# Patient Record
Sex: FEMALE | Race: WHITE
[De-identification: names, ages, dates, MRNs, and addresses within clinical notes are randomized per-mention and may not be internally consistent; named-entity substitution may affect disease eponyms.]

---

## 2020-12-15 ENCOUNTER — HOSPITAL ENCOUNTER (OUTPATIENT)
Dept: HOSPITAL 47 - RADMAMWWP | Age: 60
Discharge: HOME | End: 2020-12-15
Attending: FAMILY MEDICINE
Payer: MEDICARE

## 2020-12-15 DIAGNOSIS — Z98.82: ICD-10-CM

## 2020-12-15 DIAGNOSIS — N63.20: Primary | ICD-10-CM

## 2020-12-15 PROCEDURE — 77066 DX MAMMO INCL CAD BI: CPT

## 2020-12-29 NOTE — MM
Reason for exam: additional evaluation requested from prior study.

Last mammogram was performed 4 years and 6 months ago.



History:

Patient is postmenopausal.

Silicone gel implants in both breasts, 2000.  Benign excisional biopsy of the left

breast.

Took estrogen for 6 months beginning at age 31.  Took progesterone for 6 months 

beginning at age 31.



Physical Findings:

Nurse did not find any significant physical abnormalities on exam.



MG Diag Mamm Implants VICTORINA w CAD

Bilateral CC, MLO, and ID view(s) were taken.  XCCL view(s) were taken of the left

breast.

Prior study comparison: June 1, 2016, mammogram.  May 21, 2015, mammogram.

The breast tissue is heterogeneously dense. This may lower the sensitivity of 

mammography.  Bilateral breast prothesis.

No significant new findings when compared with previous films.





ASSESSMENT: Benign, BI-RAD 2



RECOMMENDATION:

Routine screening mammogram of both breasts in 1 year.

Manage patient on a clinical basis.

## 2021-04-14 ENCOUNTER — HOSPITAL ENCOUNTER (OUTPATIENT)
Dept: HOSPITAL 47 - ORWHC2ENDO | Age: 61
Discharge: HOME | End: 2021-04-14
Attending: INTERNAL MEDICINE
Payer: MEDICARE

## 2021-04-14 VITALS — RESPIRATION RATE: 16 BRPM | TEMPERATURE: 98.5 F

## 2021-04-14 VITALS — SYSTOLIC BLOOD PRESSURE: 136 MMHG | DIASTOLIC BLOOD PRESSURE: 91 MMHG | HEART RATE: 82 BPM

## 2021-04-14 VITALS — BODY MASS INDEX: 27.4 KG/M2

## 2021-04-14 DIAGNOSIS — K21.9: ICD-10-CM

## 2021-04-14 DIAGNOSIS — K22.70: ICD-10-CM

## 2021-04-14 DIAGNOSIS — Z79.899: ICD-10-CM

## 2021-04-14 DIAGNOSIS — E78.49: ICD-10-CM

## 2021-04-14 DIAGNOSIS — K62.5: ICD-10-CM

## 2021-04-14 DIAGNOSIS — I10: ICD-10-CM

## 2021-04-14 DIAGNOSIS — Z88.5: ICD-10-CM

## 2021-04-14 DIAGNOSIS — K31.9: ICD-10-CM

## 2021-04-14 DIAGNOSIS — K64.8: Primary | ICD-10-CM

## 2021-04-14 DIAGNOSIS — Z98.890: ICD-10-CM

## 2021-04-14 PROCEDURE — 88305 TISSUE EXAM BY PATHOLOGIST: CPT

## 2021-04-14 PROCEDURE — 43239 EGD BIOPSY SINGLE/MULTIPLE: CPT

## 2021-04-14 PROCEDURE — 45380 COLONOSCOPY AND BIOPSY: CPT

## 2021-04-14 NOTE — P.PCN
Date of Procedure: 04/14/21


Procedure(s) Performed: 


Brief history:


Patient is a pleasant 60-year-old white female scheduled for an elective upper 

endoscopy as well as colonoscopy as a part of evaluation of abdominal pain, 

heartburn and intermittent rectal bleeding for the last 3 months duration.  





Procedure performed:


Esophagogastroduodenoscopy with biopsy


Colonoscopy with biopsy





Preoperative diagnosis:


Abdominal pain and heartburn


Intermittent rectal bleeding and change in bowel





Anesthesia: MAC





Procedure:


After informed consent was obtained from the patient  was brought into the 

endoscopy unit and IV  sedation was administered by anesthesia under continuous 

monitoring.  Initially upper endoscopy was done.  The Olympus  video 

endoscope was inserted inserted into the mouth and esophagus intubated without 

any difficulty and was gradually advanced into the stomach and duodenum and 

carefully examined.  The bulb and second part of the duodenum appeared normal.  

The scope was then withdrawn into the stomach adequately insufflated with air 

and upon careful examination  the antrum had mild gastritis and biopsies were 

done from this area.  The body, cardia and fundus appeared normal.  The scope 

was then withdrawn into the esophagus.  The GE junction was located at 40 cm to 

the incisors.  It appeared riregular with no erythema erosions or ulcerations. 

there were 2 small tongues of Nolasco's appearing mucosa extending 3-4 mm 

proximal to the GE junction which was biopsied.    Rest of the esophagus 

appeared normal.  Patient tolerated the procedure well.





At this time the patient continued to remain sedation.  Initial digital rectal 

examination was normal.  Olympus  video colonoscope was then inserted into

the rectum and gradually advanced to the cecum without any difficulty.  Careful 

examination was performed as the scope was gradually being withdrawn.  The prep 

was excellent.  The cecum, ascending colon, transverse colon, descending colon, 

sigmoid colon and rectum appeared normal. random biopsies were done from 

ascending and descending colon to rule out microscopic/  Retroflexion was perfo

rmed in the rectum and no lesions were noted.  Patient tolerated the procedure 

well.





Impression:


1.  Upper endoscopy revealed mild antral gastritis and short segment Nolasco's 

esophagus


2.  Colonoscopy revealed small internal hemorrhoids but no evidence of colitis 

or colorectal neoplasia





Recommendations:


Findings of this examination were discussed with the patient as well as] her 

family.  She was advised to follow with the biopsy results.  She will continue 

with a high-fiber diet and take supplements a regular basis.  She can have a 

repeat screening colonoscopy in 10 years.

## 2021-08-27 ENCOUNTER — HOSPITAL ENCOUNTER (OUTPATIENT)
Dept: HOSPITAL 47 - LABWHC1 | Age: 61
Discharge: HOME | End: 2021-08-27
Attending: NURSE PRACTITIONER
Payer: MEDICARE

## 2021-08-27 DIAGNOSIS — F41.0: Primary | ICD-10-CM

## 2021-08-27 DIAGNOSIS — F10.11: ICD-10-CM

## 2021-08-27 LAB
ALBUMIN SERPL-MCNC: 4.7 G/DL (ref 3.8–4.9)
ALBUMIN/GLOB SERPL: 1.88 G/DL (ref 1.6–3.17)
ALP SERPL-CCNC: 71 U/L (ref 41–126)
ALT SERPL-CCNC: 23 U/L (ref 8–44)
ANION GAP SERPL CALC-SCNC: 7.9 MMOL/L (ref 4–12)
AST SERPL-CCNC: 24 U/L (ref 13–35)
BASOPHILS # BLD AUTO: 0.05 X 10*3/UL (ref 0–0.1)
BASOPHILS NFR BLD AUTO: 0.6 %
BUN SERPL-SCNC: 17 MG/DL (ref 9–27)
BUN/CREAT SERPL: 17 RATIO (ref 12–20)
CALCIUM SPEC-MCNC: 9.9 MG/DL (ref 8.7–10.3)
CHLORIDE SERPL-SCNC: 106 MMOL/L (ref 96–109)
CO2 SERPL-SCNC: 28.1 MMOL/L (ref 21.6–31.8)
EOSINOPHIL # BLD AUTO: 0.15 X 10*3/UL (ref 0.04–0.35)
EOSINOPHIL NFR BLD AUTO: 1.8 %
ERYTHROCYTE [DISTWIDTH] IN BLOOD BY AUTOMATED COUNT: 3.51 X 10*6/UL (ref 4.1–5.2)
ERYTHROCYTE [DISTWIDTH] IN BLOOD: 12 % (ref 11.5–14.5)
GLOBULIN SER CALC-MCNC: 2.5 G/DL (ref 1.6–3.3)
GLUCOSE SERPL-MCNC: 93 MG/DL (ref 70–110)
HBA1C MFR BLD: 5.7 % (ref 4–6)
HBV SURFACE AB SERPL IA-ACNC: 679.9 MIU/ML
HCT VFR BLD AUTO: 35.3 % (ref 37.2–46.3)
HGB BLD-MCNC: 11.5 G/DL (ref 12–15)
LYMPHOCYTES # SPEC AUTO: 3.41 X 10*3/UL (ref 0.9–5)
LYMPHOCYTES NFR SPEC AUTO: 40.1 %
MAGNESIUM SPEC-SCNC: 2.1 MG/DL (ref 1.5–2.4)
MCH RBC QN AUTO: 32.8 PG (ref 27–32)
MCHC RBC AUTO-ENTMCNC: 32.6 G/DL (ref 32–37)
MCV RBC AUTO: 100.6 FL (ref 80–97)
MONOCYTES # BLD AUTO: 0.65 X 10*3/UL (ref 0.2–1)
MONOCYTES NFR BLD AUTO: 7.6 %
NEUTROPHILS # BLD AUTO: 4.23 X 10*3/UL (ref 1.8–7.7)
NEUTROPHILS NFR BLD AUTO: 49.7 %
PLATELET # BLD AUTO: 240 X 10*3/UL (ref 140–440)
POTASSIUM SERPL-SCNC: 4.2 MMOL/L (ref 3.5–5.5)
PROT SERPL-MCNC: 7.2 G/DL (ref 6.2–8.2)
SODIUM SERPL-SCNC: 142 MMOL/L (ref 135–145)
T4 FREE SERPL-MCNC: 1 NG/DL (ref 0.8–1.8)
WBC # BLD AUTO: 8.51 X 10*3/UL (ref 4.5–10)

## 2021-08-27 PROCEDURE — 80164 ASSAY DIPROPYLACETIC ACD TOT: CPT

## 2021-08-27 PROCEDURE — 87340 HEPATITIS B SURFACE AG IA: CPT

## 2021-08-27 PROCEDURE — 83735 ASSAY OF MAGNESIUM: CPT

## 2021-08-27 PROCEDURE — 80053 COMPREHEN METABOLIC PANEL: CPT

## 2021-08-27 PROCEDURE — 83036 HEMOGLOBIN GLYCOSYLATED A1C: CPT

## 2021-08-27 PROCEDURE — 87390 HIV-1 AG IA: CPT

## 2021-08-27 PROCEDURE — 86803 HEPATITIS C AB TEST: CPT

## 2021-08-27 PROCEDURE — 36415 COLL VENOUS BLD VENIPUNCTURE: CPT

## 2021-08-27 PROCEDURE — 86140 C-REACTIVE PROTEIN: CPT

## 2021-08-27 PROCEDURE — 84443 ASSAY THYROID STIM HORMONE: CPT

## 2021-08-27 PROCEDURE — 86706 HEP B SURFACE ANTIBODY: CPT

## 2021-08-27 PROCEDURE — 85025 COMPLETE CBC W/AUTO DIFF WBC: CPT

## 2021-08-27 PROCEDURE — 84439 ASSAY OF FREE THYROXINE: CPT

## 2021-08-27 PROCEDURE — 86780 TREPONEMA PALLIDUM: CPT

## 2022-02-10 ENCOUNTER — HOSPITAL ENCOUNTER (OUTPATIENT)
Dept: HOSPITAL 47 - RADUSWWP | Age: 62
Discharge: HOME | End: 2022-02-10
Attending: FAMILY MEDICINE
Payer: MEDICARE

## 2022-02-10 DIAGNOSIS — E83.52: Primary | ICD-10-CM

## 2022-02-10 DIAGNOSIS — Z79.899: ICD-10-CM

## 2022-02-10 DIAGNOSIS — R10.32: ICD-10-CM

## 2022-02-10 DIAGNOSIS — R25.2: ICD-10-CM

## 2022-02-10 LAB
ALBUMIN SERPL-MCNC: 4.5 G/DL (ref 3.8–4.9)
ALBUMIN/GLOB SERPL: 1.73 G/DL (ref 1.6–3.17)
ALP SERPL-CCNC: 78 U/L (ref 41–126)
ALT SERPL-CCNC: 22 U/L (ref 8–44)
AMYLASE SERPL-CCNC: 59 U/L (ref 23–121)
ANION GAP SERPL CALC-SCNC: 14.4 MMOL/L (ref 10–18)
AST SERPL-CCNC: 22 U/L (ref 13–35)
BASOPHILS # BLD AUTO: 0.04 X 10*3/UL (ref 0–0.1)
BASOPHILS NFR BLD AUTO: 0.5 %
BUN SERPL-SCNC: 12.9 MG/DL (ref 9–27)
BUN/CREAT SERPL: 13.96 RATIO (ref 12–20)
CALCIUM SPEC-MCNC: 9.9 MG/DL (ref 8.7–10.3)
CHLORIDE SERPL-SCNC: 101 MMOL/L (ref 96–109)
CHOLEST SERPL-MCNC: 192 MG/DL (ref 0–200)
CO2 SERPL-SCNC: 24 MMOL/L (ref 20–27.5)
EOSINOPHIL # BLD AUTO: 0.1 X 10*3/UL (ref 0.04–0.35)
EOSINOPHIL NFR BLD AUTO: 1.2 %
ERYTHROCYTE [DISTWIDTH] IN BLOOD BY AUTOMATED COUNT: 3.94 X 10*6/UL (ref 4.1–5.2)
ERYTHROCYTE [DISTWIDTH] IN BLOOD: 13.1 % (ref 11.5–14.5)
GLOBULIN SER CALC-MCNC: 2.6 G/DL (ref 1.6–3.3)
GLUCOSE SERPL-MCNC: 90 MG/DL (ref 70–110)
HCT VFR BLD AUTO: 39.3 % (ref 37.2–46.3)
HDLC SERPL-MCNC: 62.2 MG/DL (ref 40–60)
HGB BLD-MCNC: 12.6 G/DL (ref 12–15)
IMM GRANULOCYTES BLD QL AUTO: 0.1 %
LDLC SERPL CALC-MCNC: 100.2 MG/DL (ref 0–131)
LIPASE SERPL-CCNC: 99 U/L (ref 14–63)
LYMPHOCYTES # SPEC AUTO: 2.5 X 10*3/UL (ref 0.9–5)
LYMPHOCYTES NFR SPEC AUTO: 29.6 %
MAGNESIUM SPEC-SCNC: 2.2 MG/DL (ref 1.5–2.4)
MCH RBC QN AUTO: 32 PG (ref 27–32)
MCHC RBC AUTO-ENTMCNC: 32.1 G/DL (ref 32–37)
MCV RBC AUTO: 99.7 FL (ref 80–97)
MONOCYTES # BLD AUTO: 0.77 X 10*3/UL (ref 0.2–1)
MONOCYTES NFR BLD AUTO: 9.1 %
NEUTROPHILS # BLD AUTO: 5.03 X 10*3/UL (ref 1.8–7.7)
NEUTROPHILS NFR BLD AUTO: 59.5 %
NRBC BLD AUTO-RTO: 0 /100 WBCS (ref 0–0)
PLATELET # BLD AUTO: 239 X 10*3/UL (ref 140–440)
POTASSIUM SERPL-SCNC: 4.8 MMOL/L (ref 3.5–5.5)
PROT SERPL-MCNC: 7.1 G/DL (ref 6.2–8.2)
SODIUM SERPL-SCNC: 139 MMOL/L (ref 135–145)
T4 FREE SERPL-MCNC: 1.4 NG/DL (ref 0.8–1.8)
TRIGL SERPL-MCNC: 148 MG/DL (ref 0–149)
VIT B12 SERPL-MCNC: 1204 PG/ML (ref 200–944)
VLDLC SERPL CALC-MCNC: 29.6 MG/DL (ref 5–40)
WBC # BLD AUTO: 8.45 X 10*3/UL (ref 4.5–10)

## 2022-02-10 PROCEDURE — 83036 HEMOGLOBIN GLYCOSYLATED A1C: CPT

## 2022-02-10 PROCEDURE — 82607 VITAMIN B-12: CPT

## 2022-02-10 PROCEDURE — 84439 ASSAY OF FREE THYROXINE: CPT

## 2022-02-10 PROCEDURE — 85025 COMPLETE CBC W/AUTO DIFF WBC: CPT

## 2022-02-10 PROCEDURE — 83735 ASSAY OF MAGNESIUM: CPT

## 2022-02-10 PROCEDURE — 82150 ASSAY OF AMYLASE: CPT

## 2022-02-10 PROCEDURE — 80053 COMPREHEN METABOLIC PANEL: CPT

## 2022-02-10 PROCEDURE — 76700 US EXAM ABDOM COMPLETE: CPT

## 2022-02-10 PROCEDURE — 84436 ASSAY OF TOTAL THYROXINE: CPT

## 2022-02-10 PROCEDURE — 84443 ASSAY THYROID STIM HORMONE: CPT

## 2022-02-10 PROCEDURE — 80061 LIPID PANEL: CPT

## 2022-02-10 PROCEDURE — 82306 VITAMIN D 25 HYDROXY: CPT

## 2022-02-10 PROCEDURE — 82746 ASSAY OF FOLIC ACID SERUM: CPT

## 2022-02-10 PROCEDURE — 84100 ASSAY OF PHOSPHORUS: CPT

## 2022-02-10 PROCEDURE — 83690 ASSAY OF LIPASE: CPT

## 2022-02-10 NOTE — US
EXAMINATION TYPE: US abdomen complete

 

DATE OF EXAM: 2/10/2022

 

COMPARISON: NONE

 

CLINICAL HISTORY: 61-year-old female  ABD PAIN. LLQ Pain x 3 days

 

TECHNIQUE: Multiple sonographic images of the right upper quadrant are obtained.

 

FINDINGS:

 

EXAM MEASUREMENTS:

 

Liver Length:  15.3 cm   

Gallbladder Wall:  0.2 cm   

CBD:  0.4 cm

Spleen:  9.5 cm   

Right Kidney:  11.1 x 4.9 x 4.7 cm 

Left Kidney:  11.3 x 6.3 x 4.8 cm   

 

 

 

Pancreas:  Tail obscured by overlying bowel gas. Visualized portions show no gross abnormality. The m
ain pancreatic duct visualized normal caliber at 2 mm.

Liver:  No masses seen. Partially obstructed by overlying bowel gas.   

Gallbladder:  No stones seen

**Evidence for sonographic Davalos's sign:  No

CBD:  wnl 

Spleen:  Partially Obscured by overlying bowel gas   

Right Kidney:  No hydronephrosis or masses seen   

Left Kidney:  No hydronephrosis or masses seen   

Upper IVC:  wnl  

Abd Aorta:  Proximal abdominal aorta for line ectatic at 2.5 cm.

 

Sonographer notes: All patient's pain is in LLQ. Area scanned. No masses or fluid seen. Entirely obsc
ured by bowel gas.  

 

 

 

IMPRESSION: 

1. Targeted scanning along the left lower quadrant site of patient's pain. The region is obscured by 
bowel gas shadowing. No discrete abnormality of the more superficial tissues.

2. No gallstones or biliary ductal dilatation. No hydronephrosis.

## 2022-09-21 ENCOUNTER — HOSPITAL ENCOUNTER (OUTPATIENT)
Dept: HOSPITAL 47 - EC | Age: 62
Setting detail: OBSERVATION
LOS: 2 days | Discharge: HOME | End: 2022-09-23
Attending: HOSPITALIST | Admitting: HOSPITALIST
Payer: MEDICARE

## 2022-09-21 DIAGNOSIS — R07.89: Primary | ICD-10-CM

## 2022-09-21 DIAGNOSIS — Z88.5: ICD-10-CM

## 2022-09-21 DIAGNOSIS — M79.651: ICD-10-CM

## 2022-09-21 DIAGNOSIS — R94.31: ICD-10-CM

## 2022-09-21 DIAGNOSIS — F41.0: ICD-10-CM

## 2022-09-21 DIAGNOSIS — Z98.890: ICD-10-CM

## 2022-09-21 DIAGNOSIS — I10: ICD-10-CM

## 2022-09-21 DIAGNOSIS — Z79.899: ICD-10-CM

## 2022-09-21 DIAGNOSIS — N17.9: ICD-10-CM

## 2022-09-21 DIAGNOSIS — R73.03: ICD-10-CM

## 2022-09-21 DIAGNOSIS — Z91.81: ICD-10-CM

## 2022-09-21 DIAGNOSIS — Z98.82: ICD-10-CM

## 2022-09-21 DIAGNOSIS — Z88.4: ICD-10-CM

## 2022-09-21 DIAGNOSIS — E78.5: ICD-10-CM

## 2022-09-21 DIAGNOSIS — Z86.010: ICD-10-CM

## 2022-09-21 DIAGNOSIS — F10.21: ICD-10-CM

## 2022-09-21 DIAGNOSIS — R61: ICD-10-CM

## 2022-09-21 DIAGNOSIS — E86.0: ICD-10-CM

## 2022-09-21 DIAGNOSIS — Z87.891: ICD-10-CM

## 2022-09-21 DIAGNOSIS — Z91.040: ICD-10-CM

## 2022-09-21 DIAGNOSIS — G89.29: ICD-10-CM

## 2022-09-21 DIAGNOSIS — Z80.9: ICD-10-CM

## 2022-09-21 DIAGNOSIS — Z87.19: ICD-10-CM

## 2022-09-21 DIAGNOSIS — Z88.7: ICD-10-CM

## 2022-09-21 DIAGNOSIS — R11.2: ICD-10-CM

## 2022-09-21 DIAGNOSIS — I08.1: ICD-10-CM

## 2022-09-21 DIAGNOSIS — Z82.49: ICD-10-CM

## 2022-09-21 DIAGNOSIS — M19.90: ICD-10-CM

## 2022-09-21 DIAGNOSIS — F32.A: ICD-10-CM

## 2022-09-21 DIAGNOSIS — R19.7: ICD-10-CM

## 2022-09-21 LAB
ALBUMIN SERPL-MCNC: 5.1 G/DL (ref 3.5–5)
ALP SERPL-CCNC: 81 U/L (ref 38–126)
ALT SERPL-CCNC: 29 U/L (ref 4–34)
ANION GAP SERPL CALC-SCNC: 19 MMOL/L
APTT BLD: 21.8 SEC (ref 22–30)
AST SERPL-CCNC: 55 U/L (ref 14–36)
BASOPHILS # BLD AUTO: 0 K/UL (ref 0–0.2)
BASOPHILS NFR BLD AUTO: 1 %
BUN SERPL-SCNC: 34 MG/DL (ref 7–17)
CALCIUM SPEC-MCNC: 9.9 MG/DL (ref 8.4–10.2)
CHLORIDE SERPL-SCNC: 98 MMOL/L (ref 98–107)
CO2 SERPL-SCNC: 20 MMOL/L (ref 22–30)
EOSINOPHIL # BLD AUTO: 0.2 K/UL (ref 0–0.7)
EOSINOPHIL NFR BLD AUTO: 3 %
ERYTHROCYTE [DISTWIDTH] IN BLOOD BY AUTOMATED COUNT: 3.74 M/UL (ref 3.8–5.4)
ERYTHROCYTE [DISTWIDTH] IN BLOOD: 12.3 % (ref 11.5–15.5)
GLUCOSE SERPL-MCNC: 107 MG/DL (ref 74–99)
HCT VFR BLD AUTO: 38.1 % (ref 34–46)
HGB BLD-MCNC: 12.6 GM/DL (ref 11.4–16)
INR PPP: 0.9 (ref ?–1.2)
LYMPHOCYTES # SPEC AUTO: 1.9 K/UL (ref 1–4.8)
LYMPHOCYTES NFR SPEC AUTO: 25 %
MCH RBC QN AUTO: 33.6 PG (ref 25–35)
MCHC RBC AUTO-ENTMCNC: 33 G/DL (ref 31–37)
MCV RBC AUTO: 101.7 FL (ref 80–100)
MONOCYTES # BLD AUTO: 0.4 K/UL (ref 0–1)
MONOCYTES NFR BLD AUTO: 5 %
NEUTROPHILS # BLD AUTO: 4.9 K/UL (ref 1.3–7.7)
NEUTROPHILS NFR BLD AUTO: 66 %
PLATELET # BLD AUTO: 186 K/UL (ref 150–450)
POTASSIUM SERPL-SCNC: 4.2 MMOL/L (ref 3.5–5.1)
PROT SERPL-MCNC: 8 G/DL (ref 6.3–8.2)
PT BLD: 10 SEC (ref 9–12)
SODIUM SERPL-SCNC: 137 MMOL/L (ref 137–145)
WBC # BLD AUTO: 7.5 K/UL (ref 3.8–10.6)

## 2022-09-21 PROCEDURE — 96372 THER/PROPH/DIAG INJ SC/IM: CPT

## 2022-09-21 PROCEDURE — 99285 EMERGENCY DEPT VISIT HI MDM: CPT

## 2022-09-21 PROCEDURE — 93971 EXTREMITY STUDY: CPT

## 2022-09-21 PROCEDURE — 76937 US GUIDE VASCULAR ACCESS: CPT

## 2022-09-21 PROCEDURE — 36415 COLL VENOUS BLD VENIPUNCTURE: CPT

## 2022-09-21 PROCEDURE — 93306 TTE W/DOPPLER COMPLETE: CPT

## 2022-09-21 PROCEDURE — 83880 ASSAY OF NATRIURETIC PEPTIDE: CPT

## 2022-09-21 PROCEDURE — 96374 THER/PROPH/DIAG INJ IV PUSH: CPT

## 2022-09-21 PROCEDURE — 83735 ASSAY OF MAGNESIUM: CPT

## 2022-09-21 PROCEDURE — 80048 BASIC METABOLIC PNL TOTAL CA: CPT

## 2022-09-21 PROCEDURE — 71046 X-RAY EXAM CHEST 2 VIEWS: CPT

## 2022-09-21 PROCEDURE — 85610 PROTHROMBIN TIME: CPT

## 2022-09-21 PROCEDURE — 81003 URINALYSIS AUTO W/O SCOPE: CPT

## 2022-09-21 PROCEDURE — 96361 HYDRATE IV INFUSION ADD-ON: CPT

## 2022-09-21 PROCEDURE — 84484 ASSAY OF TROPONIN QUANT: CPT

## 2022-09-21 PROCEDURE — 36410 VNPNXR 3YR/> PHY/QHP DX/THER: CPT

## 2022-09-21 PROCEDURE — 80053 COMPREHEN METABOLIC PANEL: CPT

## 2022-09-21 PROCEDURE — 93005 ELECTROCARDIOGRAM TRACING: CPT

## 2022-09-21 PROCEDURE — 85025 COMPLETE CBC W/AUTO DIFF WBC: CPT

## 2022-09-21 PROCEDURE — 85730 THROMBOPLASTIN TIME PARTIAL: CPT

## 2022-09-21 PROCEDURE — 83036 HEMOGLOBIN GLYCOSYLATED A1C: CPT

## 2022-09-21 RX ADMIN — HEPARIN SODIUM SCH UNIT: 5000 INJECTION INTRAVENOUS; SUBCUTANEOUS at 21:12

## 2022-09-21 RX ADMIN — CEFAZOLIN SCH MLS/HR: 330 INJECTION, POWDER, FOR SOLUTION INTRAMUSCULAR; INTRAVENOUS at 15:02

## 2022-09-21 RX ADMIN — CEFAZOLIN SCH MLS/HR: 330 INJECTION, POWDER, FOR SOLUTION INTRAMUSCULAR; INTRAVENOUS at 21:12

## 2022-09-21 NOTE — ED
General Adult HPI





- General


Chief complaint: Chest Pain


Stated complaint: CVA type symptoms


Time Seen by Provider: 09/21/22 12:17


Source: patient


Mode of arrival: EMS





- History of Present Illness


Initial comments: 


Dictation was produced using dragon dictation software. please excuse any 

grammatical, word or spelling errors. 











Chief Complaint: 62-year-old female presents to the emergency department for 

several days of chest pain and shortness of breath





History of Present Illness: To 2-year-old female presents to emergency 

department for chest pain shortness of breath.  She states that her symptoms are

worse whenever she exerts herself.  Patient has any fever or constitutional 

symptoms.  No cough.  Denies any other pain complaints to me.  Patient denies 

any heart history.  States that the pain is dull sometimes exacerbated with deep

inspiration.  Associated diaphoresis and she's been nauseated for several days. 

Pain is nonradiating.  It is towards the bilateral anterior lower chest








The ROS documented in this emergency department record has been reviewed and 

confirmed by me.  Those systems with pertinent positive or negative responses 

have been documented in the HPI.  All other systems are other negative and/or 

noncontributory.








PHYSICAL EXAM:


General Impression: Alert and oriented x3, not in acute distress


HEENT: Normocephalic atraumatic, extra-ocular movements intact, pupils equal and

reactive to light bilaterally, mucous membranes moist.


Cardiovascular: Heart regular rate and rhythm


Chest: Able to complete full sentences, no retractions, no tachypnea


Abdomen: abdomen soft, non-tender, non-distended, no organomegaly


Musculoskeletal: Pulses present and equal in all extremities, no peripheral 

edema


Motor:  no focal deficits noted


Neurological: CN II-XII grossly intact, no focal motor or sensory deficits noted


Skin: Intact with no visualized rashes


Psych: Normal affect and mood





ED course: 62-year-old well-appearing female presents emergency department with 

chest pain and exertional dyspnea.  Vital signs upon arrival are within 

acceptable limits.


Laboratory evaluation obtained.  CBC unremarkable.  Coag panel is negative.  Me

tabolic panel shows acute kidney injury created 2.27 and BUN of 34.  Patient 

does not have any history of kidney injury.  Rest of labs within acceptable 

limits.  Chest x-ray unremarkable.  Patient be admitted for IV hydration and 

renal monitoring.  Patient agreeable for admission.





EKG interpretation: Ventricular rate 98, sinus rhythm,.  Interval 159, care is 

95, QTC 41. No CO prolongation, no QTC prolongation, no ST or T-wave changes 

noted.  No old EKG for comparison.  Overall, this EKG is unremarkable








- Related Data


                                Home Medications











 Medication  Instructions  Recorded  Confirmed


 


Acetaminophen [Tylenol] 325 mg PO BID 04/12/21 04/12/21


 


Atorvastatin [Lipitor] 10 mg PO DAILY 04/12/21 04/12/21


 


Divalproex [Depakote] 250 mg PO BID 04/12/21 04/12/21


 


Multivit with Calcium,Iron,Min 1 each PO DAILY 04/12/21 04/12/21





[Women's Multivitamin]   


 


QUEtiapine XR [SEROquel XR] 200 mg PO DAILY@1700 04/12/21 04/12/21


 


QUEtiapine [SEROquel] 100 mg PO DAILY 04/12/21 04/12/21


 


Sertraline [Zoloft] 50 mg PO DAILY 04/12/21 04/12/21


 


busPIRone HCl [Buspar] 10 mg PO BID 04/12/21 04/12/21


 


lisinopriL [Zestril] 10 mg PO DAILY 04/12/21 04/12/21


 


traZODone HCL 50 mg PO HS 04/12/21 04/12/21











                                    Allergies











Allergy/AdvReac Type Severity Reaction Status Date / Time


 


latex Allergy Unknown Itching, Verified 09/21/22 14:49





   Blisters  


 


codeine AdvReac Severe HEADACHE Verified 09/21/22 14:49


 


hydrocodone [From Vicodin] AdvReac Severe HEADACHE Verified 09/21/22 14:49


 


COVID Allergy  LIPS BLUE, Uncoded 04/14/21 09:22





   FINGERS  





   BLUE,  





   CHILLS,  





   SHAKING  


 


ANESTHESIA AdvReac Unknown GAS GIVES Uncoded 04/14/21 09:22





   HER  





   HEADACHE,  





   VOMITING,  





   HEART  





   PALIPITATIONS  














Review of Systems


ROS Statement: 


Those systems with pertinent positive or pertinent negative responses have been 

documented in the HPI.





ROS Other: All systems not noted in ROS Statement are negative.





Past Medical History


Past Medical History: Hyperlipidemia, Hypertension, Osteoarthritis (OA)


Additional Past Medical History / Comment(s): hx colon polyps, occasional 

diarrhea, states rectal bleeding and abdominal pain., hx of fall Nov 2020 and 

has been having pain right hip since that radiates down right leg, walks with 

limp., states breast implants moving up.


History of Any Multi-Drug Resistant Organisms: None Reported


Past Surgical History: Orthopedic Surgery


Additional Past Surgical History / Comment(s): right elbow surgery with screws, 

right knee surgery with plate and removal ., lump left breast, breast implants


Past Anesthesia/Blood Transfusion Reactions: Postoperative Nausea & Vomiting 

(PONV)


Past Psychological History: Anxiety, Depression


Smoking Status: Former smoker


Past Alcohol Use History: None Reported


Past Drug Use History: None Reported





- Past Family History


  ** Father


Family Medical History: Cancer





Course


                                   Vital Signs











  09/21/22





  12:09


 


Temperature 98 F


 


Pulse Rate 94


 


Respiratory 18





Rate 


 


Blood Pressure 111/58


 


O2 Sat by Pulse 99





Oximetry 














Medical Decision Making





- Lab Data


Result diagrams: 


                                 09/21/22 13:11





                                 09/21/22 13:11


                                   Lab Results











  09/21/22 09/21/22 09/21/22 Range/Units





  13:11 13:11 13:11 


 


WBC  7.5    (3.8-10.6)  k/uL


 


RBC  3.74 L    (3.80-5.40)  m/uL


 


Hgb  12.6    (11.4-16.0)  gm/dL


 


Hct  38.1    (34.0-46.0)  %


 


MCV  101.7 H    (80.0-100.0)  fL


 


MCH  33.6    (25.0-35.0)  pg


 


MCHC  33.0    (31.0-37.0)  g/dL


 


RDW  12.3    (11.5-15.5)  %


 


Plt Count  186    (150-450)  k/uL


 


MPV  9.3    


 


Neutrophils %  66    %


 


Lymphocytes %  25    %


 


Monocytes %  5    %


 


Eosinophils %  3    %


 


Basophils %  1    %


 


Neutrophils #  4.9    (1.3-7.7)  k/uL


 


Lymphocytes #  1.9    (1.0-4.8)  k/uL


 


Monocytes #  0.4    (0-1.0)  k/uL


 


Eosinophils #  0.2    (0-0.7)  k/uL


 


Basophils #  0.0    (0-0.2)  k/uL


 


PT   10.0   (9.0-12.0)  sec


 


INR   0.9   (<1.2)  


 


APTT   21.8 L   (22.0-30.0)  sec


 


Sodium    137  (137-145)  mmol/L


 


Potassium    4.2  (3.5-5.1)  mmol/L


 


Chloride    98  ()  mmol/L


 


Carbon Dioxide    20 L  (22-30)  mmol/L


 


Anion Gap    19  mmol/L


 


BUN    34 H  (7-17)  mg/dL


 


Creatinine    2.27 H  (0.52-1.04)  mg/dL


 


Est GFR (CKD-EPI)AfAm    26  (>60 ml/min/1.73 sqM)  


 


Est GFR (CKD-EPI)NonAf    22  (>60 ml/min/1.73 sqM)  


 


Glucose    107 H  (74-99)  mg/dL


 


Calcium    9.9  (8.4-10.2)  mg/dL


 


Total Bilirubin    0.7  (0.2-1.3)  mg/dL


 


AST    55 H  (14-36)  U/L


 


ALT    29  (4-34)  U/L


 


Alkaline Phosphatase    81  ()  U/L


 


Troponin I     (0.000-0.034)  ng/mL


 


NT-Pro-B Natriuret Pep     pg/mL


 


Total Protein    8.0  (6.3-8.2)  g/dL


 


Albumin    5.1 H  (3.5-5.0)  g/dL














  09/21/22 09/21/22 Range/Units





  13:11 13:11 


 


WBC    (3.8-10.6)  k/uL


 


RBC    (3.80-5.40)  m/uL


 


Hgb    (11.4-16.0)  gm/dL


 


Hct    (34.0-46.0)  %


 


MCV    (80.0-100.0)  fL


 


MCH    (25.0-35.0)  pg


 


MCHC    (31.0-37.0)  g/dL


 


RDW    (11.5-15.5)  %


 


Plt Count    (150-450)  k/uL


 


MPV    


 


Neutrophils %    %


 


Lymphocytes %    %


 


Monocytes %    %


 


Eosinophils %    %


 


Basophils %    %


 


Neutrophils #    (1.3-7.7)  k/uL


 


Lymphocytes #    (1.0-4.8)  k/uL


 


Monocytes #    (0-1.0)  k/uL


 


Eosinophils #    (0-0.7)  k/uL


 


Basophils #    (0-0.2)  k/uL


 


PT    (9.0-12.0)  sec


 


INR    (<1.2)  


 


APTT    (22.0-30.0)  sec


 


Sodium    (137-145)  mmol/L


 


Potassium    (3.5-5.1)  mmol/L


 


Chloride    ()  mmol/L


 


Carbon Dioxide    (22-30)  mmol/L


 


Anion Gap    mmol/L


 


BUN    (7-17)  mg/dL


 


Creatinine    (0.52-1.04)  mg/dL


 


Est GFR (CKD-EPI)AfAm    (>60 ml/min/1.73 sqM)  


 


Est GFR (CKD-EPI)NonAf    (>60 ml/min/1.73 sqM)  


 


Glucose    (74-99)  mg/dL


 


Calcium    (8.4-10.2)  mg/dL


 


Total Bilirubin    (0.2-1.3)  mg/dL


 


AST    (14-36)  U/L


 


ALT    (4-34)  U/L


 


Alkaline Phosphatase    ()  U/L


 


Troponin I  <0.012   (0.000-0.034)  ng/mL


 


NT-Pro-B Natriuret Pep   83  pg/mL


 


Total Protein    (6.3-8.2)  g/dL


 


Albumin    (3.5-5.0)  g/dL














Disposition


Clinical Impression: 


 MICHAEL (acute kidney injury)





Disposition: ADMITTED AS IP TO THIS HOSP


Condition: Fair


Referrals: 


Lisa Becerra MD [Primary Care Provider] - 1-2 days


Decision Time: 14:51

## 2022-09-21 NOTE — XR
EXAMINATION TYPE: XR chest 2V

 

DATE OF EXAM: 9/21/2022

 

COMPARISON: NONE

 

HISTORY: Shortness of breath

 

TECHNIQUE:  Frontal and lateral views of the chest are obtained.

 

FINDINGS:

 

Scattered senescent parenchymal changes noted. 

 

No evidence for infiltrate. No evidence for atelectasis.

 

Heart size is stable.

 

Mediastinal structures are stable and grossly unremarkable.

 

No evidence for hilar prominence.

 

Degenerative changes dorsal spine. 

 

IMPRESSION:

1. No evidence for acute pulmonary disease.

## 2022-09-22 LAB
ANION GAP SERPL CALC-SCNC: 13 MMOL/L (ref 10–18)
BASOPHILS # BLD AUTO: 0.02 X 10*3/UL (ref 0–0.1)
BASOPHILS NFR BLD AUTO: 0.4 %
BUN SERPL-SCNC: 25.7 MG/DL (ref 9–27)
BUN/CREAT SERPL: 19.62 RATIO (ref 12–20)
CALCIUM SPEC-MCNC: 9 MG/DL (ref 8.7–10.3)
CHLORIDE SERPL-SCNC: 108 MMOL/L (ref 96–109)
CO2 SERPL-SCNC: 19.5 MMOL/L (ref 20–27.5)
EOSINOPHIL # BLD AUTO: 0.13 X 10*3/UL (ref 0.04–0.35)
EOSINOPHIL NFR BLD AUTO: 2.6 %
ERYTHROCYTE [DISTWIDTH] IN BLOOD BY AUTOMATED COUNT: 3.26 X 10*6/UL (ref 4.1–5.2)
ERYTHROCYTE [DISTWIDTH] IN BLOOD: 12.3 % (ref 11.5–14.5)
GLUCOSE SERPL-MCNC: 105 MG/DL (ref 70–110)
HCT VFR BLD AUTO: 33.8 % (ref 37.2–46.3)
HGB BLD-MCNC: 11.2 G/DL (ref 12–15)
IMM GRANULOCYTES BLD QL AUTO: 0.4 %
LYMPHOCYTES # SPEC AUTO: 1.96 X 10*3/UL (ref 0.9–5)
LYMPHOCYTES NFR SPEC AUTO: 38.5 %
MAGNESIUM SPEC-SCNC: 1.9 MG/DL (ref 1.5–2.4)
MCH RBC QN AUTO: 34.4 PG (ref 27–32)
MCHC RBC AUTO-ENTMCNC: 33.1 G/DL (ref 32–37)
MCV RBC AUTO: 103.7 FL (ref 80–97)
MONOCYTES # BLD AUTO: 0.54 X 10*3/UL (ref 0.2–1)
MONOCYTES NFR BLD AUTO: 10.6 %
NEUTROPHILS # BLD AUTO: 2.42 X 10*3/UL (ref 1.8–7.7)
NEUTROPHILS NFR BLD AUTO: 47.5 %
NRBC BLD AUTO-RTO: 0 /100 WBCS (ref 0–0)
PH UR: 5.5 [PH] (ref 5–8)
PLATELET # BLD AUTO: 172 X 10*3/UL (ref 140–440)
POTASSIUM SERPL-SCNC: 4.8 MMOL/L (ref 3.5–5.5)
SODIUM SERPL-SCNC: 140 MMOL/L (ref 135–145)
SP GR UR: 1.01 (ref 1–1.03)
UROBILINOGEN UR QL STRIP: <2 MG/DL (ref ?–2)
WBC # BLD AUTO: 5.09 X 10*3/UL (ref 4.5–10)

## 2022-09-22 RX ADMIN — HEPARIN SODIUM SCH UNIT: 5000 INJECTION INTRAVENOUS; SUBCUTANEOUS at 09:26

## 2022-09-22 RX ADMIN — CEFAZOLIN SCH MLS/HR: 330 INJECTION, POWDER, FOR SOLUTION INTRAMUSCULAR; INTRAVENOUS at 22:02

## 2022-09-22 RX ADMIN — DIVALPROEX SODIUM SCH MG: 250 TABLET, DELAYED RELEASE ORAL at 22:18

## 2022-09-22 RX ADMIN — HEPARIN SODIUM SCH UNIT: 5000 INJECTION INTRAVENOUS; SUBCUTANEOUS at 22:21

## 2022-09-22 RX ADMIN — ATORVASTATIN CALCIUM SCH MG: 10 TABLET, FILM COATED ORAL at 09:26

## 2022-09-22 RX ADMIN — DIVALPROEX SODIUM SCH MG: 250 TABLET, DELAYED RELEASE ORAL at 10:11

## 2022-09-22 RX ADMIN — CEFAZOLIN SCH: 330 INJECTION, POWDER, FOR SOLUTION INTRAMUSCULAR; INTRAVENOUS at 17:46

## 2022-09-22 RX ADMIN — CEFAZOLIN SCH MLS/HR: 330 INJECTION, POWDER, FOR SOLUTION INTRAMUSCULAR; INTRAVENOUS at 02:50

## 2022-09-22 NOTE — US
EXAMINATION TYPE: US venous doppler duplex UE LT

 

DATE OF EXAM: 9/22/2022

 

COMPARISON: NONE

 

CLINICAL HISTORY: infiltrated IV left AC. Blown IV site, swelling and bruising, no h/o dvt

 

SIDE PERFORMED: Left

 

 

Left Arm: Negative for DVT

 

Grayscale, color doppler, spectral doppler imaging performed of the deep veins of the upper extremiti
es.  There is normal flow, compressibility and vascular waveforms.

 

IMPRESSION: 

No evidence of deep vein thrombosis of the left upper extremity. There is subcutaneous edema seen wit
hin the soft tissues.

## 2022-09-22 NOTE — CA
Transthoracic Echo Report 

 Name: Pura Eid 

 MRN:    [id number] 

 Age:    62     Gender:     F 

 

 :    1960 

 Exam Date:     2022 13:03 

 Exam Location: [facility] 

 Ht (in):     64     Wt (lb):     140 

 Ordering Physician:        Cristo Freedman MD 

 Attending/Referring Phys:         [id number], Sheet 

 Technician         Elizabeth Beckman, [facility] 

 Procedure CPT: 

 Indications:       Rule out heart disease 

 

 Cardiac Hx: 

 Technical Quality:      Good 

 Contrast 1:                                Total Dose (mL): 

 Contrast 2:                                Total Dose (mL): 

 

 MEASUREMENTS  (Male / Female) Normal Values 

 2D ECHO 

 LV Diastolic Diameter PLAX        3.5 cm                4.2 - 5.9 / 3.9 - 5.3 cm 

 LV Systolic Diameter PLAX         2.0 cm                 

 IVS Diastolic Thickness           1.1 cm                0.6 - 1.0 / 0.6 - 0.9 cm 

 LVPW Diastolic Thickness          1.1 cm                0.6 - 1.0 / 0.6 - 0.9 cm 

 LV Relative Wall Thickness        0.6                    

 RV Internal Dim ED PLAX           2.8 cm                 

 LA Systolic Diameter LX           3.5 cm                3.0 - 4.0 / 2.7 - 3.8 cm 

 LA Volume                         51.1 cm???              18 - 58 / 22 - 52 cm??? 

 

 M-MODE 

 Aortic Root Diameter MM           3.0 cm                 

 AV Cusp Separation MM             2.2 cm                 

 

 DOPPLER 

 AV Peak Velocity                  158.7 cm/s             

 AV Peak Gradient                  10.1 mmHg              

 MV Area PHT                       5.1 cm???                

 Mitral E Point Velocity           89.3 cm/s              

 Mitral A Point Velocity           79.9 cm/s              

 Mitral E to A Ratio               1.1                    

 MV Deceleration Time              148.0 ms               

 MV E' Velocity                    9.0 cm/s               

 Mitral E to MV E' Ratio           9.9                    

 TR Peak Velocity                  209.9 cm/s             

 TR Peak Gradient                  17.6 mmHg              

 Right Ventricular Systolic Press  22.5 mmHg              

 

 

 FINDINGS 

 Left Ventricle 

 Left ventricular ejection fraction is estimated at 55-60 %. Left ventricular  

 cavity size normal. Borderline left ventricular hypertrophy. 

 

 Right Ventricle 

 Normal right ventricular size and function. Right ventricular systolic pressure  

 within normal limits. 

 

 Right Atrium 

 Normal right atrial size. 

 

 Left Atrium 

 Normal left atrial size.no evidence for an atrial septal defect. 

 

 Mitral Valve 

 Structurally normal mitral valve. Mild mitral regurgitation. 

 

 Aortic Valve 

 Trileaflet aortic valve. No aortic valve stenosis or regurgitation. 

 

 Tricuspid Valve 

 Mild tricuspid regurgitation. 

 

 Pulmonic Valve 

 Structurally normal pulmonic valve. 

 

 Pericardium 

 Normal pericardium. No pericardial effusion. 

 

 Aorta 

 Normal size aortic root and proximal ascending aorta. 

 

 CONCLUSIONS 

 LV systolic function is normal 

 Previewed by:  

 Dr. Conrad Youngblood MD 

 (Electronically Signed) 

 Final Date:      2022 17:32

## 2022-09-22 NOTE — P.HPIM
History of Present Illness





This is a pleasant 62 years old female with past medical history of 

hypertension, hyperlipidemia.  She is patient of Dr. mishra.


Patient states she was not feeling well over the last three days, she was 

confused and she was somewhat forgetful about her history , now she is awake and

alert .  Patient states that she's been having frequent vomiting and diarrhea 

about 6 times of vomiting yesterday and 10-12 times of loose bowel movement 

yesterday which were going on for the last 2-3 days, she has poor appetite that 

well and shows some little discomfort in the left lower quadrant which is now 

resolved.  patient presents with acute kidney injury and she was treated with iv

fluids and her creatinine improved today down to 1.3 compared to 2.2 upon 

admission., Her urinalysis is normal.  


 she has multiple different symptoms and this to 3 days but she says she was 

confused.  She was complaining of from chest discomfort so she wants to see her 

PCP Dr. mishra wooded EKG for her and her her to the hospital for abnormal EKG as 

per patient.


Patient currently denies any chest pain or shortness of breath, no headache or 

weakness or numbness.  No blurred vision or dizziness.  Her abdominal pain has 

resolved.  No nausea vomiting, no diarrhea.  She has 1 formed bowel movement 

this morning.


She denies dysuria or frequency.


Patient looks very anxious and she got panic attack and informed, patient states

that she had a panic attack this morning.  A patient wants to stay in hospital 

for 1 more day to monitor her anxiety 


bladder scan was only 75 mL


 her get up and go test is normal but patient complains from chronic wound pain 

and chronic right thigh pain at least for the last 6 months as patient states 

and his been stable today.





Vitas looks stable and patient is afebrile.


Labs show an unremarkable CBC, INR and liver enzymes.


ProBNP 83, troponin negative.


Creatinine elevated 2.2, baseline 0.9 about 7 months ago


EKG showing normal sinus rhythm with no significant ST-T changes and the rate of

98


Chest x-ray: No acute process


Patient also has left upper extremity swelling after IV infiltration.  Patient 

did not have this problem prior to hospitalization

















Review of Systems





Review of systems


CONSTITUTIONAL: No fever, no malaise, no fatigue. 


HEENT: No recent visual problems or hearing problems. Denied any sore throat. 


CARDIOVASCULAR: No  orthopnea, PND, no palpitations, no syncope. 


PULMONARY: No shortness of breath, no cough, no hemoptysis. 


GASTROINTESTINAL: No diarrhea, no nausea, no vomiting, no abdominal pain. 

Normoactive bowel sounds. 


NEUROLOGICAL: No headaches, no weakness, no numbness. 


HEMATOLOGICAL: Denies any bleeding or petechiae. 


GENITOURINARY: Denies any burning micturition, frequency, or urgency. 


MUSCULOSKELETAL/RHEUMATOLOGICAL: Denies any joint pain, swelling, or any muscle 

pain. 


ENDOCRINE: Denies any polyuria or polydipsia.





Past Medical History


Past Medical History: Hyperlipidemia, Hypertension, Osteoarthritis (OA)


Additional Past Medical History / Comment(s): hx colon polyps, occasional 

diarrhea, states rectal bleeding and abdominal pain., hx of fall Nov 2020 and 

has been having pain right hip since that radiates down right leg, walks with 

limp., states breast implants moving up.


History of Any Multi-Drug Resistant Organisms: None Reported


Past Surgical History: Orthopedic Surgery


Additional Past Surgical History / Comment(s): right elbow surgery with screws, 

right knee surgery with plate and removal ., lump left breast, breast implants


Past Anesthesia/Blood Transfusion Reactions: Postoperative Nausea & Vomiting 

(PONV)


Past Psychological History: Anxiety, Depression


Smoking Status: Former smoker


Past Alcohol Use History: None Reported


Additional Past Alcohol Use History / Comment(s): quit smoking 35 yrs ago 

(1986), hx of 2ppd, started age 15.  states recovering alcoholic.


Past Drug Use History: None Reported





- Past Family History


  ** Father


Family Medical History: Cancer





Medications and Allergies


                                Home Medications











 Medication  Instructions  Recorded  Confirmed  Type


 


Atorvastatin [Lipitor] 10 mg PO DAILY 04/12/21 09/21/22 History


 


Divalproex [Depakote] 250 mg PO BID 04/12/21 09/21/22 History


 


QUEtiapine XR [SEROquel XR] 200 mg PO DAILY@1700 04/12/21 09/21/22 History


 


QUEtiapine [SEROquel] 100 mg PO DAILY 04/12/21 09/21/22 History


 


RX: traZODone HCL 50 mg PO HS 04/12/21 09/21/22 History


 


Sertraline [Zoloft] 50 mg PO DAILY 04/12/21 09/21/22 History


 


lisinopriL [Zestril] 10 mg PO DAILY 04/12/21 09/21/22 History








                                    Allergies











Allergy/AdvReac Type Severity Reaction Status Date / Time


 


latex Allergy Unknown Itching, Verified 09/21/22 14:49





   Blisters  


 


codeine AdvReac Severe HEADACHE Verified 09/21/22 14:49


 


hydrocodone [From Vicodin] AdvReac Severe HEADACHE Verified 09/21/22 14:49


 


ANESTHESIA AdvReac Unknown GAS GIVES Uncoded 04/14/21 09:22





   HER  





   HEADACHE,  





   VOMITING,  





   HEART  





   PALIPITATIONS  














Physical Exam


Vitals: 


                                   Vital Signs











  Temp Pulse Pulse Resp BP BP Pulse Ox


 


 09/22/22 02:00  98.4 F   72  18   118/77  98


 


 09/21/22 20:49  98.4 F   94  19   131/85  99


 


 09/21/22 20:00    86    


 


 09/21/22 15:45  99.0 F   96  16   115/75  99


 


 09/21/22 15:35  98 F  94   18  119/91   99


 


 09/21/22 12:09  98 F  94   18  111/58   99








                                Intake and Output











 09/21/22 09/22/22 09/22/22





 22:59 06:59 14:59


 


Intake Total 740 1000 


 


Balance 740 1000 


 


Intake:   


 


  Intake, IV Titration  1000 





  Amount   


 


    Sodium Chloride 0.9% 1,  1000 





    000 ml @ 130 mls/hr IV .   





    Q7H42M [facility] Rx#:[id number]   


 


  Oral 740  


 


Other:   


 


  Voiding Method Toilet  


 


  Weight 63.503 kg  














GENERAL: The patient is alert and oriented x3, not in any acute distress. Well 

developed, well nourished. 


HEENT: Pupils are round and equally reacting to light. EOMI. No scleral icterus.

No conjunctival pallor. Normocephalic, atraumatic. No pharyngeal erythema. No 

thyromegaly. 


CARDIOVASCULAR: S1 and S2 present. No murmurs, rubs, or gallops. 


PULMONARY: Chest is clear to auscultation, no wheezing or crackles. 


ABDOMEN: Soft, nontender, nondistended, normoactive bowel sounds. No palpable 

organomegaly. 


MUSCULOSKELETAL: No joint swelling or deformity. 


EXTREMITIES: No cyanosis, clubbing, or pedal edema. 


NEUROLOGICAL: Gross neurological examination did not reveal any focal deficits. 


SKIN: No rashes. no petechiae.





Results


CBC & Chem 7: 


                                 09/22/22 07:02





                                 09/22/22 07:02


Labs: 


                  Abnormal Lab Results - Last 24 Hours (Table)











  09/21/22 09/21/22 09/21/22 Range/Units





  13:11 13:11 13:11 


 


RBC  3.74 L    (3.80-5.40)  m/uL


 


MCV  101.7 H    (80.0-100.0)  fL


 


APTT   21.8 L   (22.0-30.0)  sec


 


Carbon Dioxide    20 L  (22-30)  mmol/L


 


BUN    34 H  (7-17)  mg/dL


 


Creatinine    2.27 H  (0.52-1.04)  mg/dL


 


Glucose    107 H  (74-99)  mg/dL


 


AST    55 H  (14-36)  U/L


 


Albumin    5.1 H  (3.5-5.0)  g/dL














Thrombosis Risk Factor Assmnt





- Choose All That Apply


Any of the Below Risk Factors Present?: No





Assessment and Plan


Assessment: 





Acute kidney injury, possibly prerenal.   resolved 


Diarrhea suspicious for acute gastroenteritis, viral infection is suspected.  

resolved 


chest discomfort, sent by PCP for abnormal EKG 


Chronic right leg/thigh pain and limping 6 months


panic attack 


Hypertension


Hyperlipidemia














Plan: 





This is a pleasant 62 years old female who presents with gastroenteritis and 

acute kidney injury


Continue with IV hydration


Monitor creatinine


consult cardiology


check bladder scan, send urine analysis


Resume  lisinopril 


 check Doppler of the left upper extremity and the right lower extremity


increase her Zoloft 50 mg and to 100 mg for her panic attack 


Labs and medication were reviewed..  Continue same treatment.  Continue with 

symptomatic treatment.  Resume home medication.  Monitor lytes and vitals.  DVT 

and GI prophylaxis.  Further recommendations as per clinical course of the 

patient


DVT prophylaxis: Subcutaneous heparin


GI Prophylaxis: Pepcid


PT/OT: Pending


Prognosis is guarded

## 2022-09-22 NOTE — US
EXAMINATION TYPE: US venous doppler duplex LE RT

 

DATE OF EXAM: 9/22/2022 1:50 PM

 

COMPARISON: NONE

 

CLINICAL HISTORY: pain. upper leg pain for years, no h/o dvt

 

SIDE PERFORMED: Right  

 

TECHNIQUE:  The lower extremity deep venous system is examined utilizing real time linear array sonog
fidelina with graded compression, doppler sonography and color-flow sonography.

 

VESSELS IMAGED:

Common Femoral Vein

Deep Femoral Vein

Greater Saphenous Vein *

Femoral Vein

Popliteal Vein

Small Saphenous Vein *

Proximal Calf Veins

(* superficial vessels)

 

 

 

Right Leg:  Negative for DVT

Grayscale, color doppler, spectral doppler imaging performed of the deep veins of the lower extremiti
es.  There is normal flow, compressibility, vascular waveforms.

 

 

IMPRESSION:  No evidence of deep vein thrombosis of the right lower extremity.

## 2022-09-23 VITALS — HEART RATE: 90 BPM | DIASTOLIC BLOOD PRESSURE: 97 MMHG | TEMPERATURE: 97.7 F | SYSTOLIC BLOOD PRESSURE: 169 MMHG

## 2022-09-23 VITALS — RESPIRATION RATE: 18 BRPM

## 2022-09-23 RX ADMIN — CEFAZOLIN SCH MLS/HR: 330 INJECTION, POWDER, FOR SOLUTION INTRAMUSCULAR; INTRAVENOUS at 05:15

## 2022-09-23 RX ADMIN — HEPARIN SODIUM SCH: 5000 INJECTION INTRAVENOUS; SUBCUTANEOUS at 09:40

## 2022-09-23 RX ADMIN — DIVALPROEX SODIUM SCH MG: 250 TABLET, DELAYED RELEASE ORAL at 09:39

## 2022-09-23 RX ADMIN — ATORVASTATIN CALCIUM SCH MG: 10 TABLET, FILM COATED ORAL at 09:39

## 2022-09-23 NOTE — P.DS
Providers


Date of admission: 


09/21/22 14:49





Attending physician: 


Boni Larios





Consults: 





                                        





09/22/22 11:38


Consult Physician Urgent 


   Consulting Provider: Conrad Youngblood


   Consult Reason/Comments: chest discomfort


   Do you want consulting provider notified?: Yes











Primary care physician: 


Lisa Becerra





[facility] Course: 





Diagnoses:


Acute kidney injury, possibly prerenal.   resolved 


Diarrhea suspicious for acute gastroenteritis, viral infection is suspected.  

resolved 


chest discomfort, sent by PCP for abnormal EKG .  Evaluated by cardiologist due 

to for discharge


Chronic right leg/thigh pain and limping 6 months.  Ultrasound is negative for 

DVT


panic attack 


Hypertension


Hyperlipidemia





Hospital course:


This is a pleasant 62 years old female with past medical history of 

hypertension, hyperlipidemia.  She is patient of Dr. mishra.


Patient states she was not feeling well over the last three days, she was 

confused and she was somewhat forgetful about her history , now she is awake and

alert .  Patient states that she's been having frequent vomiting and diarrhea 

about 6 times of vomiting yesterday and 10-12 times of loose bowel movement 

yesterday which were going on for the last 2-3 days, she has poor appetite that 

well and shows some little discomfort in the left lower quadrant which is now 

resolved.  patient presents with acute kidney injury and she was treated with iv

fluids and her creatinine improved today down to 1.3 compared to 2.2 upon 

admission., Her urinalysis is normal.  


Also patient report some chest discomfort besides Dr. mishra initially sent her to

the hospital for abnormal EKG therefore cardiology consult obtained, patient 

does not have any cardiac issue and cardiologist to her for discharge most her 

symptoms are due to dehydration and now is completely resolved.  Today the 

patient she was lying in bed completely comfortable and pleasant and asked to be

discharged home today.  She denies chest pain or dyspnea or abdominal pain no 

vomiting or diarrhea no change in eating habits.  Urine is fine.  No fever.  

Requested workup was negative like echo and Doppler ultrasound.


Patient was cleared for discharge by cardiologist.


Problems and management plan were discussed with the patient and he verbalized 

understanding and acceptance


Patient was found stable and can be discharged home however he needs follow-up 

as an outpatient. Patient was instructed to follow up with PCP Dr. mishra within 

one week and patient agrees








Physical exam


Gen: patient is a AAOx3, no distress


CVS: S1-S2, RRR, no murmur


Lungs: B/L CTA, no wheezing


Abdomen: soft, no distention, no tenderness, positive bowel sounds


Extremity: no leg edema or induration





Time spent more than 35 minutes


Patient Condition at Discharge: Fair





Plan - Discharge Summary


Discharge Rx Participant: No


New Discharge Prescriptions: 


New


   Sertraline [Zoloft] 100 mg PO DAILY #30 tab





Continue


   QUEtiapine [SEROquel] 100 mg PO DAILY


   QUEtiapine XR [SEROquel XR] 200 mg PO DAILY@1700


   lisinopriL [Zestril] 10 mg PO DAILY


   Atorvastatin [Lipitor] 10 mg PO DAILY


   Divalproex [Depakote] 250 mg PO BID


   traZODone HCL 50 mg PO HS





Discontinued


   Sertraline [Zoloft] 50 mg PO DAILY


Discharge Medication List





Atorvastatin [Lipitor] 10 mg PO DAILY 04/12/21 [History]


Divalproex [Depakote] 250 mg PO BID 04/12/21 [History]


QUEtiapine XR [SEROquel XR] 200 mg PO DAILY@1700 04/12/21 [History]


QUEtiapine [SEROquel] 100 mg PO DAILY 04/12/21 [History]


lisinopriL [Zestril] 10 mg PO DAILY 04/12/21 [History]


traZODone HCL 50 mg PO HS 04/12/21 [History]


Sertraline [Zoloft] 100 mg PO DAILY #30 tab 09/23/22 [Rx]








Follow up Appointment(s)/Referral(s): 


Lisa Becerra MD [Primary Care Provider] - 1-2 days


Patient Instructions/Handouts:  Dehydration (DC), Acute Kidney Injury (DC)


Activity/Diet/Wound Care/Special Instructions: 


heart healthy diet 


activity is restricted till you see your doctor 


Discharge Disposition: HOME SELF-CARE

## 2022-09-23 NOTE — P.CRDCN
History of Present Illness


History of present illness: 


HISTORY OF PRESENTING ILLNESS


This is a pleasant 62-year-old female past medical history significant for 

hypertension, dyslipidemia, former nicotine dependence. She does not follow with

a cardiologist. We have been asked to see in consultation for chest pain. 

Patient presents to the emergency department with complaints of nausea, 

vomiting, diarrhea, decreased appetite for 2-3 days. She went to see her PCP and

was feeling lightheaded, dizzy, she states she "felt off". Her PCP performed and

EKG and sent patient to ER for further evaluation. She was found to have acute 

kidney injury, was treated with IV fluids with improvement. Her symptoms also 

improved with after IV fluids. She did have some left sided intermittent chest 

discomfort 2 days ago, that is tender to palpation. It was non-radiating, non-

exertional. Aggravating factors include pressure/palpation to the area and 

wearing her bra. Her chest pain is reproducible on exam. Her symptoms of 

lightheadedness, dizziness have resolved. 


She denies history of CAD, MI, Stroke. She states she has been told she has 

borderline diabetes. Family history includes father had an MI in his 50s. She 

denies tobacco use. 





DIAGNOSTICS


* EKG reveals sinus rhythm, heart rate 98, slow R wave progression, nonspecific 

  STT wave abnormalities.


* Echocardiogram revealed EF 5560 percent, mild mitral regurgitation


* Telemetry tracings indicate sinus rhythm


* Chest xray no acute cardiopulmonary process


* Venous Doppler negative for DVT bilaterally


* Laboratory reviewed, troponin negative 2, sodium 140, potassium 4.8, BUN 1.3 

  (on admission 2.27), magnesium 1.9, hemoglobin A1c 6.1, WBC 5.0, hemoglobin 

  11.2, platelets 172


* Current home medications include lisinopril 10 mg daily, atorvastatin 10 mg 

  daily





REVIEW OF SYSTEMS


At the time of my exam: Patient's symptoms of nausea, vomiting, diarrhea, 

lightheadedness, dizziness have resolved


CONSTITUTIONAL: Denies fever or chills.


CARDIOVASCULAR: Denies chest pain, shortness of breath, orthopnea, PND or 

palpitations.


RESPIRATORY: Denies cough. 


GASTROINTESTINAL: Denies abdominal pain, diarrhea, constipation, nausea or 

vomiting.


MUSCULOSKELETAL: +left sided chest discomfort with palpation and movement of the

chest 


NEUROLOGIC: Denies numbness, tingling, headache or weakness.


ENDOCRINE: Denies fatigue, weight change,  polydipsia or polyurina.


GENITOURINARY: Denies burning, hematuria or urgency with micturation.


HEMATOLOGIC: Denies history of anemia or bleeding. 





PHYSICAL EXAMINATION


Blood pressure 133/74, heart rate 79, afebrile, saturation 97% on room air


CONSTITUTIONAL: No apparent distress. 


HEENT: Head is normocephalic. Pupils are equal, round. Sclerae anicteric. Mucous

membranes of the mouth are moist.  No JVD. No carotid bruit.


CHEST EXAMINATION: Lungs are clear to auscultation. There is chest wall 

tenderness noted on palpation


HEART EXAMINATION: Regular rate and rhythm. S1, S2 heard. No murmurs, gallops or

rub.


ABDOMEN: Soft, nontender. Positive bowel sounds.


EXTREMITIES: 2+ peripheral pulses, no lower extremity edema and no calf 

tenderness.


SKIN: warm, dry 


NEUROLOGIC EXAMINATION: Patient is awake, alert and oriented x3. 





ASSESSMENT


Chest pain, non-cardiac, reproducible on exam, likely musculoskeletal


Nausea, vomiting, diarrhea, resolved 


Acute kidney injury, improved with IV fluids


Dehydration


Lightheadedness, dizziness, resolved 


History of hypertension


Dyslipidemia


Former nicotine dependence








PLAN


An acute coronary event has been ruled out with no EKG evidence of ischemia and 

negative cardiac enzymes.  Patient's chest pain appears to be noncardiac, repro

ducible on exam.  Echocardiogram revealed EF 5560%. No further inpatient workup

from a cardiology perspective. Please reach out with any further questions or 

concerns. 














Thank you kindly for this consultation. 


Nurse practitioner note has been reviewed by physician. Signing provider agrees 

with the documented findings, assessment, and plan of care. 











Past Medical History


Past Medical History: Hyperlipidemia, Hypertension, Osteoarthritis (OA)


Additional Past Medical History / Comment(s): hx colon polyps, occasional 

diarrhea, states rectal bleeding and abdominal pain., hx of fall Nov 2020 and 

has been having pain right hip since that radiates down right leg, walks with 

limp., states breast implants moving up.


History of Any Multi-Drug Resistant Organisms: None Reported


Past Surgical History: Orthopedic Surgery


Additional Past Surgical History / Comment(s): right elbow surgery with screws, 

right knee surgery with plate and removal ., lump left breast, breast implants


Past Anesthesia/Blood Transfusion Reactions: Postoperative Nausea & Vomiting 

(PONV)


Past Psychological History: Anxiety, Depression


Smoking Status: Former smoker


Past Alcohol Use History: None Reported


Additional Past Alcohol Use History / Comment(s): quit smoking 35 yrs ago 

(1986), hx of 2ppd, started age 15.  states recovering alcoholic.


Past Drug Use History: None Reported





- Past Family History


  ** Father


Family Medical History: Cancer





Medications and Allergies


                                Home Medications











 Medication  Instructions  Recorded  Confirmed  Type


 


Atorvastatin [Lipitor] 10 mg PO DAILY 04/12/21 09/21/22 History


 


Divalproex [Depakote] 250 mg PO BID 04/12/21 09/21/22 History


 


QUEtiapine XR [SEROquel XR] 200 mg PO DAILY@1700 04/12/21 09/21/22 History


 


QUEtiapine [SEROquel] 100 mg PO DAILY 04/12/21 09/21/22 History


 


Sertraline [Zoloft] 50 mg PO DAILY 04/12/21 09/21/22 History


 


lisinopriL [Zestril] 10 mg PO DAILY 04/12/21 09/21/22 History


 


traZODone HCL 50 mg PO HS 04/12/21 09/21/22 History








                                    Allergies











Allergy/AdvReac Type Severity Reaction Status Date / Time


 


latex Allergy Unknown Itching, Verified 09/21/22 14:49





   Blisters  


 


codeine AdvReac Severe HEADACHE Verified 09/21/22 14:49


 


hydrocodone [From Vicodin] AdvReac Severe HEADACHE Verified 09/21/22 14:49


 


ANESTHESIA AdvReac Unknown GAS GIVES Uncoded 04/14/21 09:22





   HER  





   HEADACHE,  





   VOMITING,  





   HEART  





   PALIPITATIONS  














Physical Exam


Vitals: 


                                   Vital Signs











  Temp Pulse Resp BP BP BP BP


 


 09/23/22 02:00  97.5 F L  79  18     133/74


 


 09/22/22 20:41  98.1 F  96  16  140/92   


 


 09/22/22 15:00  98.8 F  75  16     135/85


 


 09/22/22 09:52   89    135/87  134/85  131/78














  Pulse Ox


 


 09/23/22 02:00  97


 


 09/22/22 20:41  99


 


 09/22/22 15:00  99


 


 09/22/22 09:52 








                                Intake and Output











 09/22/22 09/23/22 09/23/22





 22:59 06:59 14:59


 


Other:   


 


  Voiding Method Toilet  


 


  # Voids 5 1 














Results





                                 09/22/22 07:02





                                 09/22/22 07:02


                                 Cardiac Enzymes











  09/22/22 Range/Units





  12:46 


 


Troponin I  <0.012  (0.000-0.034)  ng/mL








                                       CBC











  09/22/22 Range/Units





  07:02 


 


WBC  5.09  (4.50-10.00)  X 10*3/uL


 


RBC  3.26 L  (4.10-5.20)  X 10*6/uL


 


Hgb  11.2 L  (12.0-15.0)  g/dL


 


Hct  33.8 L  (37.2-46.3)  %


 


Plt Count  172  (140-440)  X 10*3/uL








                          Comprehensive Metabolic Panel











  09/22/22 Range/Units





  07:02 


 


Sodium  140  (135-145)  mmol/L


 


Potassium  4.8  (3.5-5.5)  mmol/L


 


Chloride  108  ()  mmol/L


 


Carbon Dioxide  19.5 L  (20.0-27.5)  mmol/L


 


BUN  25.7  (9.0-27.0)  mg/dL


 


Creatinine  1.3  (0.6-1.5)  mg/dL


 


Glucose  105  ()  mg/dL


 


Calcium  9.0  (8.7-10.3)  mg/dL








                               Current Medications











Generic Name Dose Route Start Last Admin





  Trade Name Freq  PRN Reason Stop Dose Admin


 


Acetaminophen  650 mg  09/22/22 22:08 





  Acetaminophen Tab 325 Mg Tab  PO  





  Q6HR PRN  





  Fever and/ or Pain  


 


Atorvastatin Calcium  10 mg  09/22/22 09:00  09/22/22 09:26





  Atorvastatin 10 Mg Tab  PO   10 mg





  DAILY SAI   Administration


 


Divalproex Sodium  250 mg  09/22/22 09:00  09/22/22 22:18





  Divalproex 250 Mg Tablet.  PO   250 mg





  BID SAI   Administration


 


Famotidine  20 mg  09/23/22 09:00 





  Famotidine 20 Mg Tab  PO  





  DAILY SAI  


 


Heparin Sodium (Porcine)  5,000 unit  09/21/22 21:00  09/22/22 22:21





  Heparin Sodium,Porcine/Pf 5,000 Unit/0.5 Ml Syringe  SQ   5,000 unit





  Q12HR SAI   Administration


 


Sodium Chloride  1,000 mls @ 130 mls/hr  09/21/22 15:00  09/23/22 05:15





  Saline 0.9%  IV   130 mls/hr





  .Q7H42M SAI   Administration


 


Lisinopril  10 mg  09/23/22 09:00 





  Lisinopril 10 Mg Tab  PO  





  DAILY SAI  


 


Naloxone HCl  0.2 mg  09/21/22 14:49 





  Naloxone 0.4 Mg/Ml 1 Ml Vial  IV  





  Q2M PRN  





  Opioid Reversal  


 


Quetiapine Fumarate  100 mg  09/22/22 09:00  09/22/22 09:27





  Quetiapine 100 Mg Tab  PO   100 mg





  DAILY SAI   Administration


 


Quetiapine Fumarate  100 mg  09/22/22 15:00  09/22/22 22:19





  Quetiapine 100 Mg Tab  PO   100 mg





  BID@1500,2100 SAI   Administration


 


Sertraline HCl  100 mg  09/23/22 09:00 





  Sertraline 100 Mg Tab  PO  





  DAILY SAI  


 


Trazodone HCl  50 mg  09/21/22 21:00  09/22/22 22:17





  Trazodone Hcl 50 Mg Tab  PO   50 mg





  HS SAI   Administration








                                Intake and Output











 09/22/22 09/23/22 09/23/22





 22:59 06:59 14:59


 


Other:   


 


  Voiding Method Toilet  


 


  # Voids 5 1 








                                        





                                 09/22/22 07:02 





                                 09/22/22 07:02

## 2023-04-06 ENCOUNTER — HOSPITAL ENCOUNTER (OUTPATIENT)
Dept: HOSPITAL 47 - EC | Age: 63
Setting detail: OBSERVATION
LOS: 2 days | Discharge: HOME | End: 2023-04-08
Attending: HOSPITALIST | Admitting: HOSPITALIST
Payer: MEDICARE

## 2023-04-06 DIAGNOSIS — Z98.82: ICD-10-CM

## 2023-04-06 DIAGNOSIS — Z86.010: ICD-10-CM

## 2023-04-06 DIAGNOSIS — Z98.890: ICD-10-CM

## 2023-04-06 DIAGNOSIS — M19.90: ICD-10-CM

## 2023-04-06 DIAGNOSIS — Z87.19: ICD-10-CM

## 2023-04-06 DIAGNOSIS — E78.5: ICD-10-CM

## 2023-04-06 DIAGNOSIS — Z87.891: ICD-10-CM

## 2023-04-06 DIAGNOSIS — Z91.81: ICD-10-CM

## 2023-04-06 DIAGNOSIS — D72.819: ICD-10-CM

## 2023-04-06 DIAGNOSIS — Z88.4: ICD-10-CM

## 2023-04-06 DIAGNOSIS — F10.121: Primary | ICD-10-CM
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
## 2023-04-06 DIAGNOSIS — Z80.9: ICD-10-CM

## 2023-04-06 DIAGNOSIS — I10: ICD-10-CM

## 2023-04-06 DIAGNOSIS — F41.9: ICD-10-CM

## 2023-04-06 DIAGNOSIS — Z88.5: ICD-10-CM

## 2023-04-06 DIAGNOSIS — Z79.899: ICD-10-CM

## 2023-04-06 DIAGNOSIS — Z91.040: ICD-10-CM

## 2023-04-06 DIAGNOSIS — F32.A: ICD-10-CM

## 2023-04-06 LAB
ALBUMIN SERPL-MCNC: 4.2 G/DL (ref 3.5–5)
ALP SERPL-CCNC: 50 U/L (ref 38–126)
ALT SERPL-CCNC: 38 U/L (ref 4–34)
AMYLASE SERPL-CCNC: 95 U/L (ref 30–110)
ANION GAP SERPL CALC-SCNC: 12 MMOL/L
AST SERPL-CCNC: 41 U/L (ref 14–36)
BUN SERPL-SCNC: 11 MG/DL (ref 7–17)
CALCIUM SPEC-MCNC: 9 MG/DL (ref 8.4–10.2)
CELLS COUNTED: 100
CHLORIDE SERPL-SCNC: 109 MMOL/L (ref 98–107)
CK SERPL-CCNC: 152 U/L (ref 30–135)
CO2 SERPL-SCNC: 26 MMOL/L (ref 22–30)
EOSINOPHIL # BLD MANUAL: 0.06 K/UL (ref 0–0.7)
ERYTHROCYTE [DISTWIDTH] IN BLOOD BY AUTOMATED COUNT: 3.8 M/UL (ref 3.8–5.4)
ERYTHROCYTE [DISTWIDTH] IN BLOOD: 13 % (ref 11.5–15.5)
GLUCOSE SERPL-MCNC: 100 MG/DL (ref 74–99)
HCT VFR BLD AUTO: 38.2 % (ref 34–46)
HGB BLD-MCNC: 12.8 GM/DL (ref 11.4–16)
LIPASE SERPL-CCNC: 1013 U/L (ref 23–300)
LYMPHOCYTES # BLD MANUAL: 2.03 K/UL (ref 1–4.8)
MAGNESIUM SPEC-SCNC: 1.9 MG/DL (ref 1.6–2.3)
MCH RBC QN AUTO: 33.6 PG (ref 25–35)
MCHC RBC AUTO-ENTMCNC: 33.4 G/DL (ref 31–37)
MCV RBC AUTO: 100.7 FL (ref 80–100)
MONOCYTES # BLD MANUAL: 0.32 K/UL (ref 0–1)
NEUTROPHILS NFR BLD MANUAL: 17 %
NEUTS SEG # BLD MANUAL: 0.49 K/UL (ref 1.3–7.7)
PLATELET # BLD AUTO: 208 K/UL (ref 150–450)
POTASSIUM SERPL-SCNC: 4.6 MMOL/L (ref 3.5–5.1)
PROT SERPL-MCNC: 7.2 G/DL (ref 6.3–8.2)
SODIUM SERPL-SCNC: 147 MMOL/L (ref 137–145)
WBC # BLD AUTO: 2.9 K/UL (ref 3.8–10.6)

## 2023-04-06 PROCEDURE — 83735 ASSAY OF MAGNESIUM: CPT

## 2023-04-06 PROCEDURE — 99285 EMERGENCY DEPT VISIT HI MDM: CPT

## 2023-04-06 PROCEDURE — 36415 COLL VENOUS BLD VENIPUNCTURE: CPT

## 2023-04-06 PROCEDURE — 83605 ASSAY OF LACTIC ACID: CPT

## 2023-04-06 PROCEDURE — 80320 DRUG SCREEN QUANTALCOHOLS: CPT

## 2023-04-06 PROCEDURE — 85025 COMPLETE CBC W/AUTO DIFF WBC: CPT

## 2023-04-06 PROCEDURE — 83690 ASSAY OF LIPASE: CPT

## 2023-04-06 PROCEDURE — 96361 HYDRATE IV INFUSION ADD-ON: CPT

## 2023-04-06 PROCEDURE — 96374 THER/PROPH/DIAG INJ IV PUSH: CPT

## 2023-04-06 PROCEDURE — 96376 TX/PRO/DX INJ SAME DRUG ADON: CPT

## 2023-04-06 PROCEDURE — 80053 COMPREHEN METABOLIC PANEL: CPT

## 2023-04-06 PROCEDURE — 82550 ASSAY OF CK (CPK): CPT

## 2023-04-06 PROCEDURE — 82150 ASSAY OF AMYLASE: CPT

## 2023-04-06 RX ADMIN — CEFAZOLIN SCH MLS/HR: 330 INJECTION, POWDER, FOR SOLUTION INTRAMUSCULAR; INTRAVENOUS at 21:50

## 2023-04-06 RX ADMIN — CEFAZOLIN STA MLS/HR: 330 INJECTION, POWDER, FOR SOLUTION INTRAMUSCULAR; INTRAVENOUS at 15:06

## 2023-04-06 RX ADMIN — CEFAZOLIN STA MLS/HR: 330 INJECTION, POWDER, FOR SOLUTION INTRAMUSCULAR; INTRAVENOUS at 21:53

## 2023-04-06 NOTE — ED
General Adult HPI





- General


Stated complaint: Fall,ETOH


Time Seen by Provider: 04/06/23 14:20


Source: patient, EMS, RN notes reviewed


Mode of arrival: EMS


Limitations: no limitations





- History of Present Illness


Initial comments: 


62-year-old female presents emergency Department with chief complaint of alcohol

desiccation.  Patient reportedly was really intoxicated, crisis line contacted 

EMS for evaluation.  Patient brought here.  Patient has no complaints other than

she said because of tragic family does the past.  She has not suicidal or 

homicidal.  Patient states she drinks daily all day long.








- Related Data


                                Home Medications











 Medication  Instructions  Recorded  Confirmed


 


Atorvastatin [Lipitor] 10 mg PO DAILY 04/12/21 04/06/23


 


Divalproex [Depakote] 250 mg PO BID 04/12/21 04/06/23


 


QUEtiapine XR [SEROquel XR] 200 mg PO DAILY@1700 04/12/21 04/06/23


 


QUEtiapine [SEROquel] 100 mg PO DAILY 04/12/21 04/06/23


 


lisinopriL [Zestril] 10 mg PO DAILY 04/12/21 04/06/23


 


traZODone HCL 50 mg PO HS 04/12/21 04/06/23


 


Sertraline HCl [Zoloft] 50 mg PO DAILY 04/06/23 04/06/23











                                    Allergies











Allergy/AdvReac Type Severity Reaction Status Date / Time


 


latex Allergy Unknown Itching, Verified 04/06/23 14:58





   Blisters  


 


codeine AdvReac Severe HEADACHE Verified 04/06/23 14:58


 


hydrocodone [From Vicodin] AdvReac Severe HEADACHE Verified 04/06/23 14:58


 


ANESTHESIA AdvReac Unknown GAS GIVES Uncoded 04/06/23 14:58





   HER  





   HEADACHE,  





   VOMITING,  





   HEART  





   PALIPITATIONS  














Review of Systems


ROS Statement: 


Those systems with pertinent positive or pertinent negative responses have been 

documented in the HPI.





ROS Other: All systems not noted in ROS Statement are negative.





Past Medical History


Past Medical History: Hyperlipidemia, Hypertension, Osteoarthritis (OA)


Additional Past Medical History / Comment(s): hx colon polyps, occasional 

diarrhea, states rectal bleeding and abdominal pain., hx of fall Nov 2020 and 

has been having pain right hip since that radiates down right leg, walks with 

limp., states breast implants moving up.


History of Any Multi-Drug Resistant Organisms: None Reported


Past Surgical History: Orthopedic Surgery


Additional Past Surgical History / Comment(s): right elbow surgery with screws, 

right knee surgery with plate and removal ., lump left breast, breast implants


Past Anesthesia/Blood Transfusion Reactions: Postoperative Nausea & Vomiting 

(PONV)


Past Psychological History: Anxiety, Depression


Smoking Status: Former smoker


Past Alcohol Use History: None Reported


Past Drug Use History: None Reported





- Past Family History


  ** Father


Family Medical History: Cancer





General Exam


Limitations: no limitations


General appearance: alert, in no apparent distress, appears intoxicated


Head exam: Present: atraumatic, normocephalic, normal inspection


Eye exam: Present: normal appearance, PERRL, EOMI.  Absent: scleral icterus, 

conjunctival injection, periorbital swelling


ENT exam: Present: normal exam, mucous membranes moist


Neck exam: Present: normal inspection, full ROM.  Absent: tenderness, 

meningismus, lymphadenopathy


Respiratory exam: Present: normal lung sounds bilaterally.  Absent: respiratory 

distress, wheezes, rales, rhonchi, stridor


Cardiovascular Exam: Present: regular rate, normal rhythm, normal heart sounds. 

Absent: systolic murmur, diastolic murmur, rubs, gallop, clicks


Neurological exam: Present: alert, oriented X3, CN II-XII intact


Skin exam: Present: warm, dry, intact, normal color.  Absent: rash





Course


                                   Vital Signs











  04/06/23





  14:40


 


Temperature 98 F


 


Pulse Rate 90


 


Respiratory 23





Rate 


 


Blood Pressure 109/78


 


O2 Sat by Pulse 100





Oximetry 














Medical Decision Making





- Medical Decision Making


Was pt. sent in by a medical professional or institution (KRISTAN Wasserman, NP, urgent 

care, hospital, or nursing home...) When possible be specific


@  -No


Did you speak to anyone other than the patient for history (EMS, parent, family,

police, friend...)? What history was obtained from this source 


@  -No


Did you review nursing and triage notes (agree or disagree)?  Why? 


@  -I reviewed and agree with nursing and triage notes


Were old charts reviewed (outside hosp., previous admission, EMS record, old 

EKG, old radiological studies, urgent care reports/EKG's, nursing home records)?

Report findings 


@  -No old charts were reviewed


Differential Diagnosis (chest pain, altered mental status, abdominal pain women,

abdominal pain men, vaginal bleeding, weakness, fever, dyspnea, syncope, 

headache, dizziness, GI bleed, back pain, seizure, CVA, palpatations, mental 

health, musculoskeletal)? 


@  -Alcohol intoxication


EKG interpreted by me (3pts min.).


@  -None


X-rays interpreted by me (1pt min.).


@  -None done


CT interpreted by me (1pt min.).


@  -None done


U/S interpreted by me (1pt. min.).


@  -None done


What testing was considered but not performed or refused? (CT, X-rays, U/S, 

labs)? Why?


@  -None


What meds were considered but not given or refused? Why?


@  -None


Did you discuss the management of the patient with other professionals 

(professionals i.e. Dr., PA, NP, lab, RT, psych nurse, , , 

teacher, , )? Give summary


@  -No


Was smoking cessation discussed for >3mins.?


@  -No


Was critical care preformed (if so, how long)?


@  -No


Were there social determinants of health that impacted care today? How? (Homel

essness, low income, unemployed, alcoholism, drug addiction, transportation, low

edu. Level, literacy, decrease access to med. care, [facility], rehab)?


@  -No


Was there de-escalation of care discussed even if they declined (Discuss DNR or 

withdrawal of care, Hospice)? DNR status


@  -No


What co-morbidities impacted this encounter? (DM, HTN, Smoking, COPD, CAD, 

Cancer, CVA, ARF, Chemo, Hep., AIDS, mental health diagnosis, sleep apnea, 

morbid obesity)?


@  -Alcohol abuse, depression


Was patient admitted / discharged? Hospital course, mention meds given and 

route, prescriptions, significant lab abnormalities, going to OR and other 

pertinent info.


@  -Admitted patient is severely intoxicated patient does not have a ride.  

Patient will be admitted for further monitoring and treatment will be discharged

when sober, medically stable. 


Undiagnosed new problem with uncertain prognosis?


@  -No


Drug Therapy requiring intensive monitoring for toxicity (Heparin, Nitro, 

Insulin, Cardizem)?


@  -No


Were any procedures done?


@  -No


Diagnosis/symptom?


@  -Alcohol intoxication


Acute, or Chronic, or Acute on Chronic?


@  -Acute on chronic


Uncomplicated (without systemic symptoms) or Complicated (systemic symptoms)?


@  -Uncomplicated


Side effects of treatment?


@  -No


Exacerbation, Progression, or Severe Exacerbation?


@  -No


Poses a threat to life or bodily function? How? (Chest pain, USA, MI, pneumonia,

PE, COPD, DKA, ARF, appy, cholecystitis, CVA, Diverticulitis, Homicidal, 

Suicidal, threat to staff... and all critical care pts)


@  -No








- Lab Data


Result diagrams: 


                                 04/06/23 14:59





                                 04/06/23 14:59


                                   Lab Results











  04/06/23 04/06/23 04/06/23 Range/Units





  14:59 14:59 14:59 


 


WBC  2.9 L    (3.8-10.6)  k/uL


 


RBC  3.80    (3.80-5.40)  m/uL


 


Hgb  12.8    (11.4-16.0)  gm/dL


 


Hct  38.2    (34.0-46.0)  %


 


MCV  100.7 H    (80.0-100.0)  fL


 


MCH  33.6    (25.0-35.0)  pg


 


MCHC  33.4    (31.0-37.0)  g/dL


 


RDW  13.0    (11.5-15.5)  %


 


Plt Count  208    (150-450)  k/uL


 


MPV  7.7    


 


Sodium   147 H   (137-145)  mmol/L


 


Potassium   4.6   (3.5-5.1)  mmol/L


 


Chloride   109 H   ()  mmol/L


 


Carbon Dioxide   26   (22-30)  mmol/L


 


Anion Gap   12   mmol/L


 


Creatinine   0.60   (0.52-1.04)  mg/dL


 


Est GFR (CKD-EPI)AfAm   >90   (>60 ml/min/1.73 sqM)  


 


Est GFR (CKD-EPI)NonAf   >90   (>60 ml/min/1.73 sqM)  


 


Glucose   100 H   (74-99)  mg/dL


 


Plasma Lactic Acid Gabe    1.5  (0.7-2.0)  mmol/L


 


Total Protein   7.2   (6.3-8.2)  g/dL


 


Albumin   4.2   (3.5-5.0)  g/dL


 


Amylase   95   ()  U/L














Disposition


Clinical Impression: 


 Alcoholic intoxication





Disposition: ADMITTED AS IP TO THIS HOSP


Condition: Fair


Referrals: 


Lisa Becerra MD [Primary Care Provider] - 1-2 days


Time of Disposition: 15:49

## 2023-04-07 RX ADMIN — DIVALPROEX SODIUM SCH MG: 250 TABLET, DELAYED RELEASE ORAL at 20:17

## 2023-04-07 RX ADMIN — CEFAZOLIN SCH MLS/HR: 330 INJECTION, POWDER, FOR SOLUTION INTRAMUSCULAR; INTRAVENOUS at 12:00

## 2023-04-07 RX ADMIN — Medication SCH MG: at 08:10

## 2023-04-07 RX ADMIN — CEFAZOLIN SCH: 330 INJECTION, POWDER, FOR SOLUTION INTRAMUSCULAR; INTRAVENOUS at 06:45

## 2023-04-07 NOTE — HP
HISTORY AND PHYSICAL



CHIEF COMPLAINT:

Alcohol intoxication.



HISTORY OF PRESENT ILLNESS:

This is a 62-year-old woman with a past medical history of hypertension,

hyperlipidemia, being followed by Dr. Gonzalez in the outpatient setting, was admitted with

alcohol intoxication.  The Crisis Line was contacted and EMS was brought, and the

patient was apparently drinking a 5th of alcohol.  Alcohol level was more than 400.

There is no history of any fever, rigors, or chills at this time.  The patient has some

tremors and early alcohol withdrawal symptoms at this time.



PAST MEDICAL HISTORY:

Reviewed, hypertension, hyperlipidemia, rest of the history and rest of the chart is

also reviewed.



HOME MEDICATIONS:

Reviewed on trazodone, doses and rest of medications noted.



ALLERGIES:

Reviewed include codeine, rest of allergies noted.



FAMILY HISTORY:

Cancer.



SOCIAL HISTORY:

Previous history of smoking, no history of alcohol intake.



REVIEW OF SYSTEMS:

A 14-point review is negative as mentioned.



PHYSICAL EXAMINATION:

VITAL SIGNS:  Pulse is 89, blood pressure 150/80, respirations 16.

HEENT:  Conjunctivae normal.

NECK:  No jugular venous distention.

CARDIOVASCULAR:  S1, S2.

RESPIRATIONS:  Clear to auscultation.

ABDOMEN:  Soft.

LEGS:  No edema.

NERVOUS SYSTEM:  No focal deficit, but tremors present.

SKIN:  No ulcer, rash, bleeding.

JOINTS:  No active deforming arthropathy.



LABORATORY DATA:

WBC 2.9, rest of the labs are noted.



ASSESSMENT:

1. Acute alcohol intoxication.

2. Early delirium tremens.

3. Leukopenia secondary to alcohol intake.

4. Hypertension.

5. Hyperlipidemia.

6. Multiple medical issues.



RECOMMENDATIONS:

This 62-year-old woman presented with multiple complex medical issues, we will monitor

the patient closely.  I would recommend resume the home medications and follow alcohol

withdrawal precautions.  Closely monitor.  Prognosis guarded.  Recommend alcohol rehab

after discharge.  Follow the rest of recommendations per Dr. Gonzalez as an outpatient.





MMODL / IJN: [id number] / Job#: [id number]

## 2023-04-08 VITALS
RESPIRATION RATE: 16 BRPM | TEMPERATURE: 98.2 F | HEART RATE: 66 BPM | DIASTOLIC BLOOD PRESSURE: 97 MMHG | SYSTOLIC BLOOD PRESSURE: 178 MMHG

## 2023-04-08 RX ADMIN — Medication SCH MG: at 09:31

## 2023-04-08 RX ADMIN — DIVALPROEX SODIUM SCH MG: 250 TABLET, DELAYED RELEASE ORAL at 09:30

## 2023-04-08 NOTE — DS
DISCHARGE SUMMARY



FINAL DIAGNOSES:

1. Acute alcohol intoxication.

2. Early delirium tremens, improved.

3. Leukopenia secondary to alcohol intake.

4. Multiple medical issues.



DISCHARGE DISPOSITION:

The patient discharged stable and guarded.



HISTORY OF PRESENT ILLNESS:

This 62-year-old woman was admitted with acute alcohol intoxication.  Patient improved

significantly.



PHYSICAL EXAMINATION:

VITAL SIGNS:  Stable.

CARDIOVASCULAR:  S1, S2.

ABDOMEN:  Soft.

NERVOUS SYSTEM: No focal deficit.



The patient discharged home with stable condition.  Guarded prognosis.



I recommend a course of Librium and to follow with primary physician in the outpatient

setting for treatment of depression and followup and attend the AA meetings and rehab

alcohol rehab.  Resume the home medications.  Once again, she is stable, but overall

prognosis guarded.





MMODL / IJN: [id number] / Job#: [id number]

## 2023-06-14 ENCOUNTER — HOSPITAL ENCOUNTER (OUTPATIENT)
Dept: HOSPITAL 47 - RADMAMWWP | Age: 63
Discharge: HOME | End: 2023-06-14
Attending: FAMILY MEDICINE
Payer: MEDICARE

## 2023-06-14 DIAGNOSIS — T85.49XA: Primary | ICD-10-CM

## 2023-06-14 DIAGNOSIS — R42: ICD-10-CM

## 2023-06-14 PROCEDURE — 77062 BREAST TOMOSYNTHESIS BI: CPT

## 2023-06-14 PROCEDURE — 76642 ULTRASOUND BREAST LIMITED: CPT

## 2023-06-14 PROCEDURE — 77066 DX MAMMO INCL CAD BI: CPT

## 2023-06-14 NOTE — MM
Reason for Exam: Clinical finding. 

Last mammogram was performed 2 year(s) and 6 month(s) ago. 

Indicated Problems: 

Lump or thickening of the left side for 6 Month(s).





Patient History: 

Menarche at age 12. First Full-Term Pregnancy at age 14. Left ovary removed at age 31. Right ovary

removed at age 31. Hysterectomy at age 31. Postmenopausal. Estrogen for 6 months from age 31 until

age 31. Progesterone for 6 months from age 31 until age 31. Benign Excisional Biopsy on the left

side. 2000, Bilateral Implants. 





Risk Values: 

Rosana 5 year model risk: 1.4%.

NCI Lifetime model risk: 5.6%.





Tissue Density: 

The breast tissue is heterogeneously dense. This may lower the sensitivity of mammography.

Analyzed By CAD. 





Overall Assessment: Incomplete: need additional imaging evaluation, BI-RAD 0





Management: 

Further Imaging

Electronically signed and approved by: Nikunj Gilbert D.O. Radiologis

## 2023-06-14 NOTE — USB
Reason for Exam: Clinical finding. 





Patient History: 

Menarche at age 12. First Full-Term Pregnancy at age 14. Left ovary removed at age 31. Right ovary

removed at age 31. Hysterectomy at age 31. Postmenopausal. Estrogen for 6 months from age 31 until

age 31. Progesterone for 6 months from age 31 until age 31. Benign Excisional Biopsy on the left

side. 2000, Bilateral Implants. 





Risk Values: 

Rosana 5 year model risk: 1.4%.

NCI Lifetime model risk: 5.6%.

Technique: 

Method: Targeted.  





Prior Study Comparison: 

5/21/2015  Screening Mammogram, Unknown. 6/1/2016  Screening Mammogram, Unknown. 12/15/2020

Bilateral Diagnostic Mammogram, [facility]. 





Findings: 

The upper section of the breast of the left breast, the axilla of the left breast and the

retroareolar of the left breast were scanned.

No solid or cystic masses are identified. The implant is identified in the upper portion of the

breast. Consider possible implant displacement. 





Overall Assessment: Probably benign, BI-RAD 3





Management: 

Screening Mammogram of both breasts in 1 year.

A clinical breast exam by your physician is recommended on an annual basis and results should be

correlated with mammographic findings.  This exam should not preclude additional follow-up of

suspicious palpable abnormalities. Results were given to the patient verbally at the time of exam.



Electronically signed and approved by: Nikunj Gilbert D.O. Radiologis

## 2023-06-16 ENCOUNTER — HOSPITAL ENCOUNTER (INPATIENT)
Dept: HOSPITAL 47 - EC | Age: 63
LOS: 3 days | Discharge: HOME HEALTH | DRG: 438 | End: 2023-06-19
Attending: HOSPITALIST | Admitting: HOSPITALIST
Payer: MEDICARE

## 2023-06-16 DIAGNOSIS — F10.139: ICD-10-CM

## 2023-06-16 DIAGNOSIS — Z71.41: ICD-10-CM

## 2023-06-16 DIAGNOSIS — G93.41: ICD-10-CM

## 2023-06-16 DIAGNOSIS — M19.90: ICD-10-CM
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
## 2023-06-16 DIAGNOSIS — Z79.899: ICD-10-CM

## 2023-06-16 DIAGNOSIS — Z88.4: ICD-10-CM

## 2023-06-16 DIAGNOSIS — F41.9: ICD-10-CM

## 2023-06-16 DIAGNOSIS — Z91.040: ICD-10-CM

## 2023-06-16 DIAGNOSIS — Z63.4: ICD-10-CM

## 2023-06-16 DIAGNOSIS — F32.A: ICD-10-CM

## 2023-06-16 DIAGNOSIS — Z98.82: ICD-10-CM

## 2023-06-16 DIAGNOSIS — Z88.5: ICD-10-CM

## 2023-06-16 DIAGNOSIS — Z87.19: ICD-10-CM

## 2023-06-16 DIAGNOSIS — Z87.891: ICD-10-CM

## 2023-06-16 DIAGNOSIS — M25.551: ICD-10-CM

## 2023-06-16 DIAGNOSIS — Z91.81: ICD-10-CM

## 2023-06-16 DIAGNOSIS — K85.20: Primary | ICD-10-CM

## 2023-06-16 DIAGNOSIS — Z71.6: ICD-10-CM

## 2023-06-16 DIAGNOSIS — E78.5: ICD-10-CM

## 2023-06-16 DIAGNOSIS — I10: ICD-10-CM

## 2023-06-16 DIAGNOSIS — F10.129: ICD-10-CM

## 2023-06-16 LAB
ALBUMIN SERPL-MCNC: 4.8 G/DL (ref 3.5–5)
ALP SERPL-CCNC: 55 U/L (ref 38–126)
ALT SERPL-CCNC: 38 U/L (ref 4–34)
ANION GAP SERPL CALC-SCNC: 17 MMOL/L
APAP SPEC-MCNC: <10 UG/ML
AST SERPL-CCNC: 48 U/L (ref 14–36)
BASOPHILS # BLD AUTO: 0 K/UL (ref 0–0.2)
BASOPHILS NFR BLD AUTO: 1 %
BUN SERPL-SCNC: 19 MG/DL (ref 7–17)
CALCIUM SPEC-MCNC: 9.6 MG/DL (ref 8.4–10.2)
CHLORIDE SERPL-SCNC: 104 MMOL/L (ref 98–107)
CO2 SERPL-SCNC: 22 MMOL/L (ref 22–30)
EOSINOPHIL # BLD AUTO: 0.1 K/UL (ref 0–0.7)
EOSINOPHIL NFR BLD AUTO: 1 %
ERYTHROCYTE [DISTWIDTH] IN BLOOD BY AUTOMATED COUNT: 4.23 M/UL (ref 3.8–5.4)
ERYTHROCYTE [DISTWIDTH] IN BLOOD: 12.8 % (ref 11.5–15.5)
GLUCOSE BLD-MCNC: 171 MG/DL (ref 70–110)
GLUCOSE SERPL-MCNC: 166 MG/DL (ref 74–99)
HCT VFR BLD AUTO: 43 % (ref 34–46)
HGB BLD-MCNC: 14.2 GM/DL (ref 11.4–16)
LIPASE SERPL-CCNC: 2642 U/L (ref 23–300)
LYMPHOCYTES # SPEC AUTO: 2.5 K/UL (ref 1–4.8)
LYMPHOCYTES NFR SPEC AUTO: 46 %
MAGNESIUM SPEC-SCNC: 1.7 MG/DL (ref 1.6–2.3)
MCH RBC QN AUTO: 33.4 PG (ref 25–35)
MCHC RBC AUTO-ENTMCNC: 32.9 G/DL (ref 31–37)
MCV RBC AUTO: 101.6 FL (ref 80–100)
MONOCYTES # BLD AUTO: 0.2 K/UL (ref 0–1)
MONOCYTES NFR BLD AUTO: 4 %
NEUTROPHILS # BLD AUTO: 2.4 K/UL (ref 1.3–7.7)
NEUTROPHILS NFR BLD AUTO: 45 %
PLATELET # BLD AUTO: 269 K/UL (ref 150–450)
POTASSIUM SERPL-SCNC: 4.2 MMOL/L (ref 3.5–5.1)
PROT SERPL-MCNC: 7.9 G/DL (ref 6.3–8.2)
SALICYLATES SERPL-MCNC: <1 MG/DL
SODIUM SERPL-SCNC: 143 MMOL/L (ref 137–145)
WBC # BLD AUTO: 5.3 K/UL (ref 3.8–10.6)

## 2023-06-16 PROCEDURE — 80306 DRUG TEST PRSMV INSTRMNT: CPT

## 2023-06-16 PROCEDURE — 83735 ASSAY OF MAGNESIUM: CPT

## 2023-06-16 PROCEDURE — 81001 URINALYSIS AUTO W/SCOPE: CPT

## 2023-06-16 PROCEDURE — 82140 ASSAY OF AMMONIA: CPT

## 2023-06-16 PROCEDURE — 70450 CT HEAD/BRAIN W/O DYE: CPT

## 2023-06-16 PROCEDURE — 96360 HYDRATION IV INFUSION INIT: CPT

## 2023-06-16 PROCEDURE — 99285 EMERGENCY DEPT VISIT HI MDM: CPT

## 2023-06-16 PROCEDURE — 84100 ASSAY OF PHOSPHORUS: CPT

## 2023-06-16 PROCEDURE — 85025 COMPLETE CBC W/AUTO DIFF WBC: CPT

## 2023-06-16 PROCEDURE — 80320 DRUG SCREEN QUANTALCOHOLS: CPT

## 2023-06-16 PROCEDURE — 96361 HYDRATE IV INFUSION ADD-ON: CPT

## 2023-06-16 PROCEDURE — 80179 DRUG ASSAY SALICYLATE: CPT

## 2023-06-16 PROCEDURE — 80143 DRUG ASSAY ACETAMINOPHEN: CPT

## 2023-06-16 PROCEDURE — 83690 ASSAY OF LIPASE: CPT

## 2023-06-16 PROCEDURE — 36415 COLL VENOUS BLD VENIPUNCTURE: CPT

## 2023-06-16 PROCEDURE — 80053 COMPREHEN METABOLIC PANEL: CPT

## 2023-06-16 RX ADMIN — Medication SCH MG: at 22:24

## 2023-06-16 RX ADMIN — PANTOPRAZOLE SODIUM SCH MG: 40 INJECTION, POWDER, FOR SOLUTION INTRAVENOUS at 22:44

## 2023-06-16 RX ADMIN — CEFAZOLIN SCH MLS/HR: 330 INJECTION, POWDER, FOR SOLUTION INTRAMUSCULAR; INTRAVENOUS at 22:25

## 2023-06-16 SDOH — SOCIAL STABILITY - SOCIAL INSECURITY: DISSAPEARANCE AND DEATH OF FAMILY MEMBER: Z63.4

## 2023-06-16 NOTE — CT
EXAMINATION TYPE: CT brain wo con

CT DLP: 1141.4 mGycm, Automated exposure control for dose reduction was used.

 

DATE OF EXAM: 6/16/2023 5:04 PM

 

COMPARISON: None.

 

CLINICAL INDICATION:Female, 63 years old with history of ams, AMS, falls

 

TECHNIQUE: 

Brain: Axial CT images of the brain were obtained with coronal and sagittal reformats created and rev
iewed.

Contrast used: None.

Oral contrast used: None.

 

FINDINGS:

 

Brain:

Extra-axial spaces: No abnormal extra-axial fluid collections.

Ventricular system: Dilatation in proportion to cerebral atrophy.

Cerebral parenchyma: Cerebral atrophy. No acute intraparenchymal hemorrhage or mass effect.  The gray
-white junction is well differentiated.     

Cerebellum: Unremarkable.

Mass effect: No evidence of midline shift.

Intracranial vasculature: Atherosclerotic calcifications of the intracranial vessels.

Soft tissues: Normal.

Calvarium/osseous structures: No depressed skull fracture.

Paranasal sinuses and mastoid air cells: Mild scattered paranasal sinus disease.

Visualized orbits: Orbital contents are intact.

 

 

IMPRESSION:

1. No acute intracranial process.

 

2. Nonspecific white matter changes, likely secondary to chronic small vessel ischemic disease.

## 2023-06-16 NOTE — ED
Altered Mental Status HPI





- General


Chief Complaint: Alcohol


Stated Complaint: ETOH


Time Seen by Provider: 06/16/23 14:57


Source: EMS, RN notes reviewed, old records reviewed


Mode of arrival: EMS


Limitations: altered mental status, physical limitation





- History of Present Illness


Initial Comments: 





This is a 63-year-old female to the emergency department for evaluation.  

Patient is unsure of why she is here and also unsure of how she got here.  

Neighbors apparently did a well check, patient yesterday and when they did 

recurrent will check today patient was seen to be in worse condition than she 

was yesterday.


MD Complaint: altered mental status, confusion, intoxication, weakness


-: days(s)


Severity: severe


Consistency of Symptoms: getting worse, constant


Context: alcohol abuse


Associated Symptoms: denies other symptoms





- Related Data


                                Home Medications











 Medication  Instructions  Recorded  Confirmed


 


Atorvastatin [Lipitor] 10 mg PO DAILY 04/12/21 06/16/23


 


QUEtiapine XR [SEROquel XR] 200 mg PO DAILY@1700 04/12/21 06/16/23


 


QUEtiapine [SEROquel] 100 mg PO DAILY 04/12/21 06/16/23


 


lisinopriL [Zestril] 10 mg PO DAILY 04/12/21 06/16/23


 


traZODone HCL 50 mg PO HS 04/12/21 06/16/23








                                  Previous Rx's











 Medication  Instructions  Recorded


 


Thiamine [Vitamin B-1] 100 mg PO DAILY #30 tab 04/08/23


 


chlordiazePOXIDE HCl [Librium] 25 mg PO TID 3 Days #9 capsule 04/08/23


 


Naltrexone HCl [Revia] 50 mg PO DAILY #14 tab 06/19/23


 


Sertraline [Zoloft] 100 mg PO DAILY #30 tab 06/19/23











                                    Allergies











Allergy/AdvReac Type Severity Reaction Status Date / Time


 


latex Allergy Unknown Itching, Verified 06/16/23 15:49





   Blisters  


 


codeine AdvReac Severe HEADACHE Verified 06/16/23 15:49


 


hydrocodone [From Vicodin] AdvReac Severe HEADACHE Verified 06/16/23 15:49


 


ANESTHESIA AdvReac Unknown GAS GIVES Uncoded 06/16/23 15:49





   HER  





   HEADACHE,  





   VOMITING,  





   HEART  





   PALIPITATIONS  














Review of Systems


ROS Statement: 


Those systems with pertinent positive or pertinent negative responses have been 

documented in the HPI.





ROS Other: All systems not noted in ROS Statement are negative.





Past Medical History


Past Medical History: Hyperlipidemia, Hypertension, Osteoarthritis (OA)


Additional Past Medical History / Comment(s): hx colon polyps, occasional 

diarrhea, states rectal bleeding and abdominal pain., hx of fall Nov 2020 and 

has been having pain right hip since that radiates down right leg, walks with 

limp., states breast implants moving up.


History of Any Multi-Drug Resistant Organisms: None Reported


Past Surgical History: Orthopedic Surgery


Additional Past Surgical History / Comment(s): right elbow surgery with screws, 

right knee surgery with plate and removal ., lump left breast, breast implants


Past Anesthesia/Blood Transfusion Reactions: Postoperative Nausea & Vomiting 

(PONV)


Past Psychological History: Anxiety, Depression


Smoking Status: Former smoker


Past Alcohol Use History: Abuse


Past Drug Use History: None Reported





- Past Family History


  ** Father


Family Medical History: Cancer





General Exam


Limitations: altered mental status, physical limitation


General appearance: alert, in no apparent distress, appears intoxicated, 

lethargic


Head exam: Present: atraumatic, normocephalic, normal inspection


Eye exam: Present: normal appearance, PERRL, EOMI.  Absent: scleral icterus, 

conjunctival injection, periorbital swelling


ENT exam: Present: normal exam, mucous membranes moist


Neck exam: Present: normal inspection.  Absent: tenderness, meningismus, 

lymphadenopathy


Respiratory exam: Present: normal lung sounds bilaterally.  Absent: respiratory 

distress, wheezes, rales, rhonchi, stridor


Cardiovascular Exam: Present: regular rate, normal rhythm, normal heart sounds. 

Absent: systolic murmur, diastolic murmur, rubs, gallop, clicks


GI/Abdominal exam: Present: soft, normal bowel sounds.  Absent: distended, 

tenderness, guarding, rebound, rigid


Extremities exam: Present: normal inspection, full ROM, normal capillary refill.

 Absent: tenderness, pedal edema, joint swelling, calf tenderness


Back exam: Present: normal inspection


Neurological exam: Present: alert, oriented X3, CN II-XII intact


Psychiatric exam: Present: normal affect, normal mood


Skin exam: Present: warm, dry, intact, normal color.  Absent: rash





Course


                                   Vital Signs











  06/16/23 06/16/23





  14:50 20:14


 


Temperature 97.2 F L 98.3 F


 


Pulse Rate 78 78


 


Respiratory 16 16





Rate  


 


Blood Pressure 116/79 136/72


 


O2 Sat by Pulse 95 96





Oximetry  














- Reevaluation(s)


Reevaluation #1: 





06/16/23 16:57


Medical records reviewed


Reevaluation #2: 





06/16/23 16:58


Patient symptoms unchanged


Reevaluation #3: 





06/16/23 16:58


Patient informed results questions answered


Reevaluation #4: 





06/16/23 16:58


Was pt. sent in by a medical professional or institution?


@  -no


Did you speak to anyone other than the patient for history?  


@  -no


Did you review nursing and triage notes? 


@  -agree


Were old charts reviewed? 


@  -no


Differential Diagnosis? 


@  -prior


EKG interpreted by me (3pts min.)?


@  -no


X-rays interpreted by me (1pt min.)?


@  -no


CT interpreted by me (1pt min.)?


@  -yes


U/S interpreted by me (1pt. min.)?


@  -no


What testing was considered but not performed? (CT, X-rays, U/S, labs)? Why?


@  -no


What meds were considered but not given? Why?


@  -no


Did you discuss the management of the patient with other professionals?


@  -no


Did you reconcile home meds?


@  -no


Was smoking cessation discussed for >3mins.?


@  -no


Was critical care preformed (if so, how long)?


@  -no


Were there social determinants of health that impacted care today? How? 

(Homelessness, low income, unemployed, alcoholism, drug addiction, 

transportation, low edu. Level, literacy, decrease access to med. care, [facility], 

rehab)?


@  -no


Was there de-escalation of care discussed even if they declined? (Discuss DNR or

withdrawal of care, Hospice)?


@  -no


What co-morbidities impacted this encounter? (DM, HTN, Smoking, COPD, CAD, 

Cancer, CVA, Hep., AIDS, mental health diagnosis, sleep apnea, morbid obesity)?


@  -none


Was patient admitted / discharged?


@  -63 female to the emergency department for evaluation of altered mental 

status significant intoxication low electrolytes, elevated lipase for 

pancreatitis patient will be admitted for hydration and psychiatric evaluation 

and treatment


Admitted


Undiagnosed new problem with uncertain prognosis?


@  -no


Drug Therapy requiring intensive monitoring for toxicity (Heparin, Nitro, 

Insulin, Cardizem)?


@  -no


Were any procedures done?


@  -no


Diagnosis/symptom?


@  -Acute alcohol intoxication, pancreatitis, altered mental status


Acute, or Chronic, or Acute on Chronic?


@  -no


Uncomplicated (without systemic symptoms) or Complicated (systemic symptoms)?


@  -uncomplicated


Side effects of treatment?


@  -no


Exacerbation, Progression, or Severe Exacerbation]


@  -no


Poses a threat to life or bodily function?


@  -yes severe intoxication


Reevaluation #5: 





06/16/23 16:58


Differential Altered Mental Status:


Hypoglycemia, DKA, hypercapnia, ETOH, overdose, CO poisoning, trauma, myxedema 

coma, HTN encephalopathy, infection, encephalitis, psychosis, intercranial 

hemorrhage, hepatic encephalopathy, meningitis, CVA, this is not meant to be an 

all-inclusive list





- Consultations


Consultation #1: 





Spoke with admitting physicians who agree to admit this patient





Medical Decision Making





- Medical Decision Making





63 female to the emergency department for evaluation of altered mental status 

significant intoxication low electrolytes, elevated lipase for pancreatitis 

patient will be admitted for hydration and psychiatric evaluation and treatment





- Lab Data


Result diagrams: 


                                 06/19/23 06:49





                                 06/19/23 06:49


                                   Lab Results











  06/16/23 06/16/23 06/16/23 Range/Units





  15:47 15:47 15:47 


 


WBC  5.3    (3.8-10.6)  k/uL


 


RBC  4.23    (3.80-5.40)  m/uL


 


Hgb  14.2    (11.4-16.0)  gm/dL


 


Hct  43.0    (34.0-46.0)  %


 


MCV  101.6 H    (80.0-100.0)  fL


 


MCH  33.4    (25.0-35.0)  pg


 


MCHC  32.9    (31.0-37.0)  g/dL


 


RDW  12.8    (11.5-15.5)  %


 


Plt Count  269    (150-450)  k/uL


 


MPV  8.3    


 


Neutrophils %  45    %


 


Lymphocytes %  46    %


 


Monocytes %  4    %


 


Eosinophils %  1    %


 


Basophils %  1    %


 


Neutrophils #  2.4    (1.3-7.7)  k/uL


 


Lymphocytes #  2.5    (1.0-4.8)  k/uL


 


Monocytes #  0.2    (0-1.0)  k/uL


 


Eosinophils #  0.1    (0-0.7)  k/uL


 


Basophils #  0.0    (0-0.2)  k/uL


 


Sodium    143  (137-145)  mmol/L


 


Potassium    4.2  (3.5-5.1)  mmol/L


 


Chloride    104  ()  mmol/L


 


Carbon Dioxide    22  (22-30)  mmol/L


 


Anion Gap    17  mmol/L


 


BUN    19 H  (7-17)  mg/dL


 


Creatinine    0.64  (0.52-1.04)  mg/dL


 


Est GFR (CKD-EPI)AfAm    >90  (>60 ml/min/1.73 sqM)  


 


Est GFR (CKD-EPI)NonAf    >90  (>60 ml/min/1.73 sqM)  


 


Glucose    166 H  (74-99)  mg/dL


 


Calcium    9.6  (8.4-10.2)  mg/dL


 


Phosphorus    2.9  (2.5-4.5)  mg/dL


 


Magnesium    1.7  (1.6-2.3)  mg/dL


 


Total Bilirubin    0.3  (0.2-1.3)  mg/dL


 


AST    48 H  (14-36)  U/L


 


ALT    38 H  (4-34)  U/L


 


Alkaline Phosphatase    55  ()  U/L


 


Ammonia   <9   (<30)  umol/L


 


Total Protein    7.9  (6.3-8.2)  g/dL


 


Albumin    4.8  (3.5-5.0)  g/dL


 


Lipase    2642 H  ()  U/L


 


Salicylates    <1.0  mg/dL


 


Acetaminophen    <10.0  ug/mL


 


Serum Alcohol    461 H*  mg/dL














- Radiology Data


Radiology results: report reviewed (CT brain is negative for acute disease), 

image reviewed





Disposition


Clinical Impression: 


 Alcoholic intoxication, Alcoholic gastritis, Hypomagnesemia, Altered mental 

status, Acute pancreatitis





Disposition: ADMITTED AS IP TO THIS HOSP


Condition: Fair


Is patient prescribed a controlled substance at d/c from ED?: No


Time of Disposition: 17:30

## 2023-06-17 LAB
ALBUMIN SERPL-MCNC: 4.2 G/DL (ref 3.5–5)
ALBUMIN/GLOB SERPL: 1.5 {RATIO}
ALP SERPL-CCNC: 51 U/L (ref 38–126)
ALT SERPL-CCNC: 31 U/L (ref 4–34)
ANION GAP SERPL CALC-SCNC: 10 MMOL/L
AST SERPL-CCNC: 41 U/L (ref 14–36)
BASOPHILS # BLD AUTO: 0 K/UL (ref 0–0.2)
BASOPHILS NFR BLD AUTO: 0 %
BUN SERPL-SCNC: 13 MG/DL (ref 7–17)
CALCIUM SPEC-MCNC: 8.5 MG/DL (ref 8.4–10.2)
CHLORIDE SERPL-SCNC: 106 MMOL/L (ref 98–107)
CO2 SERPL-SCNC: 24 MMOL/L (ref 22–30)
EOSINOPHIL # BLD AUTO: 0.1 K/UL (ref 0–0.7)
EOSINOPHIL NFR BLD AUTO: 2 %
ERYTHROCYTE [DISTWIDTH] IN BLOOD BY AUTOMATED COUNT: 3.7 M/UL (ref 3.8–5.4)
ERYTHROCYTE [DISTWIDTH] IN BLOOD: 13.1 % (ref 11.5–15.5)
GLOBULIN SER CALC-MCNC: 2.8 G/DL
GLUCOSE SERPL-MCNC: 113 MG/DL (ref 74–99)
HCT VFR BLD AUTO: 37.5 % (ref 34–46)
HGB BLD-MCNC: 12.4 GM/DL (ref 11.4–16)
HYALINE CASTS UR QL AUTO: 20 /LPF (ref 0–2)
LIPASE SERPL-CCNC: 2646 U/L (ref 23–300)
LYMPHOCYTES # SPEC AUTO: 2.1 K/UL (ref 1–4.8)
LYMPHOCYTES NFR SPEC AUTO: 38 %
MAGNESIUM SPEC-SCNC: 1.5 MG/DL (ref 1.6–2.3)
MCH RBC QN AUTO: 33.4 PG (ref 25–35)
MCHC RBC AUTO-ENTMCNC: 32.9 G/DL (ref 31–37)
MCV RBC AUTO: 101.4 FL (ref 80–100)
MONOCYTES # BLD AUTO: 0.4 K/UL (ref 0–1)
MONOCYTES NFR BLD AUTO: 7 %
NEUTROPHILS # BLD AUTO: 2.7 K/UL (ref 1.3–7.7)
NEUTROPHILS NFR BLD AUTO: 49 %
PH UR: 5.5 [PH] (ref 5–8)
PLATELET # BLD AUTO: 218 K/UL (ref 150–450)
POTASSIUM SERPL-SCNC: 3.9 MMOL/L (ref 3.5–5.1)
PROT SERPL-MCNC: 7 G/DL (ref 6.3–8.2)
RBC UR QL: 1 /HPF (ref 0–5)
SODIUM SERPL-SCNC: 140 MMOL/L (ref 137–145)
SP GR UR: 1.02 (ref 1–1.03)
SQUAMOUS UR QL AUTO: 2 /HPF (ref 0–4)
UROBILINOGEN UR QL STRIP: <2 MG/DL (ref ?–2)
WBC # BLD AUTO: 5.4 K/UL (ref 3.8–10.6)
WBC #/AREA URNS HPF: 12 /HPF (ref 0–5)

## 2023-06-17 RX ADMIN — CEFAZOLIN SCH MLS/HR: 330 INJECTION, POWDER, FOR SOLUTION INTRAMUSCULAR; INTRAVENOUS at 16:14

## 2023-06-17 RX ADMIN — CEFAZOLIN SCH MLS/HR: 330 INJECTION, POWDER, FOR SOLUTION INTRAMUSCULAR; INTRAVENOUS at 21:25

## 2023-06-17 RX ADMIN — Medication SCH MG: at 21:23

## 2023-06-17 RX ADMIN — ACETAMINOPHEN PRN MG: 325 TABLET, FILM COATED ORAL at 04:22

## 2023-06-17 RX ADMIN — PANTOPRAZOLE SODIUM SCH MG: 40 INJECTION, POWDER, FOR SOLUTION INTRAVENOUS at 21:23

## 2023-06-17 RX ADMIN — CEFAZOLIN SCH MLS/HR: 330 INJECTION, POWDER, FOR SOLUTION INTRAMUSCULAR; INTRAVENOUS at 08:44

## 2023-06-17 RX ADMIN — Medication SCH MG: at 08:42

## 2023-06-17 RX ADMIN — CEFAZOLIN SCH: 330 INJECTION, POWDER, FOR SOLUTION INTRAMUSCULAR; INTRAVENOUS at 05:56

## 2023-06-17 RX ADMIN — PANTOPRAZOLE SODIUM SCH MG: 40 INJECTION, POWDER, FOR SOLUTION INTRAVENOUS at 08:43

## 2023-06-17 NOTE — P.HPIM
History of Present Illness


H&P Date: 06/17/23


History of present illness; patient is 63-year-old lady with past medical 

history significant for hypertension, hyperlipidemia, alcohol abuse who was 

brought to the ER by her neighbors after doing wellness check on her.  Patient 

in the past has been admitted for alcohol abuse and there are reports with the 

patient drinking up to 5th  of alcohol.  Neighbors found the patient to be 

confused, and brought her to the ER.  Patient admits to drinking up to a gallon 

of alcohol daily.  Denies any auditory or visual hallucination.  Denies any 

thoughts of hurting herself.  Denies any chest pain or shortness of breath.


Initial lab work in the ER showed WBC 5.3, hemoglobin 14.2, MCV 11.6, sodium 

143, potassium 4.2, chloride 104, CO2 22, BUN 19, creatinine 0.64, lipase 2642, 

serum alcohol level 461


CT brain done showed no acute intracranial process.


Patient admitted to medicine service for further evaluation and treatment








REVIEW OF SYSTEMS: 


CONSTITUTIONAL: No fever, no malaise, no fatigue. 


HEENT: No recent visual problems or hearing problems. Denied any sore throat. 


CARDIOVASCULAR: No chest pain, orthopnea, PND, no palpitations, no syncope. 


PULMONARY: No shortness of breath, no cough, no hemoptysis. 


GASTROINTESTINAL: No diarrhea, no nausea, no vomiting, no abdominal pain. 


NEUROLOGICAL: No headaches, no weakness, no numbness. 


HEMATOLOGICAL: Denies any bleeding or petechiae. 


GENITOURINARY: Denies any burning micturition, frequency, or urgency. 


MUSCULOSKELETAL/RHEUMATOLOGICAL: Denies any joint pain, swelling, or any muscle 

pain. 


ENDOCRINE: Denies any polyuria or polydipsia. 





The rest of the 14-point review of systems is negative.











PHYSICAL EXAMINATION: 





GENERAL: The patient is alert and oriented x3, not in any acute distress.  

Patient is shaky and restless


HEENT: Pupils are round and equally reacting to light. EOMI. No scleral icterus.

No conjunctival pallor. Normocephalic, atraumatic. No pharyngeal erythema. No 

thyromegaly. 


CARDIOVASCULAR: S1 and S2 present. No murmurs, rubs, or gallops. 


PULMONARY: Chest is clear to auscultation, no wheezing or crackles. 


ABDOMEN: Soft, nontender, nondistended, normoactive bowel sounds. No palpable 

organomegaly. 


MUSCULOSKELETAL: No joint swelling or deformity.


EXTREMITIES: No cyanosis, clubbing, or pedal edema. 


NEUROLOGICAL: Gross neurological examination did not reveal any focal deficits. 


SKIN: No rashes. 





Assessment and plan


Acute metabolic encephalopathy


Alcohol abuse


Alcoholic pancreatitis


Hypertension


Hyperlipidemia





Plan;


Monitor vital signs


Monitor CBC


Monitor CMP


Seizure precautions


Continue CIWA protocol


Continue thiamine and folic acid


Continue IV fluids


Continue pain management


Consults psychiatry














Past Medical History


Past Medical History: Hyperlipidemia, Hypertension, Osteoarthritis (OA)


Additional Past Medical History / Comment(s): hx colon polyps, occasional 

diarrhea, states rectal bleeding and abdominal pain., hx of fall Nov 2020 and 

has been having pain right hip since that radiates down right leg, walks with 

limp., states breast implants moving up.


History of Any Multi-Drug Resistant Organisms: None Reported


Past Surgical History: Orthopedic Surgery


Additional Past Surgical History / Comment(s): right elbow surgery with screws, 

right knee surgery with plate and removal ., lump left breast, breast implants


Past Anesthesia/Blood Transfusion Reactions: Postoperative Nausea & Vomiting (P

ONV)


Past Psychological History: Anxiety, Depression


Smoking Status: Former smoker


Past Alcohol Use History: Abuse


Additional Past Alcohol Use History / Comment(s): quit smoking 35 yrs ago 

(1986), hx of 2ppd, started age 15.  states recovering alcoholic.


Past Drug Use History: None Reported





- Past Family History


  ** Father


Family Medical History: Cancer





Medications and Allergies


                                Home Medications











 Medication  Instructions  Recorded  Confirmed  Type


 


RX: Atorvastatin [Lipitor] 10 mg PO DAILY 04/12/21 06/16/23 History


 


RX: Divalproex [Depakote] 250 mg PO BID 04/12/21 06/16/23 History


 


RX: QUEtiapine XR [SEROquel XR] 200 mg PO DAILY@1700 04/12/21 06/16/23 History


 


RX: QUEtiapine [SEROquel] 100 mg PO DAILY 04/12/21 06/16/23 History


 


RX: lisinopriL [Zestril] 10 mg PO DAILY 04/12/21 06/16/23 History


 


RX: traZODone HCL 50 mg PO HS 04/12/21 06/16/23 History


 


RX: Sertraline HCl [Zoloft] 50 mg PO DAILY 04/06/23 06/16/23 History


 


RX: Thiamine [Vitamin B-1] 100 mg PO DAILY #30 tab 04/08/23 06/16/23 Rx


 


chlordiazePOXIDE HCl [Librium] 25 mg PO TID 3 Days #9 capsule 04/08/23 06/16/23 

Rx








                                    Allergies











Allergy/AdvReac Type Severity Reaction Status Date / Time


 


latex Allergy Unknown Itching, Verified 06/16/23 15:49





   Blisters  


 


codeine AdvReac Severe HEADACHE Verified 06/16/23 15:49


 


hydrocodone [From Vicodin] AdvReac Severe HEADACHE Verified 06/16/23 15:49


 


ANESTHESIA AdvReac Unknown GAS GIVES Uncoded 06/16/23 15:49





   HER  





   HEADACHE,  





   VOMITING,  





   HEART  





   PALIPITATIONS  














Physical Exam


Vitals: 


                                   Vital Signs











  Temp Pulse Pulse Pulse Resp BP BP


 


 06/17/23 07:52  98.5 F   104 H   18   141/76


 


 06/17/23 02:00  99 F    110 H  16   156/80


 


 06/16/23 22:00     125 H   


 


 06/16/23 21:41  98.1 F    125 H  20   115/61


 


 06/16/23 20:14  98.3 F  78    16  136/72 


 


 06/16/23 14:50  97.2 F L  78    16  116/79 














  Pulse Ox


 


 06/17/23 07:52  94 L


 


 06/17/23 02:00  93 L


 


 06/16/23 22:00 


 


 06/16/23 21:41  93 L


 


 06/16/23 20:14  96


 


 06/16/23 14:50  95








                                Intake and Output











 06/16/23 06/17/23 06/17/23





 22:59 06:59 14:59


 


Other:   


 


  # Voids 1 2 1


 


  # Bowel Movements   1


 


  Weight 72.575 kg  














Results


CBC & Chem 7: 


                                 06/17/23 05:58





                                 06/17/23 05:58


Labs: 


                  Abnormal Lab Results - Last 24 Hours (Table)











  06/16/23 06/16/23 06/16/23 Range/Units





  15:47 15:47 23:25 


 


RBC     (3.80-5.40)  m/uL


 


MCV  101.6 H    (80.0-100.0)  fL


 


BUN   19 H   (7-17)  mg/dL


 


Glucose   166 H   (74-99)  mg/dL


 


POC Glucose (mg/dL)    171 H  ()  mg/dL


 


Phosphorus     (2.5-4.5)  mg/dL


 


Magnesium     (1.6-2.3)  mg/dL


 


AST   48 H   (14-36)  U/L


 


ALT   38 H   (4-34)  U/L


 


Lipase   2642 H   ()  U/L


 


Serum Alcohol   461 H*   mg/dL














  06/17/23 06/17/23 Range/Units





  05:58 05:58 


 


RBC  3.70 L   (3.80-5.40)  m/uL


 


MCV  101.4 H   (80.0-100.0)  fL


 


BUN    (7-17)  mg/dL


 


Glucose   113 H  (74-99)  mg/dL


 


POC Glucose (mg/dL)    ()  mg/dL


 


Phosphorus   2.1 L  (2.5-4.5)  mg/dL


 


Magnesium   1.5 L  (1.6-2.3)  mg/dL


 


AST   41 H  (14-36)  U/L


 


ALT    (4-34)  U/L


 


Lipase   2646 H  ()  U/L


 


Serum Alcohol    mg/dL














Thrombosis Risk Factor Assmnt





- Choose All That Apply


Any of the Below Risk Factors Present?: No


Each Risk Factor Represents 2 Points: Age 61-74 years


Each Risk Factor Represents 3 Points: Family history of DVT/PE


Other congenital or acquired thrombophilia - If yes, enter type in comment: No


Thrombosis Risk Factor Assessment Total Risk Factor Score: 5


Thrombosis Risk Factor Assessment Level: High Risk

## 2023-06-17 NOTE — P.CN
Psychiatric Consult





- .


Consult date: 23


Consult:: 





23 16:23


IDENTIFYING DATA: This patient is a 63-year-old   female





REASON FOR REFERRAL: Psychiatry was consulted for assessing suicidal ideation





HISTORY OF PRESENT ILLNESS: The patient presented to the hospital on 2023 

for altered mental status secondary to alcohol intoxication.  While intoxicated,

patient stated that she will "burn herself and wants to get help ."  However, 

patient has consistently denied suicidal ideation to providers thereafter. 


Patient was seen bedside this afternoon.  She states that she has been drinking 

half a gallon of alcohol last weekend and this weekend.  She reports that 

recently she has generally been drinking 1-2 drinks otherwise but that she has 

been feeling depressed due to thinking about the deaths of her brother and 

mother.  She says that her brother  in 2022 and mother  in 

2022.  Endorses that her mood has been "depressed ".  She endorses 

symptoms of depression including anhedonia, low energy, difficulty sleeping, and

low appetite.  Discussed with her that mood, appetite and sleep are also 

impacted by alcohol.  Patient states that she enjoys living and that she has no 

intention to end her life.  She states "I don't want to die ".  She vehemently 

denies suicidal ideation, intent, or plan.  She denies access to guns or 

firearms.  She states that she likes to keep in touch with her 2 sons who 

currently do not live in the same state.  She feels that it would be beneficial 

to have someone  for psychotherapy.  She is somewhat pre-contemplative about her

alcohol use and states that she has tried to quit numerous times in the past.  

She describes that she has been to [facility] for 1 year and has been to [facility] twice both of which lasted one month each.  She claims that she would like

to be sober and was agreeable with being started on naltrexone.  She does not 

express any desire to attend inpatient rehab although this was recommended.


Patient denies constellation of symptoms consistent with carlos eduardo.  At this time 

patient denies any suicidal or homicidal ideations, intent or plan. Patient 

denies any auditory, visual hallucinations and denies any paranoia or delusions.

 She denies other substance use.





PAST PSYCHIATRIC HISTORY: Patient reports currently being prescribed Zoloft 50 

mg daily, Depakote 250 mg twice a day, Seroquel 100 mg in the morning and 200 mg

at bedtime, and trazodone 50 mg by her primary care doctor.  She states that she

does not noticed any difference with the medication and would like to be taken 

off of 1 possible.  She denies any side effects to the medications.  She denies 

any history of hospitalizations.  She denies any suicide attempts.





PAST MEDICAL HISTORY: 


Past Medical History: Hyperlipidemia, Hypertension, Osteoarthritis (OA)


Additional Past Medical History / Comment(s): hx colon polyps, occasional 

diarrhea, states rectal bleeding and abdominal pain., hx of fall 2020 and 

has been having pain right hip since that radiates down right leg, walks with 

limp., states breast implants moving up.


History of Any Multi-Drug Resistant Organisms: None Reported


Past Surgical History: Orthopedic Surgery


Additional Past Surgical History / Comment(s): right elbow surgery with screws, 

right knee surgery with plate and removal ., lump left breast, breast implants


Past Anesthesia/Blood Transfusion Reactions: Postoperative Nausea & Vomiting 

(PONV)


Past Psychological History: Anxiety, Depression


Smoking Status: Former smoker


Past Alcohol Use History: Abuse


Past Drug Use History: None Reported





ALLERGIES: as per EMR. 





CHEMICAL DEPENDENCY HISTORY: as per HPI. 





FAMILY PSYCHIATRIC/SUBSTANCE USE HISTORY: Sister " from alcohol and drug use

".  Father "was an alcoholic ".





SOCIAL HISTORY: Patient states that she currently lives by herself.  She was 

 28 years but is since .  She has 2 sons neither of whom live in 

the same state.





MENTAL STATUS EXAM: 


General Appearance: Patient appears to be stated age is alert, pleasant, and 

cooperative. Patient appears to have fair hygiene and grooming wearing hospital 

gown with fair eye contact. Hair dyed red


Behavior: Patient is calmly lying in bed without any agitated behavior.


Speech: Patient's speech is fluent and nonpressured. 


Mood/Affect: Patient reports their mood is "depressed", affect is congruent


Suicidality/Homicidality:  Patient denies having any suicidal or homicidal 

ideation intent or plan.  Future oriented to talk to her sons and would like to 

find therapist


Perceptions: Patient denies any visual hallucinations and denies any auditory 

hallucinations


Though content/process: There is no evidence of any delusional thought content 

and thought process is linear and goal-directed. 


Memory and concentration: AOX3, grossly intact for the purposes of this session.

Can spell "WORLD" backwards


Judgment and insight: Fair regarding substance use





IMPRESSIONS: 


Depressive disorder, unspecified (alcohol-induced depressive disorder and/or 

major depressive disorder)


Alcohol use disorder, severe, in withdrawal


Bereavement, uncomplicated





PLAN: 


-At this time patient DOES NOT meet criteria for inpatient psychiatric 

admission.


-Would recommend the following medication changes/additions: 


Stop Depakote 250 mg BID 


Continue home Seroquel and trazodone


Increase Zoloft to 100 mg daily for mood


Start Naltrexone 50 mg daily. Patient expresses interest in Vivitrol in the 

future.  Patient states that she has not needed/wanted morphine and is not 

interested in any pain medication.  Naltrexone may be discontinued if opioids 

are needed in the future. Patient has not been given/needed morphine that is 

currently ordered PRN


-CIWA protocol with PRN Ativan for alcohol withdrawal per primary team. Continue

to monitor vital signs.


-Can discontinue 1:1 sitter at this time as patient is not currently an imminent

threat to themselves


- to provide patient with outpatient mental health/psychiatry 

resources for appropriate follow up upon discharge. Please provide her with 

website [employer] and searching for telehealth therapist since she 

cannot drive


-Writer spoke with patient about substance abuse and the harmful effects on 

medical and mental health, patient verbally understood and agreed.


- to provide patient substance use treatment resources including AA

meetings in the community and with access line number to call for inpatient 

substance rehab


-Communicated plan to patient's nurse


-Psychiatry will sign off at this time


-Please contact with any questions.

## 2023-06-18 RX ADMIN — CEFAZOLIN SCH MLS/HR: 330 INJECTION, POWDER, FOR SOLUTION INTRAMUSCULAR; INTRAVENOUS at 09:01

## 2023-06-18 RX ADMIN — Medication SCH MG: at 08:58

## 2023-06-18 RX ADMIN — PANTOPRAZOLE SODIUM SCH: 40 INJECTION, POWDER, FOR SOLUTION INTRAVENOUS at 23:15

## 2023-06-18 RX ADMIN — ACETAMINOPHEN PRN MG: 325 TABLET, FILM COATED ORAL at 14:25

## 2023-06-18 RX ADMIN — CEFAZOLIN SCH: 330 INJECTION, POWDER, FOR SOLUTION INTRAMUSCULAR; INTRAVENOUS at 17:13

## 2023-06-18 RX ADMIN — NALTREXONE HYDROCHLORIDE SCH MG: 50 TABLET, FILM COATED ORAL at 08:59

## 2023-06-18 RX ADMIN — Medication SCH MG: at 08:59

## 2023-06-18 RX ADMIN — SERTRALINE HYDROCHLORIDE SCH MG: 100 TABLET ORAL at 08:59

## 2023-06-18 RX ADMIN — PANTOPRAZOLE SODIUM SCH MG: 40 INJECTION, POWDER, FOR SOLUTION INTRAVENOUS at 08:59

## 2023-06-18 RX ADMIN — Medication SCH MG: at 20:52

## 2023-06-18 NOTE — P.PN
Subjective


Progress Note Date: 06/18/23


patient is 63-year-old lady with past medical history significant for 

hypertension, hyperlipidemia, alcohol abuse who was brought to the ER by her 

neighbors after doing wellness check on her.  Patient in the past has been 

admitted for alcohol abuse and there are reports with the patient drinking up to

5th  of alcohol.  Neighbors found the patient to be confused, and brought her to

the ER.  Patient admits to drinking up to a gallon of alcohol daily.  Denies any

auditory or visual hallucination.  Denies any thoughts of hurting herself.  

Denies any chest pain or shortness of breath.


Initial lab work in the ER showed WBC 5.3, hemoglobin 14.2, MCV 11.6, sodium 

143, potassium 4.2, chloride 104, CO2 22, BUN 19, creatinine 0.64, lipase 2642, 

serum alcohol level 461


CT brain done showed no acute intracranial process.


Patient admitted to medicine service for further evaluation and treatment





6/18.  Patient seen and examined.  No acute issues overnight.  Denies any 

auditory or visual hallucinations.  Temperature is 98.2, heart rate 88, blood 

pressure 149/92, respirations 18








REVIEW OF SYSTEMS: 


CONSTITUTIONAL: No fever, no malaise,. 


CARDIOVASCULAR: No chest pain, no palpitations, no syncope. 


PULMONARY: No shortness of breath, no cough, 


GASTROINTESTINAL: No diarrhea, no nausea, no vomiting, no abdominal pain. 


NEUROLOGICAL: No headaches, no weakness, 





PHYSICAL EXAMINATION: 





GENERAL: The patient is alert and oriented x3, not in any acute distress. Well 

developed, well nourished. 


HEENT: Pupils are round and equally reacting to light. EOMI. No scleral icterus.

No conjunctival pallor. Normocephalic, atraumatic. No pharyngeal erythema. No 

thyromegaly. 


CARDIOVASCULAR: S1 and S2 present. No murmurs, rubs, or gallops. 


PULMONARY: Chest is clear to auscultation, no wheezing or crackles. 


ABDOMEN: Soft, nontender, nondistended, normoactive bowel sounds. No palpable 

organomegaly. 


MUSCULOSKELETAL: No joint swelling or deformity.


EXTREMITIES: No cyanosis, clubbing, or pedal edema. 


NEUROLOGICAL: Gross neurological examination did not reveal any focal deficits. 


SKIN: No rashes. 





Assessment and plan


Acute metabolic encephalopathy


Alcohol abuse


Alcoholic pancreatitis


Hypertension


Hyperlipidemia





Monitor vital signs


Monitor CBC


Monitor CMP


Continue telemetry monitoring


Seizure precautions


Continue CIWA protocol


Continue thiamine and folic acid


Continue IV fluids


Continue pain management





Psychiatric evaluated the patient recommended the following


Stop Depakote 250 mg BID 


Continue home Seroquel and trazodone


Increase Zoloft to 100 mg daily for mood


Start Naltrexone 50 mg daily. Patient expresses interest in Vivitrol in the fu

ture.  Patient states that she has not needed/wanted morphine and is not 

interested in any pain medication.


She does not meet criteria for inpatient psych admission








Objective





- Vital Signs


Vital signs: 


                                   Vital Signs











Temp  98.2 F   06/18/23 07:54


 


Pulse  88   06/18/23 07:54


 


Resp  18   06/18/23 07:54


 


BP  149/92   06/18/23 07:54


 


Pulse Ox  95   06/18/23 07:54


 


FiO2      








                                 Intake & Output











 06/17/23 06/18/23 06/18/23





 18:59 06:59 18:59


 


Intake Total 650  


 


Balance 650  


 


Intake:   


 


  Oral 650  


 


Other:   


 


  # Voids 1 3 


 


  # Bowel Movements 0  














- Labs


CBC & Chem 7: 


                                 06/17/23 05:58





                                 06/17/23 05:58


Labs: 


                  Abnormal Lab Results - Last 24 Hours (Table)











  06/16/23 06/16/23 Range/Units





  20:30 20:30 


 


Urine Protein   Trace H  (Negative)  


 


Urine Ketones   Trace H  (Negative)  


 


Ur Leukocyte Esterase   Small H  (Negative)  


 


Urine WBC   12 H  (0-5)  /hpf


 


Amorphous Sediment   Rare H  (None)  /hpf


 


Hyaline Casts   20 H  (0-2)  /lpf


 


Urine Mucus   Moderate H  (None)  /hpf


 


U Tricyclic Antidepress  Detected H   (NotDetected)  


 


U Benzodiazepines Scrn  Detected H   (NotDetected)

## 2023-06-19 VITALS — HEART RATE: 109 BPM | SYSTOLIC BLOOD PRESSURE: 131 MMHG | TEMPERATURE: 98.6 F | DIASTOLIC BLOOD PRESSURE: 80 MMHG

## 2023-06-19 VITALS — RESPIRATION RATE: 17 BRPM

## 2023-06-19 LAB
ALBUMIN SERPL-MCNC: 3.1 G/DL (ref 3.5–5)
ALBUMIN/GLOB SERPL: 1.2 {RATIO}
ALP SERPL-CCNC: 41 U/L (ref 38–126)
ALT SERPL-CCNC: 21 U/L (ref 4–34)
ANION GAP SERPL CALC-SCNC: 3 MMOL/L
AST SERPL-CCNC: 30 U/L (ref 14–36)
BASOPHILS # BLD AUTO: 0 K/UL (ref 0–0.2)
BASOPHILS NFR BLD AUTO: 0 %
BUN SERPL-SCNC: 10 MG/DL (ref 7–17)
CALCIUM SPEC-MCNC: 8.7 MG/DL (ref 8.4–10.2)
CHLORIDE SERPL-SCNC: 107 MMOL/L (ref 98–107)
CO2 SERPL-SCNC: 26 MMOL/L (ref 22–30)
EOSINOPHIL # BLD AUTO: 0.2 K/UL (ref 0–0.7)
EOSINOPHIL NFR BLD AUTO: 4 %
ERYTHROCYTE [DISTWIDTH] IN BLOOD BY AUTOMATED COUNT: 3.28 M/UL (ref 3.8–5.4)
ERYTHROCYTE [DISTWIDTH] IN BLOOD: 12.9 % (ref 11.5–15.5)
GLOBULIN SER CALC-MCNC: 2.5 G/DL
GLUCOSE SERPL-MCNC: 109 MG/DL (ref 74–99)
HCT VFR BLD AUTO: 34.4 % (ref 34–46)
HGB BLD-MCNC: 11.1 GM/DL (ref 11.4–16)
LYMPHOCYTES # SPEC AUTO: 1.3 K/UL (ref 1–4.8)
LYMPHOCYTES NFR SPEC AUTO: 32 %
MCH RBC QN AUTO: 33.8 PG (ref 25–35)
MCHC RBC AUTO-ENTMCNC: 32.2 G/DL (ref 31–37)
MCV RBC AUTO: 105.1 FL (ref 80–100)
MONOCYTES # BLD AUTO: 0.3 K/UL (ref 0–1)
MONOCYTES NFR BLD AUTO: 7 %
NEUTROPHILS # BLD AUTO: 2.1 K/UL (ref 1.3–7.7)
NEUTROPHILS NFR BLD AUTO: 53 %
PLATELET # BLD AUTO: 147 K/UL (ref 150–450)
POTASSIUM SERPL-SCNC: 4.4 MMOL/L (ref 3.5–5.1)
PROT SERPL-MCNC: 5.6 G/DL (ref 6.3–8.2)
SODIUM SERPL-SCNC: 136 MMOL/L (ref 137–145)
WBC # BLD AUTO: 3.9 K/UL (ref 3.8–10.6)

## 2023-06-19 RX ADMIN — Medication SCH MG: at 08:29

## 2023-06-19 RX ADMIN — CEFAZOLIN SCH: 330 INJECTION, POWDER, FOR SOLUTION INTRAMUSCULAR; INTRAVENOUS at 00:44

## 2023-06-19 RX ADMIN — PANTOPRAZOLE SODIUM SCH: 40 INJECTION, POWDER, FOR SOLUTION INTRAVENOUS at 08:47

## 2023-06-19 RX ADMIN — NALTREXONE HYDROCHLORIDE SCH MG: 50 TABLET, FILM COATED ORAL at 08:29

## 2023-06-19 RX ADMIN — SERTRALINE HYDROCHLORIDE SCH MG: 100 TABLET ORAL at 08:29

## 2023-06-19 RX ADMIN — CEFAZOLIN SCH: 330 INJECTION, POWDER, FOR SOLUTION INTRAMUSCULAR; INTRAVENOUS at 08:46

## 2023-06-19 NOTE — P.DS
Providers


Date of admission: 


06/16/23 17:35





Expected date of discharge: 06/19/23


Attending physician: 


Boni Larios





Consults: 





                                        





06/16/23 17:33


Consult Physician Routine 


   Consulting Provider: Jorje Barclay Reason/Comments: psych


   Do you want consulting provider notified?: Yes











Primary care physician: 


[facility] Course: 





Discharge diagnoses;


Acute metabolic encephalopathy


Alcohol abuse


Alcoholic pancreatitis


Hypertension


Hyperlipidemia








Hospital course;


patient is 63-year-old lady with past medical history significant for 

hypertension, hyperlipidemia, alcohol abuse who was brought to the ER by her 

neighbors after doing wellness check on her.  Patient in the past has been 

admitted for alcohol abuse and there are reports with the patient drinking up to

5th  of alcohol.  Neighbors found the patient to be confused, and brought her to

the ER.  Patient admits to drinking up to a gallon of alcohol daily.  Denies any

auditory or visual hallucination.  Denies any thoughts of hurting herself.  

Denies any chest pain or shortness of breath.


Initial lab work in the ER showed WBC 5.3, hemoglobin 14.2, MCV 11.6, sodium 

143, potassium 4.2, chloride 104, CO2 22, BUN 19, creatinine 0.64, lipase 2642, 

serum alcohol level 461


CT brain done showed no acute intracranial process.


Patient admitted to medicine service for further evaluation and treatment





6/18.  Patient seen and examined.  No acute issues overnight.  Denies any 

auditory or visual hallucinations.  Temperature is 98.2, heart rate 88, blood 

pressure 149/92, respirations 18


Psychiatry evaluated the patient and recommended the following





Stop Depakote 250 mg BID 


Continue home Seroquel and trazodone


Increase Zoloft to 100 mg daily for mood


Start Naltrexone 50 mg daily. Patient expresses interest in Vivitrol in the 

future.  Patient states that she has not needed/wanted morphine and is not 

interested in any pain medication.


She does not meet criteria for inpatient psych admission





6/19.  Patient seen and examined.  Patient was seen by PT OT, they cleared the 

patient for discharge to home with homecare.








PHYSICAL EXAMINATION: 





GENERAL: The patient is alert and oriented x3, not in any acute distress. Well 

developed, well nourished. 


HEENT: Pupils are round and equally reacting to light. EOMI. No scleral icterus.

No conjunctival pallor. Normocephalic, atraumatic. No pharyngeal erythema. No 

thyromegaly. 


CARDIOVASCULAR: S1 and S2 present. No murmurs, rubs, or gallops. 


PULMONARY: Chest is clear to auscultation, no wheezing or crackles. 


ABDOMEN: Soft, nontender, nondistended, normoactive bowel sounds. No palpable 

organomegaly. 


MUSCULOSKELETAL: No joint swelling or deformity.


EXTREMITIES: No cyanosis, clubbing, or pedal edema. 


NEUROLOGICAL: Gross neurological examination did not reveal any focal deficits. 


SKIN: No rashes. 





Patient Condition at Discharge: Fair





Plan - Discharge Summary


Discharge Rx Participant: Yes


New Discharge Prescriptions: 


New


   Naltrexone HCl [Revia] 50 mg PO DAILY #14 tab


   Sertraline [Zoloft] 100 mg PO DAILY #30 tab





Continue


   QUEtiapine [SEROquel] 100 mg PO DAILY


   QUEtiapine XR [SEROquel XR] 200 mg PO DAILY@1700


   lisinopriL [Zestril] 10 mg PO DAILY


   Atorvastatin [Lipitor] 10 mg PO DAILY


   chlordiazePOXIDE HCl [Librium] 25 mg PO TID 3 Days #9 capsule


   traZODone HCL 50 mg PO HS


   Thiamine [Vitamin B-1] 100 mg PO DAILY #30 tab





Discontinued


   Divalproex [Depakote] 250 mg PO BID


   Sertraline HCl [Zoloft] 50 mg PO DAILY


Discharge Medication List





Atorvastatin [Lipitor] 10 mg PO DAILY 04/12/21 [History]


QUEtiapine XR [SEROquel XR] 200 mg PO DAILY@1700 04/12/21 [History]


QUEtiapine [SEROquel] 100 mg PO DAILY 04/12/21 [History]


lisinopriL [Zestril] 10 mg PO DAILY 04/12/21 [History]


traZODone HCL 50 mg PO HS 04/12/21 [History]


Thiamine [Vitamin B-1] 100 mg PO DAILY #30 tab 04/08/23 [Rx]


chlordiazePOXIDE HCl [Librium] 25 mg PO TID 3 Days #9 capsule 04/08/23 [Rx]


Naltrexone HCl [Revia] 50 mg PO DAILY #14 tab 06/19/23 [Rx]


Sertraline [Zoloft] 100 mg PO DAILY #30 tab 06/19/23 [Rx]








Follow up Appointment(s)/Referral(s): 


[facility], [NON-STAFF] - 1 Week ([facility] will call you arrange a

visit)


Lisa Becerra MD [Primary Care Provider] - 1-2 days


Patient Instructions/Handouts:  Depression (ED)


Discharge Disposition: HOME SELF-CARE

## 2023-06-22 ENCOUNTER — HOSPITAL ENCOUNTER (OUTPATIENT)
Dept: HOSPITAL 47 - RADMRIMAIN | Age: 63
Discharge: HOME | End: 2023-06-22
Attending: FAMILY MEDICINE
Payer: MEDICARE

## 2023-06-22 DIAGNOSIS — R90.82: Primary | ICD-10-CM

## 2023-06-22 DIAGNOSIS — R42: ICD-10-CM

## 2023-06-22 PROCEDURE — 70551 MRI BRAIN STEM W/O DYE: CPT

## 2023-06-24 NOTE — MR
EXAMINATION TYPE: MR brain wo con

 

DATE OF EXAM: 6/22/2023 5:05 PM

 

COMPARISON: CT brain 4/16/2023

 

CLINICAL INDICATION:Female, 63 years old with history of R42 DIZZINESS; Dizziness, weakness, numbness
 rt side

 

TECHNIQUE: Multi planar, multi sequence imaging was performed through the brain including: T1, T2, In
version recovery, Diffusion weighted imaging, and gradient echo imaging. No gadolinium was given. 

 

FINDINGS: 

Generalized cerebral atrophy with proportional dilation to the ventricular system. Scattered foci of 
high T2 signal intensity are seen within the periventricular white matter. Midline structures show no
 abnormality. Some of these are perpendicular to the ventricles. Diffusion-weighted imaging shows no 
evidence of restricted diffusion. Punctate blooming compatible with micro-hemorrhage within the right
 centrum semiovale near midline.

 

The bone marrow signal is within normal limits. 

Paranasal sinuses and mastoid air cells: Right lateral opacified sphenoid sinus.

Visualized orbits: Orbital contents are intact.

 

IMPRESSION:

1. No evidence of intracranial mass or acute/subacute infarct.

2. White matter changes some of which are orthogonal to the ventricles correlate for demyelination ve
rsus small vessel ischemic disease

## 2023-09-11 ENCOUNTER — HOSPITAL ENCOUNTER (INPATIENT)
Dept: HOSPITAL 47 - EC | Age: 63
LOS: 2 days | Discharge: HOME | DRG: 896 | End: 2023-09-13
Attending: HOSPITALIST | Admitting: HOSPITALIST
Payer: MEDICARE

## 2023-09-11 DIAGNOSIS — Z88.4: ICD-10-CM

## 2023-09-11 DIAGNOSIS — M19.90: ICD-10-CM

## 2023-09-11 DIAGNOSIS — I10: ICD-10-CM

## 2023-09-11 DIAGNOSIS — S00.11XA: ICD-10-CM

## 2023-09-11 DIAGNOSIS — R26.9: ICD-10-CM

## 2023-09-11 DIAGNOSIS — F41.9: ICD-10-CM
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
## 2023-09-11 DIAGNOSIS — M54.50: ICD-10-CM

## 2023-09-11 DIAGNOSIS — Z79.899: ICD-10-CM

## 2023-09-11 DIAGNOSIS — Z91.81: ICD-10-CM

## 2023-09-11 DIAGNOSIS — Z87.891: ICD-10-CM

## 2023-09-11 DIAGNOSIS — K85.90: ICD-10-CM

## 2023-09-11 DIAGNOSIS — F10.229: Primary | ICD-10-CM

## 2023-09-11 DIAGNOSIS — F10.231: ICD-10-CM

## 2023-09-11 DIAGNOSIS — E78.5: ICD-10-CM
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
## 2023-09-11 DIAGNOSIS — F32.A: ICD-10-CM

## 2023-09-11 DIAGNOSIS — M54.2: ICD-10-CM

## 2023-09-11 DIAGNOSIS — Z88.5: ICD-10-CM

## 2023-09-11 DIAGNOSIS — Z91.040: ICD-10-CM

## 2023-09-11 DIAGNOSIS — R51.9: ICD-10-CM

## 2023-09-11 LAB
ALBUMIN SERPL-MCNC: 4.8 G/DL (ref 3.5–5)
ALP SERPL-CCNC: 58 U/L (ref 38–126)
ALT SERPL-CCNC: 21 U/L (ref 4–34)
AMYLASE SERPL-CCNC: 229 U/L (ref 30–110)
ANION GAP SERPL CALC-SCNC: 16 MMOL/L
AST SERPL-CCNC: 38 U/L (ref 14–36)
BASOPHILS # BLD AUTO: 0 K/UL (ref 0–0.2)
BASOPHILS NFR BLD AUTO: 0 %
BUN SERPL-SCNC: 15 MG/DL (ref 7–17)
CALCIUM SPEC-MCNC: 9.7 MG/DL (ref 8.4–10.2)
CHLORIDE SERPL-SCNC: 100 MMOL/L (ref 98–107)
CO2 SERPL-SCNC: 22 MMOL/L (ref 22–30)
EOSINOPHIL # BLD AUTO: 0.2 K/UL (ref 0–0.7)
EOSINOPHIL NFR BLD AUTO: 2 %
ERYTHROCYTE [DISTWIDTH] IN BLOOD BY AUTOMATED COUNT: 4.06 M/UL (ref 3.8–5.4)
ERYTHROCYTE [DISTWIDTH] IN BLOOD: 12.9 % (ref 11.5–15.5)
GLUCOSE SERPL-MCNC: 139 MG/DL (ref 74–99)
HCT VFR BLD AUTO: 41.3 % (ref 34–46)
HGB BLD-MCNC: 13.7 GM/DL (ref 11.4–16)
INR PPP: 0.9 (ref ?–1.2)
LIPASE SERPL-CCNC: 1365 U/L (ref 23–300)
LYMPHOCYTES # SPEC AUTO: 2.9 K/UL (ref 1–4.8)
LYMPHOCYTES NFR SPEC AUTO: 24 %
MAGNESIUM SPEC-SCNC: 1.4 MG/DL (ref 1.6–2.3)
MCH RBC QN AUTO: 33.8 PG (ref 25–35)
MCHC RBC AUTO-ENTMCNC: 33.2 G/DL (ref 31–37)
MCV RBC AUTO: 101.8 FL (ref 80–100)
MONOCYTES # BLD AUTO: 0.5 K/UL (ref 0–1)
MONOCYTES NFR BLD AUTO: 4 %
NEUTROPHILS # BLD AUTO: 8.4 K/UL (ref 1.3–7.7)
NEUTROPHILS NFR BLD AUTO: 69 %
PLATELET # BLD AUTO: 340 K/UL (ref 150–450)
POTASSIUM SERPL-SCNC: 4 MMOL/L (ref 3.5–5.1)
PROT SERPL-MCNC: 8.4 G/DL (ref 6.3–8.2)
PT BLD: 10 SEC (ref 9–12)
SODIUM SERPL-SCNC: 138 MMOL/L (ref 137–145)
WBC # BLD AUTO: 12.2 K/UL (ref 3.8–10.6)

## 2023-09-11 PROCEDURE — 82075 ASSAY OF BREATH ETHANOL: CPT

## 2023-09-11 PROCEDURE — 85610 PROTHROMBIN TIME: CPT

## 2023-09-11 PROCEDURE — 96372 THER/PROPH/DIAG INJ SC/IM: CPT

## 2023-09-11 PROCEDURE — 96365 THER/PROPH/DIAG IV INF INIT: CPT

## 2023-09-11 PROCEDURE — 72131 CT LUMBAR SPINE W/O DYE: CPT

## 2023-09-11 PROCEDURE — 96366 THER/PROPH/DIAG IV INF ADDON: CPT

## 2023-09-11 PROCEDURE — 36415 COLL VENOUS BLD VENIPUNCTURE: CPT

## 2023-09-11 PROCEDURE — 80306 DRUG TEST PRSMV INSTRMNT: CPT

## 2023-09-11 PROCEDURE — 83690 ASSAY OF LIPASE: CPT

## 2023-09-11 PROCEDURE — 99285 EMERGENCY DEPT VISIT HI MDM: CPT

## 2023-09-11 PROCEDURE — 93005 ELECTROCARDIOGRAM TRACING: CPT

## 2023-09-11 PROCEDURE — 70450 CT HEAD/BRAIN W/O DYE: CPT

## 2023-09-11 PROCEDURE — 96361 HYDRATE IV INFUSION ADD-ON: CPT

## 2023-09-11 PROCEDURE — 80053 COMPREHEN METABOLIC PANEL: CPT

## 2023-09-11 PROCEDURE — 80320 DRUG SCREEN QUANTALCOHOLS: CPT

## 2023-09-11 PROCEDURE — 72128 CT CHEST SPINE W/O DYE: CPT

## 2023-09-11 PROCEDURE — 83735 ASSAY OF MAGNESIUM: CPT

## 2023-09-11 PROCEDURE — 85025 COMPLETE CBC W/AUTO DIFF WBC: CPT

## 2023-09-11 PROCEDURE — 72125 CT NECK SPINE W/O DYE: CPT

## 2023-09-11 PROCEDURE — 84100 ASSAY OF PHOSPHORUS: CPT

## 2023-09-11 PROCEDURE — 72170 X-RAY EXAM OF PELVIS: CPT

## 2023-09-11 PROCEDURE — 82150 ASSAY OF AMYLASE: CPT

## 2023-09-11 PROCEDURE — 71046 X-RAY EXAM CHEST 2 VIEWS: CPT

## 2023-09-11 NOTE — XR
EXAM:

  XR Right Knee Complete, 4 or More Views

 

CLINICAL HISTORY:

  ITS.REASON XR Reason: fall, etoh

 

TECHNIQUE:

  Four or more views of the right knee.

 

COMPARISON:

  No relevant prior studies available.

 

FINDINGS:

  Bones/joints:  Osseous demineralization.  There are screw tracks in the 

proximal tibia, correlate with surgical history.  Old fracture of the 

lateral tibial plateau.  No dislocation.  No acute fracture.

  Soft tissues:  Unremarkable.

 

IMPRESSION:     

  No acute findings in the right knee.

## 2023-09-11 NOTE — XR
EXAM:

  XR Pelvis, 1 or 2 Views

 

CLINICAL HISTORY:

  ITS.REASON XR Reason: fall, etoh

 

TECHNIQUE:

  Frontal view of the pelvis.

 

COMPARISON:

  No relevant prior studies available.

 

FINDINGS:

  Bones/joints:  Unremarkable.  No dislocation.  No pelvic fracture.

  Soft tissues:  Unremarkable.

 

IMPRESSION:     

  No pelvic fracture.

## 2023-09-11 NOTE — CT
EXAM:

  CT Head Without Intravenous Contrast

 

CLINICAL HISTORY:

  ITS.REASON CT Reason: fall, etoh

 

TECHNIQUE:

  Axial computed tomography images of the head/brain without intravenous 

contrast.  CTDI is 45.2 mGy and DLP is 1033 mGy-cm.  This CT exam was 

performed using one or more of the following dose reduction techniques: 

automated exposure control, adjustment of the mA and/or kV according to 

patient size, and/or use of iterative reconstruction technique.

 

COMPARISON:

  No relevant prior studies available.

 

FINDINGS:

No acute intracranial hemorrhage.  No midline shift or mass effect. 

 

The territorial gray-white matter differentiation is maintained 

throughout. 

 

Age-related cerebral volume loss.  Periventricular and subcortical white 

matter hypoattenuation, consistent with chronic microangiopathy. 

 

The visualized orbits appear grossly unremarkable.

 

The calvarium is intact.

 

The visualized paranasal sinuses and mastoid air cells are grossly clear.

 

IMPRESSION:

No acute intracranial hemorrhage, midline shift, or mass effect.

 

_______________________________________________

 

EXAM:

  CT Cervical Spine Without Intravenous Contrast

 

CLINICAL HISTORY:

  ITS.REASON CT Reason: fall, etoh

 

TECHNIQUE:

  Axial computed tomography images of the cervical spine without 

intravenous contrast.  CTDI is 9.1 mGy and DLP is 258.8 mGy-cm.  This CT 

exam was performed using one or more of the following dose reduction 

techniques: automated exposure control, adjustment of the mA and/or kV 

according to patient size, and/or use of iterative reconstruction 

technique.

 

COMPARISON:

  No relevant prior studies available.

 

FINDINGS: 

The vertebral body heights are maintained. The craniocervical junction is 

intact. The atlanto-dens interval is maintained. The dens is intact.

 

There is no spondylolisthesis.  

 

Multilevel cervical spondylosis and degenerative disc disease. 

Straightening of the cervical lordosis. 

 

The unenhanced neck soft tissues are grossly unremarkable.  The 

visualized lung apices are grossly clear.

 

IMPRESSION:

No acute fracture or subluxation of the cervical spine.

## 2023-09-11 NOTE — CT
EXAM:

  CT Thoracic Spine Without Intravenous Contrast

 

CLINICAL HISTORY:

  ITS.REASON CT Reason: fall

 

TECHNIQUE:

  Axial computed tomography images of the thoracic spine without 

intravenous contrast.  CTDI is 10.3 mGy and DLP is 606.5 mGy-cm.  This CT 

exam was performed using one or more of the following dose reduction 

techniques: automated exposure control, adjustment of the mA and/or kV 

according to patient size, and/or use of iterative reconstruction 

technique.

 

COMPARISON:

  No relevant prior studies available.

 

FINDINGS:

The vertebral body heights are maintained. The thoracic kyphosis is 

preserved. There is no spondylolisthesis. 

 

The posterior elements are maintained, without evidence of acute fracture.

 The pedicles are intact. 

 

Multilevel thoracic spondylosis and degenerative disc disease. 

 

IMPRESSION: 

No acute fracture or subluxation of the thoracic spine.

 

_______________________________________________

 

EXAM:

  CT Lumbar Spine Without Intravenous Contrast

 

CLINICAL HISTORY:

  ITS.REASON CT Reason: fall

 

TECHNIQUE:

  Axial computed tomography images of the lumbar spine without 

intravenous contrast.  CTDI is 10.3 mGy and DLP is 606.5 mGy-cm.  This CT 

exam was performed using one or more of the following dose reduction 

techniques: automated exposure control, adjustment of the mA and/or kV 

according to patient size, and/or use of iterative reconstruction 

technique.

 

COMPARISON:

  No relevant prior studies available.

 

FINDINGS:

The vertebral body heights are maintained. The lumbar lordosis is 

preserved. There is no spondylolisthesis. 

 

The posterior elements are maintained, without evidence of acute fracture.

 The pedicles are intact. 

 

Multilevel lumbar spondylosis and degenerative disc disease. 

 

IMPRESSION: 

No acute fracture or subluxation of the lumbar spine.

## 2023-09-11 NOTE — XR
EXAM:

  XR Chest, 2 Views

 

CLINICAL HISTORY:

  ITS.REASON XR Reason: fall, etoh

 

TECHNIQUE:

  Frontal and lateral views of the chest.

 

COMPARISON:

  No relevant prior studies available.

 

FINDINGS:

  Lungs:  Unremarkable.  No consolidation.

  Pleural space:  Unremarkable.  No pneumothorax.

  Heart:  Unremarkable.  No cardiomegaly.

  Mediastinum:  Unremarkable.

  Bones/joints:  Unremarkable.

 

IMPRESSION:     

  Normal chest x-rays.

## 2023-09-11 NOTE — ED
Alcohol HPI





- General


Source: patient, RN notes reviewed


Mode of arrival: wheelchair





- History of Present Illness


MD Complaint: alcohol intoxication





<Autumn Bañuelos - Last Filed: 09/11/23 19:36>





<Hayden Barillas - Last Filed: 09/12/23 00:14>





- General


Chief Complaint: Alcohol


Stated Complaint: ETOH


Time Seen by Provider: 09/11/23 19:23





- History of Present Illness


Initial Comments: 


This is a 63 year old female who presents to the emergency department for 

alcohol intoxication, anxiety, and a fall. States that she woke up today next to

the coffee table and is concerned that she may have fallen. She does have a 

headache and lower back pain. States that she consumed a box of wine and a fifth

of vodka last night and this morning.


 (Autumn Bañuelos)


Dictation was produced using dragon dictation software. please excuse any 

grammatical, word or spelling errors. 











Chief Complaint: 63-year-old female presents to the emergency department with 

neck pain, chest pain and right knee pain after fall





History of Present Illness: 63-year-old female states that she fell recently she

does not know exactly what that she fell.  Patient is alcohol dependent.  She f

ell and remembers waking up next to a coffee table.  She complains of headache 

and neck pain.  Patient reports that she was here 4 days ago for initial fall.  

States that she went home feeling fine.  She states that she fell again.  

Patient is a poor historian.  States that she drinks large amounts of alcohol 

daily.  Complains of neck pain.  She has history of right knee surgery.  She 

complains of right knee pain and left anterior chest pain.  Should not report to

me that she had spinal pain but she did and should not to triage.








The ROS documented in this emergency department record has been reviewed and 

confirmed by me.  Those systems with pertinent positive or negative responses 

have been documented in the HPI.  All other systems are other negative and/or 

noncontributory.








 (Hayden Barillas)





- Related Data


                                  Previous Rx's











 Medication  Instructions  Recorded


 


Atorvastatin [Lipitor] 10 mg PO DAILY #30 tab 09/04/23


 


Divalproex [Depakote] 250 mg PO BID #60 tab 09/04/23


 


QUEtiapine [SEROquel] 100 mg PO BID 7 Days #14 tab 09/04/23


 


Sertraline [Zoloft] 100 mg PO DAILY #30 tab 09/04/23


 


Thiamine [Vitamin B-1] 100 mg PO DAILY #30 tab 09/04/23


 


lisinopriL [Zestril] 10 mg PO DAILY #30 tab 09/04/23


 


traZODone HCL [Desyrel] 50 mg PO HS PRN 7 Days #7 tab 09/04/23











                                    Allergies











Allergy/AdvReac Type Severity Reaction Status Date / Time


 


latex Allergy Unknown Itching, Verified 09/11/23 21:56





   Blisters  


 


codeine AdvReac Severe HEADACHE Verified 09/11/23 21:56


 


hydrocodone [From Vicodin] AdvReac Severe HEADACHE Verified 09/11/23 21:56


 


ANESTHESIA AdvReac Unknown GAS GIVES Uncoded 09/11/23 21:56





   HER  





   HEADACHE,  





   VOMITING,  





   HEART  





   PALIPITATIONS  














Review of Systems


ROS Other: All systems not noted in ROS Statement are negative.





<Autumn Bañuelos - Last Filed: 09/11/23 19:36>


ROS Other: All systems not noted in ROS Statement are negative.





<Hayden Barillas - Last Filed: 09/12/23 00:14>


ROS Statement: 


Those systems with pertinent positive or pertinent negative responses have been 

documented in the HPI.








Past Medical History


Past Medical History: Hyperlipidemia, Hypertension, Osteoarthritis (OA)


Additional Past Medical History / Comment(s): hx colon polyps, occasional 

diarrhea, states rectal bleeding and abdominal pain., hx of fall Nov 2020 and 

has been having pain right hip since that radiates down right leg, walks with 

limp., states breast implants moving up.


History of Any Multi-Drug Resistant Organisms: None Reported


Past Surgical History: Orthopedic Surgery


Additional Past Surgical History / Comment(s): right elbow surgery with screws, 

right knee surgery with plate and removal ., lump left breast, breast implants


Past Anesthesia/Blood Transfusion Reactions: Postoperative Nausea & Vomiting 

(PONV)


Past Psychological History: Anxiety, Depression


Smoking Status: Former smoker


Past Alcohol Use History: Abuse


Past Drug Use History: None Reported





- Past Family History


  ** Father


Family Medical History: Cancer





<Autumn Bañuelos - Last Filed: 09/11/23 19:36>





General Exam





<Autumn Bañuelos - Last Filed: 09/11/23 19:36>





<Hayden Barillas - Last Filed: 09/12/23 00:14>





- General Exam Comments


Initial Comments: 


Visual Physical Exam





Vital signs reviewed





General: Intoxicated


Head: Normocephalic, atraumatic


Eyes: PERRLA, EOMI


ENT: Airway patent


Chest: Nonlabored breathing


Skin: No visual rash, normal skin tone


Neuro: Alert and oriented 3


Musculoskeletal: No gross abnormalities





I performed the QuickNote portion of this chart. Signed Autumn Bañuelos PA-C.


 (Autumn Bañuelos)








PHYSICAL EXAM:


General Impression: Alert and oriented x3, not in acute distress


HEENT: Normocephalic atraumatic, extra-ocular movements intact, pupils equal and

reactive to light bilaterally, mucous membranes moist.


Cardiovascular: Heart regular rate and rhythm


Chest: Able to complete full sentences, no retractions, no tachypnea


Abdomen: abdomen soft, non-tender, non-distended, no organomegaly


Musculoskeletal: Pulses present and equal in all extremities, no peripheral 

edema


Motor:  no focal deficits noted


Neurological: CN II-XII grossly intact, no focal motor or sensory deficits noted


Skin: Intact with no visualized rashes


Psych: Normal affect and mood





 (Hayden Barillas)





Course


                                   Vital Signs











  09/11/23





  19:18


 


Temperature 98.5 F


 


Pulse Rate 113 H


 


Respiratory 20





Rate 


 


Blood Pressure 148/67


 


O2 Sat by Pulse 97





Oximetry 














Medical Decision Making





- Lab Data


Result diagrams: 


                                 09/11/23 20:50





                                 09/11/23 20:50





<Hayden Barillas - Last Filed: 09/12/23 00:14>





- Medical Decision Making


Was pt. sent in by a medical professional or institution ([facility]ST[facility] Wasserman, NP, urgent 

care, hospital, or nursing home...) When possible be specific


@  -No


Did you speak to anyone other than the patient for history (EMS, parent, family,

police, friend...)? What history was obtained from this source 


@  -No


Did you review nursing and triage notes (agree or disagree)?  Why? 


@  -I reviewed and agree with nursing and triage notes


Were old charts reviewed (outside hosp., previous admission, EMS record, old 

EKG, old radiological studies, urgent care reports/EKG's, nursing home records)?

Report findings 


@  -Previous admission chart was reviewed showing patient is here for 

pancreatitis


Differential Diagnosis (chest pain, altered mental status, abdominal pain women,

abdominal pain men, vaginal bleeding, musculoskeletal, weakness, fever, dyspnea,

syncope, headache, dizziness, GI bleed, back pain, seizure, CVA, palpatations, 

mental health)? 


@  -not applicable


EKG interpreted by me (3pts min.).


@  -My EKG interpretation: Ventricular rate 106, sinus tachycardia,.  Interval 

61, QRS 92, QTc 44. No RI prolongation, no QTC prolongation, no ST or T-wave 

changes noted.    Overall, this EKG is unremarkable





X-rays interpreted by me (1pt min.).


@  -Knee pelvis chest x-ray shows no acute processes.


CT interpreted by me (1pt min.).


@  -Thoracic and lumbar CT head and C-spine CT is unremarkable for any acute 

processes


U/S interpreted by me (1pt. min.).


@  -None done


What testing was considered but not performed or refused? (CT, X-rays, U/S, 

labs)? Why?


@  -None


What meds were considered but not given or refused? Why?


@  -None


Did you discuss the management of the patient with other professionals 

(professionals i.e. , PA, NP, lab, RT, psych nurse, , , 

teacher, , )? Give summary


@  -No


Was smoking cessation discussed for >3mins.?


@  -No


Was critical care preformed (if so, how long)?


@  -No


Were there social determinants of health that impacted care today? How? 

(Homelessness, low income, unemployed, alcoholism, drug addiction, 

transportation, low edu. Level, literacy, decrease access to med. care, [facility], 

rehab)?


@  -No


Was there de-escalation of care discussed even if they declined (Discuss DNR or 

withdrawal of care, Hospice)? DNR status


@  -No


What co-morbidities impacted this encounter? (DM, HTN, Smoking, COPD, CAD, 

Cancer, CVA, ARF, Chemo, Hep., AIDS, mental health diagnosis, sleep apnea, 

morbid obesity)?


@  -None


Was patient admitted / discharged? Hospital course, mention meds given and 

route, prescriptions, significant lab abnormalities, going to OR and other 

pertinent info.


@  -63-year-old female presents emergency Department with pain after fall.  

Physical examination shows no gross deformities.  X-rays are unremarkable.  CT 

imaging is negative.  Patient showing signs of alcohol withdrawal at the 

bedside.  Given Ativan.  Patient lipase levels 1365.  Patient be admitted for 

alcohol withdrawal and pancreatitis.


Undiagnosed new problem with uncertain prognosis?


@  -No


Drug Therapy requiring intensive monitoring for toxicity (Heparin, Nitro, 

Insulin, Cardizem)?


@  -No


Were any procedures done?


@  -No


Diagnosis/symptom?  Acute, or Chronic, or Acute on Chronic?  Uncomplicated 

(without systemic symptoms) or Complicated (systemic symptoms)?


@  -1.  Alcohol withdrawal, 2.  Pancreatitis


Side effects of treatment?


@  -No


Exacerbation, Progression, or Severe Exacerbation?


@  -No


Poses a threat to life or bodily function? How? (Chest pain, USA, MI, pneumonia,

PE, COPD, DKA, ARF, appy, cholecystitis, CVA, Diverticulitis, Homicidal, 

Suicidal, threat to staff... and all critical care pts)


@  -yes


 (Hayden Barillas)





- Lab Data


                                   Lab Results











  09/11/23 09/11/23 09/11/23 Range/Units





  20:50 20:50 20:50 


 


WBC  12.2 H    (3.8-10.6)  k/uL


 


RBC  4.06    (3.80-5.40)  m/uL


 


Hgb  13.7    (11.4-16.0)  gm/dL


 


Hct  41.3    (34.0-46.0)  %


 


MCV  101.8 H    (80.0-100.0)  fL


 


MCH  33.8    (25.0-35.0)  pg


 


MCHC  33.2    (31.0-37.0)  g/dL


 


RDW  12.9    (11.5-15.5)  %


 


Plt Count  340    (150-450)  k/uL


 


MPV  8.3    


 


Neutrophils %  69    %


 


Lymphocytes %  24    %


 


Monocytes %  4    %


 


Eosinophils %  2    %


 


Basophils %  0    %


 


Neutrophils #  8.4 H    (1.3-7.7)  k/uL


 


Lymphocytes #  2.9    (1.0-4.8)  k/uL


 


Monocytes #  0.5    (0-1.0)  k/uL


 


Eosinophils #  0.2    (0-0.7)  k/uL


 


Basophils #  0.0    (0-0.2)  k/uL


 


Macrocytosis  Slight    


 


PT   10.0   (9.0-12.0)  sec


 


INR   0.9   (<1.2)  


 


Sodium    138  (137-145)  mmol/L


 


Potassium    4.0  (3.5-5.1)  mmol/L


 


Chloride    100  ()  mmol/L


 


Carbon Dioxide    22  (22-30)  mmol/L


 


Anion Gap    16  mmol/L


 


BUN    15  (7-17)  mg/dL


 


Creatinine    0.58  (0.52-1.04)  mg/dL


 


Est GFR (CKD-EPI)AfAm    >90  (>60 ml/min/1.73 sqM)  


 


Est GFR (CKD-EPI)NonAf    >90  (>60 ml/min/1.73 sqM)  


 


Glucose    139 H  (74-99)  mg/dL


 


Calcium    9.7  (8.4-10.2)  mg/dL


 


Phosphorus    3.7  (2.5-4.5)  mg/dL


 


Magnesium    1.4 L  (1.6-2.3)  mg/dL


 


Total Bilirubin    0.4  (0.2-1.3)  mg/dL


 


AST    38 H  (14-36)  U/L


 


ALT    21  (4-34)  U/L


 


Alkaline Phosphatase    58  ()  U/L


 


Total Protein    8.4 H  (6.3-8.2)  g/dL


 


Albumin    4.8  (3.5-5.0)  g/dL


 


Amylase    229 H  ()  U/L


 


Lipase    1365 H  ()  U/L


 


Serum Alcohol    108  mg/dL














Disposition





<Autumn Bañuelos - Last Filed: 09/11/23 19:36>


Decision Time: 00:14





<Hayden Barillas - Last Filed: 09/12/23 00:14>


Clinical Impression: 


 Alcohol withdrawal syndrome, Pancreatitis





Disposition: ADMITTED AS IP TO THIS HOSP


Condition: Fair


Referrals: 


Lisa Becerra MD [Primary Care Provider] - 1-2 days

## 2023-09-12 VITALS — TEMPERATURE: 98.2 F

## 2023-09-12 LAB
ALBUMIN SERPL-MCNC: 4.4 G/DL (ref 3.5–5)
ALP SERPL-CCNC: 62 U/L (ref 38–126)
ALT SERPL-CCNC: 22 U/L (ref 4–34)
ANION GAP SERPL CALC-SCNC: 11 MMOL/L
AST SERPL-CCNC: 41 U/L (ref 14–36)
BASOPHILS # BLD AUTO: 0 K/UL (ref 0–0.2)
BASOPHILS NFR BLD AUTO: 1 %
BUN SERPL-SCNC: 14 MG/DL (ref 7–17)
CALCIUM SPEC-MCNC: 9.5 MG/DL (ref 8.4–10.2)
CHLORIDE SERPL-SCNC: 102 MMOL/L (ref 98–107)
CO2 SERPL-SCNC: 25 MMOL/L (ref 22–30)
EOSINOPHIL # BLD AUTO: 0.2 K/UL (ref 0–0.7)
EOSINOPHIL NFR BLD AUTO: 2 %
ERYTHROCYTE [DISTWIDTH] IN BLOOD BY AUTOMATED COUNT: 3.74 M/UL (ref 3.8–5.4)
ERYTHROCYTE [DISTWIDTH] IN BLOOD: 12.8 % (ref 11.5–15.5)
GLUCOSE SERPL-MCNC: 104 MG/DL (ref 74–99)
HCT VFR BLD AUTO: 39.2 % (ref 34–46)
HGB BLD-MCNC: 12.9 GM/DL (ref 11.4–16)
LIPASE SERPL-CCNC: 492 U/L (ref 23–300)
LYMPHOCYTES # SPEC AUTO: 1.5 K/UL (ref 1–4.8)
LYMPHOCYTES NFR SPEC AUTO: 20 %
MCH RBC QN AUTO: 34.4 PG (ref 25–35)
MCHC RBC AUTO-ENTMCNC: 32.8 G/DL (ref 31–37)
MCV RBC AUTO: 104.8 FL (ref 80–100)
MONOCYTES # BLD AUTO: 0.6 K/UL (ref 0–1)
MONOCYTES NFR BLD AUTO: 8 %
NEUTROPHILS # BLD AUTO: 5.3 K/UL (ref 1.3–7.7)
NEUTROPHILS NFR BLD AUTO: 68 %
PLATELET # BLD AUTO: 245 K/UL (ref 150–450)
POTASSIUM SERPL-SCNC: 4.2 MMOL/L (ref 3.5–5.1)
PROT SERPL-MCNC: 7.6 G/DL (ref 6.3–8.2)
SODIUM SERPL-SCNC: 138 MMOL/L (ref 137–145)
WBC # BLD AUTO: 7.8 K/UL (ref 3.8–10.6)

## 2023-09-12 RX ADMIN — Medication SCH MG: at 09:35

## 2023-09-12 RX ADMIN — ATORVASTATIN CALCIUM SCH MG: 10 TABLET, FILM COATED ORAL at 09:35

## 2023-09-12 RX ADMIN — ACETAMINOPHEN PRN MG: 325 TABLET, FILM COATED ORAL at 22:31

## 2023-09-12 RX ADMIN — DIVALPROEX SODIUM SCH MG: 250 TABLET, DELAYED RELEASE ORAL at 20:57

## 2023-09-12 RX ADMIN — SERTRALINE HYDROCHLORIDE SCH MG: 100 TABLET ORAL at 09:34

## 2023-09-12 RX ADMIN — MAGNESIUM SULFATE IN DEXTROSE SCH MLS/HR: 10 INJECTION, SOLUTION INTRAVENOUS at 10:38

## 2023-09-12 RX ADMIN — CEFAZOLIN SCH MLS/HR: 330 INJECTION, POWDER, FOR SOLUTION INTRAMUSCULAR; INTRAVENOUS at 00:31

## 2023-09-12 RX ADMIN — CEFAZOLIN SCH MLS/HR: 330 INJECTION, POWDER, FOR SOLUTION INTRAMUSCULAR; INTRAVENOUS at 08:00

## 2023-09-12 RX ADMIN — PANTOPRAZOLE SODIUM SCH MG: 40 TABLET, DELAYED RELEASE ORAL at 13:56

## 2023-09-12 RX ADMIN — CEFAZOLIN SCH: 330 INJECTION, POWDER, FOR SOLUTION INTRAMUSCULAR; INTRAVENOUS at 20:00

## 2023-09-12 RX ADMIN — ACETAMINOPHEN PRN MG: 325 TABLET, FILM COATED ORAL at 16:00

## 2023-09-12 RX ADMIN — MAGNESIUM SULFATE IN DEXTROSE SCH MLS/HR: 10 INJECTION, SOLUTION INTRAVENOUS at 11:39

## 2023-09-12 RX ADMIN — DIVALPROEX SODIUM SCH MG: 250 TABLET, DELAYED RELEASE ORAL at 09:35

## 2023-09-12 RX ADMIN — ACETAMINOPHEN PRN MG: 325 TABLET, FILM COATED ORAL at 09:34

## 2023-09-12 NOTE — P.HPIM
History of Present Illness


H&P Date: 09/12/23











This is a 63-year-old female who presented to the emergency department for 

alcohol intoxication along with fall and increased anxiety.  Patient reports she

blacked out as she drinks at least 3-4 times a week heavily with at least a 

fifth of vodka and a box of wine on most days.  Patient reports it depends on 

how much she has at the time and normally would drink until she passes out.  

Patient did have head and neck pain as she was found next to the coffee table 

with ecchymosis noted under her right eye.  Patient reports she follows with Dr. Benjamin Larios in the outpatient setting with past medical history of 

hyperlipidemia, hypertension, osteoarthritis, anxiety, depression, former 

smoker, admits to excessive alcohol use.  Patient denies any other illicit drug 

use.  Patient had multiple imagings done in the ER including chest x-ray knee x-

ray pelvis x-ray head and cervical spine CT along with thoracic and lumbar spine

CT.  Chest x-ray is negative for any acute process, right knee showing no acute 

findings, pelvis showing no pelvic fracture, CT of the head showing no acute 

intracranial hemorrhage midline shift or mass effect she also had a CT cervical 

spine showing no acute fracture or subluxation of the cervical spine or upper 

lumbar spine.  Patient was admitted under observation for acute alcohol 

intoxication with acute mild pancreatitis.  Labs reviewed with a mildly elevated

white count of 12.2 although normal this morning, hemoglobin stable, platelets 

340, BMP within normal limits other than magnesium is 1.4, AST 38, ALT 21, total

bili is 0.4, amylase is 229 and lipase is 1365.  Repeat lipase today is 492.  

Serum alcohol was 108 on admission.  Patient is maintained on CIWA protocol and 

will add Librium taper.





Review Of Systems:


Constitutional: No fever, no chills, no night sweats.  No weight change.  No 

weakness, fatigue or lethargy.  No daytime sleepiness.


EENT: No headache.  No blurred vision or double vision, no loss of vision.  No 

loss of Hearing, no ringing in the ears, no dizziness.  No nasal drainage or 

congestion.  No epistaxis.  No sore throat.


Lungs: No shortness of breath, cough, no sputum production.  No wheezing.


Cardiovascular: No chest pain, no lower extremity edema.  No palpitations.  No 

paroxysmal nocturnal dyspnea.  No orthopnea.  No lightheadedness or dizziness.  

No syncopal episodes.


Abdominal: No abdominal pain.  No nausea, vomiting.  No diarrhea.  No 

constipation.  No bloody or tarry stools..  No loss of appetite.


Genitourinary: No dysuria, increased frequency, urgency.  No urinary retention.


Musculoskeletal: No myalgias.  No muscle weakness, reports gait dysfunction, 

reports frequent falls.  No back pain.  Reports head and neck pain.


Integumentary: No wounds, no lesions.  No rash or pruritus.  No unusual 

bruising.  No change in hair or nails.


Neurologic: No aphasia. No facial droop. No change in mentation. No head injury.

No headache. No paralysis. No paresthesia.


Psychiatric: Reports history of anxiety and depression.  Denies suicidal 

ideation.  No mood swings.


Endocrine: No abnormal blood sugars.  No weight change.  No excessive sweating 

or thirst.  No cold intolerance.  











PHYSICAL EXAMINATION: 





GENERAL: The patient is alert and oriented x4,  Well developed, thin built. 


HEENT: Pupils are round and equally reacting to light. EOMI. no scleral icterus.

No conjunctival pallor. Normocephalic, atraumatic. No pharyngeal erythema. No 

thyromegaly.  Multiple front teeth missing


CARDIOVASCULAR: S1 and S2  muffled 


PULMONARY: diminished breath sounds bilaterally with no wheezing or rhonchi 

noted. 


ABDOMEN: soft.  Nontender on exam.  . non-distended, normoactive bowel sounds. 

No palpable organomegaly. 


MUSCULOSKELETAL: No joint swelling or deformity.


EXTREMITIES: No cyanosis, clubbing, or pedal edema.  


NEUROLOGICAL: Gross neurological examination did not reveal any focal deficits. 

Diffuse weakness


SKIN: No rashes.  Ecchymosis noted under the right eye, cheek bone area





Assessment:





Acute alcohol intoxication with early delirium tremens


Falls with gait dysfunction


History of hyperlipidemia


Hypertension history


Osteoarthritis


History of anxiety/depression


Former smoker


Continued alcohol use


GI prophylaxis


DVT prophylaxis


Full code





Plan:





Patient is maintained on CIWA protocol and will add Librium taper and monitor 

for any signs of withdrawals


Patient maintained on gentle IV hydration and will decrease the rate of normal 

saline.  Lipase trending down and patient reports no abdominal pain.  Will adva

nce to full liquids and low fiber diet is tolerating


Will have PT/OT therapy evaluate the patient


Social work consult as patient is having issues with being evicted from her home

and needs resources and guidance


Lengthy discussion was had about the patient stopping the use of alcohol and 

patient became tearful and agreed she needs to stop.  Will discuss possible 

alcohol rehab.


Will monitor overnight with possible discharge planning in the next 24 hours





The impression and plan of care has been dictated by Yamileth Horne, nurse 

practitioner as directed.





Dr. Latrell MD


I have performed a history and examination and MDM of this patient, discussed 

the same with the dictator, and  agree with the dictator's assessment and plan 

as written ,documented as a scribe. Based on total visit time,  I have performed

more than 50% of the visit. Any additional findings or plans will be noted. 





Past Medical History


Past Medical History: Hyperlipidemia, Hypertension, Osteoarthritis (OA)


Additional Past Medical History / Comment(s): hx colon polyps, occasional 

diarrhea, states rectal bleeding and abdominal pain., hx of fall Nov 2020 and 

has been having pain right hip since that radiates down right leg, walks with 

limp., states breast implants moving up.


History of Any Multi-Drug Resistant Organisms: None Reported


Past Surgical History: Orthopedic Surgery


Additional Past Surgical History / Comment(s): right elbow surgery with screws, 

right knee surgery with plate and removal ., lump left breast, breast implants


Past Anesthesia/Blood Transfusion Reactions: Postoperative Nausea & Vomiting 

(PONV)


Past Psychological History: Anxiety, Depression


Smoking Status: Former smoker


Past Alcohol Use History: Abuse


Past Drug Use History: None Reported





- Past Family History


  ** Father


Family Medical History: Cancer





Medications and Allergies


                                Home Medications











 Medication  Instructions  Recorded  Confirmed  Type


 


Atorvastatin [Lipitor] 10 mg PO DAILY #30 tab 09/04/23 09/11/23 Rx


 


Divalproex [Depakote] 250 mg PO BID #60 tab 09/04/23 09/11/23 Rx


 


QUEtiapine [SEROquel] 100 mg PO BID 7 Days #14 tab 09/04/23 09/11/23 Rx


 


Sertraline [Zoloft] 100 mg PO DAILY #30 tab 09/04/23 09/11/23 Rx


 


Thiamine [Vitamin B-1] 100 mg PO DAILY #30 tab 09/04/23 09/11/23 Rx


 


lisinopriL [Zestril] 10 mg PO DAILY #30 tab 09/04/23 09/11/23 Rx


 


traZODone HCL [Desyrel] 50 mg PO HS PRN 7 Days #7 tab 09/04/23 09/11/23 Rx








                                    Allergies











Allergy/AdvReac Type Severity Reaction Status Date / Time


 


latex Allergy Unknown Itching, Verified 09/11/23 21:56





   Blisters  


 


codeine AdvReac Severe HEADACHE Verified 09/11/23 21:56


 


hydrocodone [From Vicodin] AdvReac Severe HEADACHE Verified 09/11/23 21:56


 


ANESTHESIA AdvReac Unknown GAS GIVES Uncoded 09/11/23 21:56





   HER  





   HEADACHE,  





   VOMITING,  





   HEART  





   PALIPITATIONS  














Physical Exam


Vitals: 


                                   Vital Signs











  Temp Pulse Resp BP Pulse Ox


 


 09/12/23 07:45  98.2 F  81  16  151/91  98


 


 09/11/23 19:18  98.5 F  113 H  20  148/67  97








                                Intake and Output











 09/11/23 09/12/23 09/12/23





 22:59 06:59 14:59


 


Other:   


 


  Weight 56.699 kg  














Results


CBC & Chem 7: 


                                 09/12/23 10:31





                                 09/12/23 10:31


Labs: 


                  Abnormal Lab Results - Last 24 Hours (Table)











  09/11/23 09/11/23 Range/Units





  20:50 20:50 


 


WBC  12.2 H   (3.8-10.6)  k/uL


 


MCV  101.8 H   (80.0-100.0)  fL


 


Neutrophils #  8.4 H   (1.3-7.7)  k/uL


 


Glucose   139 H  (74-99)  mg/dL


 


Magnesium   1.4 L  (1.6-2.3)  mg/dL


 


AST   38 H  (14-36)  U/L


 


Total Protein   8.4 H  (6.3-8.2)  g/dL


 


Amylase   229 H  ()  U/L


 


Lipase   1365 H  ()  U/L














Assessment and Plan


Time with Patient: Greater than 30

## 2023-09-13 VITALS — RESPIRATION RATE: 18 BRPM

## 2023-09-13 RX ADMIN — ACETAMINOPHEN PRN MG: 325 TABLET, FILM COATED ORAL at 17:05

## 2023-09-13 RX ADMIN — ACETAMINOPHEN PRN MG: 325 TABLET, FILM COATED ORAL at 06:52

## 2023-09-13 RX ADMIN — Medication SCH MG: at 10:11

## 2023-09-13 RX ADMIN — DIVALPROEX SODIUM SCH MG: 250 TABLET, DELAYED RELEASE ORAL at 10:11

## 2023-09-13 RX ADMIN — ATORVASTATIN CALCIUM SCH MG: 10 TABLET, FILM COATED ORAL at 10:11

## 2023-09-13 RX ADMIN — PANTOPRAZOLE SODIUM SCH MG: 40 TABLET, DELAYED RELEASE ORAL at 06:46

## 2023-09-13 RX ADMIN — CEFAZOLIN SCH: 330 INJECTION, POWDER, FOR SOLUTION INTRAMUSCULAR; INTRAVENOUS at 06:48

## 2023-09-13 RX ADMIN — SERTRALINE HYDROCHLORIDE SCH MG: 100 TABLET ORAL at 10:11

## 2023-09-14 VITALS — HEART RATE: 92 BPM | SYSTOLIC BLOOD PRESSURE: 132 MMHG | DIASTOLIC BLOOD PRESSURE: 90 MMHG

## 2023-09-14 NOTE — P.DS
Providers


Date of admission: 


09/12/23 00:11





Expected date of discharge: 09/13/23


Attending physician: 


Boni Larios





Primary care physician: 


Lisa Becerra





Hospital Course: 











Final diagnosis





Acute alcohol intoxication with early delirium tremens


Falls with gait dysfunction


History of hyperlipidemia


Hypertension history


Osteoarthritis


History of anxiety/depression


Former smoker


Continued alcohol use


GI prophylaxis


DVT prophylaxis


Full code











Discharge disposition


Patient is being discharged in a stable condition with guarded prognosis to home

.  Patient will follow-up with Dr. Becerra  in the outpatient setting upon 

discharge.  Patient is to continue with Librium taper and follow-up with 

community [facility] health outpatient as scheduled.  Total time taken is greater 

than 35 minutes.





Hospital course


This is a 63-year-old female who was recently admitted with acute alcohol 

intoxication with acute early delirium tremens and was being closely monitored. 

patient maintained on CIWA protocol and started on Librium taper.  Patient had 

fallen as she reported finding herself on the floor and most likely passed out 

from drinking a box of wine and a large bottle of vodka.  Patient had multiple 

imagings done with no acute fractures noted.  Patient does have some right cheek

ecchymosis but no visual disturbances.  Patient will continue Librium taper and 

has been instructed to follow-up with primary care provider along with [facility].  

Patient is having issues about possibly being evicted from her apartment and 

will follow-up as community resources with assistance and moving and/or legal 

aid.  Patient with weakness was evaluated by physical therapy recommending home.

  Currently no reports of chest pain, shortness of breath, or palpitations.  

Patient is afebrile.  No reports of nausea or vomiting and patient is tolerating

diet.  Patient will be discharged home today.  High risk for readmission given 

patient's continued alcohol abuse.  





Physical exam:








Gen: This is a  a 63-year-old female who is awake, alert and oriented 3, well-

developed, elderly-appearing


HEENT: Head is atraumatic, normocephalic. Pupils equal, round. Sclerae is 

anicteric.  mild ecchymosis on the right cheek with no significant redness or 

swelling noted 


NECK: Supple. No JVD. No lymphadenopathy. No thyromegaly. 


LUNGS: Clear to auscultation. No wheezes or rhonchi.  No intercostal 

retractions.


HEART: Regular rate and rhythm. No murmur. 


ABDOMEN: Soft. Bowel sounds are present. No masses.  No tenderness.


EXTREMITIES: No pedal edema.  No calf tenderness.


NEUROLOGICAL: Patient is awake, alert and oriented x3. Cranial nerves 2 through 

12 are grossly intact. 





Please refer to medication reconciliation sheet for a list of medications.





The impression and plan of care has been dictated by Yamileth Horne, Nurse 

Practitioner as directed.





Dr. Latrell MD


I have performed a history and examination and MDM of this patient, discussed 

the same with the dictator, and  agree with the dictator's assessment and plan 

as written ,documented as a scribe. Based on total visit time,  I have performed

more than 50% of the visit.  


Patient Condition at Discharge: Fair





Plan - Discharge Summary


New Discharge Prescriptions: 


New


   chlordiazePOXIDE HCl [Librium] 20 mg PO TID #12 cap


   Acetaminophen Tab [Tylenol] 650 mg PO Q6HR PRN  tab


     PRN Reason: Fever And/ Or Pain





Continue


   traZODone HCL [Desyrel] 50 mg PO HS PRN 7 Days #7 tab


     PRN Reason: Insomnia


   QUEtiapine [SEROquel] 100 mg PO BID 7 Days #14 tab


   Thiamine [Vitamin B-1] 100 mg PO DAILY #30 tab


   Divalproex [Depakote] 250 mg PO BID #60 tab


   Sertraline [Zoloft] 100 mg PO DAILY #30 tab


   lisinopriL [Zestril] 10 mg PO DAILY #30 tab


   Atorvastatin [Lipitor] 10 mg PO DAILY #30 tab


Discharge Medication List





Atorvastatin [Lipitor] 10 mg PO DAILY #30 tab 09/04/23 [Rx]


Divalproex [Depakote] 250 mg PO BID #60 tab 09/04/23 [Rx]


QUEtiapine [SEROquel] 100 mg PO BID 7 Days #14 tab 09/04/23 [Rx]


Sertraline [Zoloft] 100 mg PO DAILY #30 tab 09/04/23 [Rx]


Thiamine [Vitamin B-1] 100 mg PO DAILY #30 tab 09/04/23 [Rx]


lisinopriL [Zestril] 10 mg PO DAILY #30 tab 09/04/23 [Rx]


traZODone HCL [Desyrel] 50 mg PO HS PRN 7 Days #7 tab 09/04/23 [Rx]


Acetaminophen Tab [Tylenol] 650 mg PO Q6HR PRN  tab 09/13/23 [Rx]


chlordiazePOXIDE HCl [Librium] 20 mg PO TID #12 cap 09/13/23 [Rx]








Follow up Appointment(s)/Referral(s): 


Lisa Becerra MD [Primary Care Provider] - 1-2 days


Patient Instructions/Handouts:  Abuse of Alcohol (DC)


Activity/Diet/Wound Care/Special Instructions: 


Activity Limited until follow-up


Follow-up with primary care provider on discharge


Follow-up with [facility]


Look into alcohol rehab


Continue taking Librium taper and avoid alcohol





Discharge/Stand Alone Forms:  AA Meetings [address] - [facility], AA Meetings [place], Who Do I Call?, Outpatient Counseling, Inp Substance Abuse Facilities, 

Personal Care Manager


Discharge Disposition: HOME SELF-CARE

## 2023-10-10 ENCOUNTER — HOSPITAL ENCOUNTER (OUTPATIENT)
Dept: HOSPITAL 47 - EC | Age: 63
Setting detail: OBSERVATION
LOS: 2 days | Discharge: HOME HEALTH | End: 2023-10-12
Attending: INTERNAL MEDICINE | Admitting: INTERNAL MEDICINE
Payer: MEDICARE

## 2023-10-10 DIAGNOSIS — R53.1: ICD-10-CM

## 2023-10-10 DIAGNOSIS — E78.5: ICD-10-CM

## 2023-10-10 DIAGNOSIS — Z87.891: ICD-10-CM

## 2023-10-10 DIAGNOSIS — M84.461A: ICD-10-CM

## 2023-10-10 DIAGNOSIS — Z79.899: ICD-10-CM

## 2023-10-10 DIAGNOSIS — Z88.5: ICD-10-CM
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
## 2023-10-10 DIAGNOSIS — I10: ICD-10-CM

## 2023-10-10 DIAGNOSIS — F10.229: ICD-10-CM

## 2023-10-10 DIAGNOSIS — F10.239: Primary | ICD-10-CM

## 2023-10-10 DIAGNOSIS — F43.23: ICD-10-CM

## 2023-10-10 DIAGNOSIS — R26.9: ICD-10-CM

## 2023-10-10 DIAGNOSIS — G92.8: ICD-10-CM

## 2023-10-10 DIAGNOSIS — Z91.040: ICD-10-CM

## 2023-10-10 LAB
ALBUMIN SERPL-MCNC: 4.4 G/DL (ref 3.5–5)
ALP SERPL-CCNC: 57 U/L (ref 38–126)
ALT SERPL-CCNC: 28 U/L (ref 4–34)
ANION GAP SERPL CALC-SCNC: 17 MMOL/L
AST SERPL-CCNC: 36 U/L (ref 14–36)
BASOPHILS # BLD AUTO: 0 K/UL (ref 0–0.2)
BASOPHILS NFR BLD AUTO: 0 %
BUN SERPL-SCNC: 17 MG/DL (ref 7–17)
CALCIUM SPEC-MCNC: 9.5 MG/DL (ref 8.4–10.2)
CHLORIDE SERPL-SCNC: 105 MMOL/L (ref 98–107)
CK SERPL-CCNC: 497 U/L (ref 30–135)
CO2 SERPL-SCNC: 22 MMOL/L (ref 22–30)
EOSINOPHIL # BLD AUTO: 0.2 K/UL (ref 0–0.7)
EOSINOPHIL NFR BLD AUTO: 2 %
ERYTHROCYTE [DISTWIDTH] IN BLOOD BY AUTOMATED COUNT: 3.84 M/UL (ref 3.8–5.4)
ERYTHROCYTE [DISTWIDTH] IN BLOOD: 12.7 % (ref 11.5–15.5)
GLUCOSE SERPL-MCNC: 126 MG/DL (ref 74–99)
HCT VFR BLD AUTO: 38.6 % (ref 34–46)
HGB BLD-MCNC: 13.2 GM/DL (ref 11.4–16)
LYMPHOCYTES # SPEC AUTO: 2.9 K/UL (ref 1–4.8)
LYMPHOCYTES NFR SPEC AUTO: 32 %
MCH RBC QN AUTO: 34.3 PG (ref 25–35)
MCHC RBC AUTO-ENTMCNC: 34.1 G/DL (ref 31–37)
MCV RBC AUTO: 100.5 FL (ref 80–100)
MONOCYTES # BLD AUTO: 0.4 K/UL (ref 0–1)
MONOCYTES NFR BLD AUTO: 4 %
NEUTROPHILS # BLD AUTO: 5.3 K/UL (ref 1.3–7.7)
NEUTROPHILS NFR BLD AUTO: 59 %
PLATELET # BLD AUTO: 272 K/UL (ref 150–450)
POTASSIUM SERPL-SCNC: 4.3 MMOL/L (ref 3.5–5.1)
PROT SERPL-MCNC: 7.3 G/DL (ref 6.3–8.2)
SODIUM SERPL-SCNC: 144 MMOL/L (ref 137–145)
WBC # BLD AUTO: 9 K/UL (ref 3.8–10.6)

## 2023-10-10 PROCEDURE — 80320 DRUG SCREEN QUANTALCOHOLS: CPT

## 2023-10-10 PROCEDURE — 84100 ASSAY OF PHOSPHORUS: CPT

## 2023-10-10 PROCEDURE — 82550 ASSAY OF CK (CPK): CPT

## 2023-10-10 PROCEDURE — 85025 COMPLETE CBC W/AUTO DIFF WBC: CPT

## 2023-10-10 PROCEDURE — 80053 COMPREHEN METABOLIC PANEL: CPT

## 2023-10-10 PROCEDURE — 83735 ASSAY OF MAGNESIUM: CPT

## 2023-10-10 PROCEDURE — 72125 CT NECK SPINE W/O DYE: CPT

## 2023-10-10 PROCEDURE — 85027 COMPLETE CBC AUTOMATED: CPT

## 2023-10-10 PROCEDURE — 70450 CT HEAD/BRAIN W/O DYE: CPT

## 2023-10-10 PROCEDURE — 36415 COLL VENOUS BLD VENIPUNCTURE: CPT

## 2023-10-10 PROCEDURE — 73562 X-RAY EXAM OF KNEE 3: CPT

## 2023-10-10 PROCEDURE — 96361 HYDRATE IV INFUSION ADD-ON: CPT

## 2023-10-10 PROCEDURE — 99285 EMERGENCY DEPT VISIT HI MDM: CPT

## 2023-10-10 PROCEDURE — 96374 THER/PROPH/DIAG INJ IV PUSH: CPT

## 2023-10-10 PROCEDURE — 97162 PT EVAL MOD COMPLEX 30 MIN: CPT

## 2023-10-10 RX ADMIN — DIVALPROEX SODIUM SCH MG: 250 TABLET, DELAYED RELEASE ORAL at 20:15

## 2023-10-10 RX ADMIN — CEFAZOLIN SCH MLS/HR: 330 INJECTION, POWDER, FOR SOLUTION INTRAMUSCULAR; INTRAVENOUS at 13:26

## 2023-10-10 RX ADMIN — DIVALPROEX SODIUM SCH MG: 250 TABLET, DELAYED RELEASE ORAL at 13:38

## 2023-10-10 RX ADMIN — ATORVASTATIN CALCIUM SCH MG: 10 TABLET, FILM COATED ORAL at 13:40

## 2023-10-10 RX ADMIN — ACETAMINOPHEN PRN MG: 325 TABLET, FILM COATED ORAL at 21:03

## 2023-10-10 RX ADMIN — FOLIC ACID SCH MG: 1 TABLET ORAL at 14:41

## 2023-10-10 NOTE — ED
Alcohol HPI





- General


Chief Complaint: Alcohol


Stated Complaint: ETOH


Time Seen by Provider: 10/10/23 09:25


Source: patient


Mode of arrival: EMS


Limitations: no limitations





- History of Present Illness


Initial Comments: 





63-year-old female with past medical history of alcohol use presents to the 

emergency department because of excessive alcohol intoxication.  States that she

drinks a fifth today.  She was supposed to go to alcohol rehab today but instead

last night she drank significantly and had a fall.  States that due to her right

knee pain she was unable to get up and ambulate.  Unsure of who called EMS but 

it was reported that she was on the ground all night.  Patient is 

noncompliantrefusing BAT and IV.  She denies any drug use.  She does believe 

that she hit her head.  She does not take any blood thinners.  No other 

alleviating, precipitating or modifying factors





- Related Data


                                  Previous Rx's











 Medication  Instructions  Recorded


 


Atorvastatin [Lipitor] 10 mg PO DAILY #30 tab 09/04/23


 


Divalproex [Depakote] 250 mg PO BID #60 tab 09/04/23


 


QUEtiapine [SEROquel] 100 mg PO BID 7 Days #14 tab 09/04/23


 


Sertraline [Zoloft] 100 mg PO DAILY #30 tab 09/04/23


 


Thiamine [Vitamin B-1] 100 mg PO DAILY #30 tab 09/04/23


 


lisinopriL [Zestril] 10 mg PO DAILY #30 tab 09/04/23


 


traZODone HCL [Desyrel] 50 mg PO HS PRN 7 Days #7 tab 09/04/23


 


Acetaminophen Tab [Tylenol] 650 mg PO Q6HR PRN  tab 09/13/23











                                    Allergies











Allergy/AdvReac Type Severity Reaction Status Date / Time


 


latex Allergy Unknown Itching, Verified 10/10/23 12:33





   Blisters  


 


codeine AdvReac Severe HEADACHE Verified 10/10/23 12:33


 


hydrocodone [From Vicodin] AdvReac Severe HEADACHE Verified 10/10/23 12:33


 


ANESTHESIA AdvReac Unknown GAS GIVES Uncoded 10/10/23 12:33





   HER  





   HEADACHE,  





   VOMITING,  





   HEART  





   PALIPITATIONS  














Review of Systems


ROS Statement: 


Those systems with pertinent positive or pertinent negative responses have been 

documented in the HPI.





ROS Other: All systems not noted in ROS Statement are negative.





Past Medical History


Past Medical History: Hyperlipidemia, Hypertension, Osteoarthritis (OA)


Additional Past Medical History / Comment(s): hx colon polyps, occasional 

diarrhea, states rectal bleeding and abdominal pain., hx of fall Nov 2020 and 

has been having pain right hip since that radiates down right leg, walks with 

limp., states breast implants moving up.


History of Any Multi-Drug Resistant Organisms: None Reported


Past Surgical History: Orthopedic Surgery


Additional Past Surgical History / Comment(s): right elbow surgery with screws, 

right knee surgery with plate and removal ., lump left breast, breast implants


Past Anesthesia/Blood Transfusion Reactions: Postoperative Nausea & Vomiting 

(PONV)


Past Psychological History: Anxiety, Depression


Smoking Status: Former smoker


Past Alcohol Use History: Abuse


Past Drug Use History: None Reported





- Past Family History


  ** Father


Family Medical History: Cancer





General Exam


Limitations: no limitations


General appearance: appears intoxicated


Head exam: Present: atraumatic, normocephalic, normal inspection


Eye exam: Present: normal appearance, PERRL, EOMI.  Absent: scleral icterus, 

conjunctival injection, periorbital swelling


ENT exam: Present: normal exam, mucous membranes moist


Neck exam: Present: normal inspection.  Absent: tenderness, meningismus, 

lymphadenopathy


Respiratory exam: Present: normal lung sounds bilaterally.  Absent: respiratory 

distress, wheezes, rales, rhonchi, stridor


Cardiovascular Exam: Present: regular rate, normal rhythm, normal heart sounds. 

Absent: systolic murmur, diastolic murmur, rubs, gallop, clicks


GI/Abdominal exam: Present: soft, normal bowel sounds.  Absent: distended, 

tenderness, guarding, rebound, rigid


Extremities exam: Present: normal inspection, full ROM, normal capillary refill.

 Absent: tenderness, pedal edema, joint swelling, calf tenderness


Back exam: Present: normal inspection


Neurological exam: Present: alert, oriented X3, CN II-XII intact


Psychiatric exam: Present: normal affect, normal mood


Skin exam: Present: warm, dry, intact, normal color.  Absent: rash





Course


                                   Vital Signs











  10/10/23 10/10/23 10/10/23





  09:23 10:27 12:00


 


Temperature 98.6 F  


 


Pulse Rate 67 86 88


 


Respiratory 20 20 16





Rate   


 


Blood Pressure 137/92 125/68 130/70


 


O2 Sat by Pulse 95 98 98





Oximetry   














  10/10/23 10/10/23 10/10/23





  13:00 15:59 18:00


 


Temperature   


 


Pulse Rate 100 110 H 105 H


 


Respiratory 20 20 16





Rate   


 


Blood Pressure 130/86 130/78 129/66


 


O2 Sat by Pulse 98 97 96





Oximetry   














Medical Decision Making





- Medical Decision Making


Was pt. sent in by a medical professional or institution (KRISTAN Wasserman, NP, urgent 

care, hospital, or nursing home...) When possible be specific


@  -No


Did you speak to anyone other than the patient for history (EMS, parent, family,

police, friend...)? What history was obtained from this source 


@  -EMS


Did you review nursing and triage notes (agree or disagree)?  Why? 


@  -I reviewed and agree with nursing and triage notes


Were old charts reviewed (outside hosp., previous admission, EMS record, old 

EKG, old radiological studies, urgent care reports/EKG's, nursing home records)?

Report findings 


@  -I reviewed patient's chart.  She has been hospitalized several times for 

same complaint


Differential Diagnosis (chest pain, altered mental status, abdominal pain women,

abdominal pain men, vaginal bleeding, weakness, fever, dyspnea, syncope, 

headache, dizziness, GI bleed, back pain, seizure, CVA, palpatations, mental 

health, musculoskeletal)? 


@  -Differential Mental Health


Depression, anxiety, bipolar, psychosis, schizophrenia, borderline personality, 

situational depression, adjustment disorder, behavioral disorder, brain tumor, 

malingering, substance abuse, encephalopathy, medication reaction, dementia, 

hypothyroidism, degenerative neurologic disorder, lupus.... This is not meant to

be all-inclusive list


EKG interpreted by me (3pts min.).


@  -Not completed


X-rays interpreted by me (1pt min.).


@  -Yes and demonstrates no acute fractures


CT interpreted by me (1pt min.).


@  -Yes and demonstrates no acute intracranial process


U/S interpreted by me (1pt. min.).


@  -None done


What testing was considered but not performed or refused? (CT, X-rays, U/S, 

labs)? Why?


@  -None


What meds were considered but not given or refused? Why?


@  -None


Did you discuss the management of the patient with other professionals 

(professionals i.e. KRISTAN Wasserman, NP, lab, RT, psych nurse, , , 

teacher, , )? Give summary


@  -Spoke with Dr. Sarabia for admission


Was smoking cessation discussed for >3mins.?


@  -No


Was critical care preformed (if so, how long)?


@  -No


Were there social determinants of health that impacted care today? How? 

(Homelessness, low income, unemployed, alcoholism, drug addiction, 

transportation, low edu. Level, literacy, decrease access to med. care, [facility], 

rehab)?


@  -No


Was there de-escalation of care discussed even if they declined (Discuss DNR or 

withdrawal of care, Hospice)? DNR status


@  -No


What co-morbidities impacted this encounter? (DM, HTN, Smoking, COPD, CAD, 

Cancer, CVA, ARF, Chemo, Hep., AIDS, mental health diagnosis, sleep apnea, 

morbid obesity)?


@  -etoh abuse


Was patient admitted / discharged? Hospital course, mention meds given and 

route, prescriptions, significant lab abnormalities, going to OR and other perti

nent info.


@  -Upon arrival patient was placed in the hallway 15.  Thorough history and 

physical exam was performed.  Patient sent for CT of her head and x-ray of her 

right knee as she is reporting injuries from her fall.  Laboratory studies are 

conducted and patient is significantly intoxicated.  Will require admission as 

she does not have a ride home.  Spoke with Dr. Sarabia who agreed to admission


Undiagnosed new problem with uncertain prognosis?


@  -No


Drug Therapy requiring intensive monitoring for toxicity (Heparin, Nitro, 

Insulin, Cardizem)?


@  -No


Were any procedures done?


@  -No


Diagnosis/symptom?


@  -Acute alcohol intoxication, acute fall, right knee pain, possible blunt head

injury


Acute, or Chronic, or Acute on Chronic?


@  -Acute


Uncomplicated (without systemic symptoms) or Complicated (systemic symptoms)?


@  -Complicated


Side effects of treatment?


@  -No


Exacerbation, Progression, or Severe Exacerbation?


@  -No


Poses a threat to life or bodily function? How? (Chest pain, USA, MI, pneumonia,

PE, COPD, DKA, ARF, appy, cholecystitis, CVA, Diverticulitis, Homicidal, 

Suicidal, threat to staff... and all critical care pts)


@  -No





- Lab Data


Result diagrams: 


                                 10/12/23 05:58





                                 10/12/23 05:58


                                   Lab Results











  10/10/23 10/10/23 Range/Units





  09:58 09:58 


 


WBC  9.0   (3.8-10.6)  k/uL


 


RBC  3.84   (3.80-5.40)  m/uL


 


Hgb  13.2   (11.4-16.0)  gm/dL


 


Hct  38.6   (34.0-46.0)  %


 


MCV  100.5 H   (80.0-100.0)  fL


 


MCH  34.3   (25.0-35.0)  pg


 


MCHC  34.1   (31.0-37.0)  g/dL


 


RDW  12.7   (11.5-15.5)  %


 


Plt Count  272   (150-450)  k/uL


 


MPV  8.0   


 


Neutrophils %  59   %


 


Lymphocytes %  32   %


 


Monocytes %  4   %


 


Eosinophils %  2   %


 


Basophils %  0   %


 


Neutrophils #  5.3   (1.3-7.7)  k/uL


 


Lymphocytes #  2.9   (1.0-4.8)  k/uL


 


Monocytes #  0.4   (0-1.0)  k/uL


 


Eosinophils #  0.2   (0-0.7)  k/uL


 


Basophils #  0.0   (0-0.2)  k/uL


 


Sodium   144  (137-145)  mmol/L


 


Potassium   4.3  (3.5-5.1)  mmol/L


 


Chloride   105  ()  mmol/L


 


Carbon Dioxide   22  (22-30)  mmol/L


 


Anion Gap   17  mmol/L


 


BUN   17  (7-17)  mg/dL


 


Creatinine   0.61  (0.52-1.04)  mg/dL


 


Est GFR (CKD-EPI)AfAm   >90  (>60 ml/min/1.73 sqM)  


 


Est GFR (CKD-EPI)NonAf   >90  (>60 ml/min/1.73 sqM)  


 


Glucose   126 H  (74-99)  mg/dL


 


Calcium   9.5  (8.4-10.2)  mg/dL


 


Total Bilirubin   0.2  (0.2-1.3)  mg/dL


 


AST   36  (14-36)  U/L


 


ALT   28  (4-34)  U/L


 


Alkaline Phosphatase   57  ()  U/L


 


Creatine Kinase   497 H  ()  U/L


 


Total Protein   7.3  (6.3-8.2)  g/dL


 


Albumin   4.4  (3.5-5.0)  g/dL


 


Serum Alcohol   334 H*  mg/dL














Disposition


Clinical Impression: 


 Toxic encephalopathy, Alcoholic intoxication, Right knee pain





Disposition: ADMITTED AS IP TO THIS HOSP


Condition: Stable


Is patient prescribed a controlled substance at d/c from ED?: No


Time of Disposition: 12:46


Decision to Admit Reason: Admit from EC


Decision Date: 10/10/23


Decision Time: 12:47

## 2023-10-10 NOTE — CT
EXAMINATION TYPE: CT brain cspine wo con

CT DLP: 1317.8 mGycm, Automated exposure control for dose reduction was used.

 

DATE OF EXAM: 10/10/2023 10:39 AM

 

COMPARISON: CT brain C-spine 9/11/2023. 

 

CLINICAL INDICATION:Female, 63 years old with history of fall, etoh; fall

 

TECHNIQUE: 

Brain: Multiple axial CT images of the brain were obtained without IV contrast. 

Cspine: Axial CT images from the skull base to the inferior aspect of T2 we obtained without intraven
ous contrast. Coronal and sagittal reformatted images were also reviewed. 

 

FINDINGS:

 

Brain:

Extra-axial spaces: No abnormal extra-axial fluid collections.

Ventricular system: Within normal limits

Cerebral parenchyma: No acute intraparenchymal hemorrhage or mass effect.  The gray-white junction is
 well differentiated. Scattered hypoattenuating areas are seen within the white matter.  

Cerebellum: Unremarkable.

Mass effect: No evidence of midline shift.

Intracranial vasculature: unremarkable

Soft tissues: Normal.

Calvarium/osseous structures: No depressed skull fracture. Benign hyperostosis frontalis noted.

Paranasal sinuses and mastoid air cells: Clear.

Visualized orbits: Orbital contents are intact.

 

Cervical spine:

Fracture: None.

Osseous structures: Mild multilevel disc space narrowing with anterior ossified ptosis. 

Vertebral alignment: Straightening of the cervical spine which may be due to patient position versus 
muscle spasm.

Spinal canal/Neural Foramina: No evidence of significant spinal canal narrowing. No evidence for sign
ificant neural foraminal stenosis.

Neck soft tissues: Prevertebral soft tissues are within normal limits.

Other: The airway is patent. The lung apices are clear. 0.9 cm hypodense right thyroid lobe nodule re
demonstrated. Mild prominence of the thyroid gland.

 

IMPRESSION:

1.  No acute intracranial process.

2.  Nonspecific white matter changes, likely secondary to chronic small vessel ischemic disease.

3.  No evidence of cervical spine fracture.

4.  Mild multilevel degenerative disc disease.

## 2023-10-10 NOTE — XR
EXAMINATION TYPE: XR knee complete RT

 

DATE OF EXAM: 10/10/2023 10:33 AM

 

INDICATION: 

Patient age:Female;  63 years old; 

Reason for study: fall, etoh; [facility]. 

 

COMPARISON: Right knee radiograph 9/11/2023

 

TECHNIQUE: The Right knee(s) was examined in Frontal, lateral and oblique projections.

 

FINDINGS:   No acute fracture or dislocation. Remote fracture of the lateral tibial plateau redemonst
rated. No soft tissue swelling. No joint effusion. Suprapatellar spurring noted. Screw tracks of the 
proximal tibia redemonstrated from prior surgery.

 

IMPRESSION: 

1. No acute osseous pathology.

2. Remote lateral tibial fracture redemonstrated.

## 2023-10-10 NOTE — P.HPIM
History of Present Illness


H&P Date: 10/10/23


Chief Complaint: Alcohol withdrawal





* 63-year-old lady with past medical history significant for hyperlipidemia, 

  history of hypertension, anxiety, depression continue as alcohol use was 

  recently discharged with similar presentation in September 23, presented to 

  the emergency department with complaints of with complains of fall and 

  excessive alcohol drinking.  She was suppose to go to rehab however she had 

  been having excessive alcohol intake.  Patient was unable to get up after the 

  fall and ambulated.  EMS was called however unsure who called at.  Patient was

  noncompliant in ED.  History is limited secondary to intoxication and 

  compliance


* Blood work reviewed showed normal WBC hemoglobin 13.2 platelet count 272


* Serum chemistry shows sodium 144 potassium 4.3 BUN 17 creatinine 0.61 serum 

  alcohol level 334


* While in ER patient was threatening to leave AGAINST MEDICAL ADVICE she was 

  counseled








REVIEW OF SYSTEMS: Fall, intoxication, confusion, gait instability


CONSTITUTIONAL: No fever, no malaise, no fatigue. 


HEENT: No recent visual problems or hearing problems. Denied any sore throat. 


CARDIOVASCULAR: No chest pain, orthopnea, PND, no palpitations, no syncope. 


PULMONARY: No shortness of breath, no cough, no hemoptysis. 


GASTROINTESTINAL: No diarrhea, no nausea, no vomiting, no abdominal pain. 


NEUROLOGICAL: No headaches, no weakness, no numbness. 


HEMATOLOGICAL: Denies any bleeding or petechiae. 


GENITOURINARY: Denies any burning micturition, frequency, or urgency. 


MUSCULOSKELETAL/RHEUMATOLOGICAL: Denies any joint pain, swelling, or any muscle 

pain. 


ENDOCRINE: Denies any polyuria or polydipsia. 











PHYSICAL EXAMINATION: 





GENERAL: The patient is alert and oriented x3, patient is inattentive


HEENT: Pupils are round and equally reacting to light. EOMI. 


CARDIOVASCULAR: S1 and S2 present. No murmurs, rubs, or gallops. 


PULMONARY: Chest is clear to auscultation, no wheezing or crackles. 


ABDOMEN: Soft, nontender, nondistended, normoactive bowel sounds. No palpable 

organomegaly. 


MUSCULOSKELETAL: No joint swelling or deformity.


EXTREMITIES: No cyanosis, clubbing, or pedal edema. 


NEUROLOGICAL: Gross neurological examination did not reveal any focal deficits. 








Past Medical History


Past Medical History: Hyperlipidemia, Hypertension, Osteoarthritis (OA)


Additional Past Medical History / Comment(s): hx colon polyps, occasional 

diarrhea, states rectal bleeding and abdominal pain., hx of fall Nov 2020 and 

has been having pain right hip since that radiates down right leg, walks with 

limp., states breast implants moving up.


History of Any Multi-Drug Resistant Organisms: None Reported


Past Surgical History: Orthopedic Surgery


Additional Past Surgical History / Comment(s): right elbow surgery with screws, 

right knee surgery with plate and removal ., lump left breast, breast implants


Past Anesthesia/Blood Transfusion Reactions: Postoperative Nausea & Vomiting 

(PONV)


Past Psychological History: Anxiety, Depression


Smoking Status: Former smoker


Past Alcohol Use History: Abuse


Past Drug Use History: None Reported





- Past Family History


  ** Father


Family Medical History: Cancer





Medications and Allergies


                                Home Medications











 Medication  Instructions  Recorded  Confirmed  Type


 


Atorvastatin [Lipitor] 10 mg PO DAILY #30 tab 09/04/23 10/10/23 Rx


 


Divalproex [Depakote] 250 mg PO BID #60 tab 09/04/23 10/10/23 Rx


 


QUEtiapine [SEROquel] 100 mg PO BID 7 Days #14 tab 09/04/23 10/10/23 Rx


 


Sertraline [Zoloft] 100 mg PO DAILY #30 tab 09/04/23 10/10/23 Rx


 


Thiamine [Vitamin B-1] 100 mg PO DAILY #30 tab 09/04/23 10/10/23 Rx


 


lisinopriL [Zestril] 10 mg PO DAILY #30 tab 09/04/23 10/10/23 Rx


 


traZODone HCL [Desyrel] 50 mg PO HS PRN 7 Days #7 tab 09/04/23 10/10/23 Rx


 


Acetaminophen Tab [Tylenol] 650 mg PO Q6HR PRN  tab 09/13/23 10/10/23 Rx








                                    Allergies











Allergy/AdvReac Type Severity Reaction Status Date / Time


 


latex Allergy Unknown Itching, Verified 10/10/23 12:33





   Blisters  


 


codeine AdvReac Severe HEADACHE Verified 10/10/23 12:33


 


hydrocodone [From Vicodin] AdvReac Severe HEADACHE Verified 10/10/23 12:33


 


ANESTHESIA AdvReac Unknown GAS GIVES Uncoded 10/10/23 12:33





   HER  





   HEADACHE,  





   VOMITING,  





   HEART  





   PALIPITATIONS  














Physical Exam


Vitals: 


                                   Vital Signs











  Temp Pulse Resp BP Pulse Ox


 


 10/10/23 12:00   88  16  130/70  98


 


 10/10/23 10:27   86  20  125/68  98


 


 10/10/23 09:23  98.6 F  67  20  137/92  95








                                Intake and Output











 10/09/23 10/10/23 10/10/23





 22:59 06:59 14:59


 


Other:   


 


  Weight   81.647 kg














Results


CBC & Chem 7: 


                                 10/10/23 09:58





                                 10/10/23 09:58


Labs: 


                  Abnormal Lab Results - Last 24 Hours (Table)











  10/10/23 10/10/23 Range/Units





  09:58 09:58 


 


MCV  100.5 H   (80.0-100.0)  fL


 


Glucose   126 H  (74-99)  mg/dL


 


Creatine Kinase   497 H  ()  U/L


 


Serum Alcohol   334 H*  mg/dL














Assessment and Plan


Assessment: 








Assessment and plan





Alcohol intoxication with impending alcohol withdrawal


History of depression and anxiety


Status post fall from alcohol intoxication


Chronic tibial plateau fracture


History of adjustment disorder


Hyperlipidemia


Hypertension





* In regards to alcohol withdrawal, continue CIWA protocol.  Continue Ativan as 

  needed, continue thiamine and folic acid


* In regards to hypertension continue lisinopril


* Regards to hyperlipidemia continue statin, ordered a CMP


* In regards to anxiety, adjustment disorder continue current psychiatric 

  medications


* In regards to fall patient will need physical therapy occupational therapy 

  evaluation after she has completed detox.  X-ray of right knee does show 

  chronic right tibial plateau fracture.  CT head negative for acute 

  intracranial process


* CODE STATUS is full code

## 2023-10-11 LAB
ALBUMIN SERPL-MCNC: 3.8 G/DL (ref 3.5–5)
ALBUMIN/GLOB SERPL: 1.4 {RATIO}
ALP SERPL-CCNC: 52 U/L (ref 38–126)
ALT SERPL-CCNC: 24 U/L (ref 4–34)
ANION GAP SERPL CALC-SCNC: 8 MMOL/L
AST SERPL-CCNC: 35 U/L (ref 14–36)
BASOPHILS # BLD AUTO: 0.04 X 10*3/UL (ref 0–0.1)
BASOPHILS NFR BLD AUTO: 0.6 %
BUN SERPL-SCNC: 26 MG/DL (ref 7–17)
CALCIUM SPEC-MCNC: 9.2 MG/DL (ref 8.4–10.2)
CHLORIDE SERPL-SCNC: 105 MMOL/L (ref 98–107)
CO2 SERPL-SCNC: 25 MMOL/L (ref 22–30)
EOSINOPHIL # BLD AUTO: 0.09 X 10*3/UL (ref 0.04–0.35)
EOSINOPHIL NFR BLD AUTO: 1.4 %
ERYTHROCYTE [DISTWIDTH] IN BLOOD BY AUTOMATED COUNT: 3.59 X 10*6/UL (ref 4.1–5.2)
ERYTHROCYTE [DISTWIDTH] IN BLOOD: 12.5 % (ref 11.5–14.5)
GLOBULIN SER CALC-MCNC: 2.7 G/DL
GLUCOSE SERPL-MCNC: 105 MG/DL (ref 74–99)
HCT VFR BLD AUTO: 36.3 % (ref 37.2–46.3)
HGB BLD-MCNC: 12.1 D/DL (ref 12–15)
IMM GRANULOCYTES BLD QL AUTO: 0.2 %
LYMPHOCYTES # SPEC AUTO: 2.5 X 10*3/UL (ref 0.9–5)
LYMPHOCYTES NFR SPEC AUTO: 39.1 %
MAGNESIUM SPEC-SCNC: 1.7 MG/DL (ref 1.6–2.3)
MCH RBC QN AUTO: 33.7 PG (ref 27–32)
MCHC RBC AUTO-ENTMCNC: 33.3 D/DL (ref 32–37)
MCV RBC AUTO: 101.1 FL (ref 80–97)
MONOCYTES # BLD AUTO: 0.87 X 10*3/UL (ref 0.2–1)
MONOCYTES NFR BLD AUTO: 13.6 %
NEUTROPHILS # BLD AUTO: 2.88 X 10*3/UL (ref 1.8–7.7)
NEUTROPHILS NFR BLD AUTO: 45.1 %
NRBC BLD AUTO-RTO: 0 X 10*3/UL (ref 0–0.01)
PLATELET # BLD AUTO: 195 X 10*3/UL (ref 140–440)
POTASSIUM SERPL-SCNC: 4.2 MMOL/L (ref 3.5–5.1)
PROT SERPL-MCNC: 6.5 G/DL (ref 6.3–8.2)
SODIUM SERPL-SCNC: 138 MMOL/L (ref 137–145)
WBC # BLD AUTO: 6.39 X 10*3/UL (ref 4.5–10)

## 2023-10-11 RX ADMIN — CEFAZOLIN SCH: 330 INJECTION, POWDER, FOR SOLUTION INTRAMUSCULAR; INTRAVENOUS at 02:02

## 2023-10-11 RX ADMIN — Medication SCH: at 08:28

## 2023-10-11 RX ADMIN — ATORVASTATIN CALCIUM SCH MG: 10 TABLET, FILM COATED ORAL at 08:24

## 2023-10-11 RX ADMIN — FOLIC ACID SCH MG: 1 TABLET ORAL at 08:24

## 2023-10-11 RX ADMIN — SERTRALINE HYDROCHLORIDE SCH MG: 100 TABLET ORAL at 08:24

## 2023-10-11 RX ADMIN — DIVALPROEX SODIUM SCH MG: 250 TABLET, DELAYED RELEASE ORAL at 08:24

## 2023-10-11 RX ADMIN — DIVALPROEX SODIUM SCH MG: 250 TABLET, DELAYED RELEASE ORAL at 21:36

## 2023-10-11 RX ADMIN — ACETAMINOPHEN PRN MG: 325 TABLET, FILM COATED ORAL at 13:00

## 2023-10-11 RX ADMIN — ACETAMINOPHEN PRN MG: 325 TABLET, FILM COATED ORAL at 21:35

## 2023-10-11 RX ADMIN — Medication SCH MG: at 08:24

## 2023-10-11 RX ADMIN — CEFAZOLIN SCH: 330 INJECTION, POWDER, FOR SOLUTION INTRAMUSCULAR; INTRAVENOUS at 16:02

## 2023-10-11 NOTE — P.PN
Subjective


Progress Note Date: 10/11/23





* 63-year-old lady with past medical history significant for hyperlipidemia, 

  history of hypertension, anxiety, depression continue as alcohol use was 

  recently discharged with similar presentation in September 23, presented to 

  the emergency department with complaints of with complains of fall and 

  excessive alcohol drinking.  She was suppose to go to rehab however she had 

  been having excessive alcohol intake.  Patient was unable to get up after the 

  fall and ambulated.  EMS was called however unsure who called at.  Patient was

  noncompliant in ED.  History is limited secondary to intoxication and complia

  nce


* Blood work reviewed showed normal WBC hemoglobin 13.2 platelet count 272


* Serum chemistry shows sodium 144 potassium 4.3 BUN 17 creatinine 0.61 serum 

  alcohol level 334


* While in ER patient was threatening to leave AGAINST MEDICAL ADVICE she was 

  counseled





10/11.  Patient seen and examined.  Still complaining of lethargy and weakness. 

Patient keen to go to [facility]








REVIEW OF SYSTEMS: 


CONSTITUTIONAL: No fever, no malaise,. 


CARDIOVASCULAR: No chest pain, no palpitations, no syncope. 


PULMONARY: No shortness of breath, no cough, 


GASTROINTESTINAL: No diarrhea, no nausea, no vomiting, no abdominal pain. 


NEUROLOGICAL: No headaches, no weakness, 





PHYSICAL EXAMINATION: 





GENERAL: The patient is alert and oriented x3, not in any acute distress. Well 

developed, well nourished. 


HEENT: Pupils are round and equally reacting to light. EOMI. No scleral icterus.

No conjunctival pallor. Normocephalic, atraumatic. No pharyngeal erythema. No 

thyromegaly. 


CARDIOVASCULAR: S1 and S2 present. No murmurs, rubs, or gallops. 


PULMONARY: Chest is clear to auscultation, no wheezing or crackles. 


ABDOMEN: Soft, nontender, nondistended, normoactive bowel sounds. No palpable 

organomegaly. 


MUSCULOSKELETAL: No joint swelling or deformity.


EXTREMITIES: No cyanosis, clubbing, or pedal edema. 


NEUROLOGICAL: Gross neurological examination did not reveal any focal deficits. 


SKIN: No rashes. 





Assessment and plan


Alcohol intoxication with impending alcohol withdrawal


History of depression and anxiety


Status post fall from alcohol intoxication


Chronic tibial plateau fracture


History of adjustment disorder


Hyperlipidemia


Hypertension





Monitor vital signs


Monitor CBC


Monitor CMP


Continue CIWA protocol


Continue thiamine and folic acid


Resume home meds


Labs and medication were reviewed..  Continue same treatment.  Continue with 

symptomatic treatment.  Resume home medication.  Monitor labs and vitals.  DVT 

and GI prophylaxis.  Further recommendations as per clinical course of the 

patient








Dictation was produced using dragon dictation software. please excuse any 

grammatical, word or spelling errors. 








Objective





- Vital Signs


Vital signs: 


                                   Vital Signs











Temp  98.1 F   10/11/23 07:00


 


Pulse  89   10/11/23 07:00


 


Resp  16   10/11/23 07:00


 


BP  137/81   10/11/23 07:00


 


Pulse Ox  100   10/11/23 07:00


 


FiO2      








                                 Intake & Output











 10/10/23 10/11/23 10/11/23





 18:59 06:59 18:59


 


Weight 81.647 kg 81.647 kg 


 


Other:   


 


  # Voids  1 














- Labs


CBC & Chem 7: 


                                 10/11/23 05:46





                                 10/11/23 05:46


Labs: 


                  Abnormal Lab Results - Last 24 Hours (Table)











  10/11/23 10/11/23 Range/Units





  05:46 05:46 


 


RBC  3.59 L   (4.10-5.20)  X 10*6/uL


 


Hct  36.3 L   (37.2-46.3)  %


 


MCV  101.1 H   (80.0-97.0)  FL


 


MCH  33.7 H   (27.0-32.0)  pg


 


BUN   26 H  (7-17)  mg/dL


 


Glucose   105 H  (74-99)  mg/dL

## 2023-10-11 NOTE — P.EN
Patient will require a cane on discharge as patient has generalized weakness and

gait dysfunction secondary to chronic plateau tibial fracture.  Cane will be 

needed in order for patient to continue performing ADLs.  Prescription was 

provided case management/social work for discharge planning.

## 2023-10-12 VITALS
SYSTOLIC BLOOD PRESSURE: 122 MMHG | HEART RATE: 80 BPM | DIASTOLIC BLOOD PRESSURE: 74 MMHG | TEMPERATURE: 98.1 F | RESPIRATION RATE: 17 BRPM

## 2023-10-12 LAB
ALBUMIN SERPL-MCNC: 3.8 D/DL (ref 3.8–4.9)
ALBUMIN/GLOB SERPL: 1.65 RATIO (ref 1.6–3.17)
ALP SERPL-CCNC: 53 U/L (ref 41–126)
ALT SERPL-CCNC: 21 U/L (ref 8–44)
ANION GAP SERPL CALC-SCNC: 10.2 MMOL/L (ref 4–12)
AST SERPL-CCNC: 22 U/L (ref 13–35)
BUN SERPL-SCNC: 17.5 MG/DL (ref 9–27)
BUN/CREAT SERPL: 35 RATIO (ref 12–20)
CALCIUM SPEC-MCNC: 9.7 MG/DL (ref 8.7–10.3)
CHLORIDE SERPL-SCNC: 105 MMOL/L (ref 96–109)
CO2 SERPL-SCNC: 24.8 MMOL/L (ref 21.6–31.8)
ERYTHROCYTE [DISTWIDTH] IN BLOOD BY AUTOMATED COUNT: 3.38 X 10*6/UL (ref 4.1–5.2)
ERYTHROCYTE [DISTWIDTH] IN BLOOD: 12.1 % (ref 11.5–14.5)
GLOBULIN SER CALC-MCNC: 2.3 D/DL (ref 1.6–3.3)
GLUCOSE SERPL-MCNC: 102 MG/DL (ref 70–110)
HCT VFR BLD AUTO: 34.1 % (ref 37.2–46.3)
HGB BLD-MCNC: 11.2 D/DL (ref 12–15)
MCH RBC QN AUTO: 33.1 PG (ref 27–32)
MCHC RBC AUTO-ENTMCNC: 32.8 D/DL (ref 32–37)
MCV RBC AUTO: 100.9 FL (ref 80–97)
NRBC BLD AUTO-RTO: 0 X 10*3/UL (ref 0–0.01)
PLATELET # BLD AUTO: 192 X 10*3/UL (ref 140–440)
POTASSIUM SERPL-SCNC: 4.5 MMOL/L (ref 3.5–5.5)
PROT SERPL-MCNC: 6.1 D/DL (ref 6.2–8.2)
SODIUM SERPL-SCNC: 140 MMOL/L (ref 135–145)
WBC # BLD AUTO: 4.91 X 10*3/UL (ref 4.5–10)

## 2023-10-12 RX ADMIN — SERTRALINE HYDROCHLORIDE SCH MG: 100 TABLET ORAL at 08:42

## 2023-10-12 RX ADMIN — Medication SCH MG: at 08:42

## 2023-10-12 RX ADMIN — ATORVASTATIN CALCIUM SCH MG: 10 TABLET, FILM COATED ORAL at 08:42

## 2023-10-12 RX ADMIN — FOLIC ACID SCH MG: 1 TABLET ORAL at 08:42

## 2023-10-12 RX ADMIN — DIVALPROEX SODIUM SCH MG: 250 TABLET, DELAYED RELEASE ORAL at 08:42

## 2023-10-12 RX ADMIN — ACETAMINOPHEN PRN MG: 325 TABLET, FILM COATED ORAL at 12:40

## 2023-10-12 NOTE — P.DS
Providers


Date of admission: 


10/10/23 12:47





Expected date of discharge: 10/12/23


Attending physician: 


Cem Sarabia MD





Primary care physician: 


[facility] Course: 





* 63-year-old lady with past medical history significant for hyperlipidemia, 

  history of hypertension, anxiety, depression continue as alcohol use was 

  recently discharged with similar presentation in September 23, presented to 

  the emergency department with complaints of with complains of fall and 

  excessive alcohol drinking.  She was suppose to go to rehab however she had 

  been having excessive alcohol intake.  Patient was unable to get up after the 

  fall and ambulated.  EMS was called however unsure who called at.  Patient was

  noncompliant in ED.  History is limited secondary to intoxication and 

  compliance


* Blood work reviewed showed normal WBC hemoglobin 13.2 platelet count 272


* Serum chemistry shows sodium 144 potassium 4.3 BUN 17 creatinine 0.61 serum 

  alcohol level 334


* While in ER patient was threatening to leave AGAINST MEDICAL ADVICE she was 

  counseled


* 10/11.  Patient seen and examined.  Still complaining of lethargy and 

  weakness.  Patient keen to go to [facility]


* 10/12: Patient seen and evaluated bedside, discharge planning discussed.  

  Patient was offered discharge to subacute rehab to evaluate for gait 

  instability.  Patient says she has to collect furniture from her place since 

  she might be moving apartments.  Patient states she would like to go home with

  home care.  Patient doesn't says she has a high fall risk.  Patient to go on 

  follow-up with [facility] in a week time patient counseled regarding 

  abstinence from alcohol








PHYSICAL EXAMINATION: 





GENERAL: The patient is alert and oriented x3, not in any acute distress. Well 

developed, well nourished. 


HEENT: Pupils are round and equally reacting to light. EOMI. No scleral icterus.

No conjunctival pallor. Normocephalic, atraumatic. No pharyngeal erythema. No 

thyromegaly. 


CARDIOVASCULAR: S1 and S2 present. No murmurs, rubs, or gallops. 


PULMONARY: Chest is clear to auscultation, no wheezing or crackles. 


ABDOMEN: Soft, nontender, nondistended, normoactive bowel sounds. No palpable 

organomegaly. 


MUSCULOSKELETAL: No joint swelling or deformity.


EXTREMITIES: No cyanosis, clubbing, or pedal edema. 


NEUROLOGICAL: Gross neurological examination did not reveal any focal deficits. 


SKIN: No rashes. 








Assessment: 





Assessment and plan


Alcohol intoxication with impending alcohol withdrawal


History of depression and anxiety


Status post fall from alcohol intoxication


Chronic tibial plateau fracture


History of adjustment disorder


Hyperlipidemia


Hypertension





Patient successfully detoxed from alcohol, patient would want to go home with 

home care. 


 Home medications reviewed and reconciled.  Continue current medication to 

lisinopril.


Outpatient follow-up with AA program


Patient does understand she is a high fall risk





Patient Condition at Discharge: Stable





Plan - Discharge Summary


New Discharge Prescriptions: 


Continue


   traZODone HCL [Desyrel] 50 mg PO HS PRN 7 Days #7 tab


     PRN Reason: Insomnia


   QUEtiapine [SEROquel] 100 mg PO BID 7 Days #14 tab


   Thiamine [Vitamin B-1] 100 mg PO DAILY #30 tab


   Divalproex [Depakote] 250 mg PO BID #60 tab


   Sertraline [Zoloft] 100 mg PO DAILY #30 tab


   Acetaminophen Tab [Tylenol] 650 mg PO Q6HR PRN  tab


     PRN Reason: Fever And/ Or Pain


   lisinopriL [Zestril] 10 mg PO DAILY #30 tab


   Atorvastatin [Lipitor] 10 mg PO DAILY #30 tab


Discharge Medication List





Atorvastatin [Lipitor] 10 mg PO DAILY #30 tab 09/04/23 [Rx]


Divalproex [Depakote] 250 mg PO BID #60 tab 09/04/23 [Rx]


QUEtiapine [SEROquel] 100 mg PO BID 7 Days #14 tab 09/04/23 [Rx]


Sertraline [Zoloft] 100 mg PO DAILY #30 tab 09/04/23 [Rx]


Thiamine [Vitamin B-1] 100 mg PO DAILY #30 tab 09/04/23 [Rx]


lisinopriL [Zestril] 10 mg PO DAILY #30 tab 09/04/23 [Rx]


traZODone HCL [Desyrel] 50 mg PO HS PRN 7 Days #7 tab 09/04/23 [Rx]


Acetaminophen Tab [Tylenol] 650 mg PO Q6HR PRN  tab 09/13/23 [Rx]








Follow up Appointment(s)/Referral(s): 


Lisa Becerra MD [Primary Care Provider] - 1-2 days


Discharge/Stand Alone Forms:  [facility] Meetings [address] - [facility], [facility], Who 

Do I Call?, Community Resources, Outpatient Counseling, Inp Substance Abuse 

Facilities


Discharge Disposition: HOME WITH HOME HEALTH SERVICES

## 2023-10-23 ENCOUNTER — HOSPITAL ENCOUNTER (EMERGENCY)
Dept: HOSPITAL 47 - EC | Age: 63
Discharge: HOME | End: 2023-10-23
Payer: MEDICARE

## 2023-10-23 VITALS — RESPIRATION RATE: 18 BRPM | TEMPERATURE: 97.9 F

## 2023-10-23 VITALS — HEART RATE: 90 BPM | SYSTOLIC BLOOD PRESSURE: 120 MMHG | DIASTOLIC BLOOD PRESSURE: 71 MMHG

## 2023-10-23 DIAGNOSIS — Z91.040: ICD-10-CM

## 2023-10-23 DIAGNOSIS — I10: ICD-10-CM

## 2023-10-23 DIAGNOSIS — S30.0XXA: Primary | ICD-10-CM
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
## 2023-10-23 DIAGNOSIS — Z88.4: ICD-10-CM

## 2023-10-23 DIAGNOSIS — Z87.891: ICD-10-CM

## 2023-10-23 DIAGNOSIS — Z86.59: ICD-10-CM

## 2023-10-23 DIAGNOSIS — Z88.5: ICD-10-CM

## 2023-10-23 PROCEDURE — 70450 CT HEAD/BRAIN W/O DYE: CPT

## 2023-10-23 PROCEDURE — 72110 X-RAY EXAM L-2 SPINE 4/>VWS: CPT

## 2023-10-23 PROCEDURE — 96372 THER/PROPH/DIAG INJ SC/IM: CPT

## 2023-10-23 PROCEDURE — 99285 EMERGENCY DEPT VISIT HI MDM: CPT

## 2023-10-23 PROCEDURE — 72125 CT NECK SPINE W/O DYE: CPT

## 2023-10-23 NOTE — XR
EXAMINATION TYPE: XR lumbosacral spine min 4V

 

DATE OF EXAM: 10/23/2023 1:36 PM

 

INDICATION: 

Patient age:Female;  63 years old; 

Reason for study: fall; [facility]. 

 

COMPARISON: None

 

TECHNIQUE: Frontal, lateral , bilateral oblique and coned in L5-S1 lateral views of the spine.

 

FINDINGS: There are 5 lumbar type vertebral bodies identified. No evidence of any acute osseous patho
logy.  No evidence of loss of vertebral body height is seen. There is normal alignment of the lumbar 
vertebral bodies. Mild dextrocurvature of the lumbar spine with apex at L3. Multilevel disc space mikel
rowing with endplate sclerosis and anterior osteophytosis. Multilevel facet arthropathy.

 

IMPRESSION: 

1.  No acute process.

2.  Mild multilevel degenerative disc disease and facet arthropathy.

3.  Mild dextrocurvature of the lumbar spine.

## 2023-10-23 NOTE — CT
EXAMINATION TYPE: CT brain cspine wo con

CT DLP: 1288.3 mGycm, Automated exposure control for dose reduction was used.

 

DATE OF EXAM: 10/23/2023 1:21 PM

 

COMPARISON: CT brain C-spine 10/10/2023. 

 

CLINICAL INDICATION:Female, 63 years old with history of fall; fall

 

TECHNIQUE: 

Brain: Multiple axial CT images of the brain were obtained without IV contrast. 

Cspine: Axial CT images from the skull base to the inferior aspect of T2 we obtained without intraven
ous contrast. Coronal and sagittal reformatted images were also reviewed. 

 

FINDINGS:

 

Brain:

Extra-axial spaces: No abnormal extra-axial fluid collections.

Ventricular system: Within normal limits

Cerebral parenchyma: Cerebral atrophy. No acute intraparenchymal hemorrhage or mass effect.  The gray
-white junction is well differentiated. Scattered hypoattenuating areas are seen within the white mat
ter.  

Cerebellum: Unremarkable.

Mass effect: No evidence of midline shift.

Intracranial vasculature: Atherosclerotic calcifications of the intracranial vessels.

Soft tissues: Normal.

Calvarium/osseous structures: No depressed skull fracture.

Paranasal sinuses and mastoid air cells: Clear.

Visualized orbits: Orbital contents are intact.

 

Cervical spine:

Fracture: None.

Osseous structures: Multilevel degenerative disc disease changes with endplate spurring and disc oste
ophyte complex's. 

Vertebral alignment: Straightening of the cervical spine which may due to patient position versus mus
nabila spasm.

Spinal canal/Neural Foramina: No evidence of significant spinal canal narrowing. Facet joint uncovert
ebral joint arthropathy scattered throughout the cervical spine with varying degrees of neural forami
nal stenosis.

Neck soft tissues: Prevertebral soft tissues are within normal limits.

Other: The airway is patent. The lung apices are clear. Heterogenous prominent thyroid gland.

 

IMPRESSION:

1.  No acute intracranial process.

2.  Nonspecific white matter changes, likely secondary to chronic small vessel ischemic disease.

3.  No evidence of cervical spine fracture.

4.  Mild multilevel degenerative disc disease.

## 2023-10-23 NOTE — ED
General Adult HPI





- General


Chief complaint: Fall


Stated complaint: back pain


Time Seen by Provider: 10/23/23 12:05


Source: patient, RN notes reviewed, old records reviewed


Mode of arrival: ambulatory


Limitations: no limitations





- History of Present Illness


Initial comments: 





63-year-old female presenting for evaluation of fall which occurred this [facility]  Patient states she was outside of her apartment, fell in the hallway 

striking her head and landing on her tailbone.  No loss conscious.  She states 

she's had some balance issues recently and has had multiple falls.  She has a 

history of alcohol abuse and is planning on going to rehab today.  She denies 

focal numbness or weakness.  Her main pain complaint is in her tailbone.  No 

chest or abdominal pain.  No vomiting.  No fever.





- Related Data


                                  Previous Rx's











 Medication  Instructions  Recorded


 


Atorvastatin [Lipitor] 10 mg PO DAILY #30 tab 09/04/23


 


Divalproex [Depakote] 250 mg PO BID #60 tab 09/04/23


 


QUEtiapine [SEROquel] 100 mg PO BID 7 Days #14 tab 09/04/23


 


Sertraline [Zoloft] 100 mg PO DAILY #30 tab 09/04/23


 


Thiamine [Vitamin B-1] 100 mg PO DAILY #30 tab 09/04/23


 


lisinopriL [Zestril] 10 mg PO DAILY #30 tab 09/04/23


 


traZODone HCL [Desyrel] 50 mg PO HS PRN 7 Days #7 tab 09/04/23


 


Acetaminophen Tab [Tylenol] 650 mg PO Q6HR PRN  tab 09/13/23











                                    Allergies











Allergy/AdvReac Type Severity Reaction Status Date / Time


 


latex Allergy Unknown Itching, Verified 10/23/23 11:34





   Blisters  


 


codeine AdvReac Severe HEADACHE Verified 10/23/23 11:34


 


hydrocodone [From Vicodin] AdvReac Severe HEADACHE Verified 10/23/23 11:34


 


ANESTHESIA AdvReac Unknown GAS GIVES Uncoded 10/23/23 11:34





   HER  





   HEADACHE,  





   VOMITING,  





   HEART  





   PALIPITATIONS  














Review of Systems


ROS Statement: 


Those systems with pertinent positive or pertinent negative responses have been 

documented in the HPI.





ROS Other: All systems not noted in ROS Statement are negative.





Past Medical History


Past Medical History: Hyperlipidemia, Hypertension, Osteoarthritis (OA)


Additional Past Medical History / Comment(s): hx colon polyps, occasional 

diarrhea, states rectal bleeding and abdominal pain., hx of fall Nov 2020 and 

has been having pain right hip since that radiates down right leg, walks with 

limp., states breast implants moving up.


History of Any Multi-Drug Resistant Organisms: None Reported


Past Surgical History: Orthopedic Surgery


Additional Past Surgical History / Comment(s): right elbow surgery with screws, 

right knee surgery with plate and removal ., lump left breast, breast implants


Past Anesthesia/Blood Transfusion Reactions: Postoperative Nausea & Vomiting 

(PONV)


Past Psychological History: Anxiety, Depression


Smoking Status: Former smoker


Past Alcohol Use History: Abuse


Past Drug Use History: None Reported





- Past Family History


  ** Father


Family Medical History: Cancer





General Exam


Limitations: no limitations


General appearance: alert, in no apparent distress


Head exam: Present: atraumatic, normocephalic


Eye exam: Present: normal appearance, PERRL


ENT exam: Present: normal exam


Neck exam: Present: normal inspection.  Absent: tenderness, meningismus


Respiratory exam: Present: normal lung sounds bilaterally.  Absent: respiratory 

distress, wheezes


Cardiovascular Exam: Present: regular rate, normal rhythm


GI/Abdominal exam: Present: soft.  Absent: distended, tenderness, rebound


Extremities exam: Present: normal inspection, normal capillary refill


Neurological exam: Present: alert, oriented X3, CN II-XII intact, other (4-5 

strength in bilateral lower extremities).  Absent: motor sensory deficit


Psychiatric exam: Present: normal affect, normal mood


Skin exam: Present: warm, dry, intact





Course


                                   Vital Signs











  10/23/23 10/23/23





  11:32 14:21


 


Temperature 98.4 F 97.9 F


 


Pulse Rate 105 H 92


 


Respiratory 20 18





Rate  


 


Blood Pressure 117/59 118/70


 


O2 Sat by Pulse 99 99





Oximetry  














Medical Decision Making





- Medical Decision Making





Was pt. sent in by a medical professional or institution (, PA, NP, urgent 

care, hospital, or nursing home...) When possible be specific


@  -No


Did you speak to anyone other than the patient for history (EMS, parent, family,

police, friend...)? What history was obtained from this source 


@  -No


Did you review nursing and triage notes (agree or disagree)?  Why? 


@  -I reviewed and agree with nursing and triage notes


Were old charts reviewed (outside hosp., previous admission, EMS record, old 

EKG, old radiological studies, urgent care reports/EKG's, nursing home records)?

Report findings 


@  -No old charts were reviewed


Differential Diagnosis (chest pain, altered mental status, abdominal pain women,

abdominal pain men, vaginal bleeding, weakness, fever, dyspnea, syncope, 

headache, dizziness, GI bleed, back pain, seizure, CVA, palpatations, mental 

health, musculoskeletal)? 


@ Lumbosacral fracture, head injury with intracranial hemorrhage or mass effect


EKG interpreted by me (3pts min.).


@  -As above


X-rays interpreted by me (1pt min.).


@  -X-ray of the lumbosacral spine negative for fracture or subluxation


CT interpreted by me (1pt min.).


@ CT brain negative for intracranial hemorrhage or mass effect


U/S interpreted by me (1pt. min.).


@  -None done


What testing was considered but not performed or refused? (CT, X-rays, U/S, 

labs)? Why?


@  -None


What meds were considered but not given or refused? Why?


@  -None


Did you discuss the management of the patient with other professionals 

(professionals i.e. , PA, NP, lab, RT, psych nurse, , , 

teacher, , )? Give summary


@  -No


Was smoking cessation discussed for >3mins.?


@  -No


Was critical care preformed (if so, how long)?


@  -No


Were there social determinants of health that impacted care today? How? 

(Homelessness, low income, unemployed, alcoholism, drug addiction, 

transportation, low edu. Level, literacy, decrease access to med. care, custodial, 

rehab)?


@  -No


Was there de-escalation of care discussed even if they declined (Discuss DNR or 

withdrawal of care, Hospice)? DNR status


@  -No


What co-morbidities impacted this encounter? (DM, HTN, Smoking, COPD, CAD, 

Cancer, CVA, ARF, Chemo, Hep., AIDS, mental health diagnosis, sleep apnea, 

morbid obesity)?


@  -None


Was patient admitted / discharged? Hospital course, mention meds given and 

route, prescriptions, significant lab abnormalities, going to OR and other 

pertinent info.


@  -[63-year-old female with fall, alcohol abuse history and plan to go to 

rehab.  Patient had a fall this morning.  She had x-ray of the lumbosacral spine

 and CAT scan of the cervical spine and brain which was negative for traumatic 

injury.  Patient eager for discharge that she may present to rehabilitation.


Undiagnosed new problem with uncertain prognosis?


@  -No


Drug Therapy requiring intensive monitoring for toxicity (Heparin, Nitro, 

Insulin, Cardizem)?


@  -No


Were any procedures done?


@  -No


Diagnosis/symptom?


@  -[Fall, sacral contusion


Acute, or Chronic, or Acute on Chronic?


@  Acute


Uncomplicated (without systemic symptoms) or Complicated (systemic symptoms)?


@  -default


Side effects of treatment?


@  -No


Exacerbation, Progression, or Severe Exacerbation?


@  -No


Poses a threat to life or bodily function? How? (Chest pain, USA, MI, pneumonia,

 PE, COPD, DKA, ARF, appy, cholecystitis, CVA, Diverticulitis, Homicidal, 

Suicidal, threat to staff... and all critical care pts)


@  -[Low risk at this time





Disposition


Clinical Impression: 


 Fall, Sacral contusion





Disposition: HOME SELF-CARE


Condition: Fair


Instructions (If sedation given, give patient instructions):  Fall Prevention 

for Older Adults (ED)


Is patient prescribed a controlled substance at d/c from ED?: No


Referrals: 


None,Stated [REFERRING] - 1-2 days


Time of Disposition: 14:44

## 2023-10-26 ENCOUNTER — HOSPITAL ENCOUNTER (EMERGENCY)
Dept: HOSPITAL 47 - EC | Age: 63
Discharge: HOME | End: 2023-10-26
Payer: MEDICARE

## 2023-10-26 VITALS — SYSTOLIC BLOOD PRESSURE: 119 MMHG | DIASTOLIC BLOOD PRESSURE: 77 MMHG | HEART RATE: 120 BPM

## 2023-10-26 VITALS — RESPIRATION RATE: 18 BRPM | TEMPERATURE: 98.2 F

## 2023-10-26 DIAGNOSIS — M79.604: ICD-10-CM

## 2023-10-26 DIAGNOSIS — Z88.4: ICD-10-CM
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
## 2023-10-26 DIAGNOSIS — Z91.040: ICD-10-CM

## 2023-10-26 DIAGNOSIS — Z86.59: ICD-10-CM

## 2023-10-26 DIAGNOSIS — I10: ICD-10-CM

## 2023-10-26 DIAGNOSIS — Z87.891: ICD-10-CM

## 2023-10-26 DIAGNOSIS — M79.605: Primary | ICD-10-CM
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
## 2023-10-26 DIAGNOSIS — F10.129: ICD-10-CM

## 2023-10-26 DIAGNOSIS — Z88.5: ICD-10-CM

## 2023-10-26 LAB
ALBUMIN SERPL-MCNC: 4.2 G/DL (ref 3.5–5)
ALP SERPL-CCNC: 53 U/L (ref 38–126)
ALT SERPL-CCNC: 23 U/L (ref 4–34)
ANION GAP SERPL CALC-SCNC: 15 MMOL/L
AST SERPL-CCNC: 31 U/L (ref 14–36)
BASOPHILS # BLD AUTO: 0 K/UL (ref 0–0.2)
BASOPHILS NFR BLD AUTO: 1 %
BUN SERPL-SCNC: 25 MG/DL (ref 7–17)
CALCIUM SPEC-MCNC: 8.9 MG/DL (ref 8.4–10.2)
CHLORIDE SERPL-SCNC: 107 MMOL/L (ref 98–107)
CO2 SERPL-SCNC: 20 MMOL/L (ref 22–30)
EOSINOPHIL # BLD AUTO: 0.1 K/UL (ref 0–0.7)
EOSINOPHIL NFR BLD AUTO: 1 %
ERYTHROCYTE [DISTWIDTH] IN BLOOD BY AUTOMATED COUNT: 3.57 M/UL (ref 3.8–5.4)
ERYTHROCYTE [DISTWIDTH] IN BLOOD: 12.4 % (ref 11.5–15.5)
GLUCOSE SERPL-MCNC: 183 MG/DL (ref 74–99)
HCT VFR BLD AUTO: 36.5 % (ref 34–46)
HGB BLD-MCNC: 12.3 GM/DL (ref 11.4–16)
LYMPHOCYTES # SPEC AUTO: 2.3 K/UL (ref 1–4.8)
LYMPHOCYTES NFR SPEC AUTO: 40 %
MAGNESIUM SPEC-SCNC: 1.5 MG/DL (ref 1.6–2.3)
MCH RBC QN AUTO: 34.6 PG (ref 25–35)
MCHC RBC AUTO-ENTMCNC: 33.8 G/DL (ref 31–37)
MCV RBC AUTO: 102.3 FL (ref 80–100)
MONOCYTES # BLD AUTO: 0.2 K/UL (ref 0–1)
MONOCYTES NFR BLD AUTO: 4 %
NEUTROPHILS # BLD AUTO: 3 K/UL (ref 1.3–7.7)
NEUTROPHILS NFR BLD AUTO: 52 %
PLATELET # BLD AUTO: 279 K/UL (ref 150–450)
POTASSIUM SERPL-SCNC: 3.8 MMOL/L (ref 3.5–5.1)
PROT SERPL-MCNC: 7 G/DL (ref 6.3–8.2)
SODIUM SERPL-SCNC: 142 MMOL/L (ref 137–145)
WBC # BLD AUTO: 5.8 K/UL (ref 3.8–10.6)

## 2023-10-26 PROCEDURE — 80053 COMPREHEN METABOLIC PANEL: CPT

## 2023-10-26 PROCEDURE — 99285 EMERGENCY DEPT VISIT HI MDM: CPT

## 2023-10-26 PROCEDURE — 83735 ASSAY OF MAGNESIUM: CPT

## 2023-10-26 PROCEDURE — 96361 HYDRATE IV INFUSION ADD-ON: CPT

## 2023-10-26 PROCEDURE — 83605 ASSAY OF LACTIC ACID: CPT

## 2023-10-26 PROCEDURE — 36415 COLL VENOUS BLD VENIPUNCTURE: CPT

## 2023-10-26 PROCEDURE — 80320 DRUG SCREEN QUANTALCOHOLS: CPT

## 2023-10-26 PROCEDURE — 96374 THER/PROPH/DIAG INJ IV PUSH: CPT

## 2023-10-26 PROCEDURE — 85025 COMPLETE CBC W/AUTO DIFF WBC: CPT

## 2023-10-26 PROCEDURE — 73521 X-RAY EXAM HIPS BI 2 VIEWS: CPT

## 2023-10-26 NOTE — XR
EXAMINATION TYPE: XR Hip Bilateral Complete

 

DATE OF EXAM: 10/26/2023 3:02 PM

 

INDICATION: 

Patient age:Female;  63 years old; 

Reason for study: Pain after fall; PHH. 

 

COMPARISON: None.

 

TECHNIQUE: Both hips were examined in the frontal and lateral projections and a AP pelvis. 

 

FINDINGS: No evidence of any acute osseous pathology, joint dislocation, or soft tissue swelling. Deg
enerative changes of the lumbar spine.

 

IMPRESSION: 

No acute osseous pathology.

## 2023-10-26 NOTE — ED
Alcohol HPI





- General


Chief Complaint: Fall


Stated Complaint: ETOH,FALL


Time Seen by Provider: 10/26/23 13:30


Source: patient, RN notes reviewed


Mode of arrival: ambulatory


Limitations: no limitations





- History of Present Illness


Initial Comments: 


This is a 63-year-old female who presents to the emergency department for 

alcohol intoxication and falls.  States that she had at least a fifth of vodka 

with some wine today and then fell.  Currently drinks about a fifth of vodka 

daily with wine a few times a week.  She is also now getting alcohol delivered, 

which has caused her to increase her alcohol consumption.  Reports injuring both

of her hips in the process of the fall.  Denies hitting her head or sustaining 

any loss of consciousness.  Currently requesting aspirin for management of the 

hip pain.  Also states that she got evicted today, which is the main reason she 

came to the emergency department.





MD Complaint: alcohol intoxication





- Related Data


                                  Previous Rx's











 Medication  Instructions  Recorded


 


Atorvastatin [Lipitor] 10 mg PO DAILY #30 tab 09/04/23


 


Divalproex [Depakote] 250 mg PO BID #60 tab 09/04/23


 


QUEtiapine [SEROquel] 100 mg PO BID 7 Days #14 tab 09/04/23


 


Sertraline [Zoloft] 100 mg PO DAILY #30 tab 09/04/23


 


Thiamine [Vitamin B-1] 100 mg PO DAILY #30 tab 09/04/23


 


lisinopriL [Zestril] 10 mg PO DAILY #30 tab 09/04/23


 


traZODone HCL [Desyrel] 50 mg PO HS PRN 7 Days #7 tab 09/04/23


 


Acetaminophen Tab [Tylenol] 650 mg PO Q6HR PRN  tab 09/13/23











                                    Allergies











Allergy/AdvReac Type Severity Reaction Status Date / Time


 


latex Allergy Unknown Itching, Verified 10/26/23 14:33





   Blisters  


 


codeine AdvReac Severe HEADACHE Verified 10/26/23 14:33


 


hydrocodone [From Vicodin] AdvReac Severe HEADACHE Verified 10/26/23 14:33


 


ANESTHESIA AdvReac Unknown GAS GIVES Uncoded 10/26/23 14:33





   HER  





   HEADACHE,  





   VOMITING,  





   HEART  





   PALIPITATIONS  














Review of Systems


ROS Statement: 


Those systems with pertinent positive or pertinent negative responses have been 

documented in the HPI.





ROS Other: All systems not noted in ROS Statement are negative.





Past Medical History


Past Medical History: Hyperlipidemia, Hypertension, Osteoarthritis (OA)


Additional Past Medical History / Comment(s): hx colon polyps, occasional 

diarrhea, states rectal bleeding and abdominal pain., hx of fall Nov 2020 and 

has been having pain right hip since that radiates down right leg, walks with 

limp., states breast implants moving up.


History of Any Multi-Drug Resistant Organisms: None Reported


Past Surgical History: Orthopedic Surgery


Additional Past Surgical History / Comment(s): right elbow surgery with screws, 

right knee surgery with plate and removal ., lump left breast, breast implants


Past Anesthesia/Blood Transfusion Reactions: Postoperative Nausea & Vomiting 

(PONV)


Past Psychological History: Anxiety, Depression


Smoking Status: Former smoker


Past Alcohol Use History: Abuse, Daily


Past Drug Use History: None Reported





- Past Family History


  ** Father


Family Medical History: Cancer





General Exam


Limitations: no limitations


General appearance: alert, in no apparent distress


Head exam: Present: atraumatic, normocephalic, normal inspection


Respiratory exam: Present: normal lung sounds bilaterally.  Absent: respiratory 

distress, wheezes, rales, rhonchi, stridor


Cardiovascular Exam: Present: regular rate, normal rhythm, normal heart sounds. 

Absent: systolic murmur, diastolic murmur, rubs, gallop, clicks


Extremities exam: Present: other (No deformities over the bilateral hips.  Mild 

tenderness to palpation.  Full range of motion.  2+ DP and PT pulses.  Capillary

refill less than 1 second.)


Neurological exam: Present: alert, oriented X3, CN II-XII intact


Psychiatric exam: Present: normal affect, normal mood


Skin exam: Present: warm, dry, intact, normal color.  Absent: rash





Course


                                   Vital Signs











  10/26/23 10/26/23 10/26/23





  13:31 15:56 17:57


 


Temperature 98.2 F  


 


Pulse Rate 117 H 110 H 120 H


 


Respiratory 18 18 18





Rate   


 


Blood Pressure 107/43 112/58 119/77


 


O2 Sat by Pulse 97 99 97





Oximetry   














Medical Decision Making





- Medical Decision Making


This is a 63-year-old female who presents to the emergency department for 

bilateral hip pain and alcohol intoxication.





Was pt. sent in by a medical professional or institution?


@  -No   


Did you speak to anyone other than the patient for history?  


@  -No


Did you review nursing and triage notes? 


@  -Yes, and I agree, it is accurate with regards to the patient's symptoms.


Were old charts reviewed? 


@  -No


Differential Diagnosis? 


@  -Differential Musculoskeletal:


Muscular strain, contusion, ligament sprain, fracture, arthritis, septic 

arthritis, bursitis, cellulitis, muscle spasm, nerve compression, DVT, arterial 

occlusion, herpes zoster, electrolyte abnormality, tumor.... This is not meant 

to be in all inclusive list


EKG interpreted by me (3pts min.)?


@  -Not obtained


X-rays interpreted by me (1pt min.)?


@  -X-ray of the bilateral hips obtained.  My interpretation identifies no acute

fractures.


CT interpreted by me (1pt min.)?


@  -Not obtained


U/S interpreted by me (1pt. min.)?


@  -Not obtained


What testing was considered but not performed? (CT, X-rays, U/S, labs)? Why?


@  -None


What meds were considered but not given? Why?


@  -None


Did you discuss the management of the patient with other professionals?


@  -No


Did you reconcile home meds?


@  -No


Was smoking cessation discussed for >3mins.?


@  -No


Was critical care preformed (if so, how long)?


@  -No


Were there social determinants of health that impacted care today? How? (Homeles

sness, low income, unemployed, alcoholism, drug addiction, transportation, low 

edu. Level, literacy, decrease access to med. care, residential, rehab)?


@  -Alcohol abuse, leading to recurrent visits for alcohol intoxication and 

causing her to have frequent falls.


Was there de-escalation of care discussed even if they declined? (Discuss DNR or

withdrawal of care, Hospice)?


@  -No


What co-morbidities impacted this encounter? (DM, HTN, Smoking, COPD, CAD, 

Cancer, CVA, Hep., AIDS, mental health diagnosis, sleep apnea, morbid obesity)?


@  -Alcoholism


Was patient admitted / discharged?


@  -Discharged. Lab work obtained revealing an elevated lactic acid likely 

secondary to alcohol consumption.  Alcohol level elevated at 264.  Lab work was 

otherwise fairly unremarkable.  X-ray of the bilateral hips obtained revealing 

no acute process.  Her pain was well controlled in the emergency department.  

Given that the patient is clinically sober and is very used to this level of a

lcohol in her system, she can be safely discharged home. She was also monitored 

in the emergency department for several hours.  Patient was given a list of 

local homeless shelters and discharged home in stable condition.  Patient was 

also counseled on the need to stop drinking alcohol.


Undiagnosed new problem with uncertain prognosis?


@  -None


Drug Therapy requiring intensive monitoring for toxicity (Heparin, Nitro, 

Insulin, Cardizem)?


@  -None


Were any procedures done?


@  -None


Diagnosis/symptom?


@  -Fall, hip pain, alcohol intoxication


Acute, or Chronic, or Acute on Chronic?


@  -Acute


Uncomplicated (without systemic symptoms) or Complicated (systemic symptoms)?


@  -Uncomplicated


Side effects of treatment?


@  -None


Exacerbation, Progression, or Severe Exacerbation]


@  -Not applicable


Poses a threat to life or bodily function?


@  -Unlikely





Return precautions reviewed in depth, the patient is instructed to return to the

emergency department with any new, worsening, or concerning symptoms. Patient 

verbalized understanding. 





This case was discussed in detail with the attending ED physician, Dr. Kay. 

Presentation, findings, and treatment plan discussed in detail as well. 








- Lab Data


Result diagrams: 


                                 10/26/23 14:07





                                 10/26/23 14:07


                                   Lab Results











  10/26/23 10/26/23 10/26/23 Range/Units





  14:07 14:07 14:07 


 


WBC  5.8    (3.8-10.6)  k/uL


 


RBC  3.57 L    (3.80-5.40)  m/uL


 


Hgb  12.3    (11.4-16.0)  gm/dL


 


Hct  36.5    (34.0-46.0)  %


 


MCV  102.3 H    (80.0-100.0)  fL


 


MCH  34.6    (25.0-35.0)  pg


 


MCHC  33.8    (31.0-37.0)  g/dL


 


RDW  12.4    (11.5-15.5)  %


 


Plt Count  279    (150-450)  k/uL


 


MPV  8.4    


 


Neutrophils %  52    %


 


Lymphocytes %  40    %


 


Monocytes %  4    %


 


Eosinophils %  1    %


 


Basophils %  1    %


 


Neutrophils #  3.0    (1.3-7.7)  k/uL


 


Lymphocytes #  2.3    (1.0-4.8)  k/uL


 


Monocytes #  0.2    (0-1.0)  k/uL


 


Eosinophils #  0.1    (0-0.7)  k/uL


 


Basophils #  0.0    (0-0.2)  k/uL


 


Macrocytosis  Slight    


 


Sodium   142   (137-145)  mmol/L


 


Potassium   3.8   (3.5-5.1)  mmol/L


 


Chloride   107   ()  mmol/L


 


Carbon Dioxide   20 L   (22-30)  mmol/L


 


Anion Gap   15   mmol/L


 


BUN   25 H   (7-17)  mg/dL


 


Creatinine   0.55   (0.52-1.04)  mg/dL


 


Est GFR (CKD-EPI)AfAm   >90   (>60 ml/min/1.73 sqM)  


 


Est GFR (CKD-EPI)NonAf   >90   (>60 ml/min/1.73 sqM)  


 


Glucose   183 H   (74-99)  mg/dL


 


Lactic Ac Sepsis Rflx     


 


Plasma Lactic Acid Gabe    3.7 H*  (0.7-2.0)  mmol/L


 


Calcium   8.9   (8.4-10.2)  mg/dL


 


Magnesium   1.5 L   (1.6-2.3)  mg/dL


 


Total Bilirubin   0.3   (0.2-1.3)  mg/dL


 


AST   31   (14-36)  U/L


 


ALT   23   (4-34)  U/L


 


Alkaline Phosphatase   53   ()  U/L


 


Total Protein   7.0   (6.3-8.2)  g/dL


 


Albumin   4.2   (3.5-5.0)  g/dL


 


Serum Alcohol   264 H*   mg/dL














  10/26/23 Range/Units





  15:22 


 


WBC   (3.8-10.6)  k/uL


 


RBC   (3.80-5.40)  m/uL


 


Hgb   (11.4-16.0)  gm/dL


 


Hct   (34.0-46.0)  %


 


MCV   (80.0-100.0)  fL


 


MCH   (25.0-35.0)  pg


 


MCHC   (31.0-37.0)  g/dL


 


RDW   (11.5-15.5)  %


 


Plt Count   (150-450)  k/uL


 


MPV   


 


Neutrophils %   %


 


Lymphocytes %   %


 


Monocytes %   %


 


Eosinophils %   %


 


Basophils %   %


 


Neutrophils #   (1.3-7.7)  k/uL


 


Lymphocytes #   (1.0-4.8)  k/uL


 


Monocytes #   (0-1.0)  k/uL


 


Eosinophils #   (0-0.7)  k/uL


 


Basophils #   (0-0.2)  k/uL


 


Macrocytosis   


 


Sodium   (137-145)  mmol/L


 


Potassium   (3.5-5.1)  mmol/L


 


Chloride   ()  mmol/L


 


Carbon Dioxide   (22-30)  mmol/L


 


Anion Gap   mmol/L


 


BUN   (7-17)  mg/dL


 


Creatinine   (0.52-1.04)  mg/dL


 


Est GFR (CKD-EPI)AfAm   (>60 ml/min/1.73 sqM)  


 


Est GFR (CKD-EPI)NonAf   (>60 ml/min/1.73 sqM)  


 


Glucose   (74-99)  mg/dL


 


Lactic Ac Sepsis Rflx  Y  


 


Plasma Lactic Acid Gabe   (0.7-2.0)  mmol/L


 


Calcium   (8.4-10.2)  mg/dL


 


Magnesium   (1.6-2.3)  mg/dL


 


Total Bilirubin   (0.2-1.3)  mg/dL


 


AST   (14-36)  U/L


 


ALT   (4-34)  U/L


 


Alkaline Phosphatase   ()  U/L


 


Total Protein   (6.3-8.2)  g/dL


 


Albumin   (3.5-5.0)  g/dL


 


Serum Alcohol   mg/dL














- Radiology Data


Radiology results: report reviewed, image reviewed





Disposition


Clinical Impression: 


 Fall, Alcohol intoxication, Bilateral hip pain





Disposition: HOME SELF-CARE


Instructions (If sedation given, give patient instructions):  Fall Prevention 

for Older Adults (ED), Alcohol Intoxication (ED)


Additional Instructions: 


Return to the emergency department with any new, worsening, or concerning sympto

ms.  Try reducing your alcohol consumption.  Alternate with ibuprofen and 

Tylenol as needed for pain relief.  Follow up with your primary care provider in

1-2 days.


Is patient prescribed a controlled substance at d/c from ED?: No


Referrals: 


Lisa Becerra MD [Primary Care Provider] - 1-2 days


Forms:  [facility], [place]

## 2023-12-13 ENCOUNTER — HOSPITAL ENCOUNTER (EMERGENCY)
Dept: HOSPITAL 47 - EC | Age: 63
Discharge: HOME | End: 2023-12-13
Payer: MEDICARE

## 2023-12-13 VITALS — HEART RATE: 86 BPM | TEMPERATURE: 97.9 F | SYSTOLIC BLOOD PRESSURE: 122 MMHG | DIASTOLIC BLOOD PRESSURE: 77 MMHG

## 2023-12-13 VITALS — RESPIRATION RATE: 18 BRPM

## 2023-12-13 DIAGNOSIS — I10: ICD-10-CM
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
## 2023-12-13 DIAGNOSIS — Z88.5: ICD-10-CM

## 2023-12-13 DIAGNOSIS — Z87.891: ICD-10-CM

## 2023-12-13 DIAGNOSIS — Z91.040: ICD-10-CM
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
## 2023-12-13 DIAGNOSIS — F10.129: Primary | ICD-10-CM

## 2023-12-13 DIAGNOSIS — Z88.4: ICD-10-CM

## 2023-12-13 LAB
ALBUMIN SERPL-MCNC: 4.2 G/DL (ref 3.5–5)
ALP SERPL-CCNC: 60 U/L (ref 38–126)
ALT SERPL-CCNC: 27 U/L (ref 4–34)
ANION GAP SERPL CALC-SCNC: 13 MMOL/L
AST SERPL-CCNC: 39 U/L (ref 14–36)
BASOPHILS # BLD AUTO: 0 K/UL (ref 0–0.2)
BASOPHILS NFR BLD AUTO: 1 %
BUN SERPL-SCNC: 14 MG/DL (ref 7–17)
CALCIUM SPEC-MCNC: 9.3 MG/DL (ref 8.4–10.2)
CHLORIDE SERPL-SCNC: 110 MMOL/L (ref 98–107)
CO2 SERPL-SCNC: 26 MMOL/L (ref 22–30)
EOSINOPHIL # BLD AUTO: 0.2 K/UL (ref 0–0.7)
EOSINOPHIL NFR BLD AUTO: 3 %
ERYTHROCYTE [DISTWIDTH] IN BLOOD BY AUTOMATED COUNT: 4 M/UL (ref 3.8–5.4)
ERYTHROCYTE [DISTWIDTH] IN BLOOD: 12.7 % (ref 11.5–15.5)
GLUCOSE SERPL-MCNC: 103 MG/DL (ref 74–99)
HCT VFR BLD AUTO: 39.7 % (ref 34–46)
HGB BLD-MCNC: 13.3 GM/DL (ref 11.4–16)
INR PPP: 1 (ref ?–1.2)
LYMPHOCYTES # SPEC AUTO: 2.7 K/UL (ref 1–4.8)
LYMPHOCYTES NFR SPEC AUTO: 49 %
MCH RBC QN AUTO: 33.3 PG (ref 25–35)
MCHC RBC AUTO-ENTMCNC: 33.5 G/DL (ref 31–37)
MCV RBC AUTO: 99.3 FL (ref 80–100)
MONOCYTES # BLD AUTO: 0.4 K/UL (ref 0–1)
MONOCYTES NFR BLD AUTO: 7 %
NEUTROPHILS # BLD AUTO: 2.1 K/UL (ref 1.3–7.7)
NEUTROPHILS NFR BLD AUTO: 38 %
PLATELET # BLD AUTO: 258 K/UL (ref 150–450)
POTASSIUM SERPL-SCNC: 3.7 MMOL/L (ref 3.5–5.1)
PROT SERPL-MCNC: 7.1 G/DL (ref 6.3–8.2)
PT BLD: 11 SEC (ref 10–12.5)
SODIUM SERPL-SCNC: 149 MMOL/L (ref 137–145)
WBC # BLD AUTO: 5.5 K/UL (ref 3.8–10.6)

## 2023-12-13 PROCEDURE — 99284 EMERGENCY DEPT VISIT MOD MDM: CPT

## 2023-12-13 PROCEDURE — 72125 CT NECK SPINE W/O DYE: CPT

## 2023-12-13 PROCEDURE — 70450 CT HEAD/BRAIN W/O DYE: CPT

## 2023-12-13 PROCEDURE — 85610 PROTHROMBIN TIME: CPT

## 2023-12-13 PROCEDURE — 36415 COLL VENOUS BLD VENIPUNCTURE: CPT

## 2023-12-13 PROCEDURE — 80320 DRUG SCREEN QUANTALCOHOLS: CPT

## 2023-12-13 PROCEDURE — 85025 COMPLETE CBC W/AUTO DIFF WBC: CPT

## 2023-12-13 PROCEDURE — 96374 THER/PROPH/DIAG INJ IV PUSH: CPT

## 2023-12-13 PROCEDURE — 96376 TX/PRO/DX INJ SAME DRUG ADON: CPT

## 2023-12-13 PROCEDURE — 80053 COMPREHEN METABOLIC PANEL: CPT

## 2023-12-13 NOTE — CT
EXAMINATION TYPE: CT brain myrtle campbell con

 

DATE OF EXAM: 12/13/2023

 

COMPARISON: 10/23/2023

 

HISTORY: fall

 

CT DLP: 1386.9 mGycm

 

Unenhanced CT of the brain was performed.  

 

The ventricles, basal cisterns and sulci overlying the cerebral convexities demonstrate enlargement. 
 

 

There is no evidence for intracranial hemorrhage or sulcal effacement.  There is decreased attenuatio
n about the periventricular white matter and deep white matter of both cerebral hemispheres, compatib
le with chronic small vessel ischemia.  

 

No mass effects are seen.  

If symptoms persist consider MRI.  

 

Osseous calvarium is intact.

 

IMPRESSION:

 

1.  Age related atrophic and chronic small vessel ischemic change without acute intracranial process 
seen at this time.

 

CT Cervical Spine:

 

Unenhanced CT of the cervical spine was performed with bone and soft tissue window settings submitted
.  Coronal and sagittal reconstruction is obtained.  

 

There is normal alignment and prevertebral soft tissues. No evidence for acute cervical fracture .   
Scattered degenerative disc disease and spondylosis. Biapical scarring.   

 

IMPRESSION:

 

1.  No evidence for acute fracture or subluxation of the cervical spine.

## 2023-12-13 NOTE — ED
General Adult HPI





- General


Chief complaint: Fall


Stated complaint: fall-ETOH


Time Seen by Provider: 12/13/23 10:11


Source: patient, RN notes reviewed


Mode of arrival: EMS


Limitations: no limitations





- History of Present Illness


Initial comments: 





63-year-old female presents emergency Department with chief complaint of fall.  

Patient states that she tripped hitting her head on the coffee table today.  She

did not lose consciousness.  She does admit to alcohol use today.  She reports 

she drank around 4 glasses of wine today.  She does admit to daily alcohol 

consumption.





- Related Data


                                  Previous Rx's











 Medication  Instructions  Recorded


 


Atorvastatin [Lipitor] 10 mg PO DAILY #30 tab 09/04/23


 


Divalproex [Depakote] 250 mg PO BID #60 tab 09/04/23


 


QUEtiapine [SEROquel] 100 mg PO BID 7 Days #14 tab 09/04/23


 


Sertraline [Zoloft] 100 mg PO DAILY #30 tab 09/04/23


 


Thiamine [Vitamin B-1] 100 mg PO DAILY #30 tab 09/04/23


 


lisinopriL [Zestril] 10 mg PO DAILY #30 tab 09/04/23


 


traZODone HCL [Desyrel] 50 mg PO HS PRN 7 Days #7 tab 09/04/23


 


Acetaminophen Tab [Tylenol] 650 mg PO Q6HR PRN  tab 09/13/23











                                    Allergies











Allergy/AdvReac Type Severity Reaction Status Date / Time


 


latex Allergy Unknown Itching, Verified 12/13/23 10:14





   Blisters  


 


codeine AdvReac Severe HEADACHE Verified 12/13/23 10:14


 


hydrocodone [From Vicodin] AdvReac Severe HEADACHE Verified 12/13/23 10:14


 


ANESTHESIA AdvReac Unknown GAS GIVES Uncoded 12/13/23 10:14





   HER  





   HEADACHE,  





   VOMITING,  





   HEART  





   PALIPITATIONS  














Review of Systems


ROS Statement: 


Those systems with pertinent positive or pertinent negative responses have been 

documented in the HPI.





ROS Other: All systems not noted in ROS Statement are negative.





Past Medical History


Past Medical History: Hyperlipidemia, Hypertension, Osteoarthritis (OA)


Additional Past Medical History / Comment(s): hx colon polyps, occasional 

diarrhea, states rectal bleeding and abdominal pain., hx of fall Nov 2020 and 

has been having pain right hip since that radiates down right leg, walks with l

imp., states breast implants moving up.


History of Any Multi-Drug Resistant Organisms: None Reported


Past Surgical History: Orthopedic Surgery


Additional Past Surgical History / Comment(s): right elbow surgery with screws, 

right knee surgery with plate and removal ., lump left breast, breast implants


Past Anesthesia/Blood Transfusion Reactions: Postoperative Nausea & Vomiting 

(PONV)


Past Psychological History: Anxiety, Depression


Smoking Status: Former smoker


Past Alcohol Use History: Abuse, Daily


Past Drug Use History: None Reported





- Past Family History


  ** Father


Family Medical History: Cancer





General Exam


Limitations: no limitations


General appearance: alert, in no apparent distress


Head exam: Present: atraumatic, normocephalic, normal inspection


Eye exam: Present: normal appearance, PERRL, EOMI.  Absent: scleral icterus, 

conjunctival injection, periorbital swelling


ENT exam: Present: normal exam, mucous membranes moist


Neck exam: Present: normal inspection.  Absent: tenderness, meningismus, 

lymphadenopathy


Respiratory exam: Present: normal lung sounds bilaterally.  Absent: respiratory 

distress, wheezes, rales, rhonchi, stridor


Cardiovascular Exam: Present: regular rate, normal rhythm, normal heart sounds. 

Absent: systolic murmur, diastolic murmur, rubs, gallop, clicks


GI/Abdominal exam: Present: soft, normal bowel sounds.  Absent: distended, 

tenderness, guarding, rebound, rigid


Extremities exam: Present: normal inspection, full ROM, normal capillary refill.

 Absent: tenderness, pedal edema, joint swelling, calf tenderness


Back exam: Present: normal inspection


Neurological exam: Present: alert, oriented X3, CN II-XII intact


Psychiatric exam: Present: normal affect, normal mood


Skin exam: Present: warm, dry, intact, normal color.  Absent: rash





Course


                                   Vital Signs











  12/13/23 12/13/23 12/13/23





  10:10 12:00 16:03


 


Temperature 98.8 F 98.5 F 97.9 F


 


Pulse Rate 89 84 86


 


Respiratory 20 18 18





Rate   


 


Blood Pressure 91/62 121/83 122/77


 


O2 Sat by Pulse 98 96 96





Oximetry   














Medical Decision Making





- Medical Decision Making





Was pt. sent in by a medical professional or institution (, PA, NP, urgent 

care, hospital, or nursing home...) When possible be specific


@  -No


Did you speak to anyone other than the patient for history (EMS, parent, family,

police, friend...)? What history was obtained from this source 


@  -No


Did you review nursing and triage notes (agree or disagree)?  Why? 


@  -I reviewed and agree with nursing and triage notes


Were old charts reviewed (outside hosp., previous admission, EMS record, old 

EKG, old radiological studies, urgent care reports/EKG's, nursing home records)?

Report findings 


@  -No old charts were reviewed


Differential Diagnosis (chest pain, altered mental status, abdominal pain women,

abdominal pain men, vaginal bleeding, weakness, fever, dyspnea, syncope, 

headache, dizziness, GI bleed, back pain, seizure, CVA, palpatations, mental 

health, musculoskeletal)? 


@  -fall, Head injury, alcohol intoxication this list is not all-inclusive


EKG interpreted by me (3pts min.).


@  -None


X-rays interpreted by me (1pt min.).


@  -None done


CT interpreted by me (1pt min.).


@  -CT brain and C-spine shows no acute intracranial process, no acute fracture


U/S interpreted by me (1pt. min.).


@  -None done


What testing was considered but not performed or refused? (CT, X-rays, U/S, 

labs)? Why?


@  -None


What meds were considered but not given or refused? Why?


@  -None


Did you discuss the management of the patient with other professionals 

(professionals i.e. , PA, NP, lab, RT, psych nurse, , , 

teacher, , )? Give summary


@  -No


Was smoking cessation discussed for >3mins.?


@  -No


Was critical care preformed (if so, how long)?


@  -No


Were there social determinants of health that impacted care today? How? 

(Homelessness, low income, unemployed, alcoholism, drug addiction, 

transportation, low edu. Level, literacy, decrease access to med. care, long term, 

rehab)?


@  -No


Was there de-escalation of care discussed even if they declined (Discuss DNR or 

withdrawal of care, Hospice)? DNR status


@  -No


What co-morbidities impacted this encounter? (DM, HTN, Smoking, COPD, CAD, 

Cancer, CVA, ARF, Chemo, Hep., AIDS, mental health diagnosis, sleep apnea, mo

rbid obesity)?


@  -None


Was patient admitted / discharged? Hospital course, mention meds given and 

route, prescriptions, significant lab abnormalities, going to OR and other 

pertinent info.


@  -Discharged.  Patient presented to the emergency department for chief 

complaint of fall.  She states that she had her head on the coffee table but did

not lose consciousness.  CT brain and C-spine shows no acute intracranial 

process, no acute fracture. Laboratory studies obtained.  CBC within normal 

limits; CMP shows sodium 139, potassium 3.7, creatinine 0.48; serum alcohol 219.

 Patient appears clinically sober.  Patient observed in the emergency department

for multiple hours.  Patient will be provided a ride home.  Patient stable at 

time of discharge.  Case discussed with Dr. Kay 


Undiagnosed new problem with uncertain prognosis?


@  -No


Drug Therapy requiring intensive monitoring for toxicity (Heparin, Nitro, 

Insulin, Cardizem)?


@  -No


Were any procedures done?


@  -No


Diagnosis/symptom?


@  -Fall, alcohol intoxication


Acute, or Chronic, or Acute on Chronic?


@  -Acute


Uncomplicated (without systemic symptoms) or Complicated (systemic symptoms)?


@  -uncomplicated


Side effects of treatment?


@  -No


Exacerbation, Progression, or Severe Exacerbation?


@  -No


Poses a threat to life or bodily function? How? (Chest pain, USA, MI, pneumonia,

PE, COPD, DKA, ARF, appy, cholecystitis, CVA, Diverticulitis, Homicidal, 

Suicidal, threat to staff... and all critical care pts)


@  -No





- Lab Data


Result diagrams: 


                                 12/13/23 10:49





                                 12/13/23 10:49


                                   Lab Results











  12/13/23 12/13/23 12/13/23 Range/Units





  10:49 10:49 10:49 


 


WBC  5.5    (3.8-10.6)  k/uL


 


RBC  4.00    (3.80-5.40)  m/uL


 


Hgb  13.3    (11.4-16.0)  gm/dL


 


Hct  39.7    (34.0-46.0)  %


 


MCV  99.3    (80.0-100.0)  fL


 


MCH  33.3    (25.0-35.0)  pg


 


MCHC  33.5    (31.0-37.0)  g/dL


 


RDW  12.7    (11.5-15.5)  %


 


Plt Count  258    (150-450)  k/uL


 


MPV  8.2    


 


Neutrophils %  38    %


 


Lymphocytes %  49    %


 


Monocytes %  7    %


 


Eosinophils %  3    %


 


Basophils %  1    %


 


Neutrophils #  2.1    (1.3-7.7)  k/uL


 


Lymphocytes #  2.7    (1.0-4.8)  k/uL


 


Monocytes #  0.4    (0-1.0)  k/uL


 


Eosinophils #  0.2    (0-0.7)  k/uL


 


Basophils #  0.0    (0-0.2)  k/uL


 


PT   11.0   (10.0-12.5)  sec


 


INR   1.0   (<1.2)  


 


Sodium    149 H  (137-145)  mmol/L


 


Potassium    3.7  (3.5-5.1)  mmol/L


 


Chloride    110 H  ()  mmol/L


 


Carbon Dioxide    26  (22-30)  mmol/L


 


Anion Gap    13  mmol/L


 


BUN    14  (7-17)  mg/dL


 


Creatinine    0.48 L  (0.52-1.04)  mg/dL


 


Est GFR (CKD-EPI)AfAm    >90  (>60 ml/min/1.73 sqM)  


 


Est GFR (CKD-EPI)NonAf    >90  (>60 ml/min/1.73 sqM)  


 


Glucose    103 H  (74-99)  mg/dL


 


Calcium    9.3  (8.4-10.2)  mg/dL


 


Total Bilirubin    0.4  (0.2-1.3)  mg/dL


 


AST    39 H  (14-36)  U/L


 


ALT    27  (4-34)  U/L


 


Alkaline Phosphatase    60  ()  U/L


 


Total Protein    7.1  (6.3-8.2)  g/dL


 


Albumin    4.2  (3.5-5.0)  g/dL


 


Serum Alcohol    219 H*  mg/dL














Disposition


Clinical Impression: 


 Fall





Disposition: HOME SELF-CARE


Condition: Stable


Instructions (If sedation given, give patient instructions):  Fall Prevention 

(ED)


Additional Instructions: 


Please follow up with your primary care provider. Return to the emergency 

department for new or worsening symptoms. 


Is patient prescribed a controlled substance at d/c from ED?: No


Referrals: 


Lisa Becerra MD [Primary Care Provider] - 1-2 days

## 2023-12-17 ENCOUNTER — HOSPITAL ENCOUNTER (INPATIENT)
Dept: HOSPITAL 47 - EC | Age: 63
LOS: 3 days | Discharge: HOME | DRG: 897 | End: 2023-12-20
Attending: INTERNAL MEDICINE | Admitting: INTERNAL MEDICINE
Payer: MEDICARE

## 2023-12-17 DIAGNOSIS — M19.90: ICD-10-CM

## 2023-12-17 DIAGNOSIS — E87.20: ICD-10-CM

## 2023-12-17 DIAGNOSIS — Z98.82: ICD-10-CM

## 2023-12-17 DIAGNOSIS — E78.5: ICD-10-CM

## 2023-12-17 DIAGNOSIS — E87.0: ICD-10-CM

## 2023-12-17 DIAGNOSIS — E86.0: ICD-10-CM

## 2023-12-17 DIAGNOSIS — F31.9: ICD-10-CM

## 2023-12-17 DIAGNOSIS — F10.229: ICD-10-CM

## 2023-12-17 DIAGNOSIS — F10.239: Primary | ICD-10-CM

## 2023-12-17 DIAGNOSIS — F41.9: ICD-10-CM

## 2023-12-17 DIAGNOSIS — D75.89: ICD-10-CM

## 2023-12-17 DIAGNOSIS — Z87.19: ICD-10-CM

## 2023-12-17 DIAGNOSIS — M47.812: ICD-10-CM
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
## 2023-12-17 DIAGNOSIS — I10: ICD-10-CM

## 2023-12-17 DIAGNOSIS — M62.82: ICD-10-CM

## 2023-12-17 LAB
ALBUMIN SERPL-MCNC: 4 G/DL (ref 3.5–5)
ALBUMIN SERPL-MCNC: 4.3 G/DL (ref 3.5–5)
ALP SERPL-CCNC: 60 U/L (ref 38–126)
ALP SERPL-CCNC: 71 U/L (ref 38–126)
ALT SERPL-CCNC: 32 U/L (ref 4–34)
ALT SERPL-CCNC: 33 U/L (ref 4–34)
ANION GAP SERPL CALC-SCNC: 17 MMOL/L
ANION GAP SERPL CALC-SCNC: 17 MMOL/L
APTT BLD: 20.8 SEC (ref 22–30)
AST SERPL-CCNC: 44 U/L (ref 14–36)
AST SERPL-CCNC: 56 U/L (ref 14–36)
BASOPHILS # BLD AUTO: 0 K/UL (ref 0–0.2)
BASOPHILS # BLD AUTO: 0.1 K/UL (ref 0–0.2)
BASOPHILS NFR BLD AUTO: 0 %
BASOPHILS NFR BLD AUTO: 1 %
BUN SERPL-SCNC: 11 MG/DL (ref 7–17)
BUN SERPL-SCNC: 11 MG/DL (ref 7–17)
CALCIUM SPEC-MCNC: 9 MG/DL (ref 8.4–10.2)
CALCIUM SPEC-MCNC: 9.4 MG/DL (ref 8.4–10.2)
CHLORIDE SERPL-SCNC: 113 MMOL/L (ref 98–107)
CHLORIDE SERPL-SCNC: 116 MMOL/L (ref 98–107)
CK SERPL-CCNC: 945 U/L (ref 30–135)
CO2 SERPL-SCNC: 22 MMOL/L (ref 22–30)
CO2 SERPL-SCNC: 23 MMOL/L (ref 22–30)
EOSINOPHIL # BLD AUTO: 0.1 K/UL (ref 0–0.7)
EOSINOPHIL # BLD AUTO: 0.1 K/UL (ref 0–0.7)
EOSINOPHIL NFR BLD AUTO: 1 %
EOSINOPHIL NFR BLD AUTO: 1 %
ERYTHROCYTE [DISTWIDTH] IN BLOOD BY AUTOMATED COUNT: 4.11 M/UL (ref 3.8–5.4)
ERYTHROCYTE [DISTWIDTH] IN BLOOD BY AUTOMATED COUNT: 4.15 M/UL (ref 3.8–5.4)
ERYTHROCYTE [DISTWIDTH] IN BLOOD: 13 % (ref 11.5–15.5)
ERYTHROCYTE [DISTWIDTH] IN BLOOD: 13.1 % (ref 11.5–15.5)
GLUCOSE SERPL-MCNC: 77 MG/DL (ref 74–99)
GLUCOSE SERPL-MCNC: 98 MG/DL (ref 74–99)
HCT VFR BLD AUTO: 41.5 % (ref 34–46)
HCT VFR BLD AUTO: 41.5 % (ref 34–46)
HGB BLD-MCNC: 13.7 GM/DL (ref 11.4–16)
HGB BLD-MCNC: 13.8 GM/DL (ref 11.4–16)
INR PPP: 1 (ref ?–1.2)
LYMPHOCYTES # SPEC AUTO: 3.9 K/UL (ref 1–4.8)
LYMPHOCYTES # SPEC AUTO: 4.1 K/UL (ref 1–4.8)
LYMPHOCYTES NFR SPEC AUTO: 34 %
LYMPHOCYTES NFR SPEC AUTO: 37 %
MAGNESIUM SPEC-SCNC: 1.8 MG/DL (ref 1.6–2.3)
MCH RBC QN AUTO: 33.2 PG (ref 25–35)
MCH RBC QN AUTO: 33.3 PG (ref 25–35)
MCHC RBC AUTO-ENTMCNC: 32.9 G/DL (ref 31–37)
MCHC RBC AUTO-ENTMCNC: 33.2 G/DL (ref 31–37)
MCV RBC AUTO: 100 FL (ref 80–100)
MCV RBC AUTO: 100.9 FL (ref 80–100)
MONOCYTES # BLD AUTO: 0.4 K/UL (ref 0–1)
MONOCYTES # BLD AUTO: 0.5 K/UL (ref 0–1)
MONOCYTES NFR BLD AUTO: 4 %
MONOCYTES NFR BLD AUTO: 4 %
NEUTROPHILS # BLD AUTO: 6 K/UL (ref 1.3–7.7)
NEUTROPHILS # BLD AUTO: 6.7 K/UL (ref 1.3–7.7)
NEUTROPHILS NFR BLD AUTO: 54 %
NEUTROPHILS NFR BLD AUTO: 59 %
PLATELET # BLD AUTO: 272 K/UL (ref 150–450)
PLATELET # BLD AUTO: 325 K/UL (ref 150–450)
POTASSIUM SERPL-SCNC: 3.8 MMOL/L (ref 3.5–5.1)
POTASSIUM SERPL-SCNC: 3.9 MMOL/L (ref 3.5–5.1)
PROT SERPL-MCNC: 6.9 G/DL (ref 6.3–8.2)
PROT SERPL-MCNC: 7.2 G/DL (ref 6.3–8.2)
PT BLD: 10.9 SEC (ref 10–12.5)
SODIUM SERPL-SCNC: 153 MMOL/L (ref 137–145)
SODIUM SERPL-SCNC: 155 MMOL/L (ref 137–145)
WBC # BLD AUTO: 11 K/UL (ref 3.8–10.6)
WBC # BLD AUTO: 11.4 K/UL (ref 3.8–10.6)

## 2023-12-17 PROCEDURE — 85610 PROTHROMBIN TIME: CPT

## 2023-12-17 PROCEDURE — 80048 BASIC METABOLIC PNL TOTAL CA: CPT

## 2023-12-17 PROCEDURE — 36415 COLL VENOUS BLD VENIPUNCTURE: CPT

## 2023-12-17 PROCEDURE — 80053 COMPREHEN METABOLIC PANEL: CPT

## 2023-12-17 PROCEDURE — HZ2ZZZZ DETOXIFICATION SERVICES FOR SUBSTANCE ABUSE TREATMENT: ICD-10-PCS

## 2023-12-17 PROCEDURE — 96361 HYDRATE IV INFUSION ADD-ON: CPT

## 2023-12-17 PROCEDURE — 72125 CT NECK SPINE W/O DYE: CPT

## 2023-12-17 PROCEDURE — 84443 ASSAY THYROID STIM HORMONE: CPT

## 2023-12-17 PROCEDURE — 82550 ASSAY OF CK (CPK): CPT

## 2023-12-17 PROCEDURE — 72170 X-RAY EXAM OF PELVIS: CPT

## 2023-12-17 PROCEDURE — 71045 X-RAY EXAM CHEST 1 VIEW: CPT

## 2023-12-17 PROCEDURE — 93005 ELECTROCARDIOGRAM TRACING: CPT

## 2023-12-17 PROCEDURE — 83605 ASSAY OF LACTIC ACID: CPT

## 2023-12-17 PROCEDURE — 83036 HEMOGLOBIN GLYCOSYLATED A1C: CPT

## 2023-12-17 PROCEDURE — 85025 COMPLETE CBC W/AUTO DIFF WBC: CPT

## 2023-12-17 PROCEDURE — 70450 CT HEAD/BRAIN W/O DYE: CPT

## 2023-12-17 PROCEDURE — 83735 ASSAY OF MAGNESIUM: CPT

## 2023-12-17 PROCEDURE — 85730 THROMBOPLASTIN TIME PARTIAL: CPT

## 2023-12-17 PROCEDURE — 80320 DRUG SCREEN QUANTALCOHOLS: CPT

## 2023-12-17 PROCEDURE — 96360 HYDRATION IV INFUSION INIT: CPT

## 2023-12-17 PROCEDURE — 81001 URINALYSIS AUTO W/SCOPE: CPT

## 2023-12-17 PROCEDURE — 99285 EMERGENCY DEPT VISIT HI MDM: CPT

## 2023-12-17 PROCEDURE — 96372 THER/PROPH/DIAG INJ SC/IM: CPT

## 2023-12-17 RX ADMIN — CEFAZOLIN SCH MLS/HR: 330 INJECTION, POWDER, FOR SOLUTION INTRAMUSCULAR; INTRAVENOUS at 21:40

## 2023-12-17 NOTE — ED
General Adult HPI





- General


Chief complaint: Fall


Stated complaint: Fall,


Time Seen by Provider: 12/17/23 18:54


Source: EMS, RN notes reviewed, old records reviewed


Mode of arrival: EMS


Limitations: altered mental status, physical limitation





- History of Present Illness


Initial comments: 





62 yo female presenting from home, patient had used her medical alert bracelet 

to call paramedics.  She was found on the floor.  Stating that she had been 

there for approximately 2 days.  The history is significantly limited and the 

patient is able to only answer simple questions.  She denies pain complaints.  

Paramedics did not note any signs of external trauma.  She does have history of 

alcohol abuse.





- Related Data


                                  Previous Rx's











 Medication  Instructions  Recorded


 


Atorvastatin [Lipitor] 10 mg PO DAILY #30 tab 09/04/23


 


Divalproex [Depakote] 250 mg PO BID #60 tab 09/04/23


 


QUEtiapine [SEROquel] 100 mg PO BID 7 Days #14 tab 09/04/23


 


Sertraline [Zoloft] 100 mg PO DAILY #30 tab 09/04/23


 


Thiamine [Vitamin B-1] 100 mg PO DAILY #30 tab 09/04/23


 


lisinopriL [Zestril] 10 mg PO DAILY #30 tab 09/04/23


 


traZODone HCL [Desyrel] 50 mg PO HS PRN 7 Days #7 tab 09/04/23


 


Acetaminophen Tab [Tylenol] 650 mg PO Q6HR PRN  tab 09/13/23











                                    Allergies











Allergy/AdvReac Type Severity Reaction Status Date / Time


 


latex Allergy Unknown Itching, Verified 12/13/23 10:14





   Blisters  


 


codeine AdvReac Severe HEADACHE Verified 12/13/23 10:14


 


hydrocodone [From Vicodin] AdvReac Severe HEADACHE Verified 12/13/23 10:14


 


ANESTHESIA AdvReac Unknown GAS GIVES Uncoded 12/13/23 10:14





   HER  





   HEADACHE,  





   VOMITING,  





   HEART  





   PALIPITATIONS  














Review of Systems


ROS Statement: 


Those systems with pertinent positive or pertinent negative responses have been 

documented in the HPI.





ROS Other: All systems not noted in ROS Statement are negative.





Past Medical History


Past Medical History: Hyperlipidemia, Hypertension, Osteoarthritis (OA)


Additional Past Medical History / Comment(s): hx colon polyps, occasional 

diarrhea, states rectal bleeding and abdominal pain., hx of fall Nov 2020 and 

has been having pain right hip since that radiates down right leg, walks with 

limp., states breast implants moving up.


History of Any Multi-Drug Resistant Organisms: None Reported


Past Surgical History: Orthopedic Surgery


Additional Past Surgical History / Comment(s): right elbow surgery with screws, 

right knee surgery with plate and removal ., lump left breast, breast implants


Past Anesthesia/Blood Transfusion Reactions: Postoperative Nausea & Vomiting 

(PONV)


Past Psychological History: Anxiety, Depression


Smoking Status: Former smoker


Past Alcohol Use History: Abuse, Daily


Past Drug Use History: None Reported





- Past Family History


  ** Father


Family Medical History: Cancer





General Exam


Limitations: altered mental status, physical limitation


General appearance: alert, appears intoxicated


Head exam: Present: atraumatic, normocephalic


Eye exam: Present: normal appearance, PERRL


Neck exam: Present: normal inspection.  Absent: tenderness


Respiratory exam: Present: normal lung sounds bilaterally.  Absent: respiratory 

distress


Cardiovascular Exam: Present: regular rate, normal rhythm


GI/Abdominal exam: Present: soft.  Absent: distended, tenderness


Extremities exam: Present: normal inspection, normal capillary refill


Neurological exam: Present: alert.  Absent: oriented X3


Psychiatric exam: Present: anxious


Skin exam: Present: warm, dry, intact





Course


                                   Vital Signs











  12/17/23 12/17/23





  18:57 19:00


 


Temperature 97.4 F L 


 


Pulse Rate 115 H 


 


Respiratory 18 





Rate  


 


Blood Pressure  129/75


 


O2 Sat by Pulse 96 





Oximetry  














Medical Decision Making





- Medical Decision Making





Was pt. sent in by a medical professional or institution (KRISTAN Wasserman, NP, urgent ca

re, hospital, or nursing home...) When possible be specific


@  -No


Did you speak to anyone other than the patient for history (EMS, parent, family,

police, friend...)? What history was obtained from this source 


@  -Paramedics]


Did you review nursing and triage notes (agree or disagree)?  Why? 


@  -I reviewed and agree with nursing and triage notes


Were old charts reviewed (outside hosp., previous admission, EMS record, old 

EKG, old radiological studies, urgent care reports/EKG's, nursing home records)?

Report findings 


@  -No old charts were reviewed


Differential Diagnosis (chest pain, altered mental status, abdominal pain women,

abdominal pain men, vaginal bleeding, weakness, fever, dyspnea, syncope, 

headache, dizziness, GI bleed, back pain, seizure, CVA, palpatations, mental 

health, musculoskeletal)? 


@  Differential Altered Mental Status:


Hypoglycemia, DKA, hypercapnia, ETOH, overdose, CO poisoning, trauma, myxedema 

coma, HTN encephalopathy, infection, encephalitis, psychosis, intercranial 

hemorrhage, hepatic encephalopathy, meningitis, CVA, this is not meant to be an 

all-inclusive list


EKG interpreted by me (3pts min.).


@Sinus tachycardia rate of 111, MD interval 147, QRS duration 94, , no ST

segment elevation.


X-rays interpreted by me (1pt min.).


@  -X-rays of the chest and pelvis performed, negative for traumatic injury]


CT interpreted by me (1pt min.).


@  -[CT brain negative for intracranial hemorrhage or mass effect


U/S interpreted by me (1pt. min.).


@  -None done


What testing was considered but not performed or refused? (CT, X-rays, U/S, 

labs)? Why?


@  -None


What meds were considered but not given or refused? Why?


@  -None


Did you discuss the management of the patient with other professionals 

(professionals i.e. , PA, NP, lab, RT, psych nurse, , , 

teacher, , )? Give summary


@  sheet


Was smoking cessation discussed for >3mins.?


@  -No


Was critical care preformed (if so, how long)?


@  -No


Were there social determinants of health that impacted care today? How? 

(Homelessness, low income, unemployed, alcoholism, drug addiction, 

transportation, low edu. Level, literacy, decrease access to med. care, [facility], 

rehab)?


@  -No


Was there de-escalation of care discussed even if they declined (Discuss DNR or 

withdrawal of care, Hospice)? DNR status


@  -No


What co-morbidities impacted this encounter? (DM, HTN, Smoking, COPD, CAD, 

Cancer, CVA, ARF, Chemo, Hep., AIDS, mental health diagnosis, sleep apnea, 

morbid obesity)?


@  -[Alcohol abuse


Was patient admitted / discharged? Hospital course, mention meds given and 

route, prescriptions, significant lab abnormalities, going to OR and other 

pertinent info.


@  63-year-old female with altered mental status, suspect alcohol intoxication. 

 Patient's initial alcohol level is 447.  She's hypernatremic at 153.  She has 

mild lactic acidosis of 2.5 likely secondary to volume loss.  Patient will 

require IV hydration, she will be monitored under alcohol withdrawal protocol.  

She's admitted to internal medicine.


Undiagnosed new problem with uncertain prognosis?


@  -No


Drug Therapy requiring intensive monitoring for toxicity (Heparin, Nitro, 

Insulin, Cardizem)?


@  -No


Were any procedures done?


@  -No


Diagnosis/symptom?


@  -Alcohol intoxication, hyponatremia, dehydration, lactic acidosis


Acute, or Chronic, or Acute on Chronic?


@  Acute


Uncomplicated (without systemic symptoms) or Complicated (systemic symptoms)?


@  -[complicated


Side effects of treatment?


@  -No


Exacerbation, Progression, or Severe Exacerbation?


@  -No


Poses a threat to life or bodily function? How? (Chest pain, USA, MI, pneumonia,

 PE, COPD, DKA, ARF, appy, cholecystitis, CVA, Diverticulitis, Homicidal, 

Suicidal, threat to staff... and all critical care pts)


@  -Yes, electrolyte abnormality, alcohol intoxication





- Lab Data


Result diagrams: 


                                 12/17/23 19:19





                                 12/17/23 19:19


                                   Lab Results











  12/17/23 12/17/23 12/17/23 Range/Units





  19:19 19:19 19:19 


 


WBC  11.4 H    (3.8-10.6)  k/uL


 


RBC  4.15    (3.80-5.40)  m/uL


 


Hgb  13.8    (11.4-16.0)  gm/dL


 


Hct  41.5    (34.0-46.0)  %


 


MCV  100.0    (80.0-100.0)  fL


 


MCH  33.2    (25.0-35.0)  pg


 


MCHC  33.2    (31.0-37.0)  g/dL


 


RDW  13.0    (11.5-15.5)  %


 


Plt Count  325    (150-450)  k/uL


 


MPV  7.7    


 


Neutrophils %  59    %


 


Lymphocytes %  34    %


 


Monocytes %  4    %


 


Eosinophils %  1    %


 


Basophils %  0    %


 


Neutrophils #  6.7    (1.3-7.7)  k/uL


 


Lymphocytes #  3.9    (1.0-4.8)  k/uL


 


Monocytes #  0.4    (0-1.0)  k/uL


 


Eosinophils #  0.1    (0-0.7)  k/uL


 


Basophils #  0.0    (0-0.2)  k/uL


 


PT   10.9   (10.0-12.5)  sec


 


INR   1.0   (<1.2)  


 


APTT   20.8 L   (22.0-30.0)  sec


 


Sodium    153 H  (137-145)  mmol/L


 


Potassium    3.8  (3.5-5.1)  mmol/L


 


Chloride    113 H  ()  mmol/L


 


Carbon Dioxide    23  (22-30)  mmol/L


 


Anion Gap    17  mmol/L


 


BUN    11  (7-17)  mg/dL


 


Creatinine    0.45 L  (0.52-1.04)  mg/dL


 


Est GFR (CKD-EPI)AfAm    >90  (>60 ml/min/1.73 sqM)  


 


Est GFR (CKD-EPI)NonAf    >90  (>60 ml/min/1.73 sqM)  


 


Glucose    98  (74-99)  mg/dL


 


Plasma Lactic Acid Gabe     (0.7-2.0)  mmol/L


 


Calcium    9.4  (8.4-10.2)  mg/dL


 


Magnesium    1.8  (1.6-2.3)  mg/dL


 


Total Bilirubin    0.3  (0.2-1.3)  mg/dL


 


AST    44 H  (14-36)  U/L


 


ALT    32  (4-34)  U/L


 


Alkaline Phosphatase    60  ()  U/L


 


Creatine Kinase    945 H  ()  U/L


 


Total Protein    7.2  (6.3-8.2)  g/dL


 


Albumin    4.3  (3.5-5.0)  g/dL


 


Serum Alcohol    447 H*  mg/dL














  12/17/23 Range/Units





  19:19 


 


WBC   (3.8-10.6)  k/uL


 


RBC   (3.80-5.40)  m/uL


 


Hgb   (11.4-16.0)  gm/dL


 


Hct   (34.0-46.0)  %


 


MCV   (80.0-100.0)  fL


 


MCH   (25.0-35.0)  pg


 


MCHC   (31.0-37.0)  g/dL


 


RDW   (11.5-15.5)  %


 


Plt Count   (150-450)  k/uL


 


MPV   


 


Neutrophils %   %


 


Lymphocytes %   %


 


Monocytes %   %


 


Eosinophils %   %


 


Basophils %   %


 


Neutrophils #   (1.3-7.7)  k/uL


 


Lymphocytes #   (1.0-4.8)  k/uL


 


Monocytes #   (0-1.0)  k/uL


 


Eosinophils #   (0-0.7)  k/uL


 


Basophils #   (0-0.2)  k/uL


 


PT   (10.0-12.5)  sec


 


INR   (<1.2)  


 


APTT   (22.0-30.0)  sec


 


Sodium   (137-145)  mmol/L


 


Potassium   (3.5-5.1)  mmol/L


 


Chloride   ()  mmol/L


 


Carbon Dioxide   (22-30)  mmol/L


 


Anion Gap   mmol/L


 


BUN   (7-17)  mg/dL


 


Creatinine   (0.52-1.04)  mg/dL


 


Est GFR (CKD-EPI)AfAm   (>60 ml/min/1.73 sqM)  


 


Est GFR (CKD-EPI)NonAf   (>60 ml/min/1.73 sqM)  


 


Glucose   (74-99)  mg/dL


 


Plasma Lactic Acid Gabe  3.4 H*  (0.7-2.0)  mmol/L


 


Calcium   (8.4-10.2)  mg/dL


 


Magnesium   (1.6-2.3)  mg/dL


 


Total Bilirubin   (0.2-1.3)  mg/dL


 


AST   (14-36)  U/L


 


ALT   (4-34)  U/L


 


Alkaline Phosphatase   ()  U/L


 


Creatine Kinase   ()  U/L


 


Total Protein   (6.3-8.2)  g/dL


 


Albumin   (3.5-5.0)  g/dL


 


Serum Alcohol   mg/dL














Disposition


Clinical Impression: 


 Alcoholic intoxication





Disposition: ADMITTED AS IP TO THIS [facility]


Condition: Stable


Is patient prescribed a controlled substance at d/c from ED?: No


Referrals: 


Lisa Becerra MD [Primary Care Provider] - 1-2 days


Time of Disposition: 21:27

## 2023-12-17 NOTE — XR
EXAM: XR chest 1V portable 

 

CLINICAL INDICATION:Female, 63 years old with history of fall; [facility]

 

COMPARISON: 9/11/2023 

 

TECHNIQUE: Chest single view.

 

FINDINGS:

Lines/tubes/devices: None.

 

Cardiomediastinum:  

Cardiac silhouette appears normal in size.

Unremarkable mediastinal silhouette. Atherosclerotic calcifications of the aorta.

 

Vasculature:  No increased pulmonary vasculature.

 

Lungs/pleura:

No consolidation, sizeable effusion, or visible pneumothorax.

 

Bones/soft tissues:

Bony thorax appears grossly intact as seen. Mild deformities of the site of remote healed fractures o
f the right third and fourth ribs. Mild degenerative changes of the shoulders and spine. Deformity of
 the proximal left humerus may reflect remote healed fracture.

Mild asymmetric elevation of the right hemidiaphragm.

 

IMPRESSION:

No evidence of acute traumatic abnormality in the chest.
EXAMINATION TYPE: XR pelvis AP view

 

DATE OF EXAM: 12/17/2023 7:57 PM

 

CLINICAL INDICATION:Female, 63 years old with history of fall; [facility]

 

COMPARISON: None

 

TECHNIQUE: The pelvis was examined in a single projection. 

 

FINDINGS: There is no evidence of fracture or dislocation. Mild degenerative changes of the lower lum
bar spine, SI joints, hips. There is no soft tissue abnormality.  No abnormal calcifications are pres
ent.

 

IMPRESSION: 

No acute fracture or dislocation identified, on this single view of the pelvis.
no

## 2023-12-17 NOTE — CT
EXAMINATION TYPE: CT brain cspine wo con

CT DLP: 1306.9 mGycm, Automated exposure control for dose reduction was used.

 

DATE OF EXAM: 12/17/2023 7:51 PM

 

COMPARISON: None.

 

CLINICAL INDICATION:Female, 63 years old with history of fall; ams

 

TECHNIQUE: 

Brain: Multiple axial CT images of the brain were obtained without IV contrast. 

Cspine: Axial CT images from the skull base to the inferior aspect of T2 we obtained without intraven
ous contrast. Coronal and sagittal reformatted images were also reviewed. 

 

FINDINGS:

 

Brain:

Extra-axial spaces: No abnormal extra-axial fluid collections.

Ventricular system: Appear dilated in proportion to the degree of cerebral atrophy.

Cerebral parenchyma: No increased attenuation to suggest acute intraparenchymal hemorrhage.   The gra
y-white matter interface appears maintained.  Mild generalized brain atrophy.  Scattered hypoattenuat
ing areas are seen within the cerebral white matter, nonspecific but most often seen with chronic evelyn
rovascular ischemic changes; mild in degree.

Cerebellum: No acute abnormality.

Mass effect: No evidence of mass effect or midline shift.

Intracranial vasculature: Unremarkable

Soft tissues: Normal.

Visualized orbits:  Orbital contents appear grossly intact.

Calvarium/osseous structures: No evidence of calvarial fracture.

Paranasal sinuses and mastoid air cells: Clear

 

MRI is more sensitive for detecting acute processes such as infarct, and may be considered if clinica
lly warranted.

 

Cervical spine:

Fracture: None seen.

Osseous structures, spinal canal/neural foramina: Mild multilevel degenerative disc disease changes w
ith endplate spurring, disc osteophyte complexes, facet arthropathy. At C2-C3, mild right neural fora
kurt stenosis. At C3-4, moderate left neural foraminal stenosis. At C4-5, no significant stenoses. A
t C5-6, no significant stenosis. At C6-7, no significant stenosis. At C7-T1, no significant stenosis.
 Osseous canal appears patent, except for mild narrowing anteriorly at the C5-C6 level due to small d
isc marginal osteophyte.

 Vertebral alignment: No traumatic malalignment. Straightening with mild reversal of the normal cervi
johny lordosis, can be seen with pain, positioning, muscular spasm.

Neck soft tissues: No acute finding.. Calcifications noted along the aortic arch. 

Other: Included lung apices show mild scarring and emphysematous changes. No acute infiltrate or pneu
mothorax.

 

IMPRESSION:

CT head:

1. No acute intracranial CT abnormality.

2. Mild atrophy and chronic microvascular ischemic changes.

 

CT cervical spine:

1. No evidence of cervical spine fracture or traumatic malalignment.

2. Mild cervical spondylosis as above.

## 2023-12-18 LAB
ANION GAP SERPL CALC-SCNC: 15 MMOL/L
BASOPHILS # BLD AUTO: 0 K/UL (ref 0–0.2)
BASOPHILS NFR BLD AUTO: 0 %
BUN SERPL-SCNC: 12 MG/DL (ref 7–17)
CALCIUM SPEC-MCNC: 8.8 MG/DL (ref 8.4–10.2)
CHLORIDE SERPL-SCNC: 110 MMOL/L (ref 98–107)
CO2 SERPL-SCNC: 21 MMOL/L (ref 22–30)
EOSINOPHIL # BLD AUTO: 0.1 K/UL (ref 0–0.7)
EOSINOPHIL NFR BLD AUTO: 1 %
ERYTHROCYTE [DISTWIDTH] IN BLOOD BY AUTOMATED COUNT: 3.42 M/UL (ref 3.8–5.4)
ERYTHROCYTE [DISTWIDTH] IN BLOOD: 13.1 % (ref 11.5–15.5)
GLUCOSE SERPL-MCNC: 131 MG/DL (ref 74–99)
HCT VFR BLD AUTO: 34.6 % (ref 34–46)
HGB BLD-MCNC: 11.7 GM/DL (ref 11.4–16)
HYALINE CASTS UR QL AUTO: 11 /LPF (ref 0–2)
LYMPHOCYTES # SPEC AUTO: 2.2 K/UL (ref 1–4.8)
LYMPHOCYTES NFR SPEC AUTO: 29 %
MCH RBC QN AUTO: 34.3 PG (ref 25–35)
MCHC RBC AUTO-ENTMCNC: 33.9 G/DL (ref 31–37)
MCV RBC AUTO: 101.3 FL (ref 80–100)
MONOCYTES # BLD AUTO: 0.3 K/UL (ref 0–1)
MONOCYTES NFR BLD AUTO: 4 %
NEUTROPHILS # BLD AUTO: 4.7 K/UL (ref 1.3–7.7)
NEUTROPHILS NFR BLD AUTO: 63 %
PH UR: 5 [PH] (ref 5–8)
PLATELET # BLD AUTO: 264 K/UL (ref 150–450)
POTASSIUM SERPL-SCNC: 3.6 MMOL/L (ref 3.5–5.1)
RBC UR QL: 1 /HPF (ref 0–5)
SODIUM SERPL-SCNC: 146 MMOL/L (ref 137–145)
SP GR UR: 1.01 (ref 1–1.03)
SQUAMOUS UR QL AUTO: 2 /HPF (ref 0–4)
UROBILINOGEN UR QL STRIP: <2 MG/DL (ref ?–2)
WBC # BLD AUTO: 7.4 K/UL (ref 3.8–10.6)
WBC #/AREA URNS HPF: 6 /HPF (ref 0–5)

## 2023-12-18 RX ADMIN — POTASSIUM CHLORIDE SCH MLS/HR: 14.9 INJECTION, SOLUTION INTRAVENOUS at 22:17

## 2023-12-18 RX ADMIN — HEPARIN SODIUM SCH UNIT: 5000 INJECTION, SOLUTION INTRAVENOUS; SUBCUTANEOUS at 17:29

## 2023-12-18 RX ADMIN — FAMOTIDINE SCH MG: 20 TABLET, FILM COATED ORAL at 11:37

## 2023-12-18 RX ADMIN — CEFAZOLIN SCH: 330 INJECTION, POWDER, FOR SOLUTION INTRAMUSCULAR; INTRAVENOUS at 14:51

## 2023-12-18 RX ADMIN — ACETAMINOPHEN PRN MG: 325 TABLET, FILM COATED ORAL at 23:05

## 2023-12-18 RX ADMIN — GUAIFENESIN PRN MG: 200 SOLUTION ORAL at 23:06

## 2023-12-18 RX ADMIN — FAMOTIDINE SCH MG: 20 TABLET, FILM COATED ORAL at 20:22

## 2023-12-18 RX ADMIN — Medication SCH MG: at 08:36

## 2023-12-18 RX ADMIN — POTASSIUM CHLORIDE SCH MLS/HR: 14.9 INJECTION, SOLUTION INTRAVENOUS at 11:39

## 2023-12-18 RX ADMIN — ACETAMINOPHEN PRN MG: 325 TABLET, FILM COATED ORAL at 17:29

## 2023-12-18 RX ADMIN — HEPARIN SODIUM SCH UNIT: 5000 INJECTION, SOLUTION INTRAVENOUS; SUBCUTANEOUS at 23:06

## 2023-12-18 NOTE — P.HPIM
History of Present Illness


H&P Date: 12/18/23


Chief Complaint: Altered mental status





Patient is a 63-year-old female with a known history of hypertension, 

hyperlipidemia, alcohol abuse, anxiety/depression and prior history of smoking 

quit 35 years ago presents to ER by EMS.  Patient used her medical alert 

bracelet to call paramedics.  Patient was found on the floor.  She was not able 

to answer questions at the time.  Otherwise denies any complaints of chest pain 

or shortness of breath.  No nausea vomiting or diarrhea.  No evidence of trauma 

noted.  Patient was found to be intoxicated with alcohol level 447 on admission.

 Patient had EKG which showed sinus tachycardia, CT head and cervical spine was 

done showed no acute intracranial CT abnormality, mild atrophy and chronic 

microvascular ischemic changes.  No evidence of cervical spine fracture or 

traumatic malalignment.  Mild cervical spondylosis.


Chest x-ray showed no evidence of acute traumatic abnormality in the chest.





The pelvis showed no acute fracture or dislocation identified.


Laboratory showed WBC 11.4 hemoglobin 13.8 and platelets 325, 


Sodium 153 potassium 3.8 chloride 113 bicarb is 23 BUN 11 and creatinine 0.45


Lactic acid 3.4 magnesium 1.8, AST 44 ALT 32 alk phos 60 and CK9 45, albumin 4.3








Review of Systems





Constitutional: Patient denies any fever or chills . Generalized weakness.


Abdomen: Patient denied any nausea or vomiting or abd. pain


Cardiovascular: Patient denies any chest pain or short of breath no 

palpitations.


Respiratory: patient denied any cough . no sputum production.  No shortness of 

breath


Neurologic: Patient denied any numbness or tingling or headache.


Musculoskeletal: Patient denies any complaints of joint swelling or deformity.


Skin: Negative


Psychiatric: Negative


Endocrine: No heat or cold intolerance.  No recent weight gain.


Genitourinary: No dysuria or hematuria.


All other 14 point ROS negative except the above





Past Medical History


Past Medical History: Hyperlipidemia, Hypertension, Osteoarthritis (OA)


Additional Past Medical History / Comment(s): hx colon polyps, occasional 

diarrhea, states rectal bleeding and abdominal pain., hx of fall Nov 2020 and 

has been having pain right hip since that radiates down right leg, walks with 

limp., states breast implants moving up.


History of Any Multi-Drug Resistant Organisms: None Reported


Past Surgical History: Orthopedic Surgery


Additional Past Surgical History / Comment(s): right elbow surgery with screws, 

right knee surgery with plate and removal ., lump left breast, breast implants


Past Anesthesia/Blood Transfusion Reactions: Postoperative Nausea & Vomiting 

(PONV)


Past Psychological History: Anxiety, Depression


Smoking Status: Former smoker


Past Alcohol Use History: Abuse, Daily


Additional Past Alcohol Use History / Comment(s): quit smoking 35 yrs ago 

(1986), hx of 2ppd, started age 15.


Past Drug Use History: None Reported





- Past Family History


  ** Father


Family Medical History: Cancer





Medications and Allergies


                                Home Medications











 Medication  Instructions  Recorded  Confirmed  Type


 


Divalproex [Depakote] 250 mg PO BID #60 tab 09/04/23 12/18/23 Rx


 


lisinopriL [Zestril] 10 mg PO DAILY #30 tab 09/04/23 12/18/23 Rx


 


traZODone HCL [Desyrel] 50 mg PO HS PRN 7 Days #7 tab 09/04/23 12/18/23 Rx


 


Atorvastatin [Lipitor] 10 mg PO HS 12/18/23 12/18/23 History


 


Meloxicam [Mobic] 15 mg PO DAILY 12/18/23 12/18/23 History


 


Pantoprazole [Protonix] 40 mg PO DAILY 12/18/23 12/18/23 History


 


QUEtiapine [SEROquel] 200 mg PO HS 12/18/23 12/18/23 History


 


Sertraline [Zoloft] 50 mg PO DAILY 12/18/23 12/18/23 History


 


Sulindac [Clinoril] 200 mg PO PC-BID 12/18/23 12/18/23 History


 


hydrOXYzine pamoate [Vistaril] 25 mg PO Q6H PRN 12/18/23 12/18/23 History


 


methocarbamoL [Robaxin-750] 750 mg PO Q6H PRN 12/18/23 12/18/23 History








                                    Allergies











Allergy/AdvReac Type Severity Reaction Status Date / Time


 


latex Allergy Unknown Itching, Verified 12/18/23 09:43





   Blisters  


 


codeine AdvReac Severe HEADACHE Verified 12/18/23 09:43


 


hydrocodone [From Vicodin] AdvReac Severe HEADACHE Verified 12/18/23 09:43


 


Anesthetics - Amide Type - AdvReac  GAS GIVES Verified 12/18/23 09:43





Select A   HER  





   HEADACHE,  





   VOMITING,  





   HEART  





   PALIPITATIONS  


 


Anesthetics - Juliana Type- AdvReac  GAS GIVES Verified 12/18/23 09:43





Parabens   HER  





   HEADACHE,  





   VOMITING,  





   HEART  





   PALIPITATIONS  


 


ANESTHESIA AdvReac Unknown GAS GIVES Uncoded 12/18/23 09:43





   HER  





   HEADACHE,  





   VOMITING,  





   HEART  





   PALIPITATIONS  














Physical Exam


Vitals: 


                                   Vital Signs











  Temp Pulse Pulse Resp BP BP Pulse Ox


 


 12/18/23 08:00  98.6 F   114 H  18   119/62  96


 


 12/18/23 04:00  98.7 F   113 H  16   119/66  98


 


 12/18/23 00:00    84  18   125/73  92 L


 


 12/17/23 22:25  98.5 F   72  18   111/76  94 L


 


 12/17/23 22:12   117 H   20  120/78   98


 


 12/17/23 19:00      129/75  


 


 12/17/23 18:57  97.4 F L  115 H   18    96








                                Intake and Output











 12/17/23 12/18/23 12/18/23





 22:59 06:59 14:59


 


Other:   


 


  # Voids  2 


 


  Weight 68.039 kg  














PHYSICAL EXAMINATION: 


Patient is lying in the bed comfortably, no acute distress, awake alert and 

oriented.. 


HEENT: Normocephalic. Neck is supple. Pupils reactive. Nostrils clear. Oral 

cavity is moist. 


Neck reveals no JVD, carotid bruits, or thyromegaly. 


CHEST EXAMINATION: Trachea is central. Symmetrical expansion.  Diminished 

sounds. 


CARDIAC: Normal S1, S2 with no gallops. No murmurs 


ABDOMEN: Soft. Bowel sounds present.  Nontender.  No organomegaly. No abdominal 

bruits. 


Extremities: reveal no edema.  No clubbing or cyanosis


Neurologically awake, alert, oriented x3 with well-coordinated movements.  No 

gross focal deficits noted


Skin: No rash or skin lesions. 


Psychiatric: Coperative.  Nonsuicidal,


Musculoskeletal: No joint swelling or deformity.  Normal range of motion.








Results


CBC & Chem 7: 


                                 12/18/23 10:33





                                 12/18/23 10:33


Labs: 


                  Abnormal Lab Results - Last 24 Hours (Table)











  12/17/23 12/17/23 12/17/23 Range/Units





  19:19 19:19 19:19 


 


WBC  11.4 H    (3.8-10.6)  k/uL


 


MCV     (80.0-100.0)  fL


 


APTT   20.8 L   (22.0-30.0)  sec


 


Sodium    153 H  (137-145)  mmol/L


 


Chloride    113 H  ()  mmol/L


 


Creatinine    0.45 L  (0.52-1.04)  mg/dL


 


Plasma Lactic Acid Gabe     (0.7-2.0)  mmol/L


 


AST    44 H  (14-36)  U/L


 


Creatine Kinase    945 H  ()  U/L


 


Ur Leukocyte Esterase     (Negative)  


 


Urine WBC     (0-5)  /hpf


 


Hyaline Casts     (0-2)  /lpf


 


Urine Mucus     (None)  /hpf


 


Serum Alcohol    447 H*  mg/dL














  12/17/23 12/17/23 12/17/23 Range/Units





  19:19 23:07 23:07 


 


WBC    11.0 H  (3.8-10.6)  k/uL


 


MCV    100.9 H  (80.0-100.0)  fL


 


APTT     (22.0-30.0)  sec


 


Sodium   155 H   (137-145)  mmol/L


 


Chloride   116 H   ()  mmol/L


 


Creatinine   0.42 L   (0.52-1.04)  mg/dL


 


Plasma Lactic Acid Gabe  3.4 H*    (0.7-2.0)  mmol/L


 


AST   56 H   (14-36)  U/L


 


Creatine Kinase     ()  U/L


 


Ur Leukocyte Esterase     (Negative)  


 


Urine WBC     (0-5)  /hpf


 


Hyaline Casts     (0-2)  /lpf


 


Urine Mucus     (None)  /hpf


 


Serum Alcohol     mg/dL














  12/17/23 12/18/23 12/18/23 Range/Units





  23:07 03:10 05:20 


 


WBC     (3.8-10.6)  k/uL


 


MCV     (80.0-100.0)  fL


 


APTT     (22.0-30.0)  sec


 


Sodium     (137-145)  mmol/L


 


Chloride     ()  mmol/L


 


Creatinine     (0.52-1.04)  mg/dL


 


Plasma Lactic Acid Gabe  3.8 H*  3.6 H*   (0.7-2.0)  mmol/L


 


AST     (14-36)  U/L


 


Creatine Kinase     ()  U/L


 


Ur Leukocyte Esterase    Trace H  (Negative)  


 


Urine WBC    6 H  (0-5)  /hpf


 


Hyaline Casts    11 H  (0-2)  /lpf


 


Urine Mucus    Rare H  (None)  /hpf


 


Serum Alcohol     mg/dL














  12/18/23 Range/Units





  06:52 


 


WBC   (3.8-10.6)  k/uL


 


MCV   (80.0-100.0)  fL


 


APTT   (22.0-30.0)  sec


 


Sodium   (137-145)  mmol/L


 


Chloride   ()  mmol/L


 


Creatinine   (0.52-1.04)  mg/dL


 


Plasma Lactic Acid Gabe  3.7 H*  (0.7-2.0)  mmol/L


 


AST   (14-36)  U/L


 


Creatine Kinase   ()  U/L


 


Ur Leukocyte Esterase   (Negative)  


 


Urine WBC   (0-5)  /hpf


 


Hyaline Casts   (0-2)  /lpf


 


Urine Mucus   (None)  /hpf


 


Serum Alcohol   mg/dL














Thrombosis Risk Factor Assmnt





- DVT/VTE Prophylaxis


DVT/VTE Prophylaxis: Pharmacologic Prophylaxis ordered





- Choose All That Apply


Any of the Below Risk Factors Present?: Yes


Each Factor Represents 1 point: Obesity (BMI >25)


Other Risk Factors: Yes


Each Risk Factor Represents 2 Points: Age 61-74 years


Other congenital or acquired thrombophilia - If yes, enter type in comment: No


Thrombosis Risk Factor Assessment Total Risk Factor Score: 3


Thrombosis Risk Factor Assessment Level: Moderate Risk





Assessment and Plan


Assessment: 








Acute alcohol intoxication with a serum alcohol level 447 on admission


Hypernatremia due to dehydration and volume depletion


Lactic acidosis


Severe alcohol abuse


Macrocytosis


Elevated AST greater than ALT


Acute rhabdomyolysis with 


Anxiety/depression and bipolar disorder


Prior history of smoking


GI and DVT prophylaxis on PPI and heparin subcu





Plan:


Patient will be continued on IV hydration, changed to D5 half-normal saline.  

Continue to monitor sodium level and repeat lactic acid level.  Follow-up repeat

CPK level


Continue with alcohol withdrawal protocol


Patient will be continued on Depakote, Zoloft and trazodone.  Seroquel is on 

hold.


Encourage oral intake and follow-up closely.  Alcohol abstinence has been coun

seled extensively.





Time with Patient: Greater than 30

## 2023-12-19 VITALS — RESPIRATION RATE: 20 BRPM

## 2023-12-19 LAB
ANION GAP SERPL CALC-SCNC: 6 MMOL/L
BASOPHILS # BLD AUTO: 0 K/UL (ref 0–0.2)
BASOPHILS NFR BLD AUTO: 0 %
BUN SERPL-SCNC: 10 MG/DL (ref 7–17)
CALCIUM SPEC-MCNC: 9 MG/DL (ref 8.4–10.2)
CHLORIDE SERPL-SCNC: 106 MMOL/L (ref 98–107)
CK SERPL-CCNC: 842 U/L (ref 30–135)
CO2 SERPL-SCNC: 27 MMOL/L (ref 22–30)
EOSINOPHIL # BLD AUTO: 0.2 K/UL (ref 0–0.7)
EOSINOPHIL NFR BLD AUTO: 3 %
ERYTHROCYTE [DISTWIDTH] IN BLOOD BY AUTOMATED COUNT: 3.33 M/UL (ref 3.8–5.4)
ERYTHROCYTE [DISTWIDTH] IN BLOOD: 12.9 % (ref 11.5–15.5)
GLUCOSE SERPL-MCNC: 111 MG/DL (ref 74–99)
HCT VFR BLD AUTO: 33.9 % (ref 34–46)
HGB BLD-MCNC: 11.6 GM/DL (ref 11.4–16)
LYMPHOCYTES # SPEC AUTO: 1.6 K/UL (ref 1–4.8)
LYMPHOCYTES NFR SPEC AUTO: 26 %
MCH RBC QN AUTO: 34.8 PG (ref 25–35)
MCHC RBC AUTO-ENTMCNC: 34.2 G/DL (ref 31–37)
MCV RBC AUTO: 101.6 FL (ref 80–100)
MONOCYTES # BLD AUTO: 0.4 K/UL (ref 0–1)
MONOCYTES NFR BLD AUTO: 7 %
NEUTROPHILS # BLD AUTO: 3.7 K/UL (ref 1.3–7.7)
NEUTROPHILS NFR BLD AUTO: 62 %
PLATELET # BLD AUTO: 221 K/UL (ref 150–450)
POTASSIUM SERPL-SCNC: 3.6 MMOL/L (ref 3.5–5.1)
SODIUM SERPL-SCNC: 139 MMOL/L (ref 137–145)
WBC # BLD AUTO: 6 K/UL (ref 3.8–10.6)

## 2023-12-19 RX ADMIN — HEPARIN SODIUM SCH UNIT: 5000 INJECTION, SOLUTION INTRAVENOUS; SUBCUTANEOUS at 23:24

## 2023-12-19 RX ADMIN — PANTOPRAZOLE SODIUM SCH MG: 40 TABLET, DELAYED RELEASE ORAL at 08:23

## 2023-12-19 RX ADMIN — GUAIFENESIN PRN MG: 200 SOLUTION ORAL at 23:24

## 2023-12-19 RX ADMIN — POTASSIUM CHLORIDE SCH MLS/HR: 14.9 INJECTION, SOLUTION INTRAVENOUS at 08:23

## 2023-12-19 RX ADMIN — DIVALPROEX SODIUM SCH: 250 TABLET, DELAYED RELEASE ORAL at 02:43

## 2023-12-19 RX ADMIN — ACETAMINOPHEN PRN MG: 325 TABLET, FILM COATED ORAL at 05:38

## 2023-12-19 RX ADMIN — GUAIFENESIN PRN MG: 200 SOLUTION ORAL at 15:36

## 2023-12-19 RX ADMIN — HEPARIN SODIUM SCH UNIT: 5000 INJECTION, SOLUTION INTRAVENOUS; SUBCUTANEOUS at 15:21

## 2023-12-19 RX ADMIN — GUAIFENESIN PRN MG: 200 SOLUTION ORAL at 05:38

## 2023-12-19 RX ADMIN — HEPARIN SODIUM SCH UNIT: 5000 INJECTION, SOLUTION INTRAVENOUS; SUBCUTANEOUS at 08:24

## 2023-12-19 RX ADMIN — DIVALPROEX SODIUM SCH MG: 250 TABLET, DELAYED RELEASE ORAL at 21:27

## 2023-12-19 RX ADMIN — SERTRALINE HYDROCHLORIDE SCH MG: 50 TABLET, FILM COATED ORAL at 08:23

## 2023-12-19 RX ADMIN — DIVALPROEX SODIUM SCH MG: 250 TABLET, DELAYED RELEASE ORAL at 08:23

## 2023-12-19 RX ADMIN — Medication SCH MG: at 08:23

## 2023-12-19 RX ADMIN — ACETAMINOPHEN PRN MG: 325 TABLET, FILM COATED ORAL at 15:36

## 2023-12-19 NOTE — CDI
Documentation Clarification Form

Date: 

From: Aide Browning

Phone: [phone]

MRN: [id number]

Admit Date: 12/17/2023 09:20:00 PM

Patient Name: Pura Eid

Visit Number: [id number]

Discharge Date:  



ATTENTION: The Clinical Documentation Specialists (CDI) and [facility] Coding Staff 
appreciate your assistance in clarifying documentation. Please respond to the 
clarification below the line at the bottom and electronically sign. The CDI & 
[facility] Coding staff will review the response and follow-up if needed. Please note: 
Queries are made part of the Legal Health Record. If you have any questions, 
please contact the author of this message via ITS.



Dr. Haylee Orlando



Rhabdomyolysis is documented in the Medical H&P on 12/18.  Additional 
clarification regarding the type of rhabdomyolysis is requested.



History/Risk Factors: "63-year-old female with a known history of hypertension, 
hyperlipidemia, alcohol abuse, anxiety/depression and prior history of smoking 
quit 35 years ago presents to ER by EMS.  Patient used her medical alert 
bracelet to call paramedics. " - Per Medical H&P on 12/18



Clinical Indicators: " She was found on the floor.  Stating that she had been 
there for approximately 2 days." - Per ED Note on 12/17



Creatine Kinase: 12/17 - 945, 12/19 - 842

Lactic Acid: 12/17 - 3.4, 3.8, 12/18 - 3.6, 3.7, 2.7, 2.0

[place] Alcohol: 12/17 - 447



Treatment: "continued on IV hydration, changed to D5 half-normal saline.  
Continue to monitor sodium level and repeat lactic acid level.  Follow-up repeat
CPK level" - Per Medical H&P on 12/18



Please clarify the type of rhabdomyolysis, if known:



[  ] Traumatic rhabdomyolysis due to prolonged immobility 

[  ] Non traumatic rhabdomyolysis due to alcohol

[  ] Other, please specify ________

[  ] Unable to Determine

MTDD

## 2023-12-20 VITALS — TEMPERATURE: 97.9 F | HEART RATE: 75 BPM | DIASTOLIC BLOOD PRESSURE: 96 MMHG | SYSTOLIC BLOOD PRESSURE: 135 MMHG

## 2023-12-20 LAB
ANION GAP SERPL CALC-SCNC: 9 MMOL/L
BASOPHILS # BLD AUTO: 0 K/UL (ref 0–0.2)
BASOPHILS NFR BLD AUTO: 1 %
BUN SERPL-SCNC: 8 MG/DL (ref 7–17)
CALCIUM SPEC-MCNC: 10 MG/DL (ref 8.4–10.2)
CHLORIDE SERPL-SCNC: 105 MMOL/L (ref 98–107)
CO2 SERPL-SCNC: 28 MMOL/L (ref 22–30)
EOSINOPHIL # BLD AUTO: 0.2 K/UL (ref 0–0.7)
EOSINOPHIL NFR BLD AUTO: 3 %
ERYTHROCYTE [DISTWIDTH] IN BLOOD BY AUTOMATED COUNT: 3.9 M/UL (ref 3.8–5.4)
ERYTHROCYTE [DISTWIDTH] IN BLOOD: 12.8 % (ref 11.5–15.5)
GLUCOSE SERPL-MCNC: 99 MG/DL (ref 74–99)
HCT VFR BLD AUTO: 39.7 % (ref 34–46)
HGB BLD-MCNC: 13.3 GM/DL (ref 11.4–16)
LYMPHOCYTES # SPEC AUTO: 1.5 K/UL (ref 1–4.8)
LYMPHOCYTES NFR SPEC AUTO: 25 %
MCH RBC QN AUTO: 34.1 PG (ref 25–35)
MCHC RBC AUTO-ENTMCNC: 33.6 G/DL (ref 31–37)
MCV RBC AUTO: 101.8 FL (ref 80–100)
MONOCYTES # BLD AUTO: 0.4 K/UL (ref 0–1)
MONOCYTES NFR BLD AUTO: 6 %
NEUTROPHILS # BLD AUTO: 4 K/UL (ref 1.3–7.7)
NEUTROPHILS NFR BLD AUTO: 64 %
PLATELET # BLD AUTO: 235 K/UL (ref 150–450)
POTASSIUM SERPL-SCNC: 4.5 MMOL/L (ref 3.5–5.1)
SODIUM SERPL-SCNC: 142 MMOL/L (ref 137–145)
WBC # BLD AUTO: 6.2 K/UL (ref 3.8–10.6)

## 2023-12-20 RX ADMIN — DIVALPROEX SODIUM SCH MG: 250 TABLET, DELAYED RELEASE ORAL at 08:41

## 2023-12-20 RX ADMIN — Medication SCH MG: at 08:41

## 2023-12-20 RX ADMIN — PANTOPRAZOLE SODIUM SCH MG: 40 TABLET, DELAYED RELEASE ORAL at 08:42

## 2023-12-20 RX ADMIN — POTASSIUM CHLORIDE SCH: 14.9 INJECTION, SOLUTION INTRAVENOUS at 05:59

## 2023-12-20 RX ADMIN — ACETAMINOPHEN PRN MG: 325 TABLET, FILM COATED ORAL at 03:31

## 2023-12-20 RX ADMIN — HEPARIN SODIUM SCH UNIT: 5000 INJECTION, SOLUTION INTRAVENOUS; SUBCUTANEOUS at 15:12

## 2023-12-20 RX ADMIN — ACETAMINOPHEN PRN MG: 325 TABLET, FILM COATED ORAL at 15:11

## 2023-12-20 RX ADMIN — HEPARIN SODIUM SCH UNIT: 5000 INJECTION, SOLUTION INTRAVENOUS; SUBCUTANEOUS at 08:41

## 2023-12-20 RX ADMIN — POTASSIUM CHLORIDE SCH: 14.9 INJECTION, SOLUTION INTRAVENOUS at 15:06

## 2023-12-20 RX ADMIN — SERTRALINE HYDROCHLORIDE SCH MG: 50 TABLET, FILM COATED ORAL at 08:42

## 2023-12-20 RX ADMIN — GUAIFENESIN PRN MG: 200 SOLUTION ORAL at 06:15

## 2023-12-21 ENCOUNTER — HOSPITAL ENCOUNTER (EMERGENCY)
Dept: HOSPITAL 47 - EC | Age: 63
Discharge: HOME | End: 2023-12-21
Payer: MEDICARE

## 2023-12-21 VITALS — HEART RATE: 110 BPM | SYSTOLIC BLOOD PRESSURE: 105 MMHG | DIASTOLIC BLOOD PRESSURE: 56 MMHG

## 2023-12-21 VITALS — RESPIRATION RATE: 17 BRPM | TEMPERATURE: 98 F

## 2023-12-21 DIAGNOSIS — Z87.891: ICD-10-CM

## 2023-12-21 DIAGNOSIS — Z86.59: ICD-10-CM

## 2023-12-21 DIAGNOSIS — Z79.899: ICD-10-CM
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
## 2023-12-21 DIAGNOSIS — S09.90XA: Primary | ICD-10-CM

## 2023-12-21 DIAGNOSIS — Z88.4: ICD-10-CM

## 2023-12-21 DIAGNOSIS — M19.90: ICD-10-CM

## 2023-12-21 DIAGNOSIS — Z91.040: ICD-10-CM

## 2023-12-21 DIAGNOSIS — I10: ICD-10-CM

## 2023-12-21 DIAGNOSIS — Z88.5: ICD-10-CM

## 2023-12-21 DIAGNOSIS — F10.20: ICD-10-CM

## 2023-12-21 DIAGNOSIS — M54.50: ICD-10-CM

## 2023-12-21 LAB
ERYTHROCYTE [DISTWIDTH] IN BLOOD BY AUTOMATED COUNT: 3.96 M/UL (ref 3.8–5.4)
ERYTHROCYTE [DISTWIDTH] IN BLOOD: 13.2 % (ref 11.5–15.5)
HCT VFR BLD AUTO: 40.1 % (ref 34–46)
HGB BLD-MCNC: 13.8 GM/DL (ref 11.4–16)
MCH RBC QN AUTO: 34.9 PG (ref 25–35)
MCHC RBC AUTO-ENTMCNC: 34.4 G/DL (ref 31–37)
MCV RBC AUTO: 101.4 FL (ref 80–100)
PLATELET # BLD AUTO: 233 K/UL (ref 150–450)
WBC # BLD AUTO: 6.9 K/UL (ref 3.8–10.6)

## 2023-12-21 PROCEDURE — 36415 COLL VENOUS BLD VENIPUNCTURE: CPT

## 2023-12-21 PROCEDURE — 72100 X-RAY EXAM L-S SPINE 2/3 VWS: CPT

## 2023-12-21 PROCEDURE — 96360 HYDRATION IV INFUSION INIT: CPT

## 2023-12-21 PROCEDURE — 96372 THER/PROPH/DIAG INJ SC/IM: CPT

## 2023-12-21 PROCEDURE — 70450 CT HEAD/BRAIN W/O DYE: CPT

## 2023-12-21 PROCEDURE — 85027 COMPLETE CBC AUTOMATED: CPT

## 2023-12-21 PROCEDURE — 99285 EMERGENCY DEPT VISIT HI MDM: CPT

## 2023-12-21 NOTE — CT
EXAMINATION TYPE: CT brain wo con

CT DLP: 1183.4 mGycm, Automated exposure control for dose reduction was used.

 

DATE OF EXAM: 12/21/2023 6:15 PM

 

COMPARISON: 12/17/2023.

 

CLINICAL INDICATION:Female, 63 years old with history of injury, ETOH.

 

TECHNIQUE: 

Brain: Axial CT images of the brain were obtained with coronal and sagittal reformats created and rev
iewed.

Contrast used: None.

Oral contrast used: None.

 

FINDINGS:

 

Brain:

Extra-axial spaces: No abnormal extra-axial fluid collections.

Ventricular system: Within normal limits

Cerebral parenchyma: No acute intraparenchymal hemorrhage or mass effect.  The gray-white junction is
 well differentiated.     

Cerebellum: Unremarkable.

Mass effect: No evidence of midline shift.

Intracranial vasculature: unremarkable

Soft tissues: Normal.

Calvarium/osseous structures: No depressed skull fracture.

Paranasal sinuses and mastoid air cells: Mild scattered paranasal sinus disease.

Visualized orbits: Orbital contents are intact.

 

IMPRESSION:

No acute intracranial process.

## 2023-12-21 NOTE — XR
EXAMINATION TYPE: XR lumbar spine 2 or 3V

 

DATE OF EXAM: 12/21/2023 6:07 PM

 

CLINICAL INDICATION:Female, 63 years old with history of pain; [facility]

 

COMPARISON: 10/23/2023.

 

TECHNIQUE: XR lumbar spine 2 or 3V - Frontal, lateral and coned in L5-S1 lateral views of the spine.

 

FINDINGS: No evidence of any acute osseous pathology.  No evidence of loss of vertebral body height i
s seen. There is normal alignment of the lumbar vertebral bodies. Mild scattered disc space narrowing
. Multilevel marginal osteophyte formation throughout the visualized spine. There is facet joint arth
ropathy throughout the spine. Scattered at least mild neural foraminal stenosis. Atherosclerosis of t
he arterial vasculature.

 

IMPRESSION: 

1. No acute fracture.

2. Mild to moderate multilevel disc degeneration.

## 2023-12-21 NOTE — ED
Alcohol HPI





- General


Source: patient, RN notes reviewed





<Maria Eugenia Bowers - Last Filed: 12/21/23 19:47>





<Azar Mckinney - Last Filed: 12/21/23 20:20>





<Arminda Long - Last Filed: 12/22/23 00:10>





- General


Stated Complaint: ETOH


Time Seen by Provider: 12/21/23 16:56





- History of Present Illness


Initial Comments: 


Patient is 63-year-old female presented ER with chief of all intoxication.  Nellie marks was brought in by EMS after a wellness visit by PD patient was found to be 

intoxicated.  Patient was found laying in her own urine.  Patient states that 

she drank only alcohol she could get her hands on today.  EMS believe that was 

about half pint of vodka.  Patient states she fell from her coffee table hitting

her head.  Patient is unsure if she lost consciousness.  Patient states she is 

having blurry vision.  Patient is not on blood thinners.  Patient is also on 

during seeing lumbar back pain. Denies bowel/bladder incontinence, fevers, 

several paresthesias.  Patient states she does have pain down her legs. 


 (Maria Eugenia Bowers)





- Related Data


                                Home Medications











 Medication  Instructions  Recorded  Confirmed


 


methocarbamoL [Robaxin-750] 750 mg PO Q6H PRN 12/18/23 12/18/23








                                  Previous Rx's











 Medication  Instructions  Recorded


 


Atorvastatin [Lipitor] 10 mg PO HS #30 tab 12/20/23


 


Divalproex [Depakote] 250 mg PO BID #60 tab 12/20/23


 


Pantoprazole [Protonix] 40 mg PO DAILY #30 tab 12/20/23


 


Sertraline [Zoloft] 50 mg PO DAILY #30 tab 12/20/23


 


Thiamine [Vitamin B-1] 100 mg PO DAILY #30 tab 12/20/23


 


lisinopriL [Zestril] 10 mg PO DAILY #30 tab 12/20/23


 


traZODone HCL [Desyrel] 50 mg PO HS PRN 7 Days #7 tab 12/20/23











                                    Allergies











Allergy/AdvReac Type Severity Reaction Status Date / Time


 


latex Allergy Unknown Itching, Verified 12/18/23 09:43





   Blisters  


 


codeine AdvReac Severe HEADACHE Verified 12/18/23 09:43


 


hydrocodone [From Vicodin] AdvReac Severe HEADACHE Verified 12/18/23 09:43


 


Anesthetics - Amide Type - AdvReac  GAS GIVES Verified 12/18/23 09:43





Select A   HER  





   HEADACHE,  





   VOMITING,  





   HEART  





   PALIPITATIONS  


 


Anesthetics - Juliana Type- AdvReac  GAS GIVES Verified 12/18/23 09:43





Parabens   HER  





   HEADACHE,  





   VOMITING,  





   HEART  





   PALIPITATIONS  


 


ANESTHESIA AdvReac Unknown GAS GIVES Uncoded 12/18/23 09:43





   HER  





   HEADACHE,  





   VOMITING,  





   HEART  





   PALIPITATIONS  














Review of Systems


ROS Other: All systems not noted in ROS Statement are negative.





<Maria Eugenia Bowers - Last Filed: 12/21/23 19:47>


ROS Other: All systems not noted in ROS Statement are negative.





<Azar Mckinney - Last Filed: 12/21/23 20:20>


ROS Other: All systems not noted in ROS Statement are negative.





<Arminda Long - Last Filed: 12/22/23 00:10>


ROS Statement: 


Those systems with pertinent positive or pertinent negative responses have been 

documented in the HPI.








Past Medical History


Past Medical History: Hyperlipidemia, Hypertension, Osteoarthritis (OA)


Additional Past Medical History / Comment(s): hx colon polyps, occasional 

diarrhea, states rectal bleeding and abdominal pain., hx of fall Nov 2020 and 

has been having pain right hip since that radiates down right leg, walks with 

limp., states breast implants moving up.


History of Any Multi-Drug Resistant Organisms: None Reported


Past Surgical History: Orthopedic Surgery


Additional Past Surgical History / Comment(s): right elbow surgery with screws, 

right knee surgery with plate and removal ., lump left breast, breast implants


Past Anesthesia/Blood Transfusion Reactions: Postoperative Nausea & Vomiting 

(PONV)


Past Psychological History: Anxiety, Depression


Smoking Status: Former smoker


Past Alcohol Use History: Abuse, Daily


Additional Past Alcohol Use History / Comment(s): quit smoking 35 yrs ago 

(1986), hx of 2ppd, started age 15.


Past Drug Use History: None Reported





- Past Family History


  ** Father


Family Medical History: Cancer





<Maria Eugenia Bowers - Last Filed: 12/21/23 19:47>





General Exam


Limitations: altered mental status


General appearance: appears intoxicated


Head exam: Present: atraumatic, normocephalic, normal inspection


Eye exam: Present: normal appearance, PERRL, EOMI.  Absent: scleral icterus, 

conjunctival injection, periorbital swelling


Pupils: Present: normal accommodation


Respiratory exam: Present: normal lung sounds bilaterally.  Absent: respiratory 

distress, wheezes, rales, rhonchi, stridor


Cardiovascular Exam: Present: regular rate, normal rhythm, normal heart sounds. 

Absent: systolic murmur, diastolic murmur, rubs, gallop, clicks


Extremities exam: Present: normal inspection, full ROM, normal capillary refill.

 Absent: tenderness, pedal edema, joint swelling, calf tenderness


Back exam: Present: normal inspection, tenderness (Lumbar spine and pelvic 

girdle.)


Neurological exam: Present: altered (Intoxicated)


Psychiatric exam: Present: agitated (Intoxicated wanting food)


Skin exam: Present: warm, dry, intact, normal color.  Absent: rash





<Maria Eugenia Bowers - Last Filed: 12/21/23 19:47>





Course





                                   Vital Signs











  12/21/23 12/21/23





  17:48 20:00


 


Temperature 98.0 F 


 


Pulse Rate 100 110 H


 


Respiratory 17 17





Rate  


 


Blood Pressure 136/87 105/56


 


O2 Sat by Pulse 98 98





Oximetry  














Medical Decision Making





- Radiology Data


Radiology results: report reviewed, image reviewed





<Maria Eugenia Bowers - Last Filed: 12/21/23 19:47>





- Lab Data


Result diagrams: 


                                 12/21/23 19:20








<Azar Mckinney - Last Filed: 12/21/23 20:20>





- Lab Data


Result diagrams: 


                                 12/21/23 19:20








<Arminda Long - Last Filed: 12/22/23 00:10>





- Medical Decision Making


Was pt. sent in by a medical professional or institution ([facility]RISTAN Wasserman, NP, urgent 

care, hospital, or nursing home...) When possible be specific


@  -No


Did you speak to anyone other than the patient for history (EMS, parent, family,

police, friend...)? What history was obtained from this source 


@  -No


Did you review nursing and triage notes (agree or disagree)?  Why? 


@  -I reviewed and agree with nursing and triage notes


Were old charts reviewed (outside hosp., previous admission, EMS record, old 

EKG, old radiological studies, urgent care reports/EKG's, nursing home records)?

Report findings 


@  -No old charts were reviewed


Differential Diagnosis (chest pain, altered mental status, abdominal pain women,

abdominal pain men, vaginal bleeding, weakness, fever, dyspnea, syncope, 

headache, dizziness, GI bleed, back pain, seizure, CVA, palpatations, mental 

health, musculoskeletal)? 


@  -Differential Altered Mental Status: Hypoglycemia, DKA, hypercapnia, ETOH, 

overdose, CO poisoning, trauma, myxedema coma, HTN encephalopathy, infection, 

encephalitis, psychosis, intercranial hemorrhage, hepatic encephalopathy, 

meningitis, CVA, this is not meant to be an all-inclusive list


EKG interpreted by me (3pts min.).


@  -None


X-rays interpreted by me (1pt min.).


@  -Lumbar spine x-ray shows no acute fractures.  There is mild to moderate 

multilevel disc degeneration.


CT interpreted by me (1pt min.).


@  -CT brain C-spine negative for acute intracranial process.


U/S interpreted by me (1pt. min.).


@  -None done


What testing was considered but not performed or refused? (CT, X-rays, U/S, 

labs)? Why?


@  -None


What meds were considered but not given or refused? Why?


@  -None


Did you discuss the management of the patient with other professionals 

(professionals i.e. , PA, NP, lab, RT, psych nurse, , , 

teacher, , )? Give summary


@  -No


Was smoking cessation discussed for >3mins.?


@  -No


Was critical care preformed (if so, how long)?


@  -No


Were there social determinants of health that impacted care today? How? 

(Homelessness, low income, unemployed, alcoholism, drug addiction, 

transportation, low edu. Level, literacy, decrease access to med. care, skilled nursing, r

ehab)?


@  -No


Was there de-escalation of care discussed even if they declined (Discuss DNR or 

withdrawal of care, Hospice)? DNR status


@  -No


What co-morbidities impacted this encounter? (DM, HTN, Smoking, COPD, CAD, 

Cancer, CVA, ARF, Chemo, Hep., AIDS, mental health diagnosis, sleep apnea, 

morbid obesity)?


@  -Depression and alcohol dependence


Was patient admitted / discharged? Hospital course, mention meds given and 

route, prescriptions, significant lab abnormalities, going to OR and other 

pertinent info.


@  -Pending.


Patient is 63-year-old female presented to the ER with chief complaint of 

alcohol intoxication.  On examination, patient's vital signs are stable.  

Physical exam was significant for lumbar spine tenderness.  No red flag back 

pain symptoms.  Patient's pupils were equal round and reactive.  Patient was 

arousable and communicating with provider patient did have slurred speech and in

or alcohol on her breath.  Due to patient reporting a fall, CT was performed.  

CT brain C-spine was negative for acute intracranial processes.  Lumbar spine 

x-ray showed no acute fractures.  There is mild to moderate multilevel 

degenerative disc disease.  Patient was started on CIWA protocol. Patient 

received 1L IV fluids. Patient signed out to Samir Mckinney PA-C pending lab 

results and disposition. 


 (Maria Eugenia Bowers)


Patient signed out to me.  Patient presenting secondary to alcohol intoxication 

and a fall in which patient reports that she hurt her head and is having lower 

back pain as well.  Imaging at this time unremarkable for acute finding.  

Patient reports that she would like to go home bite pending laboratory studies. 

Patient states that she is not driving home.  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

a cab.  Discharged home in stable condition.  Discussed return precautions with 

patient who verbalized agreement. (Azar Mckinney)





- Lab Data





                                   Lab Results











  12/21/23 Range/Units





  19:20 


 


WBC  6.9  (3.8-10.6)  k/uL


 


RBC  3.96  (3.80-5.40)  m/uL


 


Hgb  13.8  (11.4-16.0)  gm/dL


 


Hct  40.1  (34.0-46.0)  %


 


MCV  101.4 H  (80.0-100.0)  fL


 


MCH  34.9  (25.0-35.0)  pg


 


MCHC  34.4  (31.0-37.0)  g/dL


 


RDW  13.2  (11.5-15.5)  %


 


Plt Count  233  (150-450)  k/uL


 


MPV  8.5  


 


Macrocytosis  Slight  














Disposition





<Maria Eugenia Bowers - Last Filed: 12/21/23 19:47>


Is patient prescribed a controlled substance at d/c from ED?: No


Time of Disposition: 20:20


Decision Time: 20:20





<Azar Mckinney - Last Filed: 12/21/23 20:20>





<Arminda Long - Last Filed: 12/22/23 00:10>


Clinical Impression: 


 Fall, Alcohol intoxication





Disposition: HOME SELF-CARE


Condition: Good


Additional Instructions: 


Please return to the Emergency Department if symptoms worsen or any other 

concerns.  Please follow-up with your primary care provider.


Referrals: 


Lisa Becerra MD [Primary Care Provider] - 1-2 days

## 2024-01-29 ENCOUNTER — HOSPITAL ENCOUNTER (OUTPATIENT)
Dept: HOSPITAL 47 - EC | Age: 64
Setting detail: OBSERVATION
LOS: 8 days | Discharge: HOME HEALTH | End: 2024-02-06
Attending: INTERNAL MEDICINE | Admitting: INTERNAL MEDICINE
Payer: MEDICARE

## 2024-01-29 VITALS — BODY MASS INDEX: 28.3 KG/M2

## 2024-01-29 DIAGNOSIS — F32.A: ICD-10-CM

## 2024-01-29 DIAGNOSIS — Z79.899: ICD-10-CM

## 2024-01-29 DIAGNOSIS — Z91.81: ICD-10-CM

## 2024-01-29 DIAGNOSIS — E78.5: ICD-10-CM

## 2024-01-29 DIAGNOSIS — R00.0: ICD-10-CM

## 2024-01-29 DIAGNOSIS — Z91.198: ICD-10-CM

## 2024-01-29 DIAGNOSIS — M19.90: ICD-10-CM

## 2024-01-29 DIAGNOSIS — E87.0: ICD-10-CM

## 2024-01-29 DIAGNOSIS — Z91.040: ICD-10-CM

## 2024-01-29 DIAGNOSIS — Z88.5: ICD-10-CM

## 2024-01-29 DIAGNOSIS — I10: ICD-10-CM

## 2024-01-29 DIAGNOSIS — R29.6: ICD-10-CM
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
## 2024-01-29 DIAGNOSIS — R53.1: ICD-10-CM

## 2024-01-29 DIAGNOSIS — Z91.148: ICD-10-CM

## 2024-01-29 DIAGNOSIS — G92.8: ICD-10-CM

## 2024-01-29 DIAGNOSIS — F41.9: ICD-10-CM

## 2024-01-29 DIAGNOSIS — Z88.4: ICD-10-CM

## 2024-01-29 DIAGNOSIS — Z87.891: ICD-10-CM

## 2024-01-29 DIAGNOSIS — F10.229: Primary | ICD-10-CM

## 2024-01-29 DIAGNOSIS — R26.9: ICD-10-CM

## 2024-01-29 LAB
ALBUMIN SERPL-MCNC: 4.9 G/DL (ref 3.5–5)
ALP SERPL-CCNC: 77 U/L (ref 38–126)
ALT SERPL-CCNC: 30 U/L (ref 4–34)
ANION GAP SERPL CALC-SCNC: 12 MMOL/L
APTT BLD: 22 SEC (ref 22–30)
AST SERPL-CCNC: 34 U/L (ref 14–36)
BASOPHILS # BLD AUTO: 0.1 K/UL (ref 0–0.2)
BASOPHILS NFR BLD AUTO: 1 %
BUN SERPL-SCNC: 17 MG/DL (ref 7–17)
CALCIUM SPEC-MCNC: 10.1 MG/DL (ref 8.4–10.2)
CHLORIDE SERPL-SCNC: 109 MMOL/L (ref 98–107)
CO2 SERPL-SCNC: 30 MMOL/L (ref 22–30)
EOSINOPHIL # BLD AUTO: 0.3 K/UL (ref 0–0.7)
EOSINOPHIL NFR BLD AUTO: 4 %
ERYTHROCYTE [DISTWIDTH] IN BLOOD BY AUTOMATED COUNT: 3.89 M/UL (ref 3.8–5.4)
ERYTHROCYTE [DISTWIDTH] IN BLOOD: 13 % (ref 11.5–15.5)
GLUCOSE BLD-MCNC: 108 MG/DL (ref 70–110)
GLUCOSE SERPL-MCNC: 94 MG/DL (ref 74–99)
HCT VFR BLD AUTO: 39 % (ref 34–46)
HGB BLD-MCNC: 13.2 GM/DL (ref 11.4–16)
HYALINE CASTS UR QL AUTO: 4 /LPF (ref 0–2)
INR PPP: 0.9 (ref ?–1.2)
LYMPHOCYTES # SPEC AUTO: 3.6 K/UL (ref 1–4.8)
LYMPHOCYTES NFR SPEC AUTO: 46 %
MCH RBC QN AUTO: 33.9 PG (ref 25–35)
MCHC RBC AUTO-ENTMCNC: 33.9 G/DL (ref 31–37)
MCV RBC AUTO: 100.1 FL (ref 80–100)
MONOCYTES # BLD AUTO: 0.3 K/UL (ref 0–1)
MONOCYTES NFR BLD AUTO: 4 %
NEUTROPHILS # BLD AUTO: 3.3 K/UL (ref 1.3–7.7)
NEUTROPHILS NFR BLD AUTO: 43 %
PH UR: 5 [PH] (ref 5–8)
PLATELET # BLD AUTO: 317 K/UL (ref 150–450)
POTASSIUM SERPL-SCNC: 4 MMOL/L (ref 3.5–5.1)
PROT SERPL-MCNC: 8.2 G/DL (ref 6.3–8.2)
PT BLD: 10 SEC (ref 10–12.5)
RBC UR QL: 2 /HPF (ref 0–5)
SODIUM SERPL-SCNC: 151 MMOL/L (ref 137–145)
SP GR UR: 1.01 (ref 1–1.03)
SQUAMOUS UR QL AUTO: 1 /HPF (ref 0–4)
UROBILINOGEN UR QL STRIP: <2 MG/DL (ref ?–2)
WBC # BLD AUTO: 7.7 K/UL (ref 3.8–10.6)
WBC #/AREA URNS HPF: 19 /HPF (ref 0–5)

## 2024-01-29 PROCEDURE — 80053 COMPREHEN METABOLIC PANEL: CPT

## 2024-01-29 PROCEDURE — 97116 GAIT TRAINING THERAPY: CPT

## 2024-01-29 PROCEDURE — 82140 ASSAY OF AMMONIA: CPT

## 2024-01-29 PROCEDURE — 96372 THER/PROPH/DIAG INJ SC/IM: CPT

## 2024-01-29 PROCEDURE — 97162 PT EVAL MOD COMPLEX 30 MIN: CPT

## 2024-01-29 PROCEDURE — 83735 ASSAY OF MAGNESIUM: CPT

## 2024-01-29 PROCEDURE — 80048 BASIC METABOLIC PNL TOTAL CA: CPT

## 2024-01-29 PROCEDURE — 72170 X-RAY EXAM OF PELVIS: CPT

## 2024-01-29 PROCEDURE — 94760 N-INVAS EAR/PLS OXIMETRY 1: CPT

## 2024-01-29 PROCEDURE — 85025 COMPLETE CBC W/AUTO DIFF WBC: CPT

## 2024-01-29 PROCEDURE — 70450 CT HEAD/BRAIN W/O DYE: CPT

## 2024-01-29 PROCEDURE — 36415 COLL VENOUS BLD VENIPUNCTURE: CPT

## 2024-01-29 PROCEDURE — 71046 X-RAY EXAM CHEST 2 VIEWS: CPT

## 2024-01-29 PROCEDURE — 81001 URINALYSIS AUTO W/SCOPE: CPT

## 2024-01-29 PROCEDURE — 85610 PROTHROMBIN TIME: CPT

## 2024-01-29 PROCEDURE — 72125 CT NECK SPINE W/O DYE: CPT

## 2024-01-29 PROCEDURE — 80306 DRUG TEST PRSMV INSTRMNT: CPT

## 2024-01-29 PROCEDURE — 97166 OT EVAL MOD COMPLEX 45 MIN: CPT

## 2024-01-29 PROCEDURE — 93005 ELECTROCARDIOGRAM TRACING: CPT

## 2024-01-29 PROCEDURE — 97535 SELF CARE MNGMENT TRAINING: CPT

## 2024-01-29 PROCEDURE — 80320 DRUG SCREEN QUANTALCOHOLS: CPT

## 2024-01-29 PROCEDURE — 85730 THROMBOPLASTIN TIME PARTIAL: CPT

## 2024-01-29 PROCEDURE — 96360 HYDRATION IV INFUSION INIT: CPT

## 2024-01-29 PROCEDURE — 84484 ASSAY OF TROPONIN QUANT: CPT

## 2024-01-29 PROCEDURE — 99285 EMERGENCY DEPT VISIT HI MDM: CPT

## 2024-01-29 PROCEDURE — 96361 HYDRATE IV INFUSION ADD-ON: CPT

## 2024-01-29 RX ADMIN — POTASSIUM CHLORIDE SCH MLS/HR: 14.9 INJECTION, SOLUTION INTRAVENOUS at 23:41

## 2024-01-29 RX ADMIN — SODIUM CHLORIDE STA MG: 900 INJECTION, SOLUTION INTRAVENOUS at 20:29

## 2024-01-29 RX ADMIN — CEFAZOLIN SCH MLS/HR: 330 INJECTION, POWDER, FOR SOLUTION INTRAMUSCULAR; INTRAVENOUS at 20:28

## 2024-01-29 RX ADMIN — ACETAMINOPHEN PRN MG: 325 TABLET, FILM COATED ORAL at 23:41

## 2024-01-29 NOTE — CT
EXAMINATION TYPE: CT brain myrtle campbell con

 

DATE OF EXAM: 1/29/2024

 

COMPARISON: 12/21/2023

 

HISTORY: ams

 

CT DLP: 1281.6 mGycm

 

Unenhanced CT of the brain was performed.  

 

The ventricles, basal cisterns and sulci overlying the cerebral convexities demonstrate mild enlargem
ent.  

 

There is no evidence for intracranial hemorrhage or sulcal effacement.  There is decreased attenuatio
n about the periventricular white matter and deep white matter of both cerebral hemispheres, compatib
le with chronic small vessel ischemia.  

 

No mass effects are seen.  

If symptoms persist consider MRI.  

 

Osseous calvarium is intact.

 

IMPRESSION:

 

1.  Age related atrophic and chronic small vessel ischemic change without acute intracranial process 
seen at this time.

 

CT Cervical Spine:

 

Unenhanced CT of the cervical spine was performed with bone and soft tissue window settings submitted
.  Coronal and sagittal reconstruction is obtained.  

 

There is normal alignment and prevertebral soft tissues. No evidence for acute cervical fracture .   
Scattered degenerative disc disease and spondylosis. Biapical scarring.   

 

IMPRESSION:

 

1.  No evidence for acute fracture or subluxation of the cervical spine.

## 2024-01-29 NOTE — XR
EXAMINATION TYPE: XR chest 2V

 

DATE OF EXAM: 1/29/2024

 

COMPARISON: 12/17/2023

 

HISTORY: Shortness of breath

 

TECHNIQUE:  Frontal and lateral views of the chest are obtained.

 

FINDINGS:

 

Scattered senescent parenchymal changes noted. Hyperinflation compatible with COPD. 

 

No evidence for infiltrate. No evidence for atelectasis.

 

Heart size is stable.

 

Mediastinal structures are stable and grossly unremarkable.

 

No evidence for hilar prominence.

 

Degenerative changes dorsal spine. 

 

IMPRESSION:

1. No evidence for acute pulmonary disease.

## 2024-01-29 NOTE — XR
EXAMINATION TYPE: XR pelvis AP view

 

DATE OF EXAM: 1/29/2024

 

CLINICAL HISTORY: pain

 

TECHNIQUE: Single view the pelvis is submitted.

 

FINDINGS: No  evidence for fracture, dislocation or bony lesion.  Joint spaces are well-preserved.  S
I joints appear symmetric.

 

IMPRESSION: 

 

1. No acute fracture or dislocation seen.

 

ICD 10 NO FRACTURE, INITIAL EVALUATION
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
## 2024-01-30 LAB
ANION GAP SERPL CALC-SCNC: 15.2 MMOL/L (ref 4–12)
BASOPHILS # BLD AUTO: 0.06 X 10*3/UL (ref 0–0.1)
BASOPHILS NFR BLD AUTO: 0.7 %
BUN SERPL-SCNC: 16 MG/DL (ref 9–27)
BUN/CREAT SERPL: 32 RATIO (ref 12–20)
CALCIUM SPEC-MCNC: 9.5 MG/DL (ref 8.7–10.3)
CHLORIDE SERPL-SCNC: 104 MMOL/L (ref 96–109)
CO2 SERPL-SCNC: 25.8 MMOL/L (ref 21.6–31.8)
EOSINOPHIL # BLD AUTO: 0.28 X 10*3/UL (ref 0.04–0.35)
EOSINOPHIL NFR BLD AUTO: 3.3 %
ERYTHROCYTE [DISTWIDTH] IN BLOOD BY AUTOMATED COUNT: 3.33 X 10*6/UL (ref 4.1–5.2)
ERYTHROCYTE [DISTWIDTH] IN BLOOD: 13.2 % (ref 11.5–14.5)
GLUCOSE SERPL-MCNC: 87 MG/DL (ref 70–110)
HCT VFR BLD AUTO: 33.7 % (ref 37.2–46.3)
HGB BLD-MCNC: 11.2 G/DL (ref 12–15)
IMM GRANULOCYTES BLD QL AUTO: 0.2 %
LYMPHOCYTES # SPEC AUTO: 2.08 X 10*3/UL (ref 0.9–5)
LYMPHOCYTES NFR SPEC AUTO: 24.6 %
MCH RBC QN AUTO: 33.6 PG (ref 27–32)
MCHC RBC AUTO-ENTMCNC: 33.2 G/DL (ref 32–37)
MCV RBC AUTO: 101.2 FL (ref 80–97)
MONOCYTES # BLD AUTO: 0.57 X 10*3/UL (ref 0.2–1)
MONOCYTES NFR BLD AUTO: 6.7 %
NEUTROPHILS # BLD AUTO: 5.46 X 10*3/UL (ref 1.8–7.7)
NEUTROPHILS NFR BLD AUTO: 64.5 %
NRBC BLD AUTO-RTO: 0 X 10*3/UL (ref 0–0.01)
PLATELET # BLD AUTO: 262 X 10*3/UL (ref 140–440)
POTASSIUM SERPL-SCNC: 4 MMOL/L (ref 3.5–5.5)
SODIUM SERPL-SCNC: 145 MMOL/L (ref 135–145)
WBC # BLD AUTO: 8.47 X 10*3/UL (ref 4.5–10)

## 2024-01-30 RX ADMIN — IBUPROFEN PRN MG: 400 TABLET, FILM COATED ORAL at 04:23

## 2024-01-30 RX ADMIN — ESCITALOPRAM OXALATE SCH MG: 10 TABLET, FILM COATED ORAL at 15:08

## 2024-01-30 RX ADMIN — Medication SCH MG: at 08:47
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
## 2024-01-31 LAB
ANION GAP SERPL CALC-SCNC: 12 MMOL/L
BUN SERPL-SCNC: 9 MG/DL (ref 7–17)
CALCIUM SPEC-MCNC: 10.3 MG/DL (ref 8.4–10.2)
CHLORIDE SERPL-SCNC: 103 MMOL/L (ref 98–107)
CO2 SERPL-SCNC: 25 MMOL/L (ref 22–30)
GLUCOSE SERPL-MCNC: 164 MG/DL (ref 74–99)
POTASSIUM SERPL-SCNC: 3.8 MMOL/L (ref 3.5–5.1)
SODIUM SERPL-SCNC: 140 MMOL/L (ref 137–145)

## 2024-01-31 RX ADMIN — PANTOPRAZOLE SODIUM SCH MG: 40 TABLET, DELAYED RELEASE ORAL at 11:19
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
## 2024-02-02 LAB
ANION GAP SERPL CALC-SCNC: 11.2 MMOL/L (ref 4–12)
BASOPHILS # BLD AUTO: 0.05 X 10*3/UL (ref 0–0.1)
BASOPHILS NFR BLD AUTO: 0.7 %
BUN SERPL-SCNC: 16.9 MG/DL (ref 9–27)
BUN/CREAT SERPL: 28.17 RATIO (ref 12–20)
CALCIUM SPEC-MCNC: 10 MG/DL (ref 8.7–10.3)
CHLORIDE SERPL-SCNC: 101 MMOL/L (ref 96–109)
CO2 SERPL-SCNC: 25.8 MMOL/L (ref 21.6–31.8)
EOSINOPHIL # BLD AUTO: 0.24 X 10*3/UL (ref 0.04–0.35)
EOSINOPHIL NFR BLD AUTO: 3.5 %
ERYTHROCYTE [DISTWIDTH] IN BLOOD BY AUTOMATED COUNT: 3.37 X 10*6/UL (ref 4.1–5.2)
ERYTHROCYTE [DISTWIDTH] IN BLOOD: 13.2 % (ref 11.5–14.5)
GLUCOSE SERPL-MCNC: 112 MG/DL (ref 70–110)
HCT VFR BLD AUTO: 33.6 % (ref 37.2–46.3)
HGB BLD-MCNC: 11.1 G/DL (ref 12–15)
IMM GRANULOCYTES BLD QL AUTO: 0.3 %
LYMPHOCYTES # SPEC AUTO: 2.04 X 10*3/UL (ref 0.9–5)
LYMPHOCYTES NFR SPEC AUTO: 29.8 %
MAGNESIUM SPEC-SCNC: 1.9 MG/DL (ref 1.5–2.4)
MCH RBC QN AUTO: 32.9 PG (ref 27–32)
MCHC RBC AUTO-ENTMCNC: 33 G/DL (ref 32–37)
MCV RBC AUTO: 99.7 FL (ref 80–97)
MONOCYTES # BLD AUTO: 0.69 X 10*3/UL (ref 0.2–1)
MONOCYTES NFR BLD AUTO: 10.1 %
NEUTROPHILS # BLD AUTO: 3.8 X 10*3/UL (ref 1.8–7.7)
NEUTROPHILS NFR BLD AUTO: 55.6 %
NRBC BLD AUTO-RTO: 0 X 10*3/UL (ref 0–0.01)
PLATELET # BLD AUTO: 248 X 10*3/UL (ref 140–440)
POTASSIUM SERPL-SCNC: 4.6 MMOL/L (ref 3.5–5.5)
SODIUM SERPL-SCNC: 138 MMOL/L (ref 135–145)
WBC # BLD AUTO: 6.84 X 10*3/UL (ref 4.5–10)
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
## 2024-02-05 LAB
ANION GAP SERPL CALC-SCNC: 10.3 MMOL/L (ref 4–12)
BASOPHILS # BLD AUTO: 0.04 X 10*3/UL (ref 0–0.1)
BASOPHILS NFR BLD AUTO: 0.7 %
BUN SERPL-SCNC: 21.7 MG/DL (ref 9–27)
BUN/CREAT SERPL: 36.17 RATIO (ref 12–20)
CALCIUM SPEC-MCNC: 10.2 MG/DL (ref 8.7–10.3)
CHLORIDE SERPL-SCNC: 101 MMOL/L (ref 96–109)
CO2 SERPL-SCNC: 27.7 MMOL/L (ref 21.6–31.8)
EOSINOPHIL # BLD AUTO: 0.2 X 10*3/UL (ref 0.04–0.35)
EOSINOPHIL NFR BLD AUTO: 3.4 %
ERYTHROCYTE [DISTWIDTH] IN BLOOD BY AUTOMATED COUNT: 3.5 X 10*6/UL (ref 4.1–5.2)
ERYTHROCYTE [DISTWIDTH] IN BLOOD: 13.2 % (ref 11.5–14.5)
GLUCOSE SERPL-MCNC: 105 MG/DL (ref 70–110)
HCT VFR BLD AUTO: 35.4 % (ref 37.2–46.3)
HGB BLD-MCNC: 11.6 G/DL (ref 12–15)
IMM GRANULOCYTES BLD QL AUTO: 0.3 %
LYMPHOCYTES # SPEC AUTO: 2.28 X 10*3/UL (ref 0.9–5)
LYMPHOCYTES NFR SPEC AUTO: 38.4 %
MCH RBC QN AUTO: 33.1 PG (ref 27–32)
MCHC RBC AUTO-ENTMCNC: 32.8 G/DL (ref 32–37)
MCV RBC AUTO: 101.1 FL (ref 80–97)
MONOCYTES # BLD AUTO: 0.63 X 10*3/UL (ref 0.2–1)
MONOCYTES NFR BLD AUTO: 10.6 %
NEUTROPHILS # BLD AUTO: 2.76 X 10*3/UL (ref 1.8–7.7)
NEUTROPHILS NFR BLD AUTO: 46.6 %
NRBC BLD AUTO-RTO: 0 X 10*3/UL (ref 0–0.01)
PLATELET # BLD AUTO: 260 X 10*3/UL (ref 140–440)
POTASSIUM SERPL-SCNC: 5 MMOL/L (ref 3.5–5.5)
SODIUM SERPL-SCNC: 139 MMOL/L (ref 135–145)
WBC # BLD AUTO: 5.93 X 10*3/UL (ref 4.5–10)
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
## 2024-02-06 VITALS — HEART RATE: 94 BPM | RESPIRATION RATE: 16 BRPM

## 2024-02-06 VITALS — SYSTOLIC BLOOD PRESSURE: 127 MMHG | TEMPERATURE: 98.5 F | DIASTOLIC BLOOD PRESSURE: 85 MMHG

## 2024-02-08 NOTE — P.DS
Providers


Date of admission: 


01/29/24 19:47





Expected date of discharge: 02/06/24


Attending physician: 


Prosper Zendejas MD





Primary care physician: 


Lisa Becerra





Hospital Course: 











Final diagnosis





Altered mental status, multifactorial, with acute metabolic toxic encephalopathy

due to acute alcohol intoxication as well as hypernatremia, improved


Ongoing history of EtOH with frequent hospitalizations for alcohol abuse


Hypernatremia likely secondary to ongoing alcohol use and poor oral intake, 

improved


History of hyperlipidemia


Hypertension


Osteoarthritis history


Generalized weakness with gait dysfunction


History of anxiety/depression


Former smoker


Significant medication noncompliance and follow-up


Continued ongoing alcohol use


GI prophylaxis


DVT prophylaxis


Full code








Discharge disposition


Patient is being discharged in a stable condition with guarded prognosis to home

with home care being arranged.  Patient will follow-up with Dr. Becerra in 

the outpatient setting upon discharge.  Patient is to continue with outpatient 

resources at [facility] as well as [facility]PS that has been involved.  Total time taken is 

greater than 35 minutes.





Hospital course


This is a 63-year-old female who was recently admitted with altered mental 

status likely secondary to alcohol intoxication being closely monitored.  

Patient also with unsteady gait and generalized weakness reporting inability to 

walk.  Patient was seen and evaluated by physical therapy and is walking with a 

walker recommending home care.  Patient on admission and has had multiple ER 

visits and hospitalizations regarding alcohol abuse and intoxication making poor

medical decisions initially was scheduled for possible public guardian 

appointment although patient is alert and oriented x 3 and guardianship hearing 

was canceled by social work.  [facility] is involved and has a case and is following 

her.  Patient continues to be high risk for readmissions given her significant 

noncompliance and continued alcohol use.  Patient is adamant she is not drinking

again although this has been reported multiple times previously from patient.  

Patient not actively withdrawing and was not requiring any Ativan for 

withdrawals.  Patient has been started on antidepressant recommend outpatient 

follow-up with [facility] as well as primary care provider on discharge.  Home care is 

being arranged. Currently no reports of chest pain, shortness of breath, or 

palpitations.  Patient is afebrile.  No reports of nausea or vomiting and 

patient is tolerating diet.  Patient will be discharged to [facility] 

today.  Guarded prognosis and high risk for readmissions as mentioned 

previously.





Physical exam:








Gen: This is a 63-year-old female who is awake, alert and oriented x 3, well-

developed, well-nourished, elderly appearing


HEENT: Head is atraumatic, normocephalic. Pupils equal, round. Sclerae is 

anicteric. 


NECK: Supple. No JVD. No lymphadenopathy. No thyromegaly. 


LUNGS: Diminished breath sounds bilaterally otherwise clear to auscultation. No 

wheezes or rhonchi.  No intercostal retractions.


HEART: S1, S2 are muffled. 


ABDOMEN: Soft. Bowel sounds are present. No masses.  No tenderness.


EXTREMITIES: No pedal edema.  No calf tenderness.


NEUROLOGICAL: Patient is awake, alert and oriented x3. Cranial nerves 2 through 

12 are grossly intact. 





Please refer to medication reconciliation sheet for a list of medications.





The impression and plan of care has been dictated by Yamileth Horne, Nurse 

Practitioner as directed.





Dr. Ric MD


I have performed a history and examination and MDM of this patient, discussed 

the same with the dictator, and  agree with the dictator's assessment and plan 

as written ,documented as a scribe. Based on total visit time,  I have performed

more than 50% of the visit.


Patient Condition at Discharge: Stable





Plan - Discharge Summary


New Discharge Prescriptions: 


New


   traZODone HCL [Desyrel] 50 mg PO HS PRN  tab


     PRN Reason: Insomnia


   Folic Acid 1 mg PO DAILY #30 tablet


   Ibuprofen [Motrin] 400 mg PO Q6HR PRN  tab


     PRN Reason: Pain


   Multivitamins, Thera [Multivitamin] 1 tab PO DAILY #30 tablet


   Acetaminophen Tab [Tylenol] 650 mg PO Q6HR PRN  tab


     PRN Reason: Fever And/ Or Pain


   Thiamine [Vitamin B-1] 100 mg PO DAILY 30 Days #30 tab


   Escitalopram [Lexapro] 10 mg PO DAILY 30 Days #30 tab


   QUEtiapine [SEROquel] 200 mg PO HS 30 Days #30 tab





Continue


   Pantoprazole [Protonix] 40 mg PO DAILY #30 tab


   lisinopriL [Zestril] 10 mg PO DAILY #30 tab


Discharge Medication List





Pantoprazole [Protonix] 40 mg PO DAILY #30 tab 12/20/23 [Rx]


lisinopriL [Zestril] 10 mg PO DAILY #30 tab 12/20/23 [Rx]


Acetaminophen Tab [Tylenol] 650 mg PO Q6HR PRN  tab 02/06/24 [Rx]


Escitalopram [Lexapro] 10 mg PO DAILY 30 Days #30 tab 02/06/24 [Rx]


Folic Acid 1 mg PO DAILY #30 tablet 02/06/24 [Rx]


Ibuprofen [Motrin] 400 mg PO Q6HR PRN  tab 02/06/24 [Rx]


Multivitamins, Thera [Multivitamin] 1 tab PO DAILY #30 tablet 02/06/24 [Rx]


QUEtiapine [SEROquel] 200 mg PO HS 30 Days #30 tab 02/06/24 [Rx]


Thiamine [Vitamin B-1] 100 mg PO DAILY 30 Days #30 tab 02/06/24 [Rx]


traZODone HCL [Desyrel] 50 mg PO HS PRN  tab 02/06/24 [Rx]








Follow up Appointment(s)/Referral(s): 


Lisa Becerra MD [Primary Care Provider] - 1-2 days


[facility] Visiting Nurse, [NON-STAFF] - 1 Week


Activity/Diet/Wound Care/Special Instructions: 


Activity limited until follow-up


Follow-up with primary care provider on discharge


Continue to avoid all alcohol use and exposure


Follow-up with [facility] outpatient


Continue current diet


Continue taking medications as prescribed





Discharge/Stand Alone Forms:  [facility] Meetings [place], [facility], Who Do I 

Call?, Community Resources, Outpatient Counseling, Inp Substance Abuse 

Facilities


Discharge Disposition: HOME WITH HOME HEALTH SERVICES

## 2024-02-22 ENCOUNTER — HOSPITAL ENCOUNTER (INPATIENT)
Dept: HOSPITAL 47 - EC | Age: 64
LOS: 7 days | Discharge: NURSING, REHAB OR LONG-TERM CARE | DRG: 897 | End: 2024-02-29
Payer: MEDICARE

## 2024-02-22 VITALS — BODY MASS INDEX: 33.3 KG/M2

## 2024-02-22 DIAGNOSIS — M47.22: ICD-10-CM

## 2024-02-22 DIAGNOSIS — R00.0: ICD-10-CM

## 2024-02-22 DIAGNOSIS — Z71.41: ICD-10-CM
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
## 2024-02-22 DIAGNOSIS — Z87.19: ICD-10-CM

## 2024-02-22 DIAGNOSIS — R29.6: ICD-10-CM

## 2024-02-22 DIAGNOSIS — Z88.5: ICD-10-CM

## 2024-02-22 DIAGNOSIS — Z98.82: ICD-10-CM

## 2024-02-22 DIAGNOSIS — E86.0: ICD-10-CM

## 2024-02-22 DIAGNOSIS — F41.9: ICD-10-CM

## 2024-02-22 DIAGNOSIS — Z91.040: ICD-10-CM

## 2024-02-22 DIAGNOSIS — G62.1: ICD-10-CM

## 2024-02-22 DIAGNOSIS — M47.26: ICD-10-CM
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
## 2024-02-22 DIAGNOSIS — M48.061: ICD-10-CM

## 2024-02-22 DIAGNOSIS — R32: ICD-10-CM

## 2024-02-22 DIAGNOSIS — E87.20: ICD-10-CM

## 2024-02-22 DIAGNOSIS — K70.10: ICD-10-CM

## 2024-02-22 DIAGNOSIS — M50.10: ICD-10-CM
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
## 2024-02-22 DIAGNOSIS — Z79.899: ICD-10-CM

## 2024-02-22 DIAGNOSIS — R54: ICD-10-CM

## 2024-02-22 DIAGNOSIS — F10.239: ICD-10-CM

## 2024-02-22 DIAGNOSIS — Z87.891: ICD-10-CM

## 2024-02-22 DIAGNOSIS — F32.A: ICD-10-CM

## 2024-02-22 DIAGNOSIS — T46.4X5A: ICD-10-CM

## 2024-02-22 DIAGNOSIS — Z88.6: ICD-10-CM

## 2024-02-22 DIAGNOSIS — G62.9: ICD-10-CM

## 2024-02-22 DIAGNOSIS — Z86.010: ICD-10-CM

## 2024-02-22 DIAGNOSIS — E87.5: ICD-10-CM

## 2024-02-22 DIAGNOSIS — E55.9: ICD-10-CM

## 2024-02-22 DIAGNOSIS — E78.5: ICD-10-CM

## 2024-02-22 DIAGNOSIS — M51.16: ICD-10-CM

## 2024-02-22 DIAGNOSIS — E87.1: ICD-10-CM

## 2024-02-22 DIAGNOSIS — F10.229: ICD-10-CM

## 2024-02-22 DIAGNOSIS — T50.2X5A: ICD-10-CM

## 2024-02-22 DIAGNOSIS — G12.21: ICD-10-CM

## 2024-02-22 DIAGNOSIS — I10: ICD-10-CM

## 2024-02-22 DIAGNOSIS — F10.288: Primary | ICD-10-CM

## 2024-02-22 LAB
ALBUMIN SERPL-MCNC: 4.7 G/DL (ref 3.5–5)
ALP SERPL-CCNC: 53 U/L (ref 38–126)
ALT SERPL-CCNC: 37 U/L (ref 4–34)
ANION GAP SERPL CALC-SCNC: 12 MMOL/L
APTT BLD: 20.7 SEC (ref 22–30)
AST SERPL-CCNC: 60 U/L (ref 14–36)
BASOPHILS # BLD AUTO: 0 K/UL (ref 0–0.2)
BASOPHILS NFR BLD AUTO: 0 %
BUN SERPL-SCNC: 20 MG/DL (ref 7–17)
CALCIUM SPEC-MCNC: 9.5 MG/DL (ref 8.4–10.2)
CHLORIDE SERPL-SCNC: 105 MMOL/L (ref 98–107)
CK SERPL-CCNC: 512 U/L (ref 30–135)
CO2 SERPL-SCNC: 27 MMOL/L (ref 22–30)
EOSINOPHIL # BLD AUTO: 0 K/UL (ref 0–0.7)
EOSINOPHIL NFR BLD AUTO: 1 %
ERYTHROCYTE [DISTWIDTH] IN BLOOD BY AUTOMATED COUNT: 3.88 M/UL (ref 3.8–5.4)
ERYTHROCYTE [DISTWIDTH] IN BLOOD: 13.6 % (ref 11.5–15.5)
GLUCOSE SERPL-MCNC: 153 MG/DL (ref 74–99)
HCT VFR BLD AUTO: 38.9 % (ref 34–46)
HGB BLD-MCNC: 12.9 GM/DL (ref 11.4–16)
INR PPP: 0.8 (ref ?–1.2)
LYMPHOCYTES # SPEC AUTO: 1.6 K/UL (ref 1–4.8)
LYMPHOCYTES NFR SPEC AUTO: 26 %
MAGNESIUM SPEC-SCNC: 1.7 MG/DL (ref 1.6–2.3)
MCH RBC QN AUTO: 33.3 PG (ref 25–35)
MCHC RBC AUTO-ENTMCNC: 33.2 G/DL (ref 31–37)
MCV RBC AUTO: 100.4 FL (ref 80–100)
MONOCYTES # BLD AUTO: 0.3 K/UL (ref 0–1)
MONOCYTES NFR BLD AUTO: 4 %
NEUTROPHILS # BLD AUTO: 4.3 K/UL (ref 1.3–7.7)
NEUTROPHILS NFR BLD AUTO: 68 %
PH UR: 5.5 [PH] (ref 5–8)
PLATELET # BLD AUTO: 250 K/UL (ref 150–450)
POTASSIUM SERPL-SCNC: 4.6 MMOL/L (ref 3.5–5.1)
PROT SERPL-MCNC: 7.9 G/DL (ref 6.3–8.2)
PT BLD: 9.6 SEC (ref 10–12.5)
RBC UR QL: <1 /HPF (ref 0–5)
SODIUM SERPL-SCNC: 144 MMOL/L (ref 137–145)
SP GR UR: 1.03 (ref 1–1.03)
SQUAMOUS UR QL AUTO: <1 /HPF (ref 0–4)
UROBILINOGEN UR QL STRIP: <2 MG/DL (ref ?–2)
WBC # BLD AUTO: 6.3 K/UL (ref 3.8–10.6)
WBC #/AREA URNS HPF: 2 /HPF (ref 0–5)

## 2024-02-22 PROCEDURE — 70450 CT HEAD/BRAIN W/O DYE: CPT

## 2024-02-22 PROCEDURE — 87636 SARSCOV2 & INF A&B AMP PRB: CPT

## 2024-02-22 PROCEDURE — 85025 COMPLETE CBC W/AUTO DIFF WBC: CPT

## 2024-02-22 PROCEDURE — 84439 ASSAY OF FREE THYROXINE: CPT

## 2024-02-22 PROCEDURE — 82550 ASSAY OF CK (CPK): CPT

## 2024-02-22 PROCEDURE — 72157 MRI CHEST SPINE W/O & W/DYE: CPT

## 2024-02-22 PROCEDURE — 80053 COMPREHEN METABOLIC PANEL: CPT

## 2024-02-22 PROCEDURE — 70551 MRI BRAIN STEM W/O DYE: CPT

## 2024-02-22 PROCEDURE — 99285 EMERGENCY DEPT VISIT HI MDM: CPT

## 2024-02-22 PROCEDURE — 72170 X-RAY EXAM OF PELVIS: CPT

## 2024-02-22 PROCEDURE — 72158 MRI LUMBAR SPINE W/O & W/DYE: CPT

## 2024-02-22 PROCEDURE — 94760 N-INVAS EAR/PLS OXIMETRY 1: CPT

## 2024-02-22 PROCEDURE — 96372 THER/PROPH/DIAG INJ SC/IM: CPT

## 2024-02-22 PROCEDURE — 83605 ASSAY OF LACTIC ACID: CPT

## 2024-02-22 PROCEDURE — 96361 HYDRATE IV INFUSION ADD-ON: CPT

## 2024-02-22 PROCEDURE — 83921 ORGANIC ACID SINGLE QUANT: CPT

## 2024-02-22 PROCEDURE — 80320 DRUG SCREEN QUANTALCOHOLS: CPT

## 2024-02-22 PROCEDURE — 82140 ASSAY OF AMMONIA: CPT

## 2024-02-22 PROCEDURE — 96374 THER/PROPH/DIAG INJ IV PUSH: CPT

## 2024-02-22 PROCEDURE — 72131 CT LUMBAR SPINE W/O DYE: CPT

## 2024-02-22 PROCEDURE — 82607 VITAMIN B-12: CPT

## 2024-02-22 PROCEDURE — 81001 URINALYSIS AUTO W/SCOPE: CPT

## 2024-02-22 PROCEDURE — 51702 INSERT TEMP BLADDER CATH: CPT

## 2024-02-22 PROCEDURE — 72128 CT CHEST SPINE W/O DYE: CPT

## 2024-02-22 PROCEDURE — 83735 ASSAY OF MAGNESIUM: CPT

## 2024-02-22 PROCEDURE — 82746 ASSAY OF FOLIC ACID SERUM: CPT

## 2024-02-22 PROCEDURE — 85730 THROMBOPLASTIN TIME PARTIAL: CPT

## 2024-02-22 PROCEDURE — 96375 TX/PRO/DX INJ NEW DRUG ADDON: CPT

## 2024-02-22 PROCEDURE — 36415 COLL VENOUS BLD VENIPUNCTURE: CPT

## 2024-02-22 PROCEDURE — 82306 VITAMIN D 25 HYDROXY: CPT

## 2024-02-22 PROCEDURE — 93005 ELECTROCARDIOGRAM TRACING: CPT

## 2024-02-22 PROCEDURE — 80048 BASIC METABOLIC PNL TOTAL CA: CPT

## 2024-02-22 PROCEDURE — 71046 X-RAY EXAM CHEST 2 VIEWS: CPT

## 2024-02-22 PROCEDURE — 72156 MRI NECK SPINE W/O & W/DYE: CPT

## 2024-02-22 PROCEDURE — 85610 PROTHROMBIN TIME: CPT

## 2024-02-22 PROCEDURE — 72125 CT NECK SPINE W/O DYE: CPT

## 2024-02-22 PROCEDURE — 84443 ASSAY THYROID STIM HORMONE: CPT

## 2024-02-22 RX ADMIN — CEFAZOLIN STA MLS/HR: 330 INJECTION, POWDER, FOR SOLUTION INTRAMUSCULAR; INTRAVENOUS at 09:14

## 2024-02-22 RX ADMIN — SODIUM CHLORIDE STA MG: 900 INJECTION, SOLUTION INTRAVENOUS at 08:50

## 2024-02-22 RX ADMIN — ONDANSETRON STA MG: 2 INJECTION INTRAMUSCULAR; INTRAVENOUS at 09:14

## 2024-02-22 RX ADMIN — PANTOPRAZOLE SODIUM SCH MG: 40 TABLET, DELAYED RELEASE ORAL at 12:37

## 2024-02-22 RX ADMIN — ESCITALOPRAM OXALATE SCH MG: 10 TABLET, FILM COATED ORAL at 12:37

## 2024-02-22 RX ADMIN — CEFAZOLIN SCH MLS/HR: 330 INJECTION, POWDER, FOR SOLUTION INTRAMUSCULAR; INTRAVENOUS at 12:36

## 2024-02-22 RX ADMIN — ACETAMINOPHEN STA MG: 325 TABLET, FILM COATED ORAL at 08:50

## 2024-02-22 NOTE — P.HPIM
History of Present Illness


63-year-old pleasant female is admitted as patient was not able to ambulate for 

the last week.  Patient has been crawling patient has worsening weakness for 

some time patient does have alcohol abuse history does drink about half a gallon

of hard liquor.  Patient does have elevated MCV patient had a CT scan of the 

lumbar spine which showed significant stenosis at the L4-L5 level patient was 

also having back pain.  Patient is tingling numbness in both legs.  Patient 

denies any other drug abuse.  Patient is already on folate supplementation.  

Patient had a fall about 2 days ago and hit head although CT of the head did not

show any intracranial bleed.











REVIEW OF SYSTEMS: 


CONSTITUTIONAL: No fever, no malaise, no fatigue. 


HEENT: No recent visual problems or hearing problems. Denied any sore throat. 


CARDIOVASCULAR: No chest pain, orthopnea, PND, no palpitations, no syncope. 


PULMONARY: No shortness of breath, no cough, no hemoptysis. 


GASTROINTESTINAL: No diarrhea, no nausea, no vomiting, no abdominal pain. 


NEUROLOGICAL: No headaches, 


HEMATOLOGICAL: Denies any bleeding or petechiae. 


GENITOURINARY: Denies any burning micturition, frequency, or urgency. 


MUSCULOSKELETAL/RHEUMATOLOGICAL: As mentioned in HPI


ENDOCRINE: Denies any polyuria or polydipsia. 





The rest of the 14-point review of systems is negative.











PHYSICAL EXAMINATION: 





GENERAL: The patient is alert and oriented x3, not in any acute distress. Well 

developed, well nourished. 


HEENT: Pupils are round and equally reacting to light. EOMI. No scleral icterus.

No conjunctival pallor. Normocephalic, atraumatic. No pharyngeal erythema. No 

thyromegaly. 


CARDIOVASCULAR: S1 and S2 present. No murmurs, rubs, or gallops. 


PULMONARY: Chest is clear to auscultation, no wheezing or crackles. 


ABDOMEN: Soft, nontender, nondistended, normoactive bowel sounds. No palpable 

organomegaly. 


MUSCULOSKELETAL: No joint swelling or deformity.


EXTREMITIES: No cyanosis, clubbing, or pedal edema. 


NEUROLOGICAL: Significant generalized weakness more pronounced in both legs, 

patient has strength of 3+/5 in both lower extremities upper extremities is 4/5


SKIN: No rashes. 





Assessment and plan


-Inability to walk, generalized weakness: Significant weakness in bilateral 

lower extremities secondary to chronic alcohol use although patient does have 

some lumbar vertebral disease because of which I am consulting spinal surgeon.  

Patient does not have any cord compression on the CT does have radiculopathy and

spinal stenosis at L3-L4 level.  Patient will be started on anti-inflammatory 

medications along with a GI prophylaxis


-Alcohol abuse: Counseling was provided


-Alcohol withdrawal: Patient will be monitored for withdrawal presently does not

have any withdrawals patient is on Ativan CIWA protocol


-Lactic acidosis secondary to dehydration patient was started on IV fluids


-Acute alcoholic hepatitis expected to improve with fluids


-Elevated MCV secondary to chronic alcohol use will obtain B12 levels


-Hypertension patient is hypotensive hold off on antihypertensive medications


-Hyperlipidemia


-Depression for which patient will be resumed on her home medications.  Patient 

is not overtly depressed at this time Lovenox





DVT prophylaxis: Lovenox








Past Medical History


Past Medical History: Hyperlipidemia, Hypertension, Osteoarthritis (OA)


Additional Past Medical History / Comment(s): hx colon polyps, occasional 

diarrhea, hx of fall Nov 2020 and has been having pain right hip since that ra

diates down right leg, walks with limp., states breast implants moving up.


History of Any Multi-Drug Resistant Organisms: None Reported


Past Surgical History: Breast Surgery, Orthopedic Surgery, Tonsillectomy


Additional Past Surgical History / Comment(s): right elbow surgery with screws, 

right knee surgery with plate and removal ., lumpectomy left breast, breast 

implants


Past Anesthesia/Blood Transfusion Reactions: Postoperative Nausea & Vomiting 

(PONV)


Past Psychological History: Anxiety, Depression


Smoking Status: Former smoker


Past Alcohol Use History: Abuse, Daily


Past Drug Use History: None Reported





- Past Family History


  ** Father


Family Medical History: Cancer





Medications and Allergies


                                Home Medications











 Medication  Instructions  Recorded  Confirmed  Type


 


Pantoprazole [Protonix] 40 mg PO DAILY #30 tab 12/20/23 02/22/24 Rx


 


lisinopriL [Zestril] 10 mg PO DAILY #30 tab 12/20/23 02/22/24 Rx


 


Acetaminophen Tab [Tylenol] 650 mg PO Q6HR PRN  tab 02/06/24 02/22/24 Rx


 


Escitalopram [Lexapro] 10 mg PO DAILY 30 Days #30 tab 02/06/24 02/22/24 Rx


 


Folic Acid 1 mg PO DAILY #30 tablet 02/06/24 02/22/24 Rx


 


Ibuprofen [Motrin] 400 mg PO Q6HR PRN  tab 02/06/24 02/22/24 Rx


 


Multivitamins, Thera [Multivitamin] 1 tab PO DAILY #30 tablet 02/06/24 02/22/24 

Rx


 


QUEtiapine [SEROquel] 200 mg PO HS 30 Days #30 tab 02/06/24 02/22/24 Rx


 


Thiamine [Vitamin B-1] 100 mg PO DAILY 30 Days #30 tab 02/06/24 02/22/24 Rx


 


traZODone HCL [Desyrel] 50 mg PO HS PRN  tab 02/06/24 02/22/24 Rx








                                    Allergies











Allergy/AdvReac Type Severity Reaction Status Date / Time


 


latex Allergy Unknown Itching, Verified 02/22/24 11:16





   Blisters  


 


codeine AdvReac Severe HEADACHE Verified 02/22/24 11:16


 


hydrocodone [From Vicodin] AdvReac Severe HEADACHE Verified 02/22/24 11:16


 


Anesthetics - Amide Type - AdvReac  GAS GIVES Verified 02/22/24 11:16





Select A   HER  





   HEADACHE,  





   VOMITING,  





   HEART  





   PALIPITATIONS  


 


Anesthetics - Juliana Type- AdvReac  GAS GIVES Verified 02/22/24 11:16





Parabens   HER  





   HEADACHE,  





   VOMITING,  





   HEART  





   PALIPITATIONS  


 


ANESTHESIA AdvReac Unknown GAS GIVES Uncoded 02/22/24 11:16





   HER  





   HEADACHE,  





   VOMITING,  





   HEART  





   PALIPITATIONS  














Physical Exam


Vitals: 


                                   Vital Signs











  Temp Pulse Resp BP Pulse Ox


 


 02/22/24 12:01  98.2 F  114 H  18  159/88 


 


 02/22/24 11:06   104 H  17  101/78  98


 


 02/22/24 10:37   99  17  101/70  99


 


 02/22/24 08:14  97.3 F L  100  18  127/68  97








                                Intake and Output











 02/21/24 02/22/24 02/22/24





 22:59 06:59 14:59


 


Other:   


 


  Weight   90.718 kg














Results


CBC & Chem 7: 


                                 02/22/24 09:40





                                 02/22/24 08:36


Labs: 


                  Abnormal Lab Results - Last 24 Hours (Table)











  02/22/24 02/22/24 02/22/24 Range/Units





  08:36 08:36 09:33 


 


MCV     (80.0-100.0)  fL


 


PT    9.6 L  (10.0-12.5)  sec


 


APTT    20.7 L  (22.0-30.0)  sec


 


BUN  20 H    (7-17)  mg/dL


 


Creatinine  0.45 L    (0.52-1.04)  mg/dL


 


Glucose  153 H    (74-99)  mg/dL


 


Plasma Lactic Acid Gabe   3.3 H*   (0.7-2.0)  mmol/L


 


AST  60 H    (14-36)  U/L


 


ALT  37 H    (4-34)  U/L


 


Creatine Kinase  512 H    ()  U/L


 


Serum Alcohol  209 H*    mg/dL














  02/22/24 Range/Units





  09:40 


 


MCV  100.4 H  (80.0-100.0)  fL


 


PT   (10.0-12.5)  sec


 


APTT   (22.0-30.0)  sec


 


BUN   (7-17)  mg/dL


 


Creatinine   (0.52-1.04)  mg/dL


 


Glucose   (74-99)  mg/dL


 


Plasma Lactic Acid Gabe   (0.7-2.0)  mmol/L


 


AST   (14-36)  U/L


 


ALT   (4-34)  U/L


 


Creatine Kinase   ()  U/L


 


Serum Alcohol   mg/dL

## 2024-02-22 NOTE — XR
EXAMINATION TYPE: XR pelvis AP view

 

DATE OF EXAM: 2/22/2024

 

Comparison: 1/29/2024

 

Clinical History: 63-year-old female pain after fall

 

Findings:

SI joints appear symmetric and intact. Moderate degenerative change of the pubic symphysis. Hips appe
ar symmetric and intact. No acute fracture, subluxation, dislocation seen. Facet arthropathy in the l
umbar spine especially towards the right.

 

Impression:

 No acute osseous abnormality seen.

## 2024-02-22 NOTE — XR
EXAMINATION TYPE: XR chest 2V

 

DATE OF EXAM: 2/22/2024

 

COMPARISON: 1/29/2024

 

HISTORY: 63-year-old female with weakness

 

TECHNIQUE:  AP and lateral views

 

FINDINGS:  

Heart normal size. Aorta and pulmonary vasculature within normal limits. Hazy lung densities relating
 to overlying soft tissue. Old healed right-sided rib fracture deformities. There appears to be chron
ic bony deformity of the visualized proximal left humerus. No consolidation or pleural effusion.

 

 

IMPRESSION:  

No acute cardiopulmonary process.

## 2024-02-22 NOTE — CT
EXAMINATION TYPE: CT thor lumbar spine wo con

 

DATE OF EXAM: 2/22/2024

 

COMPARISON: 9/11/2023

 

HISTORY: Fall x 2 this week

 

CT DLP: 2450.5 mGycm

Automated exposure control for dose reduction was used.

 

Contrast: None

 

Technique: Axial images 3 mm thick sections. Reconstructed images in the coronal and sagittal planes.


 

FINDINGS: 

No acute fractures are evident. Vertebral body heights are preserved. There is some mild scoliosis wi
thin the thoracic spine.

 

Facet degenerative changes are through the lumbar spine.

 

L3-4: Disc bulge is present with anterior thecal sac contact. This may be asymmetric into the right p
aracentral region. Correlate for right radicular symptoms. No stenosis.

 

L4-5: Disc bulging is present. Facet hypertrophy and ligamentum flavum laxity are present contributin
g to spinal canal narrowing.

 

IMPRESSION: 

1. NO ACUTE OSSEOUS ABNORMALITIES CERVICAL THORACIC OR LUMBAR SPINE.

2. DISC BULGING AND FACET HYPERTROPHY AT L4-5 CONTRIBUTING TO SOME SPINAL CANAL NARROWING.

3. THERE MAY BE SOME RIGHT PARACENTRAL DISC BULGING L3-4. CORRELATE FOR RIGHT RADICULAR SYMPTOMS.

## 2024-02-22 NOTE — ED
General Adult HPI





- General


Stated complaint: Weakness


Time Seen by Provider: 02/22/24 08:15


Source: patient, EMS, RN notes reviewed, old records reviewed


Mode of arrival: EMS





- History of Present Illness


Initial comments: 





Patient is a 63-year-old female who was recently evaluated and admitted to this 

hospital for similar complaints presents emergency department for fall at home 

and inability to ambulate.  Was just discharged on February 6 after being 

admitted on January 29 for similar complaint.  States she believes she fell 3 to

4 days ago and has been crawling around.  Was surrounded by food on the ground. 

Endorses worsening weakness in her legs.  Reported she has a hard time with 

ambulation at baseline.  Has been calling around her apartment.  Was intoxicated

at the time as well.  Presents for further evaluation at this time.  Not on 

blood thinners.  Denies any other drug use.  Endorses somewhat mild back 

soreness however no focal pain.  Also endorses top of the head pain from 

falling.  Denies loss of consciousness or being on blood thinners.  Denies any 

chest pain or shortness of breath.  Denies any abdominal pain or nausea or 

vomiting.  Denies any numbness.  Endorses chronic lower extremity weakness.  

Presents for further evaluation at this time.Denies urinary or bowel incontin

ence or retention.  Denies saddle anesthesias.





- Related Data


                                  Previous Rx's











 Medication  Instructions  Recorded


 


Pantoprazole [Protonix] 40 mg PO DAILY #30 tab 12/20/23


 


lisinopriL [Zestril] 10 mg PO DAILY #30 tab 12/20/23


 


Acetaminophen Tab [Tylenol] 650 mg PO Q6HR PRN  tab 02/06/24


 


Escitalopram [Lexapro] 10 mg PO DAILY 30 Days #30 tab 02/06/24


 


Folic Acid 1 mg PO DAILY #30 tablet 02/06/24


 


Ibuprofen [Motrin] 400 mg PO Q6HR PRN  tab 02/06/24


 


Multivitamins, Thera [Multivitamin] 1 tab PO DAILY #30 tablet 02/06/24


 


QUEtiapine [SEROquel] 200 mg PO HS 30 Days #30 tab 02/06/24


 


Thiamine [Vitamin B-1] 100 mg PO DAILY 30 Days #30 tab 02/06/24


 


traZODone HCL [Desyrel] 50 mg PO HS PRN  tab 02/06/24











                                    Allergies











Allergy/AdvReac Type Severity Reaction Status Date / Time


 


latex Allergy Unknown Itching, Verified 02/22/24 11:16





   Blisters  


 


codeine AdvReac Severe HEADACHE Verified 02/22/24 11:16


 


hydrocodone [From Vicodin] AdvReac Severe HEADACHE Verified 02/22/24 11:16


 


Anesthetics - Amide Type - AdvReac  GAS GIVES Verified 02/22/24 11:16





Select A   HER  





   HEADACHE,  





   VOMITING,  





   HEART  





   PALIPITATIONS  


 


Anesthetics - Juliana Type- AdvReac  GAS GIVES Verified 02/22/24 11:16





Parabens   HER  





   HEADACHE,  





   VOMITING,  





   HEART  





   PALIPITATIONS  


 


ANESTHESIA AdvReac Unknown GAS GIVES Uncoded 02/22/24 11:16





   HER  





   HEADACHE,  





   VOMITING,  





   HEART  





   PALIPITATIONS  














Review of Systems


ROS Statement: 


Those systems with pertinent positive or pertinent negative responses have been 

documented in the HPI.


Review of Systems:


CONST: Denies fever


EYES: Denies blurry vision


ENT: Denies nasal congestion


C/V: Denies Chest pain


RESP: Denies shortness of breath


GI: Denies abdominal pain


: Denies dysuria


SKIN: Denies rash.


MSK: Endorses back soreness


NEURO: Endorses acute on chronic lower extremity weakness





ROS Other: All systems not noted in ROS Statement are negative.





Past Medical History


Past Medical History: Hyperlipidemia, Hypertension, Osteoarthritis (OA)


Additional Past Medical History / Comment(s): hx colon polyps, occasional 

diarrhea, hx of fall Nov 2020 and has been having pain right hip since that 

radiates down right leg, walks with limp., states breast implants moving up.


History of Any Multi-Drug Resistant Organisms: None Reported


Past Surgical History: Breast Surgery, Orthopedic Surgery, Tonsillectomy


Additional Past Surgical History / Comment(s): right elbow surgery with screws, 

right knee surgery with plate and removal ., lumpectomy left breast, breast 

implants


Past Anesthesia/Blood Transfusion Reactions: Postoperative Nausea & Vomiting 

(PONV)


Past Psychological History: Anxiety, Depression


Smoking Status: Former smoker


Past Alcohol Use History: Abuse, Daily


Past Drug Use History: None Reported





- Past Family History


  ** Father


Family Medical History: Cancer





General Exam





- General Exam Comments


Initial Comments: 





General: Appears possibly intoxicated with alcohol at this time.  Possibly mild 

withdrawals with tremors present.


HEAD: Normal with no signs of head trauma.  Negative Burrows sign, negative 

raccoon eyes.


EYES: PERRLA, EOMI, conjunctiva normal, no discharge.  Pupils are 3 mm and equal

bilaterally.


ENT: Hearing grossly intact, normal oropharynx.


RESPIRATORY: Clear breath sounds bilaterally.  No wheezes, rales, or rhonchi.


C/V: Regular rate and rhythm. S1 and S2 auscultated, no edema, peripheral pulses

2+ and intact throughout


ABD: Abd is soft, nontender, nondistended


EXT: Normal range of motion, no obvious deformity.  Pelvis is stable.  

Paraspinal muscle tenderness to palpation and soreness from the cervical spine 

through the lumbar spine.  No obvious midline tenderness however patient states 

she is sore throughout.  No obvious step-offs or deformities of the spine.  No 

obvious pain on palpation of the extremities.


SKIN: No rashes or lesions observed on exposed skin.


NEURO: Alert and oriented x 4.  Cranial nerves II-XII intact. No focal sensory 

or strength deficits.  Patient does appear to have weakness in the lower 

bilateral lower extremities, approximately 3-4 out of 5 that is symmetrical.  No

other focal neurological deficits.








Course


                                   Vital Signs











  02/22/24 02/22/24 02/22/24





  08:14 10:37 11:06


 


Temperature 97.3 F L  


 


Pulse Rate 100 99 104 H


 


Respiratory 18 17 17





Rate   


 


Blood Pressure 127/68 101/70 101/78


 


O2 Sat by Pulse 97 99 98





Oximetry   














  02/22/24





  12:01


 


Temperature 98.2 F


 


Pulse Rate 114 H


 


Respiratory 18





Rate 


 


Blood Pressure 159/88


 


O2 Sat by Pulse 





Oximetry 














Medical Decision Making





- Medical Decision Making





Was pt. sent in by a medical professional or institution (, PA, NP, urgent 

care, hospital, or nursing home...) When possible be specific


@  -No


Did you speak to anyone other than the patient for history (EMS, parent, family,

police, friend...)? What history was obtained from this source 


@  -No


Did you review nursing and triage notes (agree or disagree)?  Why? 


@  -I reviewed and agree with nursing and triage notes


Were old charts reviewed (outside hosp., previous admission, EMS record, old 

EKG, old radiological studies, urgent care reports/EKG's, nursing home records)?

Report findings 


@  -Old charts reviewed


Differential Diagnosis (chest pain, altered mental status, abdominal pain women,

abdominal pain men, vaginal bleeding, weakness, fever, dyspnea, syncope, 

headache, dizziness, GI bleed, back pain, seizure, CVA, palpatations, mental 

health, musculoskeletal)? 


@  -Differential Weakness:


Hypoglycemia, shock, sepsis, hyponatremia, anemia, infection, MI, ETOH, adverse 

medicine reaction, overdose, stroke, this is not meant to be an all-inclusive 

list.





EKG interpreted by me (3pts min.).


@  -As above


X-rays interpreted by me (1pt min.).


@  -Chest x-ray and pelvis x-ray negative for any obvious acute process or 

injury.


CT interpreted by me (1pt min.).


@  -CT brain, C-spine, T-spine, L-spine negative for any obvious fracture or 

acute injury.  Patient does have disc bulging at L4-L5.


U/S interpreted by me (1pt. min.).


@  -None done


What testing was considered but not performed or refused? (CT, X-rays, U/S, 

labs)? Why?


@  -None


What meds were considered but not given or refused? Why?


@  -None


Did you discuss the management of the patient with other professionals (prof

osmanys i.e. , PA, NP, lab, RT, psych nurse, , , 

teacher, , )? Give summary


@  -No


Was smoking cessation discussed for >3mins.?


@  -No


Was critical care preformed (if so, how long)?


@  -No


Were there social determinants of health that impacted care today? How? 

(Homelessness, low income, unemployed, alcoholism, drug addiction, 

transportation, low edu. Level, literacy, decrease access to med. care, [facility], 

rehab)?


@  -No


Was there de-escalation of care discussed even if they declined (Discuss DNR or 

withdrawal of care, Hospice)? DNR status


@  -No


What co-morbidities impacted this encounter? (DM, HTN, Smoking, COPD, CAD, 

Cancer, CVA, ARF, Chemo, Hep., AIDS, mental health diagnosis, sleep apnea, 

morbid obesity)?


@  -None


Was patient admitted / discharged? Hospital course, mention meds given and 

route, prescriptions, significant lab abnormalities, going to OR and other 

pertinent info.


@  -Patient presents for what appears to be acute on chronic complaints.  Has 

been on the ground for multiple days.  Similar to her last presentation to the 

ER.  We will obtain general workup as well as CT imaging the brain, spine as 

well as chest and pelvis x-ray.  Patient in agreement this plan.  She will be 

given a dose of Ativan she may have mild alcohol withdrawals as well as a dose 

of Tylenol.  She will also be given a 1 L fluid bolus.  Vital signs are within 

acceptable limits otherwise.





EKG shows no signs of acute ischemia.Patient's imaging shows disc bulging at L4-

L5.  Patient's laboratory studies remarkable for alcohol intoxication, lactic a

cidosis likely secondary to dehydration and acute alcoholic intoxication., as 

well as an elevated creatinine kinase.





I discussed results with patient.  She will be admitted at this time.  She is 

given IV fluids, was placed on CIWA protocol as well as maintenance fluids.  

Patient was in agreement this plan.





I spoke with the accepting physician, Dr. Sams who admitted the patient.


Undiagnosed new problem with uncertain prognosis?


@  -No


Drug Therapy requiring intensive monitoring for toxicity (Heparin, Nitro, 

Insulin, Cardizem)?


@  -No


Were any procedures done?


@  -No





Diagnosis/symptom?


@  -Alcohol intoxication, alcohol withdrawals, dehydration


Acute, or Chronic, or Acute on Chronic?


@  -Acute


Uncomplicated (without systemic symptoms) or Complicated (systemic symptoms)?


@  -Complicated


Side effects of treatment?


@  -None


Exacerbation, Progression, or Severe Exacerbation]


@  -No


Poses a threat to life or bodily function?


@  -Yes





Diagnosis/symptom?


@  -Lower extremity weakness, debility


Acute, or Chronic, or Acute on Chronic?


@  -Acute on chronic


Uncomplicated (without systemic symptoms) or Complicated (systemic symptoms)?


@  -Complicated


Side effects of treatment?


@  -None


Exacerbation, Progression, or Severe Exacerbation]


@  -No


Poses a threat to life or bodily function?


@  -Unlikely














- Lab Data


Result diagrams: 


                                 02/22/24 09:40





                                 02/22/24 08:36


                                   Lab Results











  02/22/24 02/22/24 02/22/24 Range/Units





  08:36 08:36 08:36 


 


WBC     (3.8-10.6)  k/uL


 


RBC     (3.80-5.40)  m/uL


 


Hgb     (11.4-16.0)  gm/dL


 


Hct     (34.0-46.0)  %


 


MCV     (80.0-100.0)  fL


 


MCH     (25.0-35.0)  pg


 


MCHC     (31.0-37.0)  g/dL


 


RDW     (11.5-15.5)  %


 


Plt Count     (150-450)  k/uL


 


MPV     


 


Neutrophils %     %


 


Lymphocytes %     %


 


Monocytes %     %


 


Eosinophils %     %


 


Basophils %     %


 


Neutrophils #     (1.3-7.7)  k/uL


 


Lymphocytes #     (1.0-4.8)  k/uL


 


Monocytes #     (0-1.0)  k/uL


 


Eosinophils #     (0-0.7)  k/uL


 


Basophils #     (0-0.2)  k/uL


 


PT     (10.0-12.5)  sec


 


INR     (<1.2)  


 


APTT     (22.0-30.0)  sec


 


Sodium  144    (137-145)  mmol/L


 


Potassium  4.6    (3.5-5.1)  mmol/L


 


Chloride  105    ()  mmol/L


 


Carbon Dioxide  27    (22-30)  mmol/L


 


Anion Gap  12    mmol/L


 


BUN  20 H    (7-17)  mg/dL


 


Creatinine  0.45 L    (0.52-1.04)  mg/dL


 


Est GFR (CKD-EPI)AfAm  >90    (>60 ml/min/1.73 sqM)  


 


Est GFR (CKD-EPI)NonAf  >90    (>60 ml/min/1.73 sqM)  


 


Glucose  153 H    (74-99)  mg/dL


 


Lactic Ac Sepsis Rflx     


 


Plasma Lactic Acid Gabe   3.3 H*   (0.7-2.0)  mmol/L


 


Calcium  9.5    (8.4-10.2)  mg/dL


 


Magnesium  1.7    (1.6-2.3)  mg/dL


 


Total Bilirubin  0.8    (0.2-1.3)  mg/dL


 


AST  60 H    (14-36)  U/L


 


ALT  37 H    (4-34)  U/L


 


Alkaline Phosphatase  53    ()  U/L


 


Ammonia     (<30)  umol/L


 


Creatine Kinase  512 H    ()  U/L


 


Total Protein  7.9    (6.3-8.2)  g/dL


 


Albumin  4.7    (3.5-5.0)  g/dL


 


Serum Alcohol  209 H*    mg/dL


 


Influenza Type A (PCR)    Not Detected  (Not Detectd)  


 


Influenza Type B (PCR)    Not Detected  (Not Detectd)  


 


RSV (PCR)    Not Detected  (Not Detectd)  


 


SARS-CoV-2 (PCR)    Not Detected  (Not Detectd)  














  02/22/24 02/22/24 02/22/24 Range/Units





  08:39 09:33 09:40 


 


WBC    6.3  (3.8-10.6)  k/uL


 


RBC    3.88  (3.80-5.40)  m/uL


 


Hgb    12.9  (11.4-16.0)  gm/dL


 


Hct    38.9  (34.0-46.0)  %


 


MCV    100.4 H  (80.0-100.0)  fL


 


MCH    33.3  (25.0-35.0)  pg


 


MCHC    33.2  (31.0-37.0)  g/dL


 


RDW    13.6  (11.5-15.5)  %


 


Plt Count    250  (150-450)  k/uL


 


MPV    7.8  


 


Neutrophils %    68  %


 


Lymphocytes %    26  %


 


Monocytes %    4  %


 


Eosinophils %    1  %


 


Basophils %    0  %


 


Neutrophils #    4.3  (1.3-7.7)  k/uL


 


Lymphocytes #    1.6  (1.0-4.8)  k/uL


 


Monocytes #    0.3  (0-1.0)  k/uL


 


Eosinophils #    0.0  (0-0.7)  k/uL


 


Basophils #    0.0  (0-0.2)  k/uL


 


PT   9.6 L   (10.0-12.5)  sec


 


INR   0.8   (<1.2)  


 


APTT   20.7 L   (22.0-30.0)  sec


 


Sodium     (137-145)  mmol/L


 


Potassium     (3.5-5.1)  mmol/L


 


Chloride     ()  mmol/L


 


Carbon Dioxide     (22-30)  mmol/L


 


Anion Gap     mmol/L


 


BUN     (7-17)  mg/dL


 


Creatinine     (0.52-1.04)  mg/dL


 


Est GFR (CKD-EPI)AfAm     (>60 ml/min/1.73 sqM)  


 


Est GFR (CKD-EPI)NonAf     (>60 ml/min/1.73 sqM)  


 


Glucose     (74-99)  mg/dL


 


Lactic Ac Sepsis Rflx     


 


Plasma Lactic Acid Gabe     (0.7-2.0)  mmol/L


 


Calcium     (8.4-10.2)  mg/dL


 


Magnesium     (1.6-2.3)  mg/dL


 


Total Bilirubin     (0.2-1.3)  mg/dL


 


AST     (14-36)  U/L


 


ALT     (4-34)  U/L


 


Alkaline Phosphatase     ()  U/L


 


Ammonia  13    (<30)  umol/L


 


Creatine Kinase     ()  U/L


 


Total Protein     (6.3-8.2)  g/dL


 


Albumin     (3.5-5.0)  g/dL


 


Serum Alcohol     mg/dL


 


Influenza Type A (PCR)     (Not Detectd)  


 


Influenza Type B (PCR)     (Not Detectd)  


 


RSV (PCR)     (Not Detectd)  


 


SARS-CoV-2 (PCR)     (Not Detectd)  














  02/22/24 Range/Units





  09:42 


 


WBC   (3.8-10.6)  k/uL


 


RBC   (3.80-5.40)  m/uL


 


Hgb   (11.4-16.0)  gm/dL


 


Hct   (34.0-46.0)  %


 


MCV   (80.0-100.0)  fL


 


MCH   (25.0-35.0)  pg


 


MCHC   (31.0-37.0)  g/dL


 


RDW   (11.5-15.5)  %


 


Plt Count   (150-450)  k/uL


 


MPV   


 


Neutrophils %   %


 


Lymphocytes %   %


 


Monocytes %   %


 


Eosinophils %   %


 


Basophils %   %


 


Neutrophils #   (1.3-7.7)  k/uL


 


Lymphocytes #   (1.0-4.8)  k/uL


 


Monocytes #   (0-1.0)  k/uL


 


Eosinophils #   (0-0.7)  k/uL


 


Basophils #   (0-0.2)  k/uL


 


PT   (10.0-12.5)  sec


 


INR   (<1.2)  


 


APTT   (22.0-30.0)  sec


 


Sodium   (137-145)  mmol/L


 


Potassium   (3.5-5.1)  mmol/L


 


Chloride   ()  mmol/L


 


Carbon Dioxide   (22-30)  mmol/L


 


Anion Gap   mmol/L


 


BUN   (7-17)  mg/dL


 


Creatinine   (0.52-1.04)  mg/dL


 


Est GFR (CKD-EPI)AfAm   (>60 ml/min/1.73 sqM)  


 


Est GFR (CKD-EPI)NonAf   (>60 ml/min/1.73 sqM)  


 


Glucose   (74-99)  mg/dL


 


Lactic Ac Sepsis Rflx  Y  


 


Plasma Lactic Acid Gabe   (0.7-2.0)  mmol/L


 


Calcium   (8.4-10.2)  mg/dL


 


Magnesium   (1.6-2.3)  mg/dL


 


Total Bilirubin   (0.2-1.3)  mg/dL


 


AST   (14-36)  U/L


 


ALT   (4-34)  U/L


 


Alkaline Phosphatase   ()  U/L


 


Ammonia   (<30)  umol/L


 


Creatine Kinase   ()  U/L


 


Total Protein   (6.3-8.2)  g/dL


 


Albumin   (3.5-5.0)  g/dL


 


Serum Alcohol   mg/dL


 


Influenza Type A (PCR)   (Not Detectd)  


 


Influenza Type B (PCR)   (Not Detectd)  


 


RSV (PCR)   (Not Detectd)  


 


SARS-CoV-2 (PCR)   (Not Detectd)  














- EKG Data


-: EKG Interpreted by Me


EKG Comments: 





12-lead Electrocardiogram Interpretation Note





EKG was reviewed and interpreted by myself. 12-lead ECG performed at 0824 is 

interpreted by me as revealing sinus tachycardia at a rate of 102 beats per 

minute.  Axis is normal.  NE interval is 162 ms, QRS duration is 93 ms, QTc is 

404 ms..  There were no ST or T wave abnormalities to suggest myocardial 

ischemia or injury.  R wave progression across the precordium was satisfactory. 

By my interpretation this EKG is non-diagnostic for acute ischemia.








Disposition


Clinical Impression: 


 Lower extremity weakness, Dehydration, Alcohol withdrawal, Alcohol 

intoxication, Debility





Disposition: ADMITTED AS IP TO THIS HOSP


Condition: Stable


Time of Disposition: 11:00
Anxiety and depression    Asthma    Hypertension    Osteoarthritis    Seasonal allergies

## 2024-02-22 NOTE — CT
EXAMINATION TYPE: CT brain myrtle wo con

 

DATE OF EXAM: 2/22/2024

 

COMPARISON: 1/29/2024

 

HISTORY: Fall x 2 this week

 

CT DLP: 1305.8 mGycm, Automated exposure control for dose reduction was used.

 

CONTRAST: Patient injected with 0 mL of Isovue 300.

 

CT of the brain is performed utilizing 3 mm thick sections through the posterior fossa and 3 mm thick
 sections through the remaining calvarium.  

 

Study is performed within 24 hours of arrival to the hospital.

 

 No abnormal hyperdensity is present to suggest an acute intracranial hemorrhage.

No mass lesion is evident.

No acute infarcts are evident.  Some mild periventricular white matter hypodensity is present, likely
 on the basis of chronic white matter ischemic changes.

Ventricles and sulci are appropriate for the patient age.  

 

Paranasal sinuses and mastoid air cells within the field-of-view are clear. 

 

IMPRESSIONS:

1. No acute intracranial process.

2. Chronic appearing periventricular white matter ischemic-type changes, stable from comparison.

 

CT cervical spine.

 

COMPARISON: None

 

CT of the cervical spine is performed in the axial plane at 2 mm thick sections.  Reconstructed image
s in the coronal, and sagittal plane are reviewed on the computer. 

 

No acute fractures are evident.

There is slight kyphosis centered at C6-7. 

Disc space narrowing is present diffusely through the cervical spine.

Vertebral body heights are preserved.

No spinal canal stenosis is evident.

No neural foraminal stenosis is evident. 

 

IMPRESSION:

1. No acute osseous abnormality cervical spine.

2. Mild kyphosis lower cervical spine.

3. Mild diffuse disc space narrowing.

## 2024-02-23 LAB
ANION GAP SERPL CALC-SCNC: 14 MMOL/L (ref 4–12)
BASOPHILS # BLD AUTO: 0.1 K/UL (ref 0–0.2)
BASOPHILS NFR BLD AUTO: 1 %
BUN SERPL-SCNC: 13.2 MG/DL (ref 9–27)
BUN/CREAT SERPL: 26.4 RATIO (ref 12–20)
CALCIUM SPEC-MCNC: 9.2 MG/DL (ref 8.7–10.3)
CHLORIDE SERPL-SCNC: 106 MMOL/L (ref 96–109)
CO2 SERPL-SCNC: 21 MMOL/L (ref 21.6–31.8)
EOSINOPHIL # BLD AUTO: 0.1 K/UL (ref 0–0.7)
EOSINOPHIL NFR BLD AUTO: 1 %
ERYTHROCYTE [DISTWIDTH] IN BLOOD BY AUTOMATED COUNT: 3.48 M/UL (ref 3.8–5.4)
ERYTHROCYTE [DISTWIDTH] IN BLOOD: 13.4 % (ref 11.5–15.5)
GLUCOSE SERPL-MCNC: 89 MG/DL (ref 70–110)
HCT VFR BLD AUTO: 38.2 % (ref 34–46)
HGB BLD-MCNC: 11.7 GM/DL (ref 11.4–16)
LYMPHOCYTES # SPEC AUTO: 1.4 K/UL (ref 1–4.8)
LYMPHOCYTES NFR SPEC AUTO: 24 %
MAGNESIUM SPEC-SCNC: 1.7 MG/DL (ref 1.5–2.4)
MCH RBC QN AUTO: 33.6 PG (ref 25–35)
MCHC RBC AUTO-ENTMCNC: 30.6 G/DL (ref 31–37)
MCV RBC AUTO: 109.9 FL (ref 80–100)
MONOCYTES # BLD AUTO: 0.6 K/UL (ref 0–1)
MONOCYTES NFR BLD AUTO: 10 %
NEUTROPHILS # BLD AUTO: 3.6 K/UL (ref 1.3–7.7)
NEUTROPHILS NFR BLD AUTO: 61 %
PLATELET # BLD AUTO: 162 K/UL (ref 150–450)
POTASSIUM SERPL-SCNC: 4.3 MMOL/L (ref 3.5–5.5)
SODIUM SERPL-SCNC: 141 MMOL/L (ref 135–145)
WBC # BLD AUTO: 5.9 K/UL (ref 3.8–10.6)

## 2024-02-23 RX ADMIN — KETOROLAC TROMETHAMINE PRN MG: 15 INJECTION, SOLUTION INTRAMUSCULAR; INTRAVENOUS at 10:18

## 2024-02-23 RX ADMIN — Medication SCH MG: at 09:07

## 2024-02-23 RX ADMIN — MAGNESIUM SULFATE IN DEXTROSE SCH MLS/HR: 10 INJECTION, SOLUTION INTRAVENOUS at 18:14

## 2024-02-23 RX ADMIN — FOLIC ACID SCH MG: 1 TABLET ORAL at 09:07

## 2024-02-23 NOTE — P.CNOR
History of Present Illness





- [facility]


Consult date: 02/23/24


Requesting physician: Armand Sams


Consult reason: other (Lumbar radiculopathy)


History of present illness: 











Patient is a 63 old female who presented to the hospital yesterday due to 

multiple/frequent falls at home and inability to ambulate over the past several 

months.  Patient was discharged on February 6 after being admitted on January 29

for similar complaint.  Orthopedics was consulted due to lumbar radiculopathy.  

Patient was seen at bedside this afternoon sitting up in chair.  Patient says 

over the past 4 months she has had numbness and tingling in both legs.  She says

with that she has had several falls every week and has had difficulty getting up

after she has fallen.  Patient even mentions she has difficulty getting up from 

a toilet due to weakness.  Patient says she does have weakness in the bilateral 

upper extremities as well and she notes over the past month or so she has had 

increased difficulties with fine motor skills such as putting pen to paper and f

eeding herself because of the shakiness in her hands.  Patient says over the 

past 1 month she has also noticed sometimes she wets her underwear.  Patient 

says she began wearing depends recently as well.  Patient denies any saddle 

anesthesia/numbness or tingling in the genital region.  Patient says about 4 

months ago when she does start noticing she was falling she thinks she started 1

or several new medications from her primary care doctor but cannot recall what. 

Patient says prior to falling she does not have any lightheadedness or 

dizziness, patient feels that her leg somewhat just give out under and she goes 

to the floor.  Patient denies ever hitting her head or losing consciousness.  Chung cano denies any previous orthopedic spine surgery.  Patient denies being on any

blood thinners.  Patient denies chest pain, fever, shortness breath, nausea, 

vomiting, change in vision.





Past Medical History


Past Medical History: Hyperlipidemia, Hypertension, Osteoarthritis (OA)


Additional Past Medical History / Comment(s): Multiple recent falls. hx colon 

polyps, occasional diarrhea, hx of fall Nov 2020 and has been having pain right 

hip since that radiates down right leg, walks with limp., states breast implants

moving up.


History of Any Multi-Drug Resistant Organisms: None Reported


Past Surgical History: Breast Surgery, Orthopedic Surgery, Tonsillectomy


Additional Past Surgical History / Comment(s): right elbow surgery with screws, 

right knee surgery with plate and removal ., lumpectomy left breast, breast 

implants


Past Anesthesia/Blood Transfusion Reactions: Postoperative Nausea & Vomiting 

(PONV)


Past Psychological History: Anxiety, Depression


Smoking Status: Former smoker


Past Alcohol Use History: Abuse, Daily


Additional Past Alcohol Use History / Comment(s): quit smoking 35 yrs ago 

(1986), hx of 2ppd, started age 15.


Past Drug Use History: None Reported


Additional Drug Use History / Comment(s): Patient states she drinks twice a 

month. 1/2 gallon of Vodka each time-drinks until its gone.





- Past Family History


  ** Father


Family Medical History: Cancer





Medications and Allergies


                                Home Medications











 Medication  Instructions  Recorded  Confirmed  Type


 


Pantoprazole [Protonix] 40 mg PO DAILY #30 tab 12/20/23 02/22/24 Rx


 


lisinopriL [Zestril] 10 mg PO DAILY #30 tab 12/20/23 02/22/24 Rx


 


Acetaminophen Tab [Tylenol] 650 mg PO Q6HR PRN  tab 02/06/24 02/22/24 Rx


 


Escitalopram [Lexapro] 10 mg PO DAILY 30 Days #30 tab 02/06/24 02/22/24 Rx


 


Folic Acid 1 mg PO DAILY #30 tablet 02/06/24 02/22/24 Rx


 


Ibuprofen [Motrin] 400 mg PO Q6HR PRN  tab 02/06/24 02/22/24 Rx


 


Multivitamins, Thera [Multivitamin] 1 tab PO DAILY #30 tablet 02/06/24 02/22/24 

Rx


 


QUEtiapine [SEROquel] 200 mg PO HS 30 Days #30 tab 02/06/24 02/22/24 Rx


 


Thiamine [Vitamin B-1] 100 mg PO DAILY 30 Days #30 tab 02/06/24 02/22/24 Rx


 


traZODone HCL [Desyrel] 50 mg PO HS PRN  tab 02/06/24 02/22/24 Rx








                                    Allergies











Allergy/AdvReac Type Severity Reaction Status Date / Time


 


latex Allergy Unknown Itching, Verified 02/22/24 11:16





   Blisters  


 


codeine AdvReac Severe HEADACHE Verified 02/22/24 11:16


 


hydrocodone [From Vicodin] AdvReac Severe HEADACHE Verified 02/22/24 11:16


 


Anesthetics - Amide Type - AdvReac  GAS GIVES Verified 02/22/24 11:16





Select A   HER  





   HEADACHE,  





   VOMITING,  





   HEART  





   PALIPITATIONS  


 


Anesthetics - Juliana Type- AdvReac  GAS GIVES Verified 02/22/24 11:16





Parabens   HER  





   HEADACHE,  





   VOMITING,  





   HEART  





   PALIPITATIONS  


 


ANESTHESIA AdvReac Unknown GAS GIVES Uncoded 02/22/24 11:16





   HER  





   HEADACHE,  





   VOMITING,  





   HEART  





   PALIPITATIONS  














Physical Examination


Inspection: Patient does present some very minimal resting tremor in the hands 

bilaterally.  Negative for any open fractures, significant erythema/ecchymosis 

or open wounds.





Sensation: Somewhat diminished throughout the bilateral lower extremities mostly

from the knee into the foot bilaterally.  Sensation is equal, symmetric, by 

intact throughout the upper extremities on exam.





Palpation: Moderate tenderness patient throughout the lower lumbar spine midline

and in the paravertebral regions.  Positive for fair amount of tenderness to 

patient throughout the bilateral shoulder blades and in the pair the procedure 

region of the cervical spine.  Nontender to palpation throughout rest exam.





Range of motion: Patient has full range of motion throughout bilateral ankles 

and plantar/dorsiflexion.  Limited range of motion in the bilateral hips/knees 

secondary to referred weakness and pain in the low back.  Patient is able to 

elevate both shoulders to about 95 and flexion until patient says her shoulder 

feels stiff and weak and cannot raise arms any higher.  Patient does have 

marginal most throughout bilateral elbows and wrists in flexion/extension.





Motor: 3+/5 in all major motor groups in bilateral upper and lower extremities 

on exam.





Neurovascular: Radial pulse intact, 2+ bilaterally.  Cap refill under 3 seconds 

in digits upper extremities





Special tests: Positive Ren bilaterally.  Negative clonus bilaterally.  

Negative Homans bilaterally.








Results





- Labs


Labs: 


                  Abnormal Lab Results - Last 24 Hours (Table)











  02/22/24 02/23/24 02/23/24 Range/Units





  08:36 07:57 07:57 


 


RBC   3.48 L   (3.80-5.40)  m/uL


 


MCV   109.9 H D   (80.0-100.0)  fL


 


MCHC   30.6 L   (31.0-37.0)  g/dL


 


Macrocytosis   Marked A   


 


Carbon Dioxide    21.0 L  (21.6-31.8)  mmol/L


 


Anion Gap    14.00 H  (4.00-12.00)  mmol/L


 


Creatinine    0.5 L  (0.6-1.5)  mg/dL


 


BUN/Creatinine Ratio    26.40 H  (12.00-20.00)  Ratio


 


Urine Appearance  Cloudy H    (Clear)  


 


Ur Leukocyte Esterase  Trace H    (Negative)  


 


Urine Mucus  Rare H    (None)  /hpf








                                      H & H











  02/22/24 02/23/24 Range/Units





  09:40 07:57 


 


Hgb  12.9  11.7  (11.4-16.0)  gm/dL


 


Hct  38.9  38.2  (34.0-46.0)  %








                                   Coagulation











  02/22/24 Range/Units





  09:33 


 


INR  0.8  (<1.2)  











Result Diagrams: 


                                 02/23/24 07:57





                                 02/23/24 07:57





- Diagnostic results


CT Scan - lumbar: report reviewed, image reviewed (Computed tomography scan of 

the lumbar and thoracic spine has been reviewed.  Evidence for degenerative disc

disease especially from L3-L4 and L4-L5.  Negative for any fractures or 

spondylolisthesis.)





Assessment and Plan


Assessment: 


1.  Low back pain; neck pain; bilateral lower extremity radiculopathy; 

degenerative disc disease





Plan: 


1.  Bilateral lower extremity radiculopathy; low back pain; neck pain; 

degenerative disc disease - computed tomography scan of thoracolumbar spine 

shows evidence for degenerative disc disease especially from L3-L4 and L4-L5.  

Negative for any fractures or spondylolisthesis.  I discussed the findings of 

the imaging and exam of patient with my attending, Dr. Stubbs.  Due to the 

patient's symptoms in the bilateral upper and lower extremities as well as 

positive Ren's bilaterally in the upper extremities, we are ordering an MRI

of the cervical spine for further evaluation.  At this time patient may use pain

medications and needed and patient may benefit from IV steroids.  We will 

continue to follow patient during stay in hospital.  We will await results of 

MRI of cervical spine before proceeding with any potential orthopedic 

intervention.  Patient may weight-bear as tolerated with walker and assistance. 

Appreciate other specialty recommendations





2.  Appreciate medical management





3.  Pain management - toradol





4.  DVT prophylaxis recs





5.  GI prophylaxis - Protonix





6.  PT/OT - menisci with walker and assistance





7.  Encourage incentive spirometer use





8.  Appreciate consult





Time with Patient: Less than 30

## 2024-02-23 NOTE — P.PN
Subjective


Progress Note Date: 02/23/24


63-year-old pleasant female is admitted as patient was not able to ambulate for 

the last week.  Patient has been crawling patient has worsening weakness for 

some time patient does have alcohol abuse history does drink about half a gallon

of hard liquor.  Patient does have elevated MCV patient had a CT scan of the 

lumbar spine which showed significant stenosis at the L4-L5 level patient was 

also having back pain.  Patient is tingling numbness in both legs.  Patient 

denies any other drug abuse.  Patient is already on folate supplementation.  

Patient had a fall about 2 days ago and hit head although CT of the head did not

show any intracranial bleed.





02/23/2024


Patient is evaluated today sitting up in the bed.  She states that she is having

increased weakness since her fall a few days ago she is also fallen again a few 

weeks ago.  Patient states that she has been falling frequently at home and she 

is having paresthesia in her both of her legs.  She is also having some issues 

with her left upper extremity.  Patient does report that she has not been 

drinking daily she states that she drinks about a gallon of hard liquor a few 

times a month.  She is more of a binge drinker.  Patient has been evaluated by 

physical therapy who is recommending subacute rehab she has been denied at 2 

different facilities since we are currently pending subacute rehab placement for

this patient.  Would recommend that she follows up with a neurologist possibly 

for EMG testing outpatient.  Her vitamin B12 level is normal at 374.  AST and 

ALT are elevated.  Magnesium today is 1.7.  Renal function is normal.





Review of Systems





Constitutional: Denied any fatigue denied any fever.


Cardio vascular: denied any chest pain, palpitations


Gastrointestinal: denied any nausea, vomiting, diarrhea


Pulmonary: Denied any shortness of breath cough


Neurologic denied any new focal deficits





All inpatient medications were reviewed and appropriate changes in these 

medications as dictated in the interval history and assessment and plan.





PHYSICAL EXAMINATION: 





GENERAL: The patient is alert and oriented x3, not in any acute distress. Well 

developed, well nourished. 


HEENT: Pupils are round and equally reacting to light. EOMI. No scleral icterus.

No conjunctival pallor. Normocephalic, atraumatic. No pharyngeal erythema. No 

thyromegaly. 


CARDIOVASCULAR: S1 and S2 present. No murmurs, rubs, or gallops. 


PULMONARY: Chest is clear to auscultation, no wheezing or crackles. 


ABDOMEN: Soft, nontender, nondistended, normoactive bowel sounds. No palpable 

organomegaly. 


MUSCULOSKELETAL: No joint swelling or deformity.


EXTREMITIES: No cyanosis, clubbing, or pedal edema. 


NEUROLOGICAL: Significant generalized weakness more pronounced in both legs, 

patient has strength of 3+/5 in both lower extremities upper extremities is 4/5


SKIN: No rashes. 





Assessment and plan


-Frequent falls at home likely due to alcoholism. 


-Inability to walk, generalized weakness: Significant weakness in bilateral 

lower extremities secondary to chronic alcohol use although patient does have 

some lumbar vertebral disease because of which I am consulting spinal surgeon.  

Patient does not have any cord compression on the CT does have radiculopathy and

spinal stenosis at L3-L4 level.  Patient will be started on anti-inflammatory 

medications along with a GI prophylaxis


-Alcohol abuse: Counseling was provided


-Alcohol withdrawal: Patient will be monitored for withdrawal presently does not

have any withdrawals patient is on Ativan CIWA protocol


-Lactic acidosis secondary to dehydration patient was started on IV fluids


-Acute alcoholic hepatitis expected to improve with fluids


-Elevated MCV secondary to chronic alcohol, B12 levels are normal. 


-Hypertension patient is hypotensive hold off on antihypertensive medications


-Hyperlipidemia


-Depression for which patient will be resumed on her home medications. This not 

an active issue.





DVT prophylaxis: Lovenox





PT and OT have evaluated the patient and recommending subacute rehab on d

ischarge. Pending accepting facility. 





The impression and plan of care has been dictated by Linda Dietz, Nurse 

Practitioner as directed.





Dr. Latrell MD


I have performed a history and physical examination and medical decision making 

of this patient, discussed the same with the dictator, and agree with the 

dictators assessment and plan as written, documented as a scribe. Based on total

visit time, I have performed more than 50% of this visit.





Objective





- Vital Signs


Vital signs: 


                                   Vital Signs











Temp  98.9 F   02/23/24 12:35


 


Pulse  83   02/23/24 12:35


 


Resp  16   02/23/24 12:35


 


BP  129/82   02/23/24 12:35


 


Pulse Ox  97   02/23/24 12:35


 


FiO2      








                                 Intake & Output











 02/22/24 02/23/24 02/23/24





 18:59 06:59 18:59


 


Intake Total  1400 


 


Output Total  400 


 


Balance  1000 


 


Weight 90.718 kg  


 


Intake:   


 


  Intake, IV Titration  900 





  Amount   


 


    Sodium Chloride 0.9% 1,  900 





    000 ml @ 75 mls/hr IV .   





    [id number][id number]   


 


  Oral  500 


 


Output:   


 


  Urine  400 


 


Other:   


 


  Voiding Method  External Catheter Bedside Commode


 


  # Voids   1














- Labs


CBC & Chem 7: 


                                 02/23/24 07:57





                                 02/23/24 07:57


Labs: 


                  Abnormal Lab Results - Last 24 Hours (Table)











  02/22/24 02/23/24 02/23/24 Range/Units





  08:36 07:57 07:57 


 


RBC   3.48 L   (3.80-5.40)  m/uL


 


MCV   109.9 H D   (80.0-100.0)  fL


 


MCHC   30.6 L   (31.0-37.0)  g/dL


 


Macrocytosis   Marked A   


 


Carbon Dioxide    21.0 L  (21.6-31.8)  mmol/L


 


Anion Gap    14.00 H  (4.00-12.00)  mmol/L


 


Creatinine    0.5 L  (0.6-1.5)  mg/dL


 


BUN/Creatinine Ratio    26.40 H  (12.00-20.00)  Ratio


 


Urine Appearance  Cloudy H    (Clear)  


 


Ur Leukocyte Esterase  Trace H    (Negative)  


 


Urine Mucus  Rare H    (None)  /hpf














Assessment and Plan


Time with Patient: Less than 30

## 2024-02-24 PROCEDURE — HZ2ZZZZ DETOXIFICATION SERVICES FOR SUBSTANCE ABUSE TREATMENT: ICD-10-PCS

## 2024-02-24 RX ADMIN — MAGNESIUM SULFATE IN DEXTROSE ONE MLS/HR: 10 INJECTION, SOLUTION INTRAVENOUS at 09:37

## 2024-02-24 RX ADMIN — ACETAMINOPHEN PRN MG: 325 TABLET, FILM COATED ORAL at 14:59

## 2024-02-24 RX ADMIN — HEPARIN SODIUM SCH UNIT: 5000 INJECTION, SOLUTION INTRAVENOUS; SUBCUTANEOUS at 09:37

## 2024-02-24 RX ADMIN — DEXAMETHASONE SODIUM PHOSPHATE SCH MG: 4 INJECTION, SOLUTION INTRAMUSCULAR; INTRAVENOUS at 12:31

## 2024-02-24 NOTE — P.PN
Subjective


Progress Note Date: 02/24/24


63-year-old pleasant female is admitted as patient was not able to ambulate for 

the last week.  Patient has been crawling patient has worsening weakness for 

some time patient does have alcohol abuse history does drink about half a gallon

of hard liquor.  Patient does have elevated MCV patient had a CT scan of the 

lumbar spine which showed significant stenosis at the L4-L5 level patient was 

also having back pain.  Patient is tingling numbness in both legs.  Patient 

denies any other drug abuse.  Patient is already on folate supplementation.  

Patient had a fall about 2 days ago and hit head although CT of the head did not

show any intracranial bleed.





02/23/2024


Patient is evaluated today sitting up in the bed.  She states that she is having

increased weakness since her fall a few days ago she is also fallen again a few 

weeks ago.  Patient states that she has been falling frequently at home and she 

is having paresthesia in her both of her legs.  She is also having some issues 

with her left upper extremity.  Patient does report that she has not been 

drinking daily she states that she drinks about a gallon of hard liquor a few 

times a month.  She is more of a binge drinker.  Patient has been evaluated by 

physical therapy who is recommending subacute rehab she has been denied at 2 

different facilities since we are currently pending subacute rehab placement for

this patient.  Would recommend that she follows up with a neurologist possibly 

for EMG testing outpatient.  Her vitamin B12 level is normal at 374.  AST and 

ALT are elevated.  Magnesium today is 1.7.  Renal function is normal.





02/24/2024


Patient is evaluated today sitting in the bed. She continues to report weakness,

parasthesia and weak hand grasp to the upper extremities. Orthopedics has 

evaluated the patient and recommending to undergo cervical spine MRI currently 

pending. Patient has also been started on IV dexamethasone. She is on [facility] 

protocol has not required ativan in 48 hours. Hemodynamically she is stable.





Review of Systems





Constitutional: Denied any fatigue denied any fever.


Cardio vascular: denied any chest pain, palpitations


Gastrointestinal: denied any nausea, vomiting, diarrhea


Pulmonary: Denied any shortness of breath cough


Neurologic denied any new focal deficits





All inpatient medications were reviewed and appropriate changes in these 

medications as dictated in the interval history and assessment and plan.





PHYSICAL EXAMINATION: 





GENERAL: The patient is alert and oriented x3, not in any acute distress. Well 

developed, well nourished. 


HEENT: Pupils are round and equally reacting to light. EOMI. No scleral icterus.

No conjunctival pallor. Normocephalic, atraumatic. No pharyngeal erythema. No 

thyromegaly. 


CARDIOVASCULAR: S1 and S2 present. No murmurs, rubs, or gallops. 


PULMONARY: Chest is clear to auscultation, no wheezing or crackles. 


ABDOMEN: Soft, nontender, nondistended, normoactive bowel sounds. No palpable 

organomegaly. 


MUSCULOSKELETAL: No joint swelling or deformity.


EXTREMITIES: No cyanosis, clubbing, or pedal edema. 


NEUROLOGICAL: Significant generalized weakness more pronounced in both legs, 

patient has strength of 3+/5 in both lower extremities upper extremities is 4/5


SKIN: No rashes. 





Assessment and plan


-Frequent falls at home likely due to alcoholism. 


-Inability to walk, generalized weakness: Significant weakness in bilateral 

upper and lower extremities does have radiculopathy and spinal stenosis at L3-L4

level. Will need subacute rehab on discharge. 


-Cervical radiculopathy and weakness pending cervical MRI on anti-inflammatory 

medications and started on IV dexamethasone. On GI prophylaxis. 


-Alcohol abuse: Counseling was provided


-Alcohol withdrawal: Patient will be monitored for withdrawal presently does not

have any withdrawals patient is on Ativan CIWA protocol


-Lactic acidosis secondary to dehydration patient was started on IV fluids


-Acute alcoholic hepatitis expected to improve with fluids


-Elevated MCV secondary to chronic alcohol, B12 levels are normal. 


-Hypertension patient is hypotensive hold off on antihypertensive medications


-Hyperlipidemia


-Depression for which patient will be resumed on her home medications. This not 

an active issue.





DVT prophylaxis: Lovenox





PT and OT have evaluated the patient and recommending subacute rehab on 

discharge. Pending accepting facility. 





The impression and plan of care has been dictated by Linda Dietz Nurse 

Practitioner as directed.





Dr. Latrell MD


I have performed a history and physical examination and medical decision making 

of this patient, discussed the same with the dictator, and agree with the 

dictators assessment and plan as written, documented as a scribe. Based on total

visit time, I have performed more than 50% of this visit.





Objective





- Vital Signs


Vital signs: 


                                   Vital Signs











Temp  98.3 F   02/24/24 07:28


 


Pulse  81   02/24/24 07:28


 


Resp  18   02/24/24 07:28


 


BP  142/89   02/24/24 07:28


 


Pulse Ox  97   02/24/24 08:18


 


FiO2      








                                 Intake & Output











 02/23/24 02/24/24 02/24/24





 18:59 06:59 18:59


 


Intake Total 900 590 


 


Balance 900 590 


 


Intake:   


 


  Intake, IV Titration 900  





  Amount   


 


    Sodium Chloride 0.9% 1, 900  





    000 ml @ 75 mls/hr IV .   





    [id number] [facility] Rx#:[id number]   


 


  Oral  590 


 


Other:   


 


  Voiding Method Bedside Commode Bedside Commode Bedside Commode





  Diaper 


 


  # Voids 4  1


 


  # Bowel Movements 1  














- Labs


CBC & Chem 7: 


                                 02/23/24 07:57





                                 02/23/24 07:57





Assessment and Plan


Time with Patient: Less than 30

## 2024-02-24 NOTE — P.PN
Subjective


Progress Note Date: 02/24/24


Principal diagnosis: 


Low back pain; neck pain; bilateral lower extremity radiculopathy; degenerative 

disc disease








Patient was seen at bedside this morning sitting up in chair.  Patient says she 

is still having fine motor skill issues such as using a fork or a spoon or 

riding with a pen.  Patient says she is still having numbness and tingling in 

the bilateral lower extremities as well.  Patient denies any other changes since

yesterday.  Patient is looking forward to working with therapy today.  Patient 

is looking forward to MRI of cervical spine this afternoon.  Patient denies any 

other issues.  Patient denies chest pain, fever, shortness breath, nausea, 

vomiting or change in vision, loss of bowel control








Objective





- Vital Signs


Vital signs: 


                                   Vital Signs











Temp  98.3 F   02/24/24 07:28


 


Pulse  81   02/24/24 07:28


 


Resp  18   02/24/24 07:28


 


BP  142/89   02/24/24 07:28


 


Pulse Ox  97   02/24/24 08:18


 


FiO2      








                                 Intake & Output











 02/23/24 02/24/24 02/24/24





 18:59 06:59 18:59


 


Intake Total 900 590 


 


Balance 900 590 


 


Intake:   


 


  Intake, IV Titration 900  





  Amount   


 


    Sodium Chloride 0.9% 1, 900  





    000 ml @ 75 mls/hr IV .   





    [id number] [facility] Rx#:[id number]   


 


  Oral  590 


 


Other:   


 


  Voiding Method Bedside Commode Bedside Commode 





  Diaper 


 


  # Voids 4  1


 


  # Bowel Movements 1  














- Exam


Inspection: Patient does present some very minimal resting tremor in the hands 

bilaterally.  Negative for any open fractures, significant erythema/ecchymosis 

or open wounds.





Sensation: Somewhat diminished throughout the bilateral lower extremities mostly

from the knee into the foot bilaterally.  Sensation is equal, symmetric, by 

intact throughout the upper extremities on exam.





Palpation: Moderate tenderness patient throughout the lower lumbar spine midline

and in the paravertebral regions.  Positive for fair amount of tenderness to p

atient throughout the bilateral shoulder blades and in the pair the procedure 

region of the cervical spine.  Nontender to palpation throughout rest exam.





Range of motion: Patient has full range of motion throughout bilateral ankles 

and plantar/dorsiflexion.  Limited range of motion in the bilateral hips/knees 

secondary to referred weakness and pain in the low back.  Patient is able to 

elevate both shoulders to about 95 and flexion until patient says her shoulder 

feels stiff and weak and cannot raise arms any higher.  Patient does have 

marginal most throughout bilateral elbows and wrists in flexion/extension.





Motor: 3+/5 in all major motor groups in bilateral upper and lower extremities 

on exam.





Neurovascular: Radial pulse intact, 2+ bilaterally.  Cap refill under 3 seconds 

in digits upper extremities





Special tests: Positive Ren bilaterally.  Negative clonus bilaterally.  

Negative Homans bilaterally.








- Labs


CBC & Chem 7: 


                                 02/23/24 07:57





                                 02/23/24 07:57


Labs: 


                  Abnormal Lab Results - Last 24 Hours (Table)











  02/23/24 02/23/24 Range/Units





  07:57 07:57 


 


RBC  3.48 L   (3.80-5.40)  m/uL


 


MCV  109.9 H D   (80.0-100.0)  fL


 


MCHC  30.6 L   (31.0-37.0)  g/dL


 


Macrocytosis  Marked A   


 


Carbon Dioxide   21.0 L  (21.6-31.8)  mmol/L


 


Anion Gap   14.00 H  (4.00-12.00)  mmol/L


 


Creatinine   0.5 L  (0.6-1.5)  mg/dL


 


BUN/Creatinine Ratio   26.40 H  (12.00-20.00)  Ratio














Assessment and Plan


Assessment: 


1.  Low back pain; neck pain; bilateral lower extremity radiculopathy; 

degenerative disc disease





Plan: 


1.  Bilateral lower extremity radiculopathy; low back pain; neck pain; 

degenerative disc disease - computed tomography scan of thoracolumbar spine 

shows evidence for degenerative disc disease especially from L3-L4 and L4-L5.  

Negative for any fractures or spondylolisthesis.  I discussed the findings of 

the imaging and exam of patient with my attending, Dr. Stubbs.  Awaiting MRI

of cervical spine--cervical spine MRI to be performed this afternoon.  At this 

time patient may use pain medications and needed IV steroids ordered for 

symptoms.  We will continue to follow patient during stay in hospital.  We will 

await results of MRI of cervical spine before proceeding with any potential 

orthopedic intervention.  Patient may weight-bear as tolerated with walker and 

assistance.  Appreciate other specialty recommendations





2.  Appreciate medical management





3.  Pain management - toradol





4.  DVT prophylaxis recs





5.  GI prophylaxis - Protonix





6.  PT/OT - weightbearing as tolerated with walker and assistance





7.  Encourage incentive spirometer use








Time with Patient: Less than 30

## 2024-02-24 NOTE — MR
EXAMINATION TYPE: MR cervical spine wo/w con

 

DATE OF EXAM: 2/24/2024 1:46 PM

 

CLINICAL INDICATION:Female, 63 years old with history of bilateral upper extremity weakness; pain, Bi
lateral upper extremity weakness, pain

 

COMPARISON: 2/22/2024.

 

TECHNIQUE: Multi planar, multi sequence imaging was performed utilizing: T1-weighted, T2-weighted, an
d turbo inversion recovery imaging of the cervical spine. 

IV Contrast: 9 cc Gadavist (none if empty)

 

FINDINGS: 

Alignment: The cervical vertebral bodies have preserved heights. Alignment is within normal limits gi
yulia patient positioning. 

 

Bones: Osteophytes and disc space narrowing with facet and uncovertebral joint arthropathy most prono
unced at the C5-C7 vertebral levels.

 

Cord: The spinal cord is unremarkable with regards to their signal intensity and morphology. 

 

Discs:  Multilevel disc desiccation is present.

 

C2-C3: No significant disc pathology. The spinal canal is patent.  Bilateral facet and uncovertebral 
joint arthropathy are present with mild bilateral neural foraminal stenosis.

 

C3-C4: No significant disc pathology. The spinal canal is patent.  Bilateral facet and uncovertebral 
joint arthropathy are present with mild bilateral neural foraminal stenosis.

 

C4-C5: No significant disc pathology. The spinal canal is patent.  No neural foraminal stenosis.

 

C5-C6: No significant disc pathology. The spinal canal is patent.  No neural foraminal stenosis.

 

C6-C7: No significant disc pathology. The spinal canal is patent.  No neural foraminal stenosis.

 

C7-T1: No significant disc pathology. The spinal canal is patent.  No neural foraminal stenosis.

 

IMPRESSION:

1. No evidence for disc herniation or significant spinal canal stenosis.

 

2. Multilevel disc degeneration with associated osteoarthritic changes.

## 2024-02-25 LAB
CK SERPL-CCNC: 139 U/L (ref 26–186)
VIT B12 SERPL-MCNC: 364 PG/ML (ref 200–944)

## 2024-02-25 RX ADMIN — KETOROLAC TROMETHAMINE PRN MG: 15 INJECTION, SOLUTION INTRAMUSCULAR; INTRAVENOUS at 20:49

## 2024-02-25 RX ADMIN — Medication SCH MG: at 09:43

## 2024-02-25 NOTE — P.PN
Subjective


Progress Note Date: 02/25/24


63-year-old pleasant female is admitted as patient was not able to ambulate for 

the last week.  Patient has been crawling patient has worsening weakness for 

some time patient does have alcohol abuse history does drink about half a gallon

of hard liquor.  Patient does have elevated MCV patient had a CT scan of the 

lumbar spine which showed significant stenosis at the L4-L5 level patient was 

also having back pain.  Patient is tingling numbness in both legs.  Patient 

denies any other drug abuse.  Patient is already on folate supplementation.  

Patient had a fall about 2 days ago and hit head although CT of the head did not

show any intracranial bleed.





02/23/2024


Patient is evaluated today sitting up in the bed.  She states that she is having

increased weakness since her fall a few days ago she is also fallen again a few 

weeks ago.  Patient states that she has been falling frequently at home and she 

is having paresthesia in her both of her legs.  She is also having some issues 

with her left upper extremity.  Patient does report that she has not been 

drinking daily she states that she drinks about a gallon of hard liquor a few 

times a month.  She is more of a binge drinker.  Patient has been evaluated by 

physical therapy who is recommending subacute rehab she has been denied at 2 

different facilities since we are currently pending subacute rehab placement for

this patient.  Would recommend that she follows up with a neurologist possibly 

for EMG testing outpatient.  Her vitamin B12 level is normal at 374.  AST and 

ALT are elevated.  Magnesium today is 1.7.  Renal function is normal.





02/24/2024


Patient is evaluated today sitting in the bed. She continues to report weakness,

parasthesia and weak hand grasp to the upper extremities. Orthopedics has 

evaluated the patient and recommending to undergo cervical spine MRI currently 

pending. Patient has also been started on IV dexamethasone. She is on CIWA 

protocol has not required ativan in 48 hours. Hemodynamically she is stable.





02/25/2024


Patient evaluated today sitting up in the bed.  She underwent cervical spine MRI

revealing evidence for disc herniation or significant spinal canal stenosis 

there are multilevel disc degenerative changes associated with osteoarthritic 

changes.  States that she has not had much improvement with her symptoms 

including upper extremity weakness and difficulty holding overwear with right 

being started on high-dose IV steroid.  We would recommend that the pain 

continue with steroids with dexamethasone 40 mg daily, Patient would benefit 

from EMG testing outpatient and follow up with neurology.  Magnesium level 1.7.





Review of Systems





Constitutional: Denied any fatigue denied any fever.


Cardio vascular: denied any chest pain, palpitations


Gastrointestinal: denied any nausea, vomiting, diarrhea


Pulmonary: Denied any shortness of breath cough


Neurologic denied any new focal deficits





All inpatient medications were reviewed and appropriate changes in these 

medications as dictated in the interval history and assessment and plan.





PHYSICAL EXAMINATION: 





GENERAL: The patient is alert and oriented x3, not in any acute distress. Well 

developed, well nourished. 


HEENT: Pupils are round and equally reacting to light. EOMI. No scleral icterus.

No conjunctival pallor. Normocephalic, atraumatic. No pharyngeal erythema. No 

thyromegaly. 


CARDIOVASCULAR: S1 and S2 present. No murmurs, rubs, or gallops. 


PULMONARY: Chest is clear to auscultation, no wheezing or crackles. 


ABDOMEN: Soft, nontender, nondistended, normoactive bowel sounds. No palpable 

organomegaly. 


MUSCULOSKELETAL: No joint swelling or deformity.


EXTREMITIES: No cyanosis, clubbing, or pedal edema. 


NEUROLOGICAL: Significant generalized weakness more pronounced in both legs, 

patient has strength of 3+/5 in both lower extremities upper extremities is 4/5


SKIN: No rashes. 





Assessment and plan


-Frequent falls at home likely due to alcoholism. 


-Inability to walk, generalized weakness: Significant weakness in bilateral u

pper and lower extremities does have radiculopathy and spinal stenosis at L3-L4 

level. Will need subacute rehab on discharge. 


-Cervical radiculopathy and weakness on course of oral dexamethasone, and no 

surgical interventions planned by orthopedics at this time, Cervical MRI 

complete. 


-Alcohol abuse: Counseling was provided


-Alcohol withdrawal: Patient will be monitored for withdrawal presently does not

have any withdrawals patient is on Ativan CIWA protocol


-Lactic acidosis secondary to dehydration patient was started on IV fluids


-Acute alcoholic hepatitis expected to improve with fluids


-Elevated MCV secondary to chronic alcohol, B12 levels are normal. 


-Hypertension patient is hypotensive hold off on antihypertensive medications


-Hyperlipidemia


-Depression for which patient will be resumed on her home medications. This not 

an active issue.





DVT prophylaxis: Lovenox





PT and OT have evaluated the patient and recommending subacute rehab on 

discharge. Pending accepting facility.  Follow up social work on Monday for 

discharge planning.  For now continue oral dexamethasone and conservative 

management.





The impression and plan of care has been dictated by Nurse Miguel Ángel Prac

titioner as directed.





Dr. Latrell MD


I have performed a history and physical examination and medical decision making 

of this patient, discussed the same with the dictator, and agree with the 

dictators assessment and plan as written, documented as a scribe. Based on total

visit time, I have performed more than 50% of this visit.





Objective





- Vital Signs


Vital signs: 


                                   Vital Signs











Temp  98.3 F   02/25/24 07:20


 


Pulse  86   02/25/24 07:20


 


Resp  18   02/25/24 07:20


 


BP  147/94   02/25/24 07:20


 


Pulse Ox  96   02/25/24 08:37


 


FiO2      








                                 Intake & Output











 02/24/24 02/25/24 02/25/24





 18:59 06:59 18:59


 


Intake Total 1000 1490 


 


Output Total  1000 


 


Balance 1000 490 


 


Intake:   


 


  Intake, IV Titration 1000 900 





  Amount   


 


    Magnesium Sulfate-D5w Pmx 100  





    1 gm In Dextrose/Water 1   





    100ml.bag @ 100 mls/hr   





    IVPB ONCE ONE Rx#:   





    [id number]   


 


    Sodium Chloride 0.9% 1, 900 900 





    000 ml @ 75 mls/hr IV .   





    [id number] [facility] Rx#:[id number]   


 


  Oral  590 


 


Output:   


 


  Urine  1000 


 


Other:   


 


  Voiding Method Bedside Commode Bedside Commode Bedside Commode





  Diaper Diaper





   Incontinent


 


  # Voids 1 3 














- Labs


CBC & Chem 7: 


                                 02/23/24 07:57





                                 02/23/24 07:57





Assessment and Plan


Time with Patient: Less than 30

## 2024-02-25 NOTE — P.PN
Subjective


Progress Note Date: 02/25/24


Principal diagnosis: 


Low back pain; neck pain; bilateral lower extremity radiculopathy; degenerative 

disc disease











Patient was seen at bedside this morning lying semirecumbent position in bed.  

Patient says she is still having some numbness and tingling in the lower 

extremities and pain in the neck and low back.  Patient says she has still been 

having issues riding her name things on paper and using a fork. Patient is 

looking forward to working with therapy today. Patient denies any other issues. 

Patient denies chest pain, fever, shortness breath, nausea, vomiting or change 

in vision, loss of bowel control








Objective





- Vital Signs


Vital signs: 


                                   Vital Signs











Temp  98.3 F   02/25/24 07:20


 


Pulse  86   02/25/24 07:20


 


Resp  18   02/25/24 07:20


 


BP  147/94   02/25/24 07:20


 


Pulse Ox  96   02/25/24 08:37


 


FiO2      








                                 Intake & Output











 02/24/24 02/25/24 02/25/24





 18:59 06:59 18:59


 


Intake Total 1000 1490 


 


Output Total  1000 


 


Balance 1000 490 


 


Intake:   


 


  Intake, IV Titration 1000 900 





  Amount   


 


    Magnesium Sulfate-D5w Pmx 100  





    1 gm In Dextrose/Water 1   





    100ml.bag @ 100 mls/hr   





    IVPB ONCE ONE Rx#:   





    [id number]   


 


    Sodium Chloride 0.9% 1, 900 900 





    000 ml @ 75 mls/hr IV .   





    [id number] [facility] Rx#:[id number]   


 


  Oral  590 


 


Output:   


 


  Urine  1000 


 


Other:   


 


  Voiding Method Bedside Commode Bedside Commode Bedside Commode





  Diaper Diaper





   Incontinent


 


  # Voids 1 3 














- Exam


Inspection: Patient does present some very minimal resting tremor in the hands 

bilaterally.  Negative for any open fractures, significant erythema/ecchymosis 

or open wounds.





Sensation: Somewhat diminished throughout the bilateral lower extremities mostly

from the knee into the foot bilaterally.  Sensation is equal, symmetric, by 

intact throughout the upper extremities on exam.





Palpation: Moderate tenderness patient throughout the lower lumbar spine midline

and in the paravertebral regions.  Positive for fair amount of tenderness to 

patient throughout the bilateral shoulder blades and in the pair the procedure r

egion of the cervical spine.  Nontender to palpation throughout rest exam.





Range of motion: Patient has full range of motion throughout bilateral ankles 

and plantar/dorsiflexion.  Limited range of motion in the bilateral hips/knees 

secondary to referred weakness and pain in the low back.  Patient is able to 

elevate both shoulders to about 95 and flexion until patient says her shoulder 

feels stiff and weak and cannot raise arms any higher.  Patient does have 

marginal most throughout bilateral elbows and wrists in flexion/extension.





Motor: 3+/5 in all major motor groups in bilateral upper and lower extremities 

on exam.





Neurovascular: Radial pulse intact, 2+ bilaterally.  Cap refill under 3 seconds 

in digits upper extremities





Special tests: Positive Ren bilaterally.  Negative clonus bilaterally.  

Negative Homans bilaterally.








- Labs


CBC & Chem 7: 


                                 02/23/24 07:57





                                 02/23/24 07:57





Assessment and Plan


Assessment: 


1.  Low back pain; neck pain; bilateral lower extremity radiculopathy; 

degenerative disc disease; cervical spondylosis





Plan: 


1.  Bilateral lower extremity radiculopathy; low back pain; neck pain; 

degenerative disc disease - computed tomography scan of thoracolumbar spine 

shows evidence for degenerative disc disease especially from L3-L4 and L4-L5.  

Negative for any fractures or spondylolisthesis.  MRI of cervical spine is 

negative for any spondylolisthesis or fractures.  Negative for any significant 

central or neural foraminal stenosis.  Positive for spondylosis and degenerative

disc disease.  I discussed the findings of the imaging and exam of patient with 

my attending, Dr. Stubbs.  Continue IV steroids at this time.  Neurology 

consult due to tremors in the upper extremities and difficulty with fine motor 

skills.  Patient may weight-bear as tolerated with walker and assistance.  

Orthopedics will be available as needed to see patient.  We do recommend patient

to follow-up with Dr. Stubbs for continued care.





2.  Appreciate medical  and neurology management





3.  Pain management - toradol





4.  DVT prophylaxis recs





5.  GI prophylaxis - Protonix





6.  PT/OT - weightbearing as tolerated with walker and assistance





7.  Encourage incentive spirometer use








Time with Patient: Less than 30

## 2024-02-25 NOTE — P.CNNES
History of Present Illness


Consult date: 02/25/24


Reason for Consult: Tremors


History of Present Illness: 





The patient is a 63-year-old female who was seen in neurologic consultation on 

February 25, 2024, in collaboration with Nichelle Medley, via teleneurology.


History is obtained primarily from the patient.  She reports that she has no 

 strength and is unable to walk without the use of a walker.  She reports 

that her symptoms all started approximately 2 months ago.  She has been freq

uently falling.  She feels that she is generally weak however the left side 

started first.  She says that she had a "bad fall" on Thursday.  She says she 

fell onto the concrete floor.  She says "my legs just gave out".  She states 

that there was loss of consciousness.  She was unable to get up from the floor. 

The patient denies other episodes of syncope.  She reports that she is unable to

raise her arms over her head.  She has difficulty with fine motor control of her

fingers.  She is unable to  items with her hands.  She says she is unable to

use a fork or pencil.  She denies paresthesias in her hands.  The patient also 

reports that she is unable to roll over in the bed.  She says instead, she is 

able to move herself, by pulling the side rail of the bed.  The patient reports 

headache, neck pain and low back pain.  The patient reports tingling sensation 

in her toes and in her legs.  She also states that she has new loss of bladder 

control.  The patient denies changes in vision, difficulty with speech and 

swallowing.  Approximately 2 months ago, the patient reports that one of her 

medications was changed.  She is really unable to tell me which medication it 

was.


In the emergency department, CT scan of the brain was performed.  There is no 

reported evidence of acute hemorrhage or infarct.  MRI of the cervical spine was

performed.  There is no reported significant spinal canal stenosis or 

significant neural foraminal stenosis.  CT scan of the thoracic and lumbar spine

reveals "disc bulging and facet hypertrophy at L4-5, which may contribute to 

some spinal canal stenosis".  In addition there is possible right neuroforaminal

stenosis at L3-4.


When the patient presented to the emergency department on February 22, after her

fall she was found to be intoxicated, with blood alcohol level of 209.  The 

patient reports drinking 2 half gallons of vodka per month.  She says she is a 

daily drinker.





Past Medical History


Past Medical History: Hyperlipidemia, Hypertension, Osteoarthritis (OA)


Additional Past Medical History / Comment(s): Multiple recent falls. hx colon 

polyps, occasional diarrhea, hx of fall Nov 2020 and has been having pain right 

hip since that radiates down right leg, walks with limp., states breast implants

moving up.


History of Any Multi-Drug Resistant Organisms: None Reported


Past Surgical History: Breast Surgery, Orthopedic Surgery, Tonsillectomy


Additional Past Surgical History / Comment(s): right elbow surgery with screws, 

right knee surgery with plate and removal ., lumpectomy left breast, breast 

implants


Past Anesthesia/Blood Transfusion Reactions: Postoperative Nausea & Vomiting 

(PONV)


Past Psychological History: Anxiety, Depression


Smoking Status: Former smoker


Past Alcohol Use History: Abuse, Daily


Additional Past Alcohol Use History / Comment(s): quit smoking 35 yrs ago 

(1986), hx of 2ppd, started age 15.


Past Drug Use History: None Reported


Additional Drug Use History / Comment(s): Patient states she drinks twice a 

month. 1/2 gallon of Vodka each time-drinks until its gone.





- Past Family History


  ** Father


Family Medical History: Cancer





Medications and Allergies


                                Home Medications











 Medication  Instructions  Recorded  Confirmed  Type


 


Pantoprazole [Protonix] 40 mg PO DAILY #30 tab 12/20/23 02/22/24 Rx


 


lisinopriL [Zestril] 10 mg PO DAILY #30 tab 12/20/23 02/22/24 Rx


 


Acetaminophen Tab [Tylenol] 650 mg PO Q6HR PRN  tab 02/06/24 02/22/24 Rx


 


Escitalopram [Lexapro] 10 mg PO DAILY 30 Days #30 tab 02/06/24 02/22/24 Rx


 


Folic Acid 1 mg PO DAILY #30 tablet 02/06/24 02/22/24 Rx


 


Ibuprofen [Motrin] 400 mg PO Q6HR PRN  tab 02/06/24 02/22/24 Rx


 


Multivitamins, Thera [Multivitamin] 1 tab PO DAILY #30 tablet 02/06/24 02/22/24 

Rx


 


QUEtiapine [SEROquel] 200 mg PO HS 30 Days #30 tab 02/06/24 02/22/24 Rx


 


Thiamine [Vitamin B-1] 100 mg PO DAILY 30 Days #30 tab 02/06/24 02/22/24 Rx


 


traZODone HCL [Desyrel] 50 mg PO HS PRN  tab 02/06/24 02/22/24 Rx








                                    Allergies











Allergy/AdvReac Type Severity Reaction Status Date / Time


 


latex Allergy Unknown Itching, Verified 02/22/24 11:16





   Blisters  


 


codeine AdvReac Severe HEADACHE Verified 02/22/24 11:16


 


hydrocodone [From Vicodin] AdvReac Severe HEADACHE Verified 02/22/24 11:16


 


Anesthetics - Amide Type - AdvReac  GAS GIVES Verified 02/22/24 11:16





Select A   HER  





   HEADACHE,  





   VOMITING,  





   HEART  





   PALIPITATIONS  


 


Anesthetics - Juliana Type- AdvReac  GAS GIVES Verified 02/22/24 11:16





Parabens   HER  





   HEADACHE,  





   VOMITING,  





   HEART  





   PALIPITATIONS  


 


ANESTHESIA AdvReac Unknown GAS GIVES Uncoded 02/22/24 11:16





   HER  





   HEADACHE,  





   VOMITING,  





   HEART  





   PALIPITATIONS  














Physical Examination





- Vital Signs


Vital Signs: 


                                   Vital Signs











  Temp Pulse Resp BP Pulse Ox


 


 02/25/24 08:37      96


 


 02/25/24 07:20  98.3 F  86  18  147/94  96


 


 02/25/24 02:00  98.4 F  91  16  164/84  94 L


 


 02/24/24 20:05    16  


 


 02/24/24 20:00  98.3 F  84  16  150/93  92 L


 


 02/24/24 14:22  98.6 F  89  18  152/91  97








                                Intake and Output











 02/24/24 02/25/24 02/25/24





 22:59 06:59 14:59


 


Intake Total 1000 1490 


 


Output Total  1000 


 


Balance 1000 490 


 


Intake:   


 


  Intake, IV Titration 1000 900 





  Amount   


 


    Magnesium Sulfate-D5w Pmx 100  





    1 gm In Dextrose/Water 1   





    100ml.bag @ 100 mls/hr   





    IVPB ONCE ONE Rx#:   





    [id number]   


 


    Sodium Chloride 0.9% 1, 900 900 





    000 ml @ 75 mls/hr IV .   





    [id number] SAI Rx#:[id number]   


 


  Oral  590 


 


Output:   


 


  Urine  1000 


 


Other:   


 


  Voiding Method Bedside Commode  Bedside Commode





 Diaper  Diaper





   Incontinent


 


  # Voids 1 3 

















General: Patient is well-nourished, well-developed and in no acute distress


HEENT: Head is atraumatic, normocephalic.  Fundus not visualized.  There is no 

scleral icterus.  Mucous membranes are moist.  Dentition is poor.  There is a 

tremor of the chin.


Neck: Supple without carotid bruits


Heart: Regular rate and rhythm


Lungs: No shortness of breath or wheezing


Extremities: There is edema of the hands and feet


Neurological examination


Mental status: The patient is awake and alert.  She is able to state her name, 

date of birth, age and location.  She reports the current year to be "25".  She 

reports the current month to be "May"..  Her speech is clear.  There is no 

dysarthria or aphasia.  Judgment and insight are intact.


Cranial nerves: Pupils are equal at 3 mm, round and reactive to light.  Visual 

fields are full to confrontation.  Extraocular movements are intact.  There is 

no nystagmus.  Facial sensation is intact.  There is no facial asymmetry.  

Hearing is grossly intact.  Uvula and palate are midline.  Shoulder shrug is 

symmetric.  Tongue protrudes midline.


Motor: Strength testing: (Right/left)  4/4.  Biceps 4/4.  Triceps 4/4.  

Finger abductors 2/2.  Opponens 2/2.  Wrist extensors 2/2.  Wrist flexors 4/3+. 

Hip flexors 2/2.  Hip abductors 5/5.  Hip adductors 5/5.  Ankle plantar flexors 

5/5.  


Ankle dorsiflexors 4/5.  There is atrophy of the intrinsic hand muscles.  There 

is no evidence of fasciculations


Sensation: The patient reports decreased sensation to light touch in the right 

V2 distribution and right foot.  There is no extinction with double simultaneous

stimulation.


Coordination: There is truncal ataxia.  There is a mild tremor with 

finger-to-nose testing.  Rapid alternating movements of the fingers is severely 

diminished.  The patient is unable to alternate the touch fingers to thumb.  

Heel-to-shin testing is not assessed.


Deep tendon reflexes: 3+/4+ throughout.  Plantar responses are flexor 

bilaterally.


Gait: Not assessed


Observation: The patient is observed repositioning her legs in the bed and is 

able to scoot herself up higher into the bed.





Results





- Laboratory Findings


CBC and BMP: 


                                 02/23/24 07:57





                                 02/23/24 07:57


Abnormal Lab Findings: 


                                  Abnormal Labs











  02/22/24 02/22/24 02/22/24





  08:36 08:36 08:36


 


RBC   


 


MCV   


 


MCHC   


 


Macrocytosis   


 


PT   


 


APTT   


 


Carbon Dioxide   


 


Anion Gap   


 


BUN  20 H  


 


Creatinine  0.45 L  


 


BUN/Creatinine Ratio   


 


Glucose  153 H  


 


Plasma Lactic Acid Gabe   3.3 H* 


 


AST  60 H  


 


ALT  37 H  


 


Creatine Kinase  512 H  


 


Urine Appearance    Cloudy H


 


Ur Leukocyte Esterase    Trace H


 


Urine Mucus    Rare H


 


Serum Alcohol  209 H*  














  02/22/24 02/22/24 02/22/24





  09:33 09:40 13:00


 


RBC   


 


MCV   100.4 H 


 


MCHC   


 


Macrocytosis   


 


PT  9.6 L  


 


APTT  20.7 L  


 


Carbon Dioxide   


 


Anion Gap   


 


BUN   


 


Creatinine   


 


BUN/Creatinine Ratio   


 


Glucose   


 


Plasma Lactic Acid Gabe    3.7 H*


 


AST   


 


ALT   


 


Creatine Kinase   


 


Urine Appearance   


 


Ur Leukocyte Esterase   


 


Urine Mucus   


 


Serum Alcohol   














  02/23/24 02/23/24





  07:57 07:57


 


RBC  3.48 L 


 


MCV  109.9 H D 


 


MCHC  30.6 L 


 


Macrocytosis  Marked A 


 


PT  


 


APTT  


 


Carbon Dioxide   21.0 L


 


Anion Gap   14.00 H


 


BUN  


 


Creatinine   0.5 L


 


BUN/Creatinine Ratio   26.40 H


 


Glucose  


 


Plasma Lactic Acid Gabe  


 


AST  


 


ALT  


 


Creatine Kinase  


 


Urine Appearance  


 


Ur Leukocyte Esterase  


 


Urine Mucus  


 


Serum Alcohol  














Assessment and Plan


Assessment: 





1.  Generalized weakness involving bilateral upper and lower extremities.  The 

patient's neurological examination shows signs of upper and lower motor neuron 

involvement.  There is atrophy of the muscles and hyperreflexia.  There is no 

visualized fasciculations nor is there extensor plantar response.  Differential 

diagnosis: Amyotrophic lateral sclerosis versus cervical spinal stenosis with 

neuropathy secondary to alcohol abuse versus cerebellar vermian atrophy





2.  Alcohol dependence





3.  History of hypertension





4.  History of hyperlipidemia


Plan: 





1.  MRI of the brain has been ordered to further evaluate for cerebellar vermian

atrophy


2.  Lab work to further investigate possible peripheral neuropathy has been 

ordered


3.  Physical therapy evaluation and treatment for strengthening and gait 

training


4.  EMG is recommended


5.  Appreciate orthopedic input








Thank you for allowing us to participate in the care of this patient





 Dr. Satish Jackson will assume neurologic coverage of this patient as of February 26, 2024


Time with Patient: Greater than 30 (65 minutes were spent caring for this pat

ient today including, obtaining a history, examining the patient, reviewing 

imaging, chart documentation, labs, placing orders and creating this note)

## 2024-02-26 RX ADMIN — Medication SCH MCG: at 16:08

## 2024-02-26 RX ADMIN — ONDANSETRON PRN MG: 2 INJECTION INTRAMUSCULAR; INTRAVENOUS at 10:49

## 2024-02-26 NOTE — P.PN
Subjective


Progress Note Date: 02/26/24


I am seeing the patient for the first time during this hospital visit.  Please 

refer to Dr. Manriquez's notes for further details.


According to patient for last 2-3 month patient has been having falls with leg 

weakness given out.  She states that she has problems using utensils in both 

hands for last 2-3 weeks.  She has chronic paresthesia of bilateral feet as well

as has some fair stage in the hands.  She does consume a lot of alcohol but she 

stated that regarding this most recent fall she denies consuming a lot of 

alcohol.


She noticed atrophy of both hands and it's been going on for weeks possibly a f

ew month.  She has neck middle and lower back pain.  Denies any radiation of her

spine.


Patient denies of any difficulty swallowing, she does have problems with balance

and feels unsteady.  She denies any difficulty with speech.  Denies any issues 

with vision.


Time she does have cramping of her bilateral Thighs and calves.








Objective





- Vital Signs


Vital signs: 


                                   Vital Signs











Temp  97.4 F L  02/26/24 07:24


 


Pulse  87   02/26/24 07:24


 


Resp  16   02/26/24 07:24


 


BP  145/88   02/26/24 07:24


 


Pulse Ox  93 L  02/26/24 07:24


 


FiO2      








                                 Intake & Output











 02/25/24 02/26/24 02/26/24





 18:59 06:59 18:59


 


Intake Total 1600 1000 


 


Balance 1600 1000 


 


Intake:   


 


  Oral 1600 1000 


 


Other:   


 


  Voiding Method Bedside Commode Bedside Commode Bedside Commode





 Diaper Diaper Diaper





 Incontinent Incontinent Incontinent


 


  # Voids 4 3 














- Exam


General: Sitting up in bed and is not in acute distress.


Neuro:


The patient is awake alert oriented to self place and time.  Is following simple

commands.  No aphasia and no neglect.


The pupils are round equal reactive to light.  The pupils are round 3-4 mm 

bilaterally.  Visual fields all 4 to confrontation.  Extraocular movement is 

intact no nystagmus.  No facial weakness.  No dysarthria.


Motor: Gait she had to be assisted she is a one person assist walk-in intake and

slow steps.  Strength is forearm flexion is 4 positive wall forearm extension is

about 4 negative.  Otherwise rest of upper extremity is 3-4 (more low 4).  Hip 

flexion and extension are about a 4-4+, knee extension is about 4+ to 5 minus, 

ankle dorsiflexion bilaterally is about 3-4 wall plantarflexion is about 4+.  

She has atrophy in the thenar and hypothenar bilateral hands


Sensation is normal to touch throughout.


Reflexes is a brachioradialis are 3+, ankles are 1 positive otherwise rest are 2

throughout.


Plantars are mute





SOME OF THE WORK-UP DURING THIS HOSPITAL VISIT CONSISTED OF:


.9


TSH is 0.175 and the free T4 is 1.12


Folate is 16.40


CB12 is 364


Vitamin D is 19.8


Calcium is 9.2


alcohol level of 209. 


CT scan of the brain was performed.  There is no reported evidence of acute 

hemorrhage or infarct.  


CT cervical spine: No acute osseous abnormalities cervical spine.  Mild kyphosis

lower cervical spine.  Mild diffuse disc space narrowing.


MRI of the cervical spine was performed.  There is no reported significant 

spinal canal stenosis or significant neural foraminal stenosis.  


CT scan of the thoracic and lumbar spine reveals "disc bulging and facet 

hypertrophy at L4-5, which may contribute to some spinal canal stenosis".  In 

addition there is possible right neuroforaminal stenosis at L3-4.


 











- Labs


CBC & Chem 7: 


                                 02/23/24 07:57





                                 02/23/24 07:57


Labs: 


                  Abnormal Lab Results - Last 24 Hours (Table)











  02/25/24 Range/Units





  06:19 


 


Vitamin D 25-Hydroxy  19.8 L  (30.0-100.0)  ng/mL


 


TSH  0.175 L  (0.350-5.500)  UIU/ML














Assessment and Plan


Assessment: 





1.  Generalized weakness involving bilateral upper and lower extremities.  The 

patient's neurological examination shows signs of upper and lower motor neuron 

involvement.  There is atrophy of the muscles and hyperreflexia.  Differential 

diagnosis: Motor neuron disease (such as Amyotrophic lateral sclerosis).  Cannot

exclude neuropathy secondary to alcohol abuse (but would not give upper motor 

neuron diseae) versus cerebellar vermian atrophy





2.  Alcohol dependence





3. Vitamin D deficiency





4.  History of hypertension





5.  History of hyperlipidemia





Plan: 


1.  MRI of the brain is pending.


2. Recommend EMG with NCS of uppers and lowers as outpatient.


3. Continue neuro checks


4. PT, OT are consulted


5. Orthopedic team is on board.


6. Recommend the patient to follow-up with Neuromuscular specialist as 

outpatient within 1-2 weeks.  


7. Patient was counseled on alcohol cessation.


8. Continue thiamine 100mg daily.


9. Patient has low TSH but normal free T4.  Will defer the management to primary

team.


10.Hemoglobin A1c: 6.0 in 12/2023 and no need to repeat.


11.  Will defer the rest of medical management to the primary team and other 

specialist.





The plan is discussed with patient and her nurse.





Time with Patient: Less than 30

## 2024-02-26 NOTE — MR
EXAMINATION TYPE: MR brain wo con

 

DATE OF EXAM: 2/26/2024

 

COMPARISON: 6/22/2023

 

HISTORY: Truncal ataxia

 

CONTRAST:  Performed utilizing 0 mL intravenous Gadobutrol gadolinium contrast.  

 

TECHNIQUE: Multiplanar, multiecho imaging on a 3.0 Sujey magnet is performed through the brain.  Stud
y is performed within 24 hours of arrival to the hospital.

 

The craniovertebral junction is normal.  The pituitary is normal.  

 

Diffusion-weighted imaging is performed.  No abnormal hyperintensity is present to suggest an acute i
ntracranial infarct or acute ischemic change.

 

There are scattered punctate areas of hyperintensity on T2 and Inversion Recovery weighted sequences 
in periventricular deep white matter and brainstem which are non-specific but can be related to micro
vascular ischemic changes. 

 

Ventricles and sulci are prominent for the patient age.

 

 

IMPRESSION:

1. Periventricular and deep white matter changes which are nonspecific but can be related to microvas
cular ischemic change.

2. No acute intracranial process

## 2024-02-26 NOTE — P.PN
Subjective


Progress Note Date: 02/26/24


Principal diagnosis: 


Low back pain; neck pain; bilateral lower extremity radiculopathy; degenerative 

disc disease











Patient was seen at bedside this morning was sitting up at the edge of the bed 

eating breakfast.  Patient says she did speak with neurologist yesterday at 

bedside and they are ordering MRI of the brain for further evaluation.  Patient 

says she is still having some numbness and tingling in the lower extremities and

pain in the neck and low back.  Patient says she is still having issues with 

fine motor skills. Patient denies chest pain, fever, shortness breath, nausea, 

vomiting or change in vision, loss of bowel control








Objective





- Vital Signs


Vital signs: 


                                   Vital Signs











Temp  97.4 F L  02/26/24 07:24


 


Pulse  87   02/26/24 07:24


 


Resp  16   02/26/24 07:24


 


BP  145/88   02/26/24 07:24


 


Pulse Ox  93 L  02/26/24 07:24


 


FiO2      








                                 Intake & Output











 02/25/24 02/26/24 02/26/24





 18:59 06:59 18:59


 


Intake Total 1600 1000 


 


Balance 1600 1000 


 


Intake:   


 


  Oral 1600 1000 


 


Other:   


 


  Voiding Method Bedside Commode Bedside Commode Bedside Commode





 Diaper Diaper Diaper





 Incontinent Incontinent Incontinent


 


  # Voids 4 3 














- Exam


Inspection: Patient does present some very minimal resting tremor in the hands 

bilaterally.  Negative for any open fractures, significant erythema/ecchymosis 

or open wounds.





Sensation: Somewhat diminished throughout the bilateral lower extremities mostly

from the knee into the foot bilaterally.  Sensation is equal, symmetric, by 

intact throughout the upper extremities on exam.





Palpation: Moderate tenderness patient throughout the lower lumbar spine midline

and in the paravertebral regions.  Positive for fair amount of tenderness to 

patient throughout the bilateral shoulder blades and in the pair the procedure 

region of the cervical spine.  Nontender to palpation throughout rest exam.





Range of motion: Patient has full range of motion throughout bilateral ankles 

and plantar/dorsiflexion.  Limited range of motion in the bilateral hips/knees 

secondary to referred weakness and pain in the low back.  Patient is able to 

elevate both shoulders to about 95 and flexion until patient says her shoulder 

feels stiff and weak and cannot raise arms any higher.  Patient does have 

marginal most throughout bilateral elbows and wrists in flexion/extension.





Motor: 3+/5 in all major motor groups in bilateral upper and lower extremities 

on exam.





Neurovascular: Radial pulse intact, 2+ bilaterally.  Cap refill under 3 seconds 

in digits upper extremities





Special tests: Positive Ren bilaterally.  Negative clonus bilaterally.  

Negative Homans bilaterally.








- Labs


CBC & Chem 7: 


                                 02/23/24 07:57





                                 02/23/24 07:57


Labs: 


                  Abnormal Lab Results - Last 24 Hours (Table)











  02/25/24 Range/Units





  06:19 


 


Vitamin D 25-Hydroxy  19.8 L  (30.0-100.0)  ng/mL


 


TSH  0.175 L  (0.350-5.500)  UIU/ML














Assessment and Plan


Assessment: 


1.  Low back pain; neck pain; bilateral lower extremity radiculopathy; de

generative disc disease; cervical spondylosis





Plan: 


1.  Bilateral lower extremity radiculopathy; low back pain; neck pain; 

degenerative disc disease - computed tomography scan of thoracolumbar spine 

shows evidence for degenerative disc disease especially from L3-L4 and L4-L5.  

Negative for any fractures or spondylolisthesis.  MRI of cervical spine is 

negative for any spondylolisthesis or fractures.  Negative for any significant 

central or neural foraminal stenosis.  Positive for spondylosis and degenerative

disc disease.  I discussed the findings of the imaging and exam of patient with 

my attending, Dr. Stubbs.  Continue IV steroids at this time.  Neurology did

see patient yesterday and ordered an MRI of brain.  At this time we will wait 

her conditions from neurology and MRI of brain before proceeding with any 

potential orthopedic intervention.  Continue with IV steroids for now.  Patient 

may weight-bear as tolerated with walker and assistance.  Orthopedics will be 

available as needed to see patient.  We do recommend patient to follow-up with 

Dr. Stubbs for continued care.





2.  Appreciate medical  and neurology management





3.  Pain management - toradol





4.  DVT prophylaxis recs





5.  GI prophylaxis - Protonix





6.  PT/OT - weightbearing as tolerated with walker and assistance





7.  Encourage incentive spirometer use








Time with Patient: Less than 30

## 2024-02-26 NOTE — P.PN
Subjective


Progress Note Date: 02/26/24











63-year-old pleasant female is admitted as patient was not able to ambulate for 

the last week.  Patient has been crawling patient has worsening weakness for 

some time patient does have alcohol abuse history does drink about half a gallon

of hard liquor.  Patient does have elevated MCV patient had a CT scan of the 

lumbar spine which showed significant stenosis at the L4-L5 level patient was 

also having back pain.  Patient is tingling numbness in both legs.  Patient 

denies any other drug abuse.  Patient is already on folate supplementation.  

Patient had a fall about 2 days ago and hit head although CT of the head did not

show any intracranial bleed.





02/23/2024


Patient is evaluated today sitting up in the bed.  She states that she is having

increased weakness since her fall a few days ago she is also fallen again a few 

weeks ago.  Patient states that she has been falling frequently at home and she 

is having paresthesia in her both of her legs.  She is also having some issues 

with her left upper extremity.  Patient does report that she has not been 

drinking daily she states that she drinks about a gallon of hard liquor a few 

times a month.  She is more of a binge drinker.  Patient has been evaluated by 

physical therapy who is recommending subacute rehab she has been denied at 2 

different facilities since we are currently pending subacute rehab placement for

this patient.  Would recommend that she follows up with a neurologist possibly 

for EMG testing outpatient.  Her vitamin B12 level is normal at 374.  AST and 

ALT are elevated.  Magnesium today is 1.7.  Renal function is normal.





02/24/2024


Patient is evaluated today sitting in the bed. She continues to report weakness,

parasthesia and weak hand grasp to the upper extremities. Orthopedics has 

evaluated the patient and recommending to undergo cervical spine MRI currently 

pending. Patient has also been started on IV dexamethasone. She is on CIWA 

protocol has not required ativan in 48 hours. Hemodynamically she is stable.





02/25/2024


Patient evaluated today sitting up in the bed.  She underwent cervical spine MRI

revealing evidence for disc herniation or significant spinal canal stenosis 

there are multilevel disc degenerative changes associated with osteoarthritic 

changes.  States that she has not had much improvement with her symptoms 

including upper extremity weakness and difficulty holding overwear with right 

being started on high-dose IV steroid.  We would recommend that the pain 

continue with steroids with dexamethasone 40 mg daily, Patient would benefit 

from EMG testing outpatient and follow up with neurology.  Magnesium level 1.7.





02/26/2024





Patient is seen and evaluated in follow-up today continues to report neck and 

upper back pain and difficulty with ambulation.  Patient evaluated by physical 

therapy with case management following with plans of ECF for continued weakness.

 Awaiting updated notes and insurance authorization to be submitted.  Patient is

afebrile with no reports of chest pain or shortness of breath.  Patient 

continued on CIWA protocol and will continu  Patient was also evaluated by 

orthopedics with no plans of surgical intervention at this time recommending 

conservative management and outpatient follow-up.  Patient is scheduled an MRI 

of the brain with neurology following.  This is pending for today.  Encouraged 

increase activity as tolerated and continued PT/OT therapy.





Review of Systems





Constitutional: Denied any fatigue denied any fever.


Cardio vascular: denied any chest pain, palpitations


Gastrointestinal: denied any nausea, vomiting, diarrhea


Pulmonary: Denied any shortness of breath cough


Neurologic denied any new focal deficits





All inpatient medications were reviewed and appropriate changes in these 

medications as dictated in the interval history and assessment and plan.





PHYSICAL EXAMINATION: 





GENERAL: The patient is alert and oriented x3, not in any acute distress. Well 

developed, well nourished. 


HEENT: Pupils are round and equally reacting to light. EOMI. No scleral icterus.

No conjunctival pallor. Normocephalic, atraumatic. No pharyngeal erythema. No 

thyromegaly. 


CARDIOVASCULAR: S1 and S2 present. No murmurs, rubs, or gallops. 


PULMONARY: Chest is clear to auscultation, no wheezing or crackles. 


ABDOMEN: Soft, nontender, nondistended, normoactive bowel sounds. No palpable 

organomegaly. 


MUSCULOSKELETAL: No joint swelling or deformity.


EXTREMITIES: No cyanosis, clubbing, or pedal edema. 


NEUROLOGICAL: Significant generalized weakness more pronounced in both legs, 

patient has strength of 3+/5 in both lower extremities upper extremities is 4/5


SKIN: No rashes. 





Assessment:





-Frequent falls at home likely due to alcoholism. 


-Inability to walk, generalized weakness: Significant weakness in bilateral 

upper and lower extremities does have radiculopathy and spinal stenosis at L3-L4

level. Will need subacute rehab on discharge. 


-Cervical radiculopathy and weakness on course of oral dexamethasone, and no 

surgical interventions planned by orthopedics at this time, Cervical MRI 

complete. 


-Alcohol abuse: Counseling was provided


-Alcohol withdrawal: Patient will be monitored for withdrawal presently does not

have any withdrawals patient is on Ativan CIWA protocol


-Lactic acidosis secondary to dehydration patient was continued on IV fluids, 

improved


-Acute alcoholic hepatitis expected to improve with fluids


-Elevated MCV secondary to chronic alcohol, B12 levels are normal. 


-Hypertension patient is hypotensive hold off on antihypertensive medications


-Hyperlipidemia


-Depression for which patient will be resumed on her home medications. This not 

an active issue.


-DVT prophylaxis: Lovenox


-PT and OT have evaluated the patient and recommending subacute rehab on 

discharge.  [facility] excepted pending insurance authorization. 





Plan:





Patient is continued on CIWA protocol and will monitor for withdrawals, not 

currently endorsing any symptoms of withdrawals


Patient with continues with neck and back pain will continue on current regimen 

including steroids.  Orthopedics evaluated the patient with no plans of surgical

intervention at this time recommending conservative management


Patient evaluated by PT/OT therapy recommending rehab and insurance 

authorization was submitted and pending at this time


Case management/social work following working on discharge planning and has been

accepted at [facility] pending insurance authorization


Will continue to monitor with hopeful discharge in the next 24 to 48 hours





The impression and plan of care has been dictated by Yamileth Dean, Nurse 

Practitioner as directed.





Dr. Latrell MD


I have performed a history and physical examination and medical decision making 

of this patient, discussed the same with the dictator, and agree with the 

dictators assessment and plan as written, documented as a scribe. Based on total

visit time, I have performed more than 50% of this visit.





Objective





- Vital Signs


Vital signs: 


                                   Vital Signs











Temp  97.4 F L  02/26/24 07:24


 


Pulse  87   02/26/24 07:24


 


Resp  16   02/26/24 07:24


 


BP  145/88   02/26/24 07:24


 


Pulse Ox  93 L  02/26/24 07:24


 


FiO2      








                                 Intake & Output











 02/25/24 02/26/24 02/26/24





 18:59 06:59 18:59


 


Intake Total 1600 1000 


 


Balance 1600 1000 


 


Intake:   


 


  Oral 1600 1000 


 


Other:   


 


  Voiding Method Bedside Commode Bedside Commode Bedside Commode





 Diaper Diaper Diaper





 Incontinent Incontinent Incontinent


 


  # Voids 4 3 














- Labs


CBC & Chem 7: 


                                 02/23/24 07:57





                                 02/23/24 07:57


Labs: 


                  Abnormal Lab Results - Last 24 Hours (Table)











  02/25/24 Range/Units





  06:19 


 


Vitamin D 25-Hydroxy  19.8 L  (30.0-100.0)  ng/mL


 


TSH  0.175 L  (0.350-5.500)  UIU/ML

## 2024-02-27 LAB
ANION GAP SERPL CALC-SCNC: 1 MMOL/L
BUN SERPL-SCNC: 25 MG/DL (ref 7–17)
CALCIUM SPEC-MCNC: 9.6 MG/DL (ref 8.4–10.2)
CHLORIDE SERPL-SCNC: 108 MMOL/L (ref 98–107)
CO2 SERPL-SCNC: 27 MMOL/L (ref 22–30)
GLUCOSE SERPL-MCNC: 84 MG/DL (ref 74–99)
MAGNESIUM SPEC-SCNC: 1.8 MG/DL (ref 1.6–2.3)
POTASSIUM SERPL-SCNC: 5.2 MMOL/L (ref 3.5–5.1)
SODIUM SERPL-SCNC: 136 MMOL/L (ref 137–145)

## 2024-02-27 NOTE — P.PN
Subjective


Progress Note Date: 02/27/24


Principal diagnosis: 


Low back pain; neck pain; bilateral lower extremity radiculopathy; degenerative 

disc disease











Patient was seen at bedside this morning was sitting up at the edge of the bed 

eating breakfast.  Patient says she did speak with neurologist yesterday at 

bedside.  Patient says she is still having some numbness and tingling in the 

lower extremities and pain in the neck and low back.  Patient says she is still 

having issues with fine motor skills. Patient denies chest pain, fever, 

shortness breath, nausea, vomiting or change in vision, loss of bowel control








Objective





- Vital Signs


Vital signs: 


                                   Vital Signs











Temp  98.1 F   02/27/24 08:25


 


Pulse  81   02/27/24 08:25


 


Resp  16   02/27/24 09:19


 


BP  146/95   02/27/24 08:25


 


Pulse Ox  98   02/27/24 08:25


 


FiO2      








                                 Intake & Output











 02/26/24 02/27/24 02/27/24





 18:59 06:59 18:59


 


Intake Total 900 1000 


 


Balance 900 1000 


 


Intake:   


 


  Intake, IV Titration 900  





  Amount   


 


    Sodium Chloride 0.9% 1, 900  





    000 ml @ 75 mls/hr IV .   





    [id number] Count includes the [facility] Rx#:[id number]   


 


  Oral  1000 


 


Other:   


 


  Voiding Method Bedside Commode Bedside Commode Bedside Commode





 Diaper Diaper Diaper





 Incontinent Incontinent Incontinent


 


  # Voids  1 














- Exam


Inspection: Patient does present some very minimal resting tremor in the hands 

bilaterally.  Negative for any open fractures, significant erythema/ecchymosis 

or open wounds.





Sensation: Somewhat diminished throughout the bilateral lower extremities mostly

from the knee into the foot bilaterally.  Sensation is equal, symmetric, by 

intact throughout the upper extremities on exam.





Palpation: Moderate tenderness patient throughout the lower lumbar spine midline

and in the paravertebral regions.  Positive for fair amount of tenderness to 

patient throughout the bilateral shoulder blades and in the pair the procedure 

region of the cervical spine.  Nontender to palpation throughout rest exam.





Range of motion: Patient has full range of motion throughout bilateral ankles 

and plantar/dorsiflexion.  Limited range of motion in the bilateral hips/knees 

secondary to referred weakness and pain in the low back.  Patient is able to 

elevate both shoulders to about 95 and flexion until patient says her shoulder 

feels stiff and weak and cannot raise arms any higher.  Patient does have 

marginal most throughout bilateral elbows and wrists in flexion/extension.





Motor: 3+/5 in all major motor groups in bilateral upper and lower extremities 

on exam.





Neurovascular: Radial pulse intact, 2+ bilaterally.  Cap refill under 3 seconds 

in digits upper extremities





Special tests: Positive Ren bilaterally.  Negative clonus bilaterally.  Ne

gative Homans bilaterally.








- Labs


CBC & Chem 7: 


                                 02/23/24 07:57





                                 02/27/24 11:04





Assessment and Plan


Assessment: 


1.  Low back pain; neck pain; bilateral lower extremity radiculopathy; 

degenerative disc disease; cervical spondylosis





Plan: 


1.  Bilateral lower extremity radiculopathy; low back pain; neck pain; 

degenerative disc disease - computed tomography scan of thoracolumbar spine 

shows evidence for degenerative disc disease especially from L3-L4 and L4-L5.  

Negative for any fractures or spondylolisthesis.  MRI of cervical spine is 

negative for any spondylolisthesis or fractures.  Negative for any significant 

central or neural foraminal stenosis.  Positive for spondylosis and degenerative

disc disease.  I discussed the findings of the imaging and exam of patient with 

my attending, Dr. Stubbs.  Continue IV steroids at this time. Neuroogy 

seeing patient and rcommending EMG outpatient.  Continue with IV steroids for 

now.  Patient may weight-bear as tolerated with walker and assistance. Patient 

stable from ortho standpoint for discharge. Ortho signing of at this time. Pleas

e do not hesitate to contact us for any further questions.  We do recommend 

patient to follow-up with Dr. Stubbs for continued care in office.





2.  Appreciate medical  and neurology management





3.  Pain management - toradol





4.  DVT prophylaxis recs





5.  GI prophylaxis - Protonix





6.  PT/OT - weightbearing as tolerated with walker and assistance





7.  Encourage incentive spirometer use








Time with Patient: Less than 30

## 2024-02-27 NOTE — P.PN
Subjective


Progress Note Date: 02/27/24


I am following-up with patient and feels about the same.  








Objective





- Vital Signs


Vital signs: 


                                   Vital Signs











Temp  98.1 F   02/27/24 12:41


 


Pulse  72   02/27/24 12:41


 


Resp  16   02/27/24 12:41


 


BP  146/91   02/27/24 12:41


 


Pulse Ox  98   02/27/24 12:41


 


FiO2      








                                 Intake & Output











 02/26/24 02/27/24 02/27/24





 18:59 06:59 18:59


 


Intake Total 900 1000 


 


Balance 900 1000 


 


Intake:   


 


  Intake, IV Titration 900  





  Amount   


 


    Sodium Chloride 0.9% 1, 900  





    000 ml @ 75 mls/hr IV .   





    [id number] [facility] Rx#:[id number]   


 


  Oral  1000 


 


Other:   


 


  Voiding Method Bedside Commode Bedside Commode Bedside Commode





 Diaper Diaper Diaper





 Incontinent Incontinent Incontinent


 


  # Voids  1 1














- Exam


General: Sitting up in bed and is not in acute distress.


Neuro:


The patient is awake alert oriented to self place and time.  Is following simple

commands.  No aphasia and no neglect.


The pupils are round equal reactive to light.  The pupils are round 3-4 mm 

bilaterally.  Visual fields all 4 to confrontation.  Extraocular movement is 

intact no nystagmus.  No facial weakness.  No dysarthria.


Motor: Gait she had to be assisted she is a one person assist walk-in intake and

slow steps.  Strength is forearm flexion is 4 positive wall forearm extension is

about 4 negative.  Otherwise rest of upper extremity is 3-4 (more low 4).  Hip 

flexion and extension are about a 4-4+, knee extension is about 4+ to 5 minus, 

ankle dorsiflexion bilaterally is about 3-4 wall plantarflexion is about 4+.  

She has atrophy in the thenar and hypothenar bilateral hands


Sensation is normal to touch throughout.


Reflexes is a brachioradialis are 3+, ankles are 1 positive otherwise rest are 2

throughout.


Plantars are mute





SOME OF THE WORK-UP DURING THIS HOSPITAL VISIT CONSISTED OF:


.9


TSH is 0.175 and the free T4 is 1.12


Folate is 16.40


CB12 is 364


Vitamin D is 19.8


Calcium is 9.2


alcohol level of 209. 


CT scan of the brain was performed.  There is no reported evidence of acute 

hemorrhage or infarct.  


CT cervical spine: No acute osseous abnormalities cervical spine.  Mild kyphosis

lower cervical spine.  Mild diffuse disc space narrowing.


MRI of the cervical spine was performed.  There is no reported significant 

spinal canal stenosis or significant neural foraminal stenosis.  


CT scan of the thoracic and lumbar spine reveals "disc bulging and facet 

hypertrophy at L4-5, which may contribute to some spinal canal stenosis".  In 

addition there is possible right neuroforaminal stenosis at L3-4.


MR the brain is reported as periventricular and deep white matter changes which 

are nonspecific but can be related to microvascular ischemic change.  No acute 

intracranial process. 








- Labs


CBC & Chem 7: 


                                 02/23/24 07:57





                                 02/27/24 11:04


Labs: 


                  Abnormal Lab Results - Last 24 Hours (Table)











  02/27/24 Range/Units





  11:04 


 


Sodium  136 L  (137-145)  mmol/L


 


Potassium  5.2 H  (3.5-5.1)  mmol/L


 


Chloride  108 H  ()  mmol/L


 


BUN  25 H  (7-17)  mg/dL














Assessment and Plan


Assessment: 





1.  Generalized weakness involving bilateral upper and lower extremities.  The 

patient's neurological examination shows signs of upper and lower motor neuron 

involvement.  There is atrophy of the muscles and hyperreflexia.  Differential 

diagnosis: Motor neuron disease (such as Amyotrophic lateral sclerosis).  Cannot

exclude neuropathy secondary to alcohol abuse (but would not give upper motor 

neuron disease).





2.  Alcohol dependence





3. Vitamin D deficiency





4.  History of hypertension





5.  History of hyperlipidemia





Plan: 


1.  Will pursue with MRI thoracid and lumbar.


2. Recommend the patient to follow-up with Neuromuscular specialist as 

outpatient within 1-2 weeks.  Recommend EMG with [facility] of uppers and lowers as 

outpatient.  


3. Continue neuro checks


4. PT, OT are consulted


5. Orthopedic team is on board.


6. Patient was counseled on alcohol cessation.


7. Continue thiamine 100mg daily.


8. Patient has low TSH but normal free T4.  Will defer the management to primary

team.


9.Hemoglobin A1c: 6.0 in 12/2023 and no need to repeat.


10.  Will defer the rest of medical management to the primary team and other 

specialist.





The plan is discussed with patient and primary attending.  





Time with Patient: Less than 30

## 2024-02-27 NOTE — P.PN
Subjective


Progress Note Date: 02/27/24








63-year-old pleasant female is admitted as patient was not able to ambulate for 

the last week.  Patient has been crawling patient has worsening weakness for 

some time patient does have alcohol abuse history does drink about half a gallon

of hard liquor.  Patient does have elevated MCV patient had a CT scan of the 

lumbar spine which showed significant stenosis at the L4-L5 level patient was 

also having back pain.  Patient is tingling numbness in both legs.  Patient 

denies any other drug abuse.  Patient is already on folate supplementation.  

Patient had a fall about 2 days ago and hit head although CT of the head did not

show any intracranial bleed.





02/23/2024


Patient is evaluated today sitting up in the bed.  She states that she is having

increased weakness since her fall a few days ago she is also fallen again a few 

weeks ago.  Patient states that she has been falling frequently at home and she 

is having paresthesia in her both of her legs.  She is also having some issues 

with her left upper extremity.  Patient does report that she has not been 

drinking daily she states that she drinks about a gallon of hard liquor a few 

times a month.  She is more of a binge drinker.  Patient has been evaluated by 

physical therapy who is recommending subacute rehab she has been denied at 2 

different facilities since we are currently pending subacute rehab placement for

this patient.  Would recommend that she follows up with a neurologist possibly 

for EMG testing outpatient.  Her vitamin B12 level is normal at 374.  AST and 

ALT are elevated.  Magnesium today is 1.7.  Renal function is normal.





02/24/2024


Patient is evaluated today sitting in the bed. She continues to report weakness,

parasthesia and weak hand grasp to the upper extremities. Orthopedics has 

evaluated the patient and recommending to undergo cervical spine MRI currently 

pending. Patient has also been started on IV dexamethasone. She is on CIWA 

protocol has not required ativan in 48 hours. Hemodynamically she is stable.





02/25/2024


Patient evaluated today sitting up in the bed.  She underwent cervical spine MRI

revealing evidence for disc herniation or significant spinal canal stenosis 

there are multilevel disc degenerative changes associated with osteoarthritic 

changes.  States that she has not had much improvement with her symptoms 

including upper extremity weakness and difficulty holding overwear with right 

being started on high-dose IV steroid.  We would recommend that the pain 

continue with steroids with dexamethasone 40 mg daily, Patient would benefit 

from EMG testing outpatient and follow up with neurology.  Magnesium level 1.7.





02/26/2024





Patient is seen and evaluated in follow-up today continues to report neck and 

upper back pain and difficulty with ambulation.  Patient evaluated by physical 

therapy with case management following with plans of ECF for continued weakness.

 Awaiting updated notes and insurance authorization to be submitted.  Patient is

afebrile with no reports of chest pain or shortness of breath.  Patient 

continued on CIWA protocol and will continu  Patient was also evaluated by 

orthopedics with no plans of surgical intervention at this time recommending 

conservative management and outpatient follow-up.  Patient is scheduled an MRI 

of the brain with neurology following.  This is pending for today.  Encouraged 

increase activity as tolerated and continued PT/OT therapy.





02/27/2024


Patient is seen in follow-up today continues to report neck and upper back pain 

and has any difficulty with ambulation.  She continues with upper and lower 

extremity weakness and paresthesias.  She is unable to fully extend her fingers 

on her left hand.  After discussion with neurology there is concern for possible

ALS.  Patient has underwent cervical and brain MRI and is now pending a thoracic

and lumbar MRI today. 





Review of Systems





Constitutional: Denied any fatigue denied any fever.


Cardio vascular: denied any chest pain, palpitations


Gastrointestinal: denied any nausea, vomiting, diarrhea


Pulmonary: Denied any shortness of breath cough


Neurologic denied any new focal deficits





All inpatient medications were reviewed and appropriate changes in these 

medications as dictated in the interval history and assessment and plan.





PHYSICAL EXAMINATION: 





GENERAL: The patient is alert and oriented x3, not in any acute distress. Well 

developed, well nourished. 


HEENT: Pupils are round and equally reacting to light. EOMI. No scleral icterus.

No conjunctival pallor. Normocephalic, atraumatic. No pharyngeal erythema. No 

thyromegaly. 


CARDIOVASCULAR: S1 and S2 present. No murmurs, rubs, or gallops. 


PULMONARY: Chest is clear to auscultation, no wheezing or crackles. 


ABDOMEN: Soft, nontender, nondistended, normoactive bowel sounds. No palpable 

organomegaly. 


MUSCULOSKELETAL: No joint swelling or deformity.


EXTREMITIES: No cyanosis, clubbing, or pedal edema. 


NEUROLOGICAL: Significant generalized weakness more pronounced in both legs, 

patient has strength of 3+/5 in both lower extremities upper extremities is 4/5


SKIN: No rashes. 





Assessment:





-Frequent falls at home likely due to alcoholism. 


-Inability to walk, generalized weakness: Significant weakness in bilateral 

upper and lower extremities does have radiculopathy and spinal stenosis at L3-L4

level. Will need subacute rehab on discharge. 


-Cervical radiculopathy and weakness on course of oral dexamethasone, and no 

surgical interventions planned by orthopedics at this time, Cervical MRI 

complete. 


-Alcohol abuse: Counseling was provided


-Alcohol withdrawal: Patient will be monitored for withdrawal presently does not

have any withdrawals patient is on Ativan CIWA protocol


-Lactic acidosis secondary to dehydration patient was continued on IV fluids, 

improved


-Acute alcoholic hepatitis expected to improve with fluids


-Elevated MCV secondary to chronic alcohol, B12 levels are normal. 


-Hypertension patient is hypotensive hold off on antihypertensive medications


-Hyperlipidemia


-Depression for which patient will be resumed on her home medications. This not 

an active issue.


-DVT prophylaxis: Lovenox


-PT and OT have evaluated the patient and recommending subacute rehab on 

discharge.  [facility] excepted pending insurance authorization. 





Plan:





Patient is continued on CIWA protocol and will monitor for withdrawals, not 

currently endorsing any symptoms of withdrawals


Patient with continues with neck and back pain will continue on current regimen 

including steroids.  Orthopedics evaluated the patient with no plans of surgical

intervention at this time recommending conservative management


Patient evaluated by PT/OT therapy recommending rehab and insurance 

authorization was submitted and pending at this time


Case management/social work following working on discharge planning and has been

accepted at [facility] 


Discussed with neurology recommending patient undergo thoracic and lumbar MRI 

have been ordered and pending.


Will continue to monitor with hopeful discharge in the next 24 to 48 hours





The impression and plan of care has been dictated by Linda Dietz Nurse 

Practitioner as directed.





Dr. Latrell MD


I have performed a history and physical examination and medical decision making 

of this patient, discussed the same with the dictator, and agree with the 

dictators assessment and plan as written, documented as a scribe. Based on total

visit time, I have performed more than 50% of this visit.








Objective





- Vital Signs


Vital signs: 


                                   Vital Signs











Temp  98.1 F   02/27/24 08:25


 


Pulse  81   02/27/24 08:25


 


Resp  16   02/27/24 09:19


 


BP  146/95   02/27/24 08:25


 


Pulse Ox  98   02/27/24 08:25


 


FiO2      








                                 Intake & Output











 02/26/24 02/27/24 02/27/24





 18:59 06:59 18:59


 


Intake Total 900 1000 


 


Balance 900 1000 


 


Intake:   


 


  Intake, IV Titration 900  





  Amount   


 


    Sodium Chloride 0.9% 1, 900  





    000 ml @ 75 mls/hr IV .   





    [id number][id number]   


 


  Oral  1000 


 


Other:   


 


  Voiding Method Bedside Commode Bedside Commode Bedside Commode





 Diaper Diaper Diaper





 Incontinent Incontinent Incontinent


 


  # Voids  1 














- Labs


CBC & Chem 7: 


                                 02/23/24 07:57





                                 02/27/24 11:04


Labs: 


                  Abnormal Lab Results - Last 24 Hours (Table)











  02/27/24 Range/Units





  11:04 


 


Sodium  136 L  (137-145)  mmol/L


 


Potassium  5.2 H  (3.5-5.1)  mmol/L


 


Chloride  108 H  ()  mmol/L


 


BUN  25 H  (7-17)  mg/dL














Assessment and Plan


Time with Patient: Less than 30

## 2024-02-28 RX ADMIN — SODIUM ZIRCONIUM CYCLOSILICATE ONE GM: 10 POWDER, FOR SUSPENSION ORAL at 10:25

## 2024-02-28 RX ADMIN — MAGNESIUM SULFATE IN DEXTROSE ONE MLS/HR: 10 INJECTION, SOLUTION INTRAVENOUS at 10:25

## 2024-02-28 NOTE — P.PN
Subjective


Progress Note Date: 02/28/24


I am following-up with patient and feels about the same.  








Objective





- Vital Signs


Vital signs: 


                                   Vital Signs











Temp  98.9 F   02/28/24 12:56


 


Pulse  95   02/28/24 12:56


 


Resp  16   02/28/24 12:56


 


BP  138/82   02/28/24 12:56


 


Pulse Ox  96   02/28/24 12:56


 


FiO2      








                                 Intake & Output











 02/27/24 02/28/24 02/28/24





 18:59 06:59 18:59


 


Other:   


 


  Voiding Method Bedside Commode Bedside Commode Bedside Commode





 Diaper Diaper Diaper





 Incontinent Incontinent Incontinent


 


  # Voids 1 2 














- Exam


General: Sitting up in bed and is not in acute distress.


Neuro:


The patient is awake alert oriented to self place and time.  Is following simple

commands.  No aphasia and no neglect.


The pupils are round equal reactive to light.  The pupils are round 3-4 mm 

bilaterally.  Visual fields all 4 to confrontation.  Extraocular movement is 

intact no nystagmus.  No facial weakness.  No dysarthria.  Has ?rigth lower 

eyelid fasciculation.


Motor: Gait she had to be assisted she is a one person assist walk-in intake and

slow steps.  Strength is forearm flexion is 4 positive wall forearm extension is

about 4 negative.  Otherwise rest of upper extremity is 3-4 (more low 4).  Hip 

flexion and extension are about a 4-4+, knee extension is about 4+ to 5 minus, 

ankle dorsiflexion bilaterally is about 3-4 wall plantarflexion is about 4+.  

She has atrophy in the thenar and hypothenar bilateral hands.  Also had nursing 

staff as chaperon and patient has atrophy of deltoid and around scapula 

bilaterally.


Sensation is normal to touch throughout.


Reflexes is a brachioradialis are 3+, ankles are 1 positive otherwise rest are 2

throughout.


Plantars are mute





SOME OF THE WORK-UP DURING THIS HOSPITAL VISIT CONSISTED OF:


.9


TSH is 0.175 and the free T4 is 1.12


Folate is 16.40


CB12 is 364


Vitamin D is 19.8


Calcium is 9.2


alcohol level of 209. 


CT scan of the brain was performed.  There is no reported evidence of acute 

hemorrhage or infarct.  


CT cervical spine: No acute osseous abnormalities cervical spine.  Mild kyphosis

lower cervical spine.  Mild diffuse disc space narrowing.


MRI of the cervical spine was performed.  There is no reported significant 

spinal canal stenosis or significant neural foraminal stenosis.  


CT scan of the thoracic and lumbar spine reveals "disc bulging and facet hypertr

ophy at L4-5, which may contribute to some spinal canal stenosis".  In addition 

there is possible right neuroforaminal stenosis at L3-4.


MR the brain is reported as periventricular and deep white matter changes which 

are nonspecific but can be related to microvascular ischemic change.  No acute 

intracranial process. 








- Labs


CBC & Chem 7: 


                                 02/23/24 07:57





                                 02/27/24 11:04





Assessment and Plan


Assessment: 





1.  Generalized weakness involving bilateral upper and lower extremities.  The 

patient's neurological examination shows signs of upper and lower motor neuron 

involvement.  There is atrophy of the muscles and hyperreflexia.  Differential 

diagnosis: Motor neuron disease (such as Amyotrophic lateral sclerosis).  Cannot

exclude neuropathy secondary to alcohol abuse (but would not give upper motor 

neuron disease).





2.  Alcohol dependence





3. Vitamin D deficiency





4.  History of hypertension





5.  History of hyperlipidemia





Plan: 


1.  Pending MRI thoracid and lumbar.


2. Recommend the patient to follow-up with Neuromuscular specialist as 

outpatient within 1-2 weeks.  Recommend EMG with [facility] of uppers and lowers as 

outpatient.  


3. Continue neuro checks


4. PT, OT are consulted


5. Orthopedic team is on board.


6. Patient was counseled on alcohol cessation.


7. Continue thiamine 100mg daily.


8. Patient has low TSH but normal free T4.  Will defer the management to primary

team.


9.Hemoglobin A1c: 6.0 in 12/2023 and no need to repeat.


10.  Will defer the rest of medical management to the primary team and other 

specialist.





The plan is discussed with patient and nursing staff.





Time with Patient: Less than 30

## 2024-02-28 NOTE — P.PN
Subjective


Progress Note Date: 02/28/24








63-year-old pleasant female is admitted as patient was not able to ambulate for 

the last week.  Patient has been crawling patient has worsening weakness for 

some time patient does have alcohol abuse history does drink about half a gallon

of hard liquor.  Patient does have elevated MCV patient had a CT scan of the 

lumbar spine which showed significant stenosis at the L4-L5 level patient was 

also having back pain.  Patient is tingling numbness in both legs.  Patient 

denies any other drug abuse.  Patient is already on folate supplementation.  

Patient had a fall about 2 days ago and hit head although CT of the head did not

show any intracranial bleed.





02/23/2024


Patient is evaluated today sitting up in the bed.  She states that she is having

increased weakness since her fall a few days ago she is also fallen again a few 

weeks ago.  Patient states that she has been falling frequently at home and she 

is having paresthesia in her both of her legs.  She is also having some issues 

with her left upper extremity.  Patient does report that she has not been 

drinking daily she states that she drinks about a gallon of hard liquor a few 

times a month.  She is more of a binge drinker.  Patient has been evaluated by 

physical therapy who is recommending subacute rehab she has been denied at 2 

different facilities since we are currently pending subacute rehab placement for

this patient.  Would recommend that she follows up with a neurologist possibly 

for EMG testing outpatient.  Her vitamin B12 level is normal at 374.  AST and 

ALT are elevated.  Magnesium today is 1.7.  Renal function is normal.





02/24/2024


Patient is evaluated today sitting in the bed. She continues to report weakness,

parasthesia and weak hand grasp to the upper extremities. Orthopedics has 

evaluated the patient and recommending to undergo cervical spine MRI currently 

pending. Patient has also been started on IV dexamethasone. She is on CIWA 

protocol has not required ativan in 48 hours. Hemodynamically she is stable.





02/25/2024


Patient evaluated today sitting up in the bed.  She underwent cervical spine MRI

revealing evidence for disc herniation or significant spinal canal stenosis 

there are multilevel disc degenerative changes associated with osteoarthritic 

changes.  States that she has not had much improvement with her symptoms 

including upper extremity weakness and difficulty holding overwear with right 

being started on high-dose IV steroid.  We would recommend that the pain 

continue with steroids with dexamethasone 40 mg daily, Patient would benefit 

from EMG testing outpatient and follow up with neurology.  Magnesium level 1.7.





02/26/2024





Patient is seen and evaluated in follow-up today continues to report neck and 

upper back pain and difficulty with ambulation.  Patient evaluated by physical 

therapy with case management following with plans of ECF for continued weakness.

 Awaiting updated notes and insurance authorization to be submitted.  Patient is

afebrile with no reports of chest pain or shortness of breath.  Patient 

continued on CIWA protocol and will continu  Patient was also evaluated by 

orthopedics with no plans of surgical intervention at this time recommending 

conservative management and outpatient follow-up.  Patient is scheduled an MRI 

of the brain with neurology following.  This is pending for today.  Encouraged 

increase activity as tolerated and continued PT/OT therapy.





02/27/2024


Patient is seen in follow-up today continues to report neck and upper back pain 

and has any difficulty with ambulation.  She continues with upper and lower 

extremity weakness and paresthesias.  She is unable to fully extend her fingers 

on her left hand.  After discussion with neurology there is concern for possible

ALS.  Patient has underwent cervical and brain MRI and is now pending a thoracic

and lumbar MRI today. 





02/28/2024


Recurrent abdominal chordee report neck and upper back pain difficulty 

ambulation as well as upper extremity weakness and paresthesias.  There is 

concern for possible ALS she is currently pending follow-up MRIs with her 

thoracic and lumbar spine which would not be taken until 515 today.  This places

her discharge on a hold as we are unable to coordinate transportation and 

discharge to a rehab facility after the MRI has been completed and reports are 

in.  Neurology continues to follow this patient.





Review of Systems





Constitutional: Denied any fatigue denied any fever.


Cardio vascular: denied any chest pain, palpitations


Gastrointestinal: denied any nausea, vomiting, diarrhea


Pulmonary: Denied any shortness of breath cough


Neurologic denied any new focal deficits





All inpatient medications were reviewed and appropriate changes in these 

medications as dictated in the interval history and assessment and plan.





PHYSICAL EXAMINATION: 





GENERAL: The patient is alert and oriented x3, not in any acute distress. Well 

developed, well nourished. 


HEENT: Pupils are round and equally reacting to light. EOMI. No scleral icterus.

No conjunctival pallor. Normocephalic, atraumatic. No pharyngeal erythema. No 

thyromegaly. 


CARDIOVASCULAR: S1 and S2 present. No murmurs, rubs, or gallops. 


PULMONARY: Chest is clear to auscultation, no wheezing or crackles. 


ABDOMEN: Soft, nontender, nondistended, normoactive bowel sounds. No palpable 

organomegaly. 


MUSCULOSKELETAL: No joint swelling or deformity.


EXTREMITIES: No cyanosis, clubbing, or pedal edema. 


NEUROLOGICAL: Significant generalized weakness more pronounced in both legs, 

patient has strength of 3+/5 in both lower extremities upper extremities is 4/5


SKIN: No rashes. 





Assessment:





-Frequent falls at home likely due to alcoholism. 


-Inability to walk, generalized weakness: Significant weakness in bilateral 

upper and lower extremities does have radiculopathy and spinal stenosis at L3-L4

level. Will need subacute rehab on discharge. 


-Cervical radiculopathy and weakness on course of oral dexamethasone, and no 

surgical interventions planned by orthopedics at this time, Cervical MRI comple

te. 


-Alcohol abuse: Counseling was provided


-Alcohol withdrawal: Patient will be monitored for withdrawal presently does not

have any withdrawals patient is on Ativan CIWA protocol


-Lactic acidosis secondary to dehydration patient was continued on IV fluids, 

improved


-Acute alcoholic hepatitis expected to improve with fluids


-Elevated MCV secondary to chronic alcohol, B12 levels are normal. 


-Hypertension patient is hypotensive hold off on antihypertensive medications


-Hyperlipidemia


-Depression for which patient will be resumed on her home medications. This not 

an active issue.


-DVT prophylaxis: Lovenox


-PT and OT have evaluated the patient and recommending subacute rehab on 

discharge.  [facility] excepted pending insurance authorization. 





Plan:





Patient is continued on CIWA protocol and will monitor for withdrawals, not 

currently endorsing any symptoms of withdrawals


Patient with continues with neck and back pain will continue on current regimen 

including steroids.  Orthopedics evaluated the patient with no plans of surgical

intervention at this time recommending conservative management


Case management/social work following working on discharge planning and has been

accepted at [facility] 


Discussed with neurology recommending patient undergo thoracic and lumbar MRI 

have been ordered and pending.


Will continue to monitor with hopeful discharge in the next 24 to 48 hours





The impression and plan of care has been dictated by Linda Dietz Nurse 

Practitioner as directed.





Dr. Latrell MD


I have performed a history and physical examination and medical decision making 

of this patient, discussed the same with the dictator, and agree with the 

dictators assessment and plan as written, documented as a scribe. Based on total

visit time, I have performed more than 50% of this visit.








Objective





- Vital Signs


Vital signs: 


                                   Vital Signs











Temp  98.9 F   02/28/24 12:56


 


Pulse  95   02/28/24 12:56


 


Resp  16   02/28/24 12:56


 


BP  138/82   02/28/24 12:56


 


Pulse Ox  96   02/28/24 12:56


 


FiO2      








                                 Intake & Output











 02/27/24 02/28/24 02/28/24





 18:59 06:59 18:59


 


Other:   


 


  Voiding Method Bedside Commode Bedside Commode Bedside Commode





 Diaper Diaper Diaper





 Incontinent Incontinent Incontinent


 


  # Voids 1 2 














- Labs


CBC & Chem 7: 


                                 02/23/24 07:57





                                 02/27/24 11:04





Assessment and Plan


Time with Patient: Less than 30

## 2024-02-29 VITALS
HEART RATE: 94 BPM | TEMPERATURE: 98.7 F | DIASTOLIC BLOOD PRESSURE: 87 MMHG | RESPIRATION RATE: 18 BRPM | SYSTOLIC BLOOD PRESSURE: 142 MMHG

## 2024-02-29 LAB
ANION GAP SERPL CALC-SCNC: 5 MMOL/L
BUN SERPL-SCNC: 26 MG/DL (ref 7–17)
CALCIUM SPEC-MCNC: 9.8 MG/DL (ref 8.4–10.2)
CHLORIDE SERPL-SCNC: 106 MMOL/L (ref 98–107)
CO2 SERPL-SCNC: 27 MMOL/L (ref 22–30)
GLUCOSE SERPL-MCNC: 85 MG/DL (ref 74–99)
POTASSIUM SERPL-SCNC: 6 MMOL/L (ref 3.5–5.1)
SODIUM SERPL-SCNC: 138 MMOL/L (ref 137–145)

## 2024-02-29 RX ADMIN — INSULIN HUMAN ONE UNIT: 100 INJECTION, SOLUTION PARENTERAL at 11:41

## 2024-02-29 RX ADMIN — DEXTROSE MONOHYDRATE ONE ML: 25 INJECTION, SOLUTION INTRAVENOUS at 11:41

## 2024-02-29 RX ADMIN — SODIUM BICARBONATE ONE ML: 84 INJECTION, SOLUTION INTRAVENOUS at 11:41

## 2024-02-29 NOTE — MR
EXAMINATION TYPE: MR tspine/lspine wo/w con

 

DATE OF EXAM: 2/28/2024

 

COMPARISON: CT thoracolumbar spine and 6 days earlier

 

HISTORY: Bilateral UE and LE weakness, Concern for ALS

 

TECHNIQUE: Multiplanar, multisequence imaging of thoracic and lumbar spine are performed without and 
with IV contrast, patient injected with 9 cc of gadolinium

 

FINDINGS: 

 

T-SPINE:

 

Spinal cord shows normal course, caliber, and signal as it courses the thoracic spine.  Vertebral bod
y heights are satisfactory.  Slight scoliotic curvature is redemonstrated. Mild multilevel anterior s
purring redemonstrated. No large disc herniations are seen. Bone marrow signal intensity is fairly we
ll preserved. No suspicious enhancement is seen. Review of the axial images shows no significant spin
al canal stenosis or neural foraminal narrowing at any thoracic level.  

 

IMPRESSION: No significant abnormality is seen to account for patient's symptoms in the thoracic spin
e.  

 

Lumbar spine:

 

Sagittal images of the lumbar spine show vertebral body heights and alignment to remaining satisfacto
ry. Multilevel disc desiccation with mild multilevel disc space narrowing with relative sparing of th
e lower lumbar levels.  The conus medullaris is normal in position and signal indicating inferior L1 
level. Mild to moderate multilevel anterior spurring is seen with some heterogeneous Modic type I end
plate changes at the anterior L2-L3 and L3-L4 levels noted.

 

Axial images show multilevel mild posterior disc herniations mildly effaces the anterior thecal sac a
t L1-L2 through L4-L5 levels. Axial images at L4-L5 level shows moderate facet arthropathy and ligame
ntum flavum hypertrophy effacing the bilateral posterior lateral thecal sac. Similar finding at L3-L4
 level to a lesser degree. No abnormal enhancement is seen. No advanced neural foraminal narrowing or
 nerve root effacement identified.. Paraspinal muscle bulk is maintained.

 

IMPRESSION: Multilevel degenerative changes in the lumbar spine as detailed above.

## 2024-02-29 NOTE — P.DS
Providers


Date of admission: 


02/24/24 14:17





Attending physician: 


Haylee Orlando





Consults: 





                                        





02/22/24 19:00


Consult Physician Routine 


   Consulting Provider: Carlos Stubbs


   Consult Reason/Comments: Lumbar radiculopathy


   Do you want consulting provider notified?: Yes





02/25/24 09:38


Consult Physician Routine 


   Consulting Provider: Satish Jackson


   Consult Reason/Comments: tremor


   Do you want consulting provider notified?: Yes











Primary care physician: 


Lisa Becerra





Hospital Course: 


Final Diagnosis


-Generalized weakness involving the bilateral upper and lower extremities this 

could be secondary to general medical deconditioning, versus neuropathy 

secondary to alcohol abuse.


-History of hypertension maintained on lisinopril which is held due to the 

hyperkalemia


-Hyperkalemia due to ACE inhibitor use


-History of hyperlipidemia not currently on statin therapy


-Current falls at home likely due to alcoholism


-Bilateral lower extremity radiculopathy and weakness this could be due to 

degenerative disc disease will need further outpatient workup


-Chronic alcohol use


-Elevated MCV secondary to chronic alcohol use vitamin B12 levels were checked 

and normal


-Depression stable on current medications





Discharge Disposition


Patient is stable for discharge to subacute rehab.  As recommended patient 

should follow-up with a neuromuscular specialist at [facility] information has 

been provided.  The phone number to schedule this appointment is [phone] 

Recommending outpatient EMG with NCS of uppers and lowers which will need to be 

done as an outpatient.  Patient should continue off of lisinopril due to 

elevated potassium levels.  Recommended to continue a 3-day course of Lokelma 

daily and repeat labs to follow-up for the potassium.  She is advised to 

continue off alcohol she is given information for  outpatient counseling 

services.  Recommended to continue on vitamin B1 and folate on discharge.  

Patient will also continue on oral magnesium daily.  Patient's PCP is Dr. Lisa Becerra recommending to follow-up in 1 to 2 days.  Patient is weightbearing 

as tolerated with walker and assistance.  Patient will need subacute rehab on 

discharge and will be discharged to [facility].





Hospital Course 


This is a 63-year-old pleasant female is admitted as patient was not able to 

ambulate for the last week.  Patient has been crawling patient has worsening 

weakness for some time patient does have alcohol abuse history and reports 

drinking 1/2 gallon of hard liquor twice a month.  Patient does have elevated 

MCV on admission likely due to the alcholism. Patient reports tingling numbness 

in both legs. She also has weakness in her upper extremities. Patient denies any

other drug abuse.  Patient is already on folate supplementation.  Patient had a 

fall about 2 days ago and hit head although CT of the head did not show any 

intracranial bleed.  Patient had a CT scan of the thoracic and lumbar spine 

which revealed disc bulging and facet arthropathy at L4-L5 which may contribute 

to some spinal canal stenosis and additional there is possible right 

neuroforaminal stenosis at L3-L4.  Patient was admitted to the hospital with 

consult placed to orthopedic surgery and subsequently a neurology consultation 

was placed. Patient underwent addition imaging had brain MRI showing 

periventricular and deep white matter changes which are nonspecific but can be 

related to microvascular ischemic change and there was no acute intracranial 

process.  Cervical MRI done showing no evidence for disc herniation or 

significant spinal canal stenosis.  There is multilevel disc degeneration with 

associated osteoarthritic changes.  There was concern for possible motor neuron 

disorder such as ALS and patient was recommended to undergo thoracolumbar MRI 

which was completed revealing multilevel degenerative changes in the lumbar 

spine.  There is moderate facet arthropathy and ligamentum flavum hypertrophy 

effacing the bilateral posterior lateral thecal sac at the levels of L4-L5.  

With a similar finding of L3-L4 at a lesser degree.  There is no advanced 

neuroforaminal narrowing or nerve root effacement identified.  Patient was 

placed on IV steroids without much improvement in the radicular symptoms.  

Orthopedics has cleared the patient for discharge to subacute rehab and 

recommending to follow-up in the office no surgical intervention recommended.  

Neurology has recommended the same and patient needs to follow up with a 

neuromuscular clinic. ALS felt unlikely due to MRI findings but needs to rule 

out other neural motor diseases outpatient. Additional testing done includes a 

TSH level that was  0.157, free T4 at 1.12.  Folate level is 16.40, B12 is 364, 

vitamin D level is 19.8, calcium 9.2, alcohol level on admission was found to be

209.  Patient did not exhibit any signs of acute alcohol withdrawal although she

was treated with Ativan CIWA protocol.  Patient has had increased potassium this

admission and lisinopril has been discontinued. Elevated potassium treated with 

lokelma and cocktail. Recommending to repeat labs in 2 to 3 days. She is not 

having any chest pain, reporting no shortness of breath, no nausea vomiting or 

diarrhea. Lungs are clear, S1 S2 auscultated, abdomen is soft and nontender. 

Continues with bilateral upper and lower extremity weakness; has thenar atrophy 

bilaterally. Patient is cleared for discharge to subacute rehab with the above 

mentioned recommendations. 





Please see medication reconciliation for a list of current medications. Thank 

you for allowing us to participate in the care of this patient. 





Greater than 35 minutes taken coordinating this complex discharge plan.





The impression and plan of care has been dictated by Linda Dietz Nurse 

Practitioner as directed.





Dr. Latrell MD


I have performed a history and physical examination and medical decision making 

of this patient, discussed the same with the dictator, and agree with the 

dictators assessment and plan as written, documented as a scribe. Based on total

visit time, I have performed more than 50% of this visit.





Patient Condition at Discharge: Stable





Plan - Discharge Summary


Discharge Rx Participant: Yes


New Discharge Prescriptions: 


New


   Heparin Sodium,Porcine (1 ml) [Heparin Sodium] 5,000 unit SQ Q12HR  each


   Magnesium Oxide [Mag-Ox] 400 mg PO DAILY  tab


   Cholecalciferol [Vitamin D3 (25 Mcg = 1000 Iu)] 50 mcg PO DAILY  tab


   Sodium Zirconium Cyclosilicate [Lokelma] 10 mg PO DAILY #3 packet





Continue


   Pantoprazole [Protonix] 40 mg PO DAILY #30 tab


   traZODone HCL [Desyrel] 50 mg PO HS PRN  tab


     PRN Reason: Insomnia


   Folic Acid 1 mg PO DAILY #30 tablet


   Multivitamins, Thera [Multivitamin (formulary)] 1 tab PO DAILY #30 tablet


   Acetaminophen Tab [Tylenol] 650 mg PO Q6HR PRN  tab


     PRN Reason: Fever And/ Or Pain


   Thiamine [Vitamin B-1] 100 mg PO DAILY 30 Days #30 tab


   Escitalopram [Lexapro] 10 mg PO DAILY 30 Days #30 tab


   QUEtiapine [SEROquel] 200 mg PO HS 30 Days #30 tab





Discontinued


   lisinopriL [Zestril] 10 mg PO DAILY #30 tab





No Action


   Ibuprofen [Motrin] 400 mg PO Q6HR PRN  tab


     PRN Reason: Pain


Discharge Medication List





Pantoprazole [Protonix] 40 mg PO DAILY #30 tab 12/20/23 [Rx]


Acetaminophen Tab [Tylenol] 650 mg PO Q6HR PRN  tab 02/06/24 [Rx]


Escitalopram [Lexapro] 10 mg PO DAILY 30 Days #30 tab 02/06/24 [Rx]


Folic Acid 1 mg PO DAILY #30 tablet 02/06/24 [Rx]


Ibuprofen [Motrin] 400 mg PO Q6HR PRN  tab 02/06/24 [Rx]


Multivitamins, Thera [Multivitamin (formulary)] 1 tab PO DAILY #30 tablet 

02/06/24 [Rx]


QUEtiapine [SEROquel] 200 mg PO HS 30 Days #30 tab 02/06/24 [Rx]


Thiamine [Vitamin B-1] 100 mg PO DAILY 30 Days #30 tab 02/06/24 [Rx]


traZODone HCL [Desyrel] 50 mg PO HS PRN  tab 02/06/24 [Rx]


Cholecalciferol [Vitamin D3 (25 Mcg = 1000 Iu)] 50 mcg PO DAILY  tab 02/29/24 

[Rx]


Heparin Sodium,Porcine (1 ml) [Heparin Sodium] 5,000 unit SQ Q12HR  each 

02/29/24 [Rx]


Magnesium Oxide [Mag-Ox] 400 mg PO DAILY  tab 02/29/24 [Rx]


Sodium Zirconium Cyclosilicate [Lokelma] 10 mg PO DAILY #3 packet 02/29/24 [Rx]








Follow up Appointment(s)/Referral(s): 


Lisa Becerra MD [Primary Care Provider] - 1-2 days


Carlos Stubbs DO [Doctor of Osteopathic Medicine] - 2 Weeks


Madyson Ray MD [Medical Doctor] - 1 Week


Ambulatory/Diagnostic Orders: 


Basic Metabolic Panel [LAB.AMB] Time Frame: 3 Days, Location: None Selected


Activity/Diet/Wound Care/Special Instructions: 


Recommend to follow up with EMG testing outpatient and follow up with a 

neurologist on discharge in 1 to 2 weeks. 








Recommend to discontinue lisinopril due to the elevated potassium








Continue on lokelma daily for 3 days and repeat a BMP in 2 to 3 days. 


Discharge/Stand Alone Forms:  [employer] Meet[employer] [address] - [facility], [employer] Meet[employer] . 

[place]


Discharge Disposition: TRANSFER TO SNF/ECF

## 2024-02-29 NOTE — P.PN
Subjective


Progress Note Date: 02/29/24


I am following-up with patient and feels about the same.  Denies any new 

neurological issues.








Objective





- Vital Signs


Vital signs: 


                                   Vital Signs











Temp  98.3 F   02/29/24 07:39


 


Pulse  77   02/29/24 07:39


 


Resp  17   02/29/24 07:39


 


BP  134/89   02/29/24 07:39


 


Pulse Ox  96   02/29/24 07:39


 


FiO2      








                                 Intake & Output











 02/28/24 02/29/24 02/29/24





 18:59 06:59 18:59


 


Intake Total 100 1190 


 


Balance 100 1190 


 


Intake:   


 


  Intake, IV Titration 100  





  Amount   


 


    Magnesium Sulfate-D5w Pmx 100  





    1 gm In Dextrose/Water 1   





    100ml.bag @ 100 mls/hr   





    IVPB ONCE ONE Rx#:   





    [id number]   


 


  Oral  1190 


 


Other:   


 


  Voiding Method Bedside Commode Bedside Commode Bedside Commode





 Diaper Diaper 





 Incontinent  


 


  # Voids 3 2 1


 


  # Bowel Movements   1














- Exam


General: Sitting up in bed and is not in acute distress.


Neuro:


The patient is awake alert oriented to self place and time.  Is following simple

commands.  No aphasia and no neglect.


The pupils are round equal reactive to light.  The pupils are round 3-4 mm 

bilaterally.  Visual fields all 4 to confrontation.  Extraocular movement is 

intact no nystagmus.  No facial weakness.  No dysarthria.  Has ?rigth lower 

eyelid fasciculation.


Motor: Gait she had to be assisted she is a one person assist walk-in intake and

slow steps.  Strength is forearm flexion is 4 positive wall forearm extension is

about 4 negative.  Otherwise rest of upper extremity is 3-4 (more low 4).  Hip 

flexion and extension are about a 4-4+, knee extension is about 4+ to 5 minus, 

ankle dorsiflexion bilaterally is about 3-4 wall plantarflexion is about 4+.  

She has atrophy in the thenar and hypothenar bilateral hands.  Also had nursing 

staff as chaperon and patient has atrophy of deltoid and around scapula 

bilaterally.


Sensation is normal to touch throughout.


Reflexes is a brachioradialis are 3+, ankles are 1 positive otherwise rest are 2

throughout.


Plantars are mute





SOME OF THE WORK-UP DURING THIS HOSPITAL VISIT CONSISTED OF:


.9


TSH is 0.175 and the free T4 is 1.12


Folate is 16.40


CB12 is 364


Vitamin D is 19.8


Calcium is 9.2


alcohol level of 209. 


CT scan of the brain was performed.  There is no reported evidence of acute 

hemorrhage or infarct.  


CT cervical spine: No acute osseous abnormalities cervical spine.  Mild kyphosis

lower cervical spine.  Mild diffuse disc space narrowing.


MRI of the cervical spine was performed.  There is no reported significant 

spinal canal stenosis or significant neural foraminal stenosis.  


CT scan of the thoracic and lumbar spine reveals "disc bulging and facet 

hypertrophy at L4-5, which may contribute to some spinal canal stenosis".  In 

addition there is possible right neuroforaminal stenosis at L3-4.


MR the brain is reported as periventricular and deep white matter changes which 

are nonspecific but can be related to microvascular ischemic change.  No acute 

intracranial process. 


MRI Thoracic: Reported as no significant abnormality seen to account for the 

patient's symptoms in the thoracic spine.  


MRI lumbar spine: Multilevel degenerative changes in the lumbar spine.





- Labs


CBC & Chem 7: 


                                 02/23/24 07:57





                                 02/29/24 10:33


Labs: 


                  Abnormal Lab Results - Last 24 Hours (Table)











  02/29/24 Range/Units





  10:33 


 


Potassium  6.0 H  (3.5-5.1)  mmol/L


 


BUN  26 H  (7-17)  mg/dL














Assessment and Plan


Assessment: 





1.  Generalized weakness involving bilateral upper and lower extremities.  The 

patient's neurological examination shows signs of upper and lower motor neuron 

involvement.  There is atrophy of the muscles and hyperreflexia.  Differential 

diagnosis: Motor neuron disease (such as Amyotrophic lateral sclerosis).  Cannot

exclude neuropathy secondary to alcohol abuse (but would not give upper motor 

neuron disease).





2.  Alcohol dependence





3. Vitamin D deficiency





4.  History of hypertension





5.  History of hyperlipidemia





Plan: 


1. Recommend the patient to follow-up with Neuromuscular specialist as o

utpatient within 1-2 weeks.  Recommend EMG with NCS of uppers and lowers as 

outpatient.  


2. Continue neuro checks


3. PT, OT are consulted


4. Orthopedic team is on board.


5. Patient was counseled on alcohol cessation.


6. Continue thiamine 100mg daily.


7. Patient has low TSH but normal free T4.  Will defer the management to primary

team.


8.Hemoglobin A1c: 6.0 in 12/2023 and no need to repeat.


9.  Will defer the rest of medical management to the primary team and other 

specialist.





The plan is discussed with patient, primary team N.P and her nurse.  


There is no further neurological work-up.  Please notify neurology team if any 

further concerns.





Time with Patient: Less than 30

## 2024-03-10 ENCOUNTER — HOSPITAL ENCOUNTER (INPATIENT)
Dept: HOSPITAL 47 - EC | Age: 64
LOS: 3 days | Discharge: HOME | DRG: 74 | End: 2024-03-13
Attending: HOSPITALIST | Admitting: HOSPITALIST
Payer: MEDICARE

## 2024-03-10 DIAGNOSIS — Z79.899: ICD-10-CM

## 2024-03-10 DIAGNOSIS — F10.188: ICD-10-CM

## 2024-03-10 DIAGNOSIS — Z86.010: ICD-10-CM

## 2024-03-10 DIAGNOSIS — Z91.81: ICD-10-CM

## 2024-03-10 DIAGNOSIS — Z98.82: ICD-10-CM

## 2024-03-10 DIAGNOSIS — F32.A: ICD-10-CM

## 2024-03-10 DIAGNOSIS — E66.9: ICD-10-CM

## 2024-03-10 DIAGNOSIS — M72.0: ICD-10-CM

## 2024-03-10 DIAGNOSIS — E78.5: ICD-10-CM

## 2024-03-10 DIAGNOSIS — M54.12: ICD-10-CM

## 2024-03-10 DIAGNOSIS — Z59.00: ICD-10-CM

## 2024-03-10 DIAGNOSIS — G62.1: Primary | ICD-10-CM

## 2024-03-10 DIAGNOSIS — Z88.5: ICD-10-CM

## 2024-03-10 DIAGNOSIS — F41.9: ICD-10-CM

## 2024-03-10 DIAGNOSIS — Z88.4: ICD-10-CM

## 2024-03-10 DIAGNOSIS — Z28.311: ICD-10-CM

## 2024-03-10 DIAGNOSIS — D75.89: ICD-10-CM

## 2024-03-10 DIAGNOSIS — Z91.040: ICD-10-CM

## 2024-03-10 DIAGNOSIS — I10: ICD-10-CM

## 2024-03-10 DIAGNOSIS — Z28.21: ICD-10-CM

## 2024-03-10 DIAGNOSIS — R29.6: ICD-10-CM

## 2024-03-10 LAB
ALBUMIN SERPL-MCNC: 4.2 G/DL (ref 3.5–5)
ALP SERPL-CCNC: 66 U/L (ref 38–126)
ALT SERPL-CCNC: 28 U/L (ref 4–34)
ANION GAP SERPL CALC-SCNC: 5 MMOL/L
APTT BLD: 22.7 SEC (ref 22–30)
AST SERPL-CCNC: 32 U/L (ref 14–36)
BASOPHILS # BLD AUTO: 0.1 K/UL (ref 0–0.2)
BASOPHILS NFR BLD AUTO: 1 %
BUN SERPL-SCNC: 26 MG/DL (ref 7–17)
CALCIUM SPEC-MCNC: 9.8 MG/DL (ref 8.4–10.2)
CHLORIDE SERPL-SCNC: 108 MMOL/L (ref 98–107)
CO2 SERPL-SCNC: 27 MMOL/L (ref 22–30)
EOSINOPHIL # BLD AUTO: 0.2 K/UL (ref 0–0.7)
EOSINOPHIL NFR BLD AUTO: 2 %
ERYTHROCYTE [DISTWIDTH] IN BLOOD BY AUTOMATED COUNT: 3.41 M/UL (ref 3.8–5.4)
ERYTHROCYTE [DISTWIDTH] IN BLOOD: 13.3 % (ref 11.5–15.5)
GLUCOSE SERPL-MCNC: 102 MG/DL (ref 74–99)
HCT VFR BLD AUTO: 35.4 % (ref 34–46)
HGB BLD-MCNC: 11.9 GM/DL (ref 11.4–16)
INR PPP: 0.9 (ref ?–1.2)
LYMPHOCYTES # SPEC AUTO: 1.8 K/UL (ref 1–4.8)
LYMPHOCYTES NFR SPEC AUTO: 22 %
MAGNESIUM SPEC-SCNC: 1.9 MG/DL (ref 1.6–2.3)
MCH RBC QN AUTO: 35 PG (ref 25–35)
MCHC RBC AUTO-ENTMCNC: 33.7 G/DL (ref 31–37)
MCV RBC AUTO: 103.8 FL (ref 80–100)
MONOCYTES # BLD AUTO: 0.4 K/UL (ref 0–1)
MONOCYTES NFR BLD AUTO: 5 %
NEUTROPHILS # BLD AUTO: 5.5 K/UL (ref 1.3–7.7)
NEUTROPHILS NFR BLD AUTO: 68 %
PH UR: 5.5 [PH] (ref 5–8)
PLATELET # BLD AUTO: 351 K/UL (ref 150–450)
POTASSIUM SERPL-SCNC: 3.9 MMOL/L (ref 3.5–5.1)
PROT SERPL-MCNC: 6.9 G/DL (ref 6.3–8.2)
PT BLD: 9.8 SEC (ref 10–12.5)
SODIUM SERPL-SCNC: 140 MMOL/L (ref 137–145)
SP GR UR: 1.02 (ref 1–1.03)
UROBILINOGEN UR QL STRIP: <2 MG/DL (ref ?–2)
WBC # BLD AUTO: 8.1 K/UL (ref 3.8–10.6)

## 2024-03-10 PROCEDURE — 81003 URINALYSIS AUTO W/O SCOPE: CPT

## 2024-03-10 PROCEDURE — 85730 THROMBOPLASTIN TIME PARTIAL: CPT

## 2024-03-10 PROCEDURE — 83735 ASSAY OF MAGNESIUM: CPT

## 2024-03-10 PROCEDURE — 85610 PROTHROMBIN TIME: CPT

## 2024-03-10 PROCEDURE — 36415 COLL VENOUS BLD VENIPUNCTURE: CPT

## 2024-03-10 PROCEDURE — 80053 COMPREHEN METABOLIC PANEL: CPT

## 2024-03-10 PROCEDURE — 99285 EMERGENCY DEPT VISIT HI MDM: CPT

## 2024-03-10 PROCEDURE — 85025 COMPLETE CBC W/AUTO DIFF WBC: CPT

## 2024-03-10 RX ADMIN — IBUPROFEN STA MG: 600 TABLET ORAL at 15:58

## 2024-03-10 RX ADMIN — ACETAMINOPHEN STA MG: 325 TABLET, FILM COATED ORAL at 15:59

## 2024-03-10 SDOH — ECONOMIC STABILITY - HOUSING INSECURITY: HOMELESSNESS UNSPECIFIED: Z59.00
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
## 2024-03-11 RX ADMIN — Medication SCH MG: at 07:24

## 2024-03-11 RX ADMIN — ESCITALOPRAM OXALATE SCH MG: 20 TABLET, FILM COATED ORAL at 07:24

## 2024-03-11 RX ADMIN — THERA TABS SCH EACH: TAB at 07:23

## 2024-03-11 RX ADMIN — IBUPROFEN PRN MG: 400 TABLET, FILM COATED ORAL at 02:19

## 2024-03-11 RX ADMIN — Medication SCH MCG: at 07:24

## 2024-03-11 RX ADMIN — FOLIC ACID SCH MG: 1 TABLET ORAL at 07:23

## 2024-03-11 RX ADMIN — PANTOPRAZOLE SODIUM SCH MG: 40 TABLET, DELAYED RELEASE ORAL at 07:23

## 2024-03-11 RX ADMIN — Medication SCH MG: at 07:22

## 2024-03-11 RX ADMIN — HEPARIN SODIUM SCH UNIT: 5000 INJECTION, SOLUTION INTRAVENOUS; SUBCUTANEOUS at 20:00

## 2024-03-11 NOTE — P.HPIM
History of Present Illness


H&P Date: 03/11/24


This is a pleasant 63-year-old female with medical history of depression, 

chronic alcohol use and hypertension.  Patient was recently hospitalized with 

concern for upper and lower extremity weakness as well as paresthesias to the 

upper and lower extremity.  Patient underwent extensive orthopedic and 

neurological workup including MRI of the brain, cervical, thoracic and lumbar 

spine.  Patient was recommended to follow-up with neurology for EMG and NCS 

testing of the upper and lower extremities and she was given a follow-up phone 

number for a neuromuscular specialist out of [facility].  Patient was discharged

to subacute rehab where she was then discharged to a room and board type 

situation.  Patient states that the house was not set up for a disabled patient 

and she was having issues with bathing and the toileting facilities.  She was 

per the patient kicked out after 2 days however states that she also wanted to 

leave.  Patient returns to the ER with similar complaints as above she continues

to have 4-5 muscle strength in the bilateral upper lower extremities as well as 

significant weakness of the muscles in the left hand.  Also reports a headache 

over the left occipital lobe radiating down into the neck.  No chest pain or 

shortness of breath she reports no nausea vomiting or diarrhea.  She is not 

having any dizziness or lightheadedness while sitting but does state that when 

she stands up to ambulate she feels dizzy and lightheaded.  Her hemoglobin is 

stable 11.9, she has macrocytosis at 103.8.  She is post to be on folate 

supplementation.  Folate and B12 levels were checked 2 weeks ago and were within

normal limits.  Renal function is stable BUN of 26, creatinine of 0.52 e

lectrolytes are within normal limits her liver enzymes are normal.  Her 

urinalysis is benign.  She has not been drinking alcohol.  She was admitted to 

the hospital under medicine and physical therapy was consulted for discharge 

planning. Patient is now homeless, she was unable to afford her apartment prior 

to her previous admission. She is evaluated today on the medical floor. Similar 

complaints as previous. Hemodynamically she is stable.





REVIEW OF SYSTEMS: 


CONSTITUTIONAL: No fever, no malaise, no fatigue. 


HEENT: No recent visual problems or hearing problems. Denied any sore throat. 


CARDIOVASCULAR: No chest pain, orthopnea, PND, no palpitations, no syncope. 


PULMONARY: No shortness of breath, no cough, no hemoptysis. 


GASTROINTESTINAL: No diarrhea, no nausea, no vomiting, no abdominal pain. 


NEUROLOGICAL: Reports headache, no weakness, no numbness. 


HEMATOLOGICAL: Denies any bleeding or petechiae. 


GENITOURINARY: Denies any burning micturition, frequency, or urgency. 


MUSCULOSKELETAL/RHEUMATOLOGICAL: Denies joint pain, and swelling. Patient 

reports weakness in upper and lower extremities. 


ENDOCRINE: Denies any polyuria or polydipsia. 





The rest of the 14-point review of systems is negative.





PHYSICAL EXAMINATION: 





GENERAL: The patient is alert and oriented x3, not in any acute distress. Well 

developed, well nourished. 


HEENT: Pupils are round and equally reacting to light. EOMI. No scleral icterus.

No conjunctival pallor. Normocephalic, atraumatic. No pharyngeal erythema. No 

thyromegaly. 


CARDIOVASCULAR: S1 and S2 present. No murmurs, rubs, or gallops. 


PULMONARY: Chest is clear to auscultation, no wheezing or crackles. 


ABDOMEN: Soft, nontender, nondistended, normoactive bowel sounds. No palpable 

organomegaly. 


MUSCULOSKELETAL: No joint swelling or deformity.


EXTREMITIES: No cyanosis, clubbing, or pedal edema. 


NEUROLOGICAL: Gross neurological examination did not reveal any focal deficits. 

4/5 strength upper extremities bilaterally. No pronator drift. No ataxia. 


SKIN: No rashes. 





Assessment and Plan


-Generalized weakness involving the bilateral upper and lower extremities this 

could be secondary to general medical deconditioning, versus neuropathy secon

wisam to alcohol abuse. Underwent extensive work up previous admission was 

referred to outpatient neuromuscular specialist patient has not yet followed up.

PT/OT are consulted for discharge planning possible return to subacute rehab. 

Social work on board. 


-Left hand weakness and likely dupuytrens contracture as mentioned patient has 

already been referred to neuromuscular specialist o.p work up.


-Headache due to cervical radiculopathy will treat with NSAIDs.


-History of hypertension maintained on lisinopril 


-History of hyperlipidemia not currently on statin therapy


-Bilateral lower extremity radiculopathy and weakness this could be due to 

degenerative disc disease will need further outpatient workup


-Chronic alcohol use


-Elevated MCV secondary to chronic alcohol use vitamin B12 levels were checked 

and normal


-Depression stable on current medications


GI prophylaxis


DVT prophylaxis


Full Code





The impression and plan of care has been dictated by Linda Dietz, Nurse 

Practitioner as directed.





Dr. Latrell MD


I have performed a history and physical examination and medical decision making 

of this patient, discussed the same with the dictator, and agree with the 

dictators assessment and plan as written, documented as a scribe. Based on total

visit time, I have performed more than 50% of this visit.








Past Medical History


Past Medical History: Hyperlipidemia, Hypertension, Osteoarthritis (OA)


Additional Past Medical History / Comment(s): Multiple recent falls. hx colon 

polyps, occasional diarrhea, hx of fall Nov 2020 and has been having pain right 

hip since that radiates down right leg, walks with limp., states breast implants

moving up.


History of Any Multi-Drug Resistant Organisms: None Reported


Past Surgical History: Breast Surgery, Orthopedic Surgery, Tonsillectomy


Additional Past Surgical History / Comment(s): right elbow surgery with screws, 

right knee surgery with plate and removal ., lumpectomy left breast, breast 

implants


Past Anesthesia/Blood Transfusion Reactions: Postoperative Nausea & Vomiting 

(PONV)


Past Psychological History: Anxiety, Depression


Smoking Status: Former smoker


Past Alcohol Use History: Abuse, Daily


Past Drug Use History: None Reported





- Past Family History


  ** Father


Family Medical History: Cancer





Medications and Allergies


                                Home Medications











 Medication  Instructions  Recorded  Confirmed  Type


 


Pantoprazole [Protonix] 40 mg PO DAILY #30 tab 12/20/23 03/10/24 Rx


 


Acetaminophen Tab [Tylenol] 650 mg PO Q6HR PRN  tab 02/06/24 03/10/24 Rx


 


Folic Acid 1 mg PO DAILY #30 tablet 02/06/24 03/10/24 Rx


 


Ibuprofen [Motrin] 400 mg PO Q6HR PRN  tab 02/06/24 03/10/24 Rx


 


Multivitamins, Thera [Multivitamin 1 tab PO DAILY #30 tablet 02/06/24 03/10/24 

Rx





(formulary)]    


 


QUEtiapine [SEROquel] 200 mg PO HS 30 Days #30 tab 02/06/24 03/10/24 Rx


 


Thiamine [Vitamin B-1] 100 mg PO DAILY 30 Days #30 tab 02/06/24 03/10/24 Rx


 


traZODone HCL [Desyrel] 50 mg PO HS PRN  tab 02/06/24 03/10/24 Rx


 


Cholecalciferol [Vitamin D3 (25 50 mcg PO DAILY  tab 02/29/24 03/10/24 Rx





Mcg = 1000 Iu)]    


 


Magnesium Oxide [Mag-Ox] 400 mg PO DAILY  tab 02/29/24 03/10/24 Rx


 


Escitalopram [Lexapro] 20 mg PO DAILY 03/10/24 03/10/24 History


 


Sodium Zirconium Cyclosilicate 10 gm PO DAILY 03/10/24 03/10/24 History





[Lokelma]    








                                    Allergies











Allergy/AdvReac Type Severity Reaction Status Date / Time


 


latex Allergy Unknown Itching, Verified 03/10/24 17:51





   Blisters  


 


codeine AdvReac Severe HEADACHE Verified 03/10/24 17:51


 


hydrocodone [From Vicodin] AdvReac Severe HEADACHE Verified 03/10/24 17:51


 


Anesthetics - Amide Type - AdvReac  GAS GIVES Verified 03/10/24 17:51





Select A   HER  





   HEADACHE,  





   VOMITING,  





   HEART  





   PALIPITATIONS  


 


Anesthetics - Juliana Type- AdvReac  GAS GIVES Verified 03/10/24 17:51





Parabens   HER  





   HEADACHE,  





   VOMITING,  





   HEART  





   PALIPITATIONS  


 


ANESTHESIA AdvReac Unknown GAS GIVES Uncoded 03/10/24 17:51





   HER  





   HEADACHE,  





   VOMITING,  





   HEART  





   PALIPITATIONS  














Physical Exam


Vitals: 


                                   Vital Signs











  Temp Pulse Pulse Resp BP BP Pulse Ox


 


 03/11/24 07:31  97.5 F L   76  16   135/87  97


 


 03/11/24 02:00  98.7 F   85  16   128/63  96


 


 03/10/24 20:00  98.7 F   69  16   128/63  96


 


 03/10/24 19:42  98.8 F  73   18  140/77   95


 


 03/10/24 12:48  98 F  80   20  121/79   95








                                Intake and Output











 03/10/24 03/11/24 03/11/24





 22:59 06:59 14:59


 


Other:   


 


  # Voids  4 


 


  Weight 72.575 kg  














Results


CBC & Chem 7: 


                                 03/10/24 14:03





                                 03/10/24 14:03


Labs: 


                  Abnormal Lab Results - Last 24 Hours (Table)











  03/10/24 03/10/24 03/10/24 Range/Units





  14:03 14:03 14:03 


 


RBC  3.41 L    (3.80-5.40)  m/uL


 


MCV  103.8 H D    (80.0-100.0)  fL


 


PT   9.8 L   (10.0-12.5)  sec


 


Chloride    108 H  ()  mmol/L


 


BUN    26 H  (7-17)  mg/dL


 


Glucose    102 H  (74-99)  mg/dL














Thrombosis Risk Factor Assmnt





- Choose All That Apply


Any of the Below Risk Factors Present?: Yes


Each Factor Represents 1 point: Obesity (BMI >25)


Other Risk Factors: Yes


Each Risk Factor Represents 2 Points: Age 61-74 years


Other congenital or acquired thrombophilia - If yes, enter type in comment: No


Thrombosis Risk Factor Assessment Total Risk Factor Score: 3


Thrombosis Risk Factor Assessment Level: Moderate Risk





Assessment and Plan


Time with Patient: Greater than 30

## 2024-03-12 VITALS — RESPIRATION RATE: 16 BRPM

## 2024-03-12 RX ADMIN — ACETAMINOPHEN PRN MG: 325 TABLET, FILM COATED ORAL at 06:41

## 2024-03-12 NOTE — P.PN
Subjective


Progress Note Date: 03/12/24


This is a pleasant 63-year-old female with medical history of depression, 

chronic alcohol use and hypertension.  Patient was recently hospitalized with 

concern for upper and lower extremity weakness as well as paresthesias to the 

upper and lower extremity.  Patient underwent extensive orthopedic and neurol

ogical workup including MRI of the brain, cervical, thoracic and lumbar spine.  

Patient was recommended to follow-up with neurology for EMG and NCS testing of 

the upper and lower extremities and she was given a follow-up phone number for a

neuromuscular specialist out of [facility].  Patient was discharged to subacute 

rehab where she was then discharged to a room and board type situation.  Patient

states that the house was not set up for a disabled patient and she was having 

issues with bathing and the toileting facilities.  She was per the patient 

kicked out after 2 days however states that she also wanted to leave.  Patient 

returns to the ER with similar complaints as above she continues to have 4-5 

muscle strength in the bilateral upper lower extremities as well as significant 

weakness of the muscles in the left hand.  Also reports a headache over the left

occipital lobe radiating down into the neck.  No chest pain or shortness of 

breath she reports no nausea vomiting or diarrhea.  She is not having any 

dizziness or lightheadedness while sitting but does state that when she stands 

up to ambulate she feels dizzy and lightheaded.  Her hemoglobin is stable 11.9, 

she has macrocytosis at 103.8.  She is post to be on folate supplementation.  

Folate and B12 levels were checked 2 weeks ago and were within normal limits.  

Renal function is stable BUN of 26, creatinine of 0.52 electrolytes are within 

normal limits her liver enzymes are normal.  Her urinalysis is benign.  She has 

not been drinking alcohol.  She was admitted to the hospital under medicine and 

physical therapy was consulted for discharge planning. Patient is now homeless, 

she was unable to afford her apartment prior to her previous admission. She is 

evaluated today on the medical floor. Similar complaints as previous. 

Hemodynamically she is stable.





03/12/2024


Patient is evaluated in follow-up today on the medical floor.  She was denied at

[facility].  She was reevaluated by physical therapy this admission recommending 

rehabilitation with 24/7 supervision.  Social work following and multiple 

referrals have been placed and we will follow-up in the morning.  Patient 

remains with upper and lower extremity weakness she has been up ambulating with 

a walker and using the bedside commode.  Patient has barriers to follow-up with 

a neuromuscular specialist she has no transportation and is currently homeless. 

She does not have any family support here in [place].  Her sister lives in 

[place].





REVIEW OF SYSTEMS: 


CONSTITUTIONAL: No fever, no malaise, no fatigue. 


HEENT: No recent visual problems or hearing problems. Denied any sore throat. 


CARDIOVASCULAR: No chest pain, orthopnea, PND, no palpitations, no syncope. 


PULMONARY: No shortness of breath, no cough, no hemoptysis. 


GASTROINTESTINAL: No diarrhea, no nausea, no vomiting, no abdominal pain. 


NEUROLOGICAL: Reports headache, Reports upper and lower extremity weakness. 





PHYSICAL EXAMINATION: 





GENERAL: The patient is alert and oriented x3, not in any acute distress. Well 

developed, well nourished. 


HEENT: Pupils are round and equally reacting to light. EOMI. No scleral icterus.

No conjunctival pallor. Normocephalic, atraumatic. No pharyngeal erythema. No 

thyromegaly. 


CARDIOVASCULAR: S1 and S2 present. No murmurs, rubs, or gallops. 


PULMONARY: Chest is clear to auscultation, no wheezing or crackles. 


ABDOMEN: Soft, nontender, nondistended, normoactive bowel sounds. No palpable 

organomegaly. 


MUSCULOSKELETAL: No joint swelling or deformity.


EXTREMITIES: No cyanosis, clubbing, or pedal edema. 


NEUROLOGICAL: Gross neurological examination did not reveal any focal deficits. 

4/5 strength upper extremities bilaterally. No pronator drift. No ataxia. 


SKIN: No rashes. 





Assessment and Plan


-Generalized weakness involving the bilateral upper and lower extremities this 

could be secondary to general medical deconditioning, versus neuropathy 

secondary to alcohol abuse. Underwent extensive work up previous admission was 

referred to outpatient neuromuscular specialist patient has not yet followed up.

PT/OT are consulted for discharge planning possible return to subacute rehab. 

Social work on board. 


-Left hand weakness and likely dupuytrens contracture as mentioned patient has 

already been referred to neuromuscular specialist o.p work up.


-Headache due to cervical radiculopathy will treat with NSAIDs.


-History of hypertension maintained on lisinopril 


-History of hyperlipidemia not currently on statin therapy


-Bilateral lower extremity radiculopathy and weakness this could be due to 

degenerative disc disease will need further outpatient workup


-Chronic alcohol use


-Elevated MCV secondary to chronic alcohol use vitamin B12 levels were checked 

and normal


-Depression stable on current medications


GI prophylaxis


DVT prophylaxis


Full Code





The impression and plan of care has been dictated by Linda Dietz Nurse 

Practitioner as directed.





Dr. Latrell MD


I have performed a history and physical examination and medical decision making 

of this patient, discussed the same with the dictator, and agree with the 

dictators assessment and plan as written, documented as a scribe. Based on total

visit time, I have performed more than 50% of this visit.








Objective





- Vital Signs


Vital signs: 


                                   Vital Signs











Temp  98.4 F   03/12/24 12:09


 


Pulse  58 L  03/12/24 12:09


 


Resp  16   03/12/24 12:09


 


BP  130/65   03/12/24 12:09


 


Pulse Ox  96   03/12/24 12:09


 


FiO2      








                                 Intake & Output











 03/11/24 03/12/24 03/12/24





 18:59 06:59 18:59


 


Other:   


 


  # Voids 3 3 1


 


  # Bowel Movements 1  1














- Labs


CBC & Chem 7: 


                                 03/10/24 14:03





                                 03/10/24 14:03





Assessment and Plan


Time with Patient: Less than 30

## 2024-03-13 VITALS — HEART RATE: 87 BPM | TEMPERATURE: 100 F | DIASTOLIC BLOOD PRESSURE: 79 MMHG | SYSTOLIC BLOOD PRESSURE: 132 MMHG

## 2024-03-13 RX ADMIN — KETOROLAC TROMETHAMINE STA: 15 INJECTION, SOLUTION INTRAMUSCULAR; INTRAVENOUS at 18:24

## 2024-03-15 NOTE — P.DS
Providers


Date of admission: 


03/10/24 18:07





Attending physician: 


Boni Larios





Primary care physician: 


Lisa Becerra





Hospital Course: 


Final Diagnosis


-Generalized weakness involving the bilateral upper and lower extremities this 

could be secondary to general medical deconditioning, versus neuropathy 

secondary to alcohol abuse. Underwent extensive work up previous admission was 

referred to outpatient neuromuscular specialist patient has not yet followed up.

PT/OT are consulted for discharge planning possible return to subacute rehab. 

Social work on board. 


-Left hand weakness and likely dupuytrens contracture as mentioned patient has 

already been referred to neuromuscular specialist o.p work up.


-Headache due to cervical radiculopathy will treat with NSAIDs.


-History of hypertension maintained on lisinopril 


-History of hyperlipidemia not currently on statin therapy


-Bilateral lower extremity radiculopathy and weakness this could be due to 

degenerative disc disease will need further outpatient workup


-Chronic alcohol use


-Elevated MCV secondary to chronic alcohol use vitamin B12 levels were checked 

and normal


-Depression stable on current medications





Discharge Disposition


Patient is stable for discharge home has been cleared medically. Recommending 

same follow up instructions to call and schedule an appointment with the 

neuromuscular specialist out of [facility]. Discharge planning greater than 35 

minutes due to the complexity of patients social situation and needing 

additional community resources. She will be discharging to her apartment. 

Recommend to see her PCP Dr Lisa Becerra in 1 to 2 days. 





Recommend to follow up with neuromuscular specialist out of [facility] on 

discharge in 1 to 2 weeks. 


[phone] please call to schedule an appointment. Recommending outpatient 

EMG and NCS testing.





Hospital Course


This is a pleasant 63-year-old female with medical history of depression, 

chronic alcohol use and hypertension. Patient comes in essentially for placement

after leaving the housing arrangement set up by rehab. Patient was recently 

hospitalized with concern for upper and lower extremity weakness as well as 

paresthesias to the upper and lower extremity.  Patient underwent extensive 

orthopedic and neurological workup including MRI of the brain, cervical, 

thoracic and lumbar spine.  Patient was recommended to follow-up with neurology 

for EMG and NCS testing of the upper and lower extremities and she was given a 

follow-up phone number for a neuromuscular specialist out of [facility].  

Patient was discharged to subacute rehab where she was then discharged to a room

and board type situation.  Patient states that the house was not set up for a 

disabled patient and she was having issues with bathing and the toileting 

facilities.  She was per the patient kicked out after 2 days however states that

she also wanted to leave.  Patient returns to the ER with similar complaints as 

above she continues to have 4-5 muscle strength in the bilateral upper lower 

extremities as well as significant weakness of the muscles in the left hand.  

Also reports a headache over the left occipital lobe radiating down into the 

neck.  No chest pain or shortness of breath she reports no nausea vomiting or 

diarrhea.  She is not having any dizziness or lightheadedness while sitting but 

does state that when she stands up to ambulate she feels dizzy and lightheaded. 

Her hemoglobin is stable 11.9, she has macrocytosis at 103.8.  She is post to be

on folate supplementation.  Folate and B12 levels were checked 2 weeks ago and 

were within normal limits.  Renal function is stable BUN of 26, creatinine of 

0.52 electrolytes are within normal limits her liver enzymes are normal.  Her 

urinalysis is benign.  She has not been drinking alcohol.  She was admitted to 

the hospital under medicine and physical therapy was consulted for discharge 

planning. PT recommending 24/7 supervision, referrals were placed to multiple 

[facility] and assisted living situations patient was denied at all of them and did not

want referrals placed south of [facility]. Patient will plan on returning 

home to her apartment at this time although she has an impending eviction notice

per the patient. She is still able to discharge home and pack up her belongings.

She has had multiple follows up by social work and provided community resources 

to find more affordable housing. Patients headache has improved with NSAIDs. As 

recommended her prior admission patient needs to follow up with an neuromusclar 

specialist and she is given that information again. She will be discharged home.










Please see medication reconciliation for a list of current medications. Thank 

you for allowing us to participate in the care of this patient. 





The impression and plan of care has been dictated by Linda Dietz, Nurse 

Practitioner as directed.





Dr. Latrell MD


I have performed a history and physical examination and medical decision making 

of this patient, discussed the same with the dictator, and agree with the 

dictators assessment and plan as written, documented as a scribe. Based on total

visit time, I have performed more than 50% of this visit.





Patient Condition at Discharge: Fair





Plan - Discharge Summary


Discharge Rx Participant: No


New Discharge Prescriptions: 


Continue


   Pantoprazole [Protonix] 40 mg PO DAILY #30 tab


   traZODone HCL [Desyrel] 50 mg PO HS PRN  tab


     PRN Reason: Insomnia


   Folic Acid 1 mg PO DAILY #30 tablet


   Ibuprofen [Motrin] 400 mg PO Q6HR PRN  tab


     PRN Reason: Pain


   Multivitamins, Thera [Multivitamin (formulary)] 1 tab PO DAILY #30 tablet


   Acetaminophen Tab [Tylenol] 650 mg PO Q6HR PRN  tab


     PRN Reason: Fever And/ Or Pain


   Thiamine [Vitamin B-1] 100 mg PO DAILY 30 Days #30 tab


   Escitalopram [Lexapro] 20 mg PO DAILY


   QUEtiapine [SEROquel] 200 mg PO HS 30 Days #30 tab


   Cholecalciferol [Vitamin D3 (25 Mcg = 1000 Iu)] 50 mcg PO DAILY  tab





Discontinued


   Sodium Zirconium Cyclosilicate [Lokelma] 10 gm PO DAILY


   Magnesium Oxide [Mag-Ox] 400 mg PO DAILY  tab


Discharge Medication List





Pantoprazole [Protonix] 40 mg PO DAILY #30 tab 12/20/23 [Rx]


Acetaminophen Tab [Tylenol] 650 mg PO Q6HR PRN  tab 02/06/24 [Rx]


Folic Acid 1 mg PO DAILY #30 tablet 02/06/24 [Rx]


Ibuprofen [Motrin] 400 mg PO Q6HR PRN  tab 02/06/24 [Rx]


Multivitamins, Thera [Multivitamin (formulary)] 1 tab PO DAILY #30 tablet 

02/06/24 [Rx]


QUEtiapine [SEROquel] 200 mg PO HS 30 Days #30 tab 02/06/24 [Rx]


Thiamine [Vitamin B-1] 100 mg PO DAILY 30 Days #30 tab 02/06/24 [Rx]


traZODone HCL [Desyrel] 50 mg PO HS PRN  tab 02/06/24 [Rx]


Cholecalciferol [Vitamin D3 (25 Mcg = 1000 Iu)] 50 mcg PO DAILY  tab 02/29/24 

[Rx]


Escitalopram [Lexapro] 20 mg PO DAILY 03/10/24 [History]








Follow up Appointment(s)/Referral(s): 


Lisa Becerra MD [Primary Care Provider] - 1-2 days (Please call the office 

to schedule a follow up appointment. )


Activity/Diet/Wound Care/Special Instructions: 


Tom Castaneda [facility] P: [phone]








Recommend to follow up with neuromuscular specialist out of [facility] on 

discharge in 1 to 2 weeks. 


[phone] please call to schedule an appointment. Recommending outpatient 

EMG and NCS testing.





Discharge/Stand Alone Forms:  [facility], [facility], Who Do I Call?, 

Community Resources,  Area PCPs


Discharge Disposition: HOME SELF-CARE

## 2024-03-21 ENCOUNTER — HOSPITAL ENCOUNTER (INPATIENT)
Dept: HOSPITAL 47 - EC | Age: 64
LOS: 5 days | Discharge: HOME | DRG: 897 | End: 2024-03-26
Attending: HOSPITALIST | Admitting: HOSPITALIST
Payer: MEDICARE

## 2024-03-21 DIAGNOSIS — Z91.040: ICD-10-CM

## 2024-03-21 DIAGNOSIS — Z87.891: ICD-10-CM

## 2024-03-21 DIAGNOSIS — Z88.5: ICD-10-CM

## 2024-03-21 DIAGNOSIS — F32.A: ICD-10-CM
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
## 2024-03-21 DIAGNOSIS — F41.9: ICD-10-CM

## 2024-03-21 DIAGNOSIS — Z91.199: ICD-10-CM

## 2024-03-21 DIAGNOSIS — Z79.899: ICD-10-CM

## 2024-03-21 DIAGNOSIS — R53.1: ICD-10-CM

## 2024-03-21 DIAGNOSIS — M72.0: ICD-10-CM

## 2024-03-21 DIAGNOSIS — E87.0: ICD-10-CM
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
## 2024-03-21 DIAGNOSIS — Z98.82: ICD-10-CM

## 2024-03-21 DIAGNOSIS — E87.6: ICD-10-CM
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
## 2024-03-21 DIAGNOSIS — I10: ICD-10-CM

## 2024-03-21 DIAGNOSIS — Z91.148: ICD-10-CM

## 2024-03-21 DIAGNOSIS — F10.129: Primary | ICD-10-CM

## 2024-03-21 DIAGNOSIS — R26.9: ICD-10-CM

## 2024-03-21 DIAGNOSIS — E78.5: ICD-10-CM

## 2024-03-21 DIAGNOSIS — F10.10: ICD-10-CM

## 2024-03-21 DIAGNOSIS — E83.42: ICD-10-CM

## 2024-03-21 DIAGNOSIS — R29.6: ICD-10-CM

## 2024-03-21 DIAGNOSIS — E86.0: ICD-10-CM

## 2024-03-21 DIAGNOSIS — Z87.19: ICD-10-CM

## 2024-03-21 PROCEDURE — 85730 THROMBOPLASTIN TIME PARTIAL: CPT

## 2024-03-21 PROCEDURE — 83735 ASSAY OF MAGNESIUM: CPT

## 2024-03-21 PROCEDURE — 85610 PROTHROMBIN TIME: CPT

## 2024-03-21 PROCEDURE — 93005 ELECTROCARDIOGRAM TRACING: CPT

## 2024-03-21 PROCEDURE — 72125 CT NECK SPINE W/O DYE: CPT

## 2024-03-21 PROCEDURE — 80048 BASIC METABOLIC PNL TOTAL CA: CPT

## 2024-03-21 PROCEDURE — 85025 COMPLETE CBC W/AUTO DIFF WBC: CPT

## 2024-03-21 PROCEDURE — 94760 N-INVAS EAR/PLS OXIMETRY 1: CPT

## 2024-03-21 PROCEDURE — 70450 CT HEAD/BRAIN W/O DYE: CPT

## 2024-03-21 PROCEDURE — 99285 EMERGENCY DEPT VISIT HI MDM: CPT

## 2024-03-21 PROCEDURE — 80320 DRUG SCREEN QUANTALCOHOLS: CPT

## 2024-03-21 PROCEDURE — 80053 COMPREHEN METABOLIC PANEL: CPT

## 2024-03-21 PROCEDURE — 96365 THER/PROPH/DIAG IV INF INIT: CPT

## 2024-03-21 PROCEDURE — 96361 HYDRATE IV INFUSION ADD-ON: CPT

## 2024-03-21 PROCEDURE — 82550 ASSAY OF CK (CPK): CPT

## 2024-03-21 PROCEDURE — 81003 URINALYSIS AUTO W/O SCOPE: CPT

## 2024-03-21 PROCEDURE — 84100 ASSAY OF PHOSPHORUS: CPT

## 2024-03-21 PROCEDURE — 36415 COLL VENOUS BLD VENIPUNCTURE: CPT

## 2024-03-21 PROCEDURE — 84484 ASSAY OF TROPONIN QUANT: CPT

## 2024-03-21 PROCEDURE — 83880 ASSAY OF NATRIURETIC PEPTIDE: CPT

## 2024-03-21 PROCEDURE — 96375 TX/PRO/DX INJ NEW DRUG ADDON: CPT

## 2024-03-21 PROCEDURE — 96366 THER/PROPH/DIAG IV INF ADDON: CPT

## 2024-03-21 RX ADMIN — ONDANSETRON STA MG: 2 INJECTION INTRAMUSCULAR; INTRAVENOUS at 23:47

## 2024-03-21 RX ADMIN — CEFAZOLIN STA MLS/HR: 330 INJECTION, POWDER, FOR SOLUTION INTRAMUSCULAR; INTRAVENOUS at 23:47

## 2024-03-21 NOTE — ED
Altered Mental Status HPI





- General


Stated Complaint: Fall


Time Seen by Provider: 03/21/24 22:53


Source: RN notes reviewed, old records reviewed


Mode of arrival: ambulatory


Limitations: no limitations





- History of Present Illness


Initial Comments: 





This is a 63-year-old female to the ER for evaluation of altered mental status. 

Patient presents today for evaluation regards to known history of alcohol abuse 

and recent fall.  Patient has multiple recent falls with known history of 

alcohol abuse and presentation to the emergency department.  Patient is a poor 

historian secondary to clinical condition


MD Complaint: altered mental status, confusion


-: hour(s)


Severity: moderate


Consistency of Symptoms: constant


Context: history of similar presentation


Associated Symptoms: denies other symptoms


Treatments Prior to Arrival: IV fluid, oxygen





- Related Data


                                Home Medications











 Medication  Instructions  Recorded  Confirmed


 


Escitalopram [Lexapro] 20 mg PO DAILY 03/10/24 03/22/24








                                  Previous Rx's











 Medication  Instructions  Recorded


 


Pantoprazole [Protonix] 40 mg PO DAILY #30 tab 12/20/23


 


Acetaminophen Tab [Tylenol] 650 mg PO Q6HR PRN  tab 02/06/24


 


Folic Acid 1 mg PO DAILY #30 tablet 02/06/24


 


Ibuprofen [Motrin] 400 mg PO Q6HR PRN  tab 02/06/24


 


Multivitamins, Thera [Multivitamin 1 tab PO DAILY #30 tablet 02/06/24





(formulary)]  


 


QUEtiapine [SEROquel] 200 mg PO HS 30 Days #30 tab 02/06/24


 


Thiamine [Vitamin B-1] 100 mg PO DAILY 30 Days #30 tab 02/06/24


 


traZODone HCL [Desyrel] 50 mg PO HS PRN  tab 02/06/24


 


Cholecalciferol [Vitamin D3 (25 50 mcg PO DAILY  tab 02/29/24





Mcg = 1000 Iu)]  











                                    Allergies











Allergy/AdvReac Type Severity Reaction Status Date / Time


 


latex Allergy Unknown Itching, Verified 03/22/24 07:54





   Blisters  


 


codeine AdvReac Severe HEADACHE Verified 03/22/24 07:54


 


hydrocodone [From Vicodin] AdvReac Severe HEADACHE Verified 03/22/24 07:54


 


Anesthetics - Amide Type - AdvReac  GAS GIVES Verified 03/22/24 07:54





Select A   HER  





   HEADACHE,  





   VOMITING,  





   HEART  





   PALIPITATIONS  


 


Anesthetics - Juliana Type- AdvReac  GAS GIVES Verified 03/22/24 07:54





Parabens   HER  





   HEADACHE,  





   VOMITING,  





   HEART  





   PALIPITATIONS  


 


ANESTHESIA AdvReac Unknown GAS GIVES Uncoded 03/10/24 17:51





   HER  





   HEADACHE,  





   VOMITING,  





   HEART  





   PALIPITATIONS  














Review of Systems


ROS Statement: 


Those systems with pertinent positive or pertinent negative responses have been 

documented in the HPI.





ROS Other: All systems not noted in ROS Statement are negative.





Past Medical History


Past Medical History: Hyperlipidemia, Hypertension, Osteoarthritis (OA)


Additional Past Medical History / Comment(s): Multiple recent falls. hx colon 

polyps, occasional diarrhea, hx of fall Nov 2020 and has been having pain right 

hip since that radiates down right leg, walks with limp., states breast implants

moving up.


History of Any Multi-Drug Resistant Organisms: None Reported


Past Surgical History: Breast Surgery, Orthopedic Surgery, Tonsillectomy


Additional Past Surgical History / Comment(s): right elbow surgery with screws, 

right knee surgery with plate and removal ., lumpectomy left breast, breast 

implants


Past Anesthesia/Blood Transfusion Reactions: Postoperative Nausea & Vomiting 

(PONV)


Past Psychological History: Anxiety, Depression


Smoking Status: Former smoker


Past Alcohol Use History: Abuse, Daily


Past Drug Use History: None Reported





- Past Family History


  ** Father


Family Medical History: Cancer





General Exam


General appearance: alert, in no apparent distress, anxious


Head exam: Present: atraumatic, normocephalic, normal inspection


Eye exam: Present: normal appearance, PERRL, EOMI.  Absent: scleral icterus, 

conjunctival injection, periorbital swelling


ENT exam: Present: normal exam, mucous membranes moist


Neck exam: Present: normal inspection.  Absent: tenderness, meningismus, 

lymphadenopathy


Respiratory exam: Present: normal lung sounds bilaterally.  Absent: respiratory 

distress, wheezes, rales, rhonchi, stridor


Cardiovascular Exam: Present: regular rate, normal rhythm, normal heart sounds. 

Absent: systolic murmur, diastolic murmur, rubs, gallop, clicks


GI/Abdominal exam: Present: soft, normal bowel sounds.  Absent: distended, 

tenderness, guarding, rebound, rigid


Extremities exam: Present: normal inspection, full ROM, normal capillary refill.

 Absent: tenderness, pedal edema, joint swelling, calf tenderness


Back exam: Present: normal inspection


Neurological exam: Present: alert, oriented X3, CN II-XII intact


Psychiatric exam: Present: normal affect, normal mood


Skin exam: Present: warm, dry, intact, normal color.  Absent: rash





Course


                                   Vital Signs











  03/21/24 03/22/24 03/22/24





  22:45 01:15 02:52


 


Temperature 98.7 F  


 


Pulse Rate 93 101 H 96


 


Pulse Rate [   





Pulse Oximetery   





]   


 


Respiratory 22 16 18





Rate   


 


Blood Pressure 125/77 117/76 110/75


 


Blood Pressure   





[Right Arm]   


 


O2 Sat by Pulse 96 97 97





Oximetry   














  03/22/24 03/22/24 03/22/24





  03:28 04:00 06:10


 


Temperature   


 


Pulse Rate 98 102 H 95


 


Pulse Rate [   





Pulse Oximetery   





]   


 


Respiratory 16 16 16





Rate   


 


Blood Pressure 117/75 119/76 130/91


 


Blood Pressure   





[Right Arm]   


 


O2 Sat by Pulse 97 96 95





Oximetry   














  03/22/24 03/22/24 03/22/24





  09:19 11:47 14:12


 


Temperature   


 


Pulse Rate 85 82 85


 


Pulse Rate [   





Pulse Oximetery   





]   


 


Respiratory 16 18 20





Rate   


 


Blood Pressure 146/89 130/85 123/85


 


Blood Pressure   





[Right Arm]   


 


O2 Sat by Pulse 96 93 L 94 L





Oximetry   














  03/22/24 03/22/24 03/22/24





  16:38 17:19 17:57


 


Temperature  98.1 F 


 


Pulse Rate 79  


 


Pulse Rate [  73 





Pulse Oximetery   





]   


 


Respiratory 18 20 20





Rate   


 


Blood Pressure 138/81  


 


Blood Pressure  159/88 





[Right Arm]   


 


O2 Sat by Pulse 95 96 





Oximetry   














- Reevaluation(s)


Reevaluation #1: 





03/21/24 23:00


Medical record is reviewed


Reevaluation #2: 





03/22/24 03:12


Patient has no improvement in symptoms here in the ER


Reevaluation #3: 





03/22/24 03:12


Patient informed of results and questions answered


Reevaluation #4: 





Was pt. sent in by a medical professional or institution (, PA, NP, urgent 

care, hospital, or nursing home...) When possible be specific


@  -no


Did you speak to anyone other than the patient for history (EMS, parent, family,

police, friend...)? What history was obtained from this source 


@  -no


Did you review nursing and triage notes (agree or disagree)?  Why? 


@  -agree


Are old charts reviewed (outside hosp., previous admission, EMS record, old EKG,

old radiological studies, urgent care reports/EKG's, nursing home records)? 

Report findings 


@  -yes


Differential Diagnosis (chest pain, altered mental status, abdominal pain women,

abdominal pain men, vaginal bleeding, weakness, fever, dyspnea, syncope, 

headache, dizziness, GI bleed, back pain, seizure, CVA, palpatations, mental 

health, musculoskeletal)? 


@  -prior


EKG interpreted by me (3pts min.).


@  -yes


X-rays interpreted by me (1pt min.).


@  -no


CT interpreted by me (1pt min.).


@  -yes negative for acute disease


U/S interpreted by me (1pt. min.).


@  -no


What testing was considered but not performed or refused? (CT, X-rays, U/S, 

labs)? Why?


@  -none


What meds were considered but not given or refused? Why?


@  -none


Did you discuss the management of the patient with other professionals 

(professionals i.e. , PA, NP, lab, RT, psych nurse, , , 

teacher, , )? Give summary


@  -no


Was smoking cessation discussed for >3mins.?


@  -no


Was critical care preformed (if so, how long)?


@  -no


Were there social determinants of health that impacted care today? How? 

(Homelessness, low income, unemployed, alcoholism, drug addiction, 

transportation, low edu. Level, literacy, decrease access to med. care, snf, 

rehab)?


@  -none


Was there de-escalation of care discussed even if they declined (Discuss DNR or 

withdrawal of care, Hospice)? DNR status


@  -no


What co-morbidities impacted this encounter? (DM, HTN, Smoking, COPD, CAD, 

Cancer, CVA, ARF, Chemo, Hep., AIDS, mental health diagnosis, sleep apnea, 

morbid obesity)?


@  -none


Was patient admitted / discharged? Hospital course, mention meds given and 

route, prescriptions, significant lab abnormalities, going to OR and other 

pertinent info.


@  - 63 female with recent multiple falls coming in for severe weakness and 

severely abnormal electrolytes.  Patient will be admitted for further evaluation

and management


Admitted


Undiagnosed new problem with uncertain prognosis?


@  -no


Drug Therapy requiring intensive monitoring for toxicity (Heparin, Nitro, 

Insulin, Cardizem)?


@  -no


Were any procedures done?


@  -no


Diagnosis/symptom?


@  -Falls, weakness, abnormal lab testing


Acute, or Chronic, or Acute on Chronic?


@  -Acute


Uncomplicated (without systemic symptoms) or Complicated (systemic symptoms)?


@  -Complicated


Side effects of treatment?


@  -no


Exacerbation, Progression, or Severe Exacerbation?


@  -exacerbation


Poses a threat to life or bodily function? How? (Chest pain, USA, MI, pneumonia,

PE, COPD, DKA, ARF, appy, cholecystitis, CVA, Diverticulitis, Homicidal, 

Suicidal, threat to staff... and all critical care pts)


@  -yes with significant medical comorbidities








Reevaluation #5: 





Differential Altered Mental Status:


Hypoglycemia, DKA, hypercapnia, ETOH, overdose, CO poisoning, trauma, myxedema 

coma, HTN encephalopathy, infection, encephalitis, psychosis, intercranial 

hemorrhage, hepatic encephalopathy, meningitis, CVA, this is not meant to be an 

all-inclusive list








- Consultations


Consultation #1: 





Spoke with [facility] who agrees to admit this patient





Medical Decision Making





- Medical Decision Making





63 female with recent multiple falls coming in for severe weakness and severely 

abnormal electrolytes.  Patient will be admitted for further evaluation and 

management





- Lab Data


Result diagrams: 


                                 03/24/24 07:31





                                 03/24/24 07:31


                                   Lab Results











  03/22/24 03/22/24 03/22/24 Range/Units





  00:01 01:45 01:45 


 


WBC     (3.8-10.6)  k/uL


 


RBC     (3.80-5.40)  m/uL


 


Hgb     (11.4-16.0)  gm/dL


 


Hct     (34.0-46.0)  %


 


MCV     (80.0-100.0)  fL


 


MCH     (25.0-35.0)  pg


 


MCHC     (31.0-37.0)  g/dL


 


RDW     (11.5-15.5)  %


 


Plt Count     (150-450)  k/uL


 


MPV     


 


Neutrophils %     %


 


Lymphocytes %     %


 


Monocytes %     %


 


Eosinophils %     %


 


Basophils %     %


 


Neutrophils #     (1.3-7.7)  k/uL


 


Lymphocytes #     (1.0-4.8)  k/uL


 


Monocytes #     (0-1.0)  k/uL


 


Eosinophils #     (0-0.7)  k/uL


 


Basophils #     (0-0.2)  k/uL


 


Macrocytosis     


 


PT     (10.0-12.5)  sec


 


INR     (<1.2)  


 


APTT     (22.0-30.0)  sec


 


Sodium   147 H   (137-145)  mmol/L


 


Potassium   2.4 L*   (3.5-5.1)  mmol/L


 


Chloride   131 H*   ()  mmol/L


 


Carbon Dioxide   8 L*   (22-30)  mmol/L


 


Anion Gap   8   mmol/L


 


BUN   9   (7-17)  mg/dL


 


Creatinine   0.18 L   (0.52-1.04)  mg/dL


 


Est GFR (CKD-EPI)AfAm   >90   (>60 ml/min/1.73 sqM)  


 


Est GFR (CKD-EPI)NonAf   >90   (>60 ml/min/1.73 sqM)  


 


Glucose   54 L   (74-99)  mg/dL


 


Calcium   4.6 L*   (8.4-10.2)  mg/dL


 


Phosphorus   2.9   (2.5-4.5)  mg/dL


 


Magnesium   1.0 L   (1.6-2.3)  mg/dL


 


Total Bilirubin   0.5   (0.2-1.3)  mg/dL


 


AST   27   (14-36)  U/L


 


ALT   9   (4-34)  U/L


 


Alkaline Phosphatase   37 L   ()  U/L


 


Creatine Kinase     ()  U/L


 


Troponin I    0.032  (0.000-0.034)  ng/mL


 


NT-Pro-B Natriuret Pep   39   pg/mL


 


Total Protein   3.8 L   (6.3-8.2)  g/dL


 


Albumin   1.7 L   (3.5-5.0)  g/dL


 


Urine Color  Colorless    


 


Urine Appearance  Clear    (Clear)  


 


Urine pH  5.5    (5.0-8.0)  


 


Ur Specific Gravity  1.003    (1.001-1.035)  


 


Urine Protein  Negative    (Negative)  


 


Urine Glucose (UA)  Negative    (Negative)  


 


Urine Ketones  Negative    (Negative)  


 


Urine Blood  Negative    (Negative)  


 


Urine Nitrite  Negative    (Negative)  


 


Urine Bilirubin  Negative    (Negative)  


 


Urine Urobilinogen  <2.0    (<2.0)  mg/dL


 


Ur Leukocyte Esterase  Negative    (Negative)  


 


Serum Alcohol   86   mg/dL














  03/22/24 03/22/24 03/22/24 Range/Units





  01:45 02:23 02:23 


 


WBC   6.4   (3.8-10.6)  k/uL


 


RBC   3.63 L   (3.80-5.40)  m/uL


 


Hgb   12.0   (11.4-16.0)  gm/dL


 


Hct   37.4   (34.0-46.0)  %


 


MCV   103.0 H   (80.0-100.0)  fL


 


MCH   33.0   (25.0-35.0)  pg


 


MCHC   32.0   (31.0-37.0)  g/dL


 


RDW   12.9   (11.5-15.5)  %


 


Plt Count   245   (150-450)  k/uL


 


MPV   7.9   


 


Neutrophils %   41   %


 


Lymphocytes %   45   %


 


Monocytes %   7   %


 


Eosinophils %   3   %


 


Basophils %   1   %


 


Neutrophils #   2.6   (1.3-7.7)  k/uL


 


Lymphocytes #   2.9   (1.0-4.8)  k/uL


 


Monocytes #   0.4   (0-1.0)  k/uL


 


Eosinophils #   0.2   (0-0.7)  k/uL


 


Basophils #   0.1   (0-0.2)  k/uL


 


Macrocytosis   Slight   


 


PT    10.0  (10.0-12.5)  sec


 


INR    0.9  (<1.2)  


 


APTT    23.3  (22.0-30.0)  sec


 


Sodium     (137-145)  mmol/L


 


Potassium     (3.5-5.1)  mmol/L


 


Chloride     ()  mmol/L


 


Carbon Dioxide     (22-30)  mmol/L


 


Anion Gap     mmol/L


 


BUN     (7-17)  mg/dL


 


Creatinine     (0.52-1.04)  mg/dL


 


Est GFR (CKD-EPI)AfAm     (>60 ml/min/1.73 sqM)  


 


Est GFR (CKD-EPI)NonAf     (>60 ml/min/1.73 sqM)  


 


Glucose     (74-99)  mg/dL


 


Calcium     (8.4-10.2)  mg/dL


 


Phosphorus     (2.5-4.5)  mg/dL


 


Magnesium     (1.6-2.3)  mg/dL


 


Total Bilirubin     (0.2-1.3)  mg/dL


 


AST     (14-36)  U/L


 


ALT     (4-34)  U/L


 


Alkaline Phosphatase     ()  U/L


 


Creatine Kinase  159 H    ()  U/L


 


Troponin I     (0.000-0.034)  ng/mL


 


NT-Pro-B Natriuret Pep     pg/mL


 


Total Protein     (6.3-8.2)  g/dL


 


Albumin     (3.5-5.0)  g/dL


 


Urine Color     


 


Urine Appearance     (Clear)  


 


Urine pH     (5.0-8.0)  


 


Ur Specific Gravity     (1.001-1.035)  


 


Urine Protein     (Negative)  


 


Urine Glucose (UA)     (Negative)  


 


Urine Ketones     (Negative)  


 


Urine Blood     (Negative)  


 


Urine Nitrite     (Negative)  


 


Urine Bilirubin     (Negative)  


 


Urine Urobilinogen     (<2.0)  mg/dL


 


Ur Leukocyte Esterase     (Negative)  


 


Serum Alcohol     mg/dL














- EKG Data


-: EKG Interpreted by Me (EKG is sinus 89   QTc 406)





- Radiology Data


Radiology results: report reviewed (CT brain and C-spine is negative for acute 

disease), image reviewed





Disposition


Clinical Impression: 


 Fall, MICHAEL (acute kidney injury), Weakness, Debility, Dehydration, 

Hypomagnesemia, Alcoholic intoxication, Hypernatremia, Hypokalemia





Disposition: ADMITTED AS IP TO THIS HOSP


Condition: Fair


Is patient prescribed a controlled substance at d/c from ED?: No


Time of Disposition: 03:00

## 2024-03-22 LAB
ALBUMIN SERPL-MCNC: 1.7 G/DL (ref 3.5–5)
ALP SERPL-CCNC: 37 U/L (ref 38–126)
ALT SERPL-CCNC: 9 U/L (ref 4–34)
ANION GAP SERPL CALC-SCNC: 6 MMOL/L
ANION GAP SERPL CALC-SCNC: 8 MMOL/L
APTT BLD: 23.3 SEC (ref 22–30)
AST SERPL-CCNC: 27 U/L (ref 14–36)
BASOPHILS # BLD AUTO: 0.1 K/UL (ref 0–0.2)
BASOPHILS NFR BLD AUTO: 1 %
BUN SERPL-SCNC: 16 MG/DL (ref 7–17)
BUN SERPL-SCNC: 9 MG/DL (ref 7–17)
CALCIUM SPEC-MCNC: 4.6 MG/DL (ref 8.4–10.2)
CALCIUM SPEC-MCNC: 9.3 MG/DL (ref 8.4–10.2)
CHLORIDE SERPL-SCNC: 107 MMOL/L (ref 98–107)
CHLORIDE SERPL-SCNC: 131 MMOL/L (ref 98–107)
CO2 SERPL-SCNC: 26 MMOL/L (ref 22–30)
CO2 SERPL-SCNC: 8 MMOL/L (ref 22–30)
EOSINOPHIL # BLD AUTO: 0.2 K/UL (ref 0–0.7)
EOSINOPHIL NFR BLD AUTO: 3 %
ERYTHROCYTE [DISTWIDTH] IN BLOOD BY AUTOMATED COUNT: 3.63 M/UL (ref 3.8–5.4)
ERYTHROCYTE [DISTWIDTH] IN BLOOD: 12.9 % (ref 11.5–15.5)
GLUCOSE BLD-MCNC: 109 MG/DL (ref 70–110)
GLUCOSE SERPL-MCNC: 54 MG/DL (ref 74–99)
GLUCOSE SERPL-MCNC: 96 MG/DL (ref 74–99)
HCT VFR BLD AUTO: 37.4 % (ref 34–46)
HGB BLD-MCNC: 12 GM/DL (ref 11.4–16)
INR PPP: 0.9 (ref ?–1.2)
LYMPHOCYTES # SPEC AUTO: 2.9 K/UL (ref 1–4.8)
LYMPHOCYTES NFR SPEC AUTO: 45 %
MAGNESIUM SPEC-SCNC: 1 MG/DL (ref 1.6–2.3)
MCH RBC QN AUTO: 33 PG (ref 25–35)
MCHC RBC AUTO-ENTMCNC: 32 G/DL (ref 31–37)
MCV RBC AUTO: 103 FL (ref 80–100)
MONOCYTES # BLD AUTO: 0.4 K/UL (ref 0–1)
MONOCYTES NFR BLD AUTO: 7 %
NEUTROPHILS # BLD AUTO: 2.6 K/UL (ref 1.3–7.7)
NEUTROPHILS NFR BLD AUTO: 41 %
NT-PROBNP SERPL-MCNC: 39 PG/ML
PH UR: 5.5 [PH] (ref 5–8)
PLATELET # BLD AUTO: 245 K/UL (ref 150–450)
POTASSIUM SERPL-SCNC: 2.4 MMOL/L (ref 3.5–5.1)
POTASSIUM SERPL-SCNC: 5.1 MMOL/L (ref 3.5–5.1)
PROT SERPL-MCNC: 3.8 G/DL (ref 6.3–8.2)
PT BLD: 10 SEC (ref 10–12.5)
SODIUM SERPL-SCNC: 139 MMOL/L (ref 137–145)
SODIUM SERPL-SCNC: 147 MMOL/L (ref 137–145)
SP GR UR: 1 (ref 1–1.03)
UROBILINOGEN UR QL STRIP: <2 MG/DL (ref ?–2)
WBC # BLD AUTO: 6.4 K/UL (ref 3.8–10.6)

## 2024-03-22 PROCEDURE — HZ2ZZZZ DETOXIFICATION SERVICES FOR SUBSTANCE ABUSE TREATMENT: ICD-10-PCS

## 2024-03-22 RX ADMIN — POTASSIUM CHLORIDE SCH MLS/HR: 14.9 INJECTION, SOLUTION INTRAVENOUS at 03:09

## 2024-03-22 RX ADMIN — Medication STA MG: at 03:07

## 2024-03-22 RX ADMIN — ACETAMINOPHEN PRN MG: 325 TABLET, FILM COATED ORAL at 17:46

## 2024-03-22 RX ADMIN — THERA TABS SCH EACH: TAB at 09:23

## 2024-03-22 RX ADMIN — Medication STA MG: at 03:13

## 2024-03-22 RX ADMIN — MAGNESIUM SULFATE IN DEXTROSE SCH MLS/HR: 10 INJECTION, SOLUTION INTRAVENOUS at 03:09

## 2024-03-22 RX ADMIN — FOLIC ACID SCH MG: 1 TABLET ORAL at 09:23

## 2024-03-22 RX ADMIN — POTASSIUM BICARBONATE ONE MEQ: 782 TABLET, EFFERVESCENT ORAL at 03:08

## 2024-03-22 RX ADMIN — HEPARIN SODIUM SCH UNIT: 5000 INJECTION, SOLUTION INTRAVENOUS; SUBCUTANEOUS at 20:46

## 2024-03-22 RX ADMIN — ESCITALOPRAM OXALATE SCH MG: 20 TABLET, FILM COATED ORAL at 11:28

## 2024-03-22 RX ADMIN — CEFAZOLIN STA MLS/HR: 330 INJECTION, POWDER, FOR SOLUTION INTRAMUSCULAR; INTRAVENOUS at 02:48

## 2024-03-22 RX ADMIN — POTASSIUM BICARBONATE ONE MEQ: 782 TABLET, EFFERVESCENT ORAL at 03:13

## 2024-03-22 RX ADMIN — PANTOPRAZOLE SODIUM SCH MG: 40 INJECTION, POWDER, FOR SOLUTION INTRAVENOUS at 09:23

## 2024-03-22 RX ADMIN — IBUPROFEN PRN MG: 400 TABLET, FILM COATED ORAL at 20:52

## 2024-03-22 NOTE — P.HPIM
History of Present Illness


H&P Date: 03/22/24


This is a 63-year-old female with medical history of depression, chronic alcohol

use and hypertension. Patient was recently hospitalized with concern for upper 

and lower extremity weakness as well as paresthesias to the upper and lower 

extremity.  Patient underwent extensive orthopedic and neurological workup 

including MRI of the brain, cervical, thoracic and lumbar spine.  Patient was 

recommended to follow-up with neurology for EMG and NCS testing of the upper and

lower extremities and she was given a follow-up phone number for a neuromuscular

specialist out of [facility].  Patient was discharged to subacute rehab where 

she was then discharged to a room and board type situation.  Patient states that

the house was not set up for a disabled patient and she was having issues with 

bathing and the toileting facilities.  She was per the patient kicked out after 

2 days however states that she also wanted to leave.  Patient returned to the ER

with similar complaints as above she continues to have 4-5 muscle strength in 

the bilateral upper lower extremities as well as significant weakness of the 

muscles in the left hand. She was evaluated by physical therapy and was 

discharged home, she was able to return to her apartment although patient states

she is pending eviction as the rent now exceeds her monthly income.  She has not

been able to follow-up with a neuromuscular specialist as she states that she 

does not have any transportation.  Patient is been at home for about a week and 

she comes into the hospital after sustaining a fall at home states that she has 

not been eating well and had just been set up with [employer].  Her sister 

did bring her alcohol and patient states that she drank 1/5 of vodka and her 

blood alcohol level was 86 on admission.  Patient states that this is the first 

time that she drank since prior discharge.  Initial blood work reveals a sodium 

level of 147, potassium of 2.4, chloride of 131, CO2 of 8, BUN of 9, creatinine 

of 0.18, calcium level 4.6, magnesium level of 1.0.  Urinalysis is negative.  

Patient was admitted to the hospital with a consult placed to physical therapy 

for discharge planning.  She was hydrated and on repeat blood work her 

electrolytes have normalized and patient is downgraded to the medical surgical 

floor.  She will need to be reevaluated physical therapy for discharge planning 

she is requesting to return to [facility] for more rehabilitation.





REVIEW OF SYSTEMS: 


CONSTITUTIONAL: No fever, no malaise, no fatigue. 


HEENT: No recent visual problems or hearing problems. Denied any sore throat. 


CARDIOVASCULAR: No chest pain, orthopnea, PND, no palpitations, no syncope. 


PULMONARY: No shortness of breath, no cough, no hemoptysis. 


GASTROINTESTINAL: No diarrhea, no nausea, no vomiting, no abdominal pain. 


NEUROLOGICAL: No headaches, no weakness, no numbness. 


HEMATOLOGICAL: Denies any bleeding or petechiae. 


GENITOURINARY: Denies any burning micturition, frequency, or urgency. 


MUSCULOSKELETAL/RHEUMATOLOGICAL: Denies any joint pain, swelling, or any muscle 

pain. 


ENDOCRINE: Denies any polyuria or polydipsia. 





The rest of the 14-point review of systems is negative.





PHYSICAL EXAMINATION: 





GENERAL: The patient is alert and oriented x3, not in any acute distress. Well 

developed, well nourished. 


HEENT: Pupils are round and equally reacting to light. EOMI. No scleral icterus.

No conjunctival pallor. Normocephalic, atraumatic. No pharyngeal erythema. No 

thyromegaly. 


CARDIOVASCULAR: S1 and S2 present. No murmurs, rubs, or gallops. 


PULMONARY: Chest is clear to auscultation, no wheezing or crackles. 


ABDOMEN: Soft, nontender, nondistended, normoactive bowel sounds. No palpable 

organomegaly. 


MUSCULOSKELETAL: No joint swelling or deformity.


EXTREMITIES: No cyanosis, clubbing, or pedal edema. 


NEUROLOGICAL: Gross neurological examination did not reveal any focal deficits. 

4/5 strength upper extremities bilaterally. No pronator drift. No ataxia. 


SKIN: No rashes. 





Assessment and Plan


-Alcohol intoxication patient will be monitored for alcohol withdrawal and 

ativan CIWA protocol ordered.


-Fall secondary to acute alcohol intoxication patient will need to see PT/OT


-Hypokalemia, hypomagnesemia due to poor nutrition and poor oral intake. Patient

is on a fixed income and does not have transportation. She was recently set up 

with meals on wheels. Electrolytes have been normalized.


-Generalized weakness involving the bilateral upper and lower extremities this 

could be secondary to general medical deconditioning, versus neuropathy 

secondary to alcohol abuse. Underwent extensive work up previous admission was 

referred to outpatient neuromuscular specialist patient has not yet followed up.

PT/OT are consulted for discharge planning possible return to subacute rehab. 

Social work on board. 


-Left hand weakness and likely dupuytrens contracture as mentioned patient has 

already been referred to neuromuscular specialist o.p work up.


-History of hypertension maintained on lisinopril 


-History of hyperlipidemia not currently on statin therapy


-Bilateral lower extremity radiculopathy and weakness this could be due to 

degenerative disc disease will need further outpatient workup


-Chronic alcohol use


-Elevated MCV secondary to chronic alcohol use vitamin B12 levels were checked 

and normal her previous admission 


-Depression stable on current medications


GI prophylaxis


DVT prophylaxis


Full Code





Pending PT/OT and social work consultation for discharge planning.





The impression and plan of care has been dictated by Linda Dietz Nurse 

Practitioner as directed.





Dr. Latrell MD


I have performed a history and physical examination and medical decision making 

of this patient, discussed the same with the dictator, and agree with the 

dictators assessment and plan as written, documented as a scribe. Based on total

visit time, I have performed more than 50% of this visit.














Past Medical History


Past Medical History: Hyperlipidemia, Hypertension, Osteoarthritis (OA)


Additional Past Medical History / Comment(s): Multiple recent falls. hx colon 

polyps, occasional diarrhea, hx of fall Nov 2020 and has been having pain right 

hip since that radiates down right leg, walks with limp., states breast implants

moving up.


History of Any Multi-Drug Resistant Organisms: None Reported


Past Surgical History: Breast Surgery, Orthopedic Surgery, Tonsillectomy


Additional Past Surgical History / Comment(s): right elbow surgery with screws, 

right knee surgery with plate and removal ., lumpectomy left breast, breast 

implants


Past Anesthesia/Blood Transfusion Reactions: Postoperative Nausea & Vomiting 

(PONV)


Past Psychological History: Anxiety, Depression


Smoking Status: Former smoker


Past Alcohol Use History: Abuse, Daily


Past Drug Use History: None Reported





- Past Family History


  ** Father


Family Medical History: Cancer





Medications and Allergies


                                Home Medications











 Medication  Instructions  Recorded  Confirmed  Type


 


Pantoprazole [Protonix] 40 mg PO DAILY #30 tab 12/20/23 03/22/24 Rx


 


Acetaminophen Tab [Tylenol] 650 mg PO Q6HR PRN  tab 02/06/24 03/22/24 Rx


 


Folic Acid 1 mg PO DAILY #30 tablet 02/06/24 03/22/24 Rx


 


Ibuprofen [Motrin] 400 mg PO Q6HR PRN  tab 02/06/24 03/22/24 Rx


 


Multivitamins, Thera [Multivitamin 1 tab PO DAILY #30 tablet 02/06/24 03/22/24 

Rx





(formulary)]    


 


QUEtiapine [SEROquel] 200 mg PO HS 30 Days #30 tab 02/06/24 03/22/24 Rx


 


Thiamine [Vitamin B-1] 100 mg PO DAILY 30 Days #30 tab 02/06/24 03/22/24 Rx


 


traZODone HCL [Desyrel] 50 mg PO HS PRN  tab 02/06/24 03/22/24 Rx


 


Cholecalciferol [Vitamin D3 (25 50 mcg PO DAILY  tab 02/29/24 03/22/24 Rx





Mcg = 1000 Iu)]    


 


Escitalopram [Lexapro] 20 mg PO DAILY 03/10/24 03/22/24 History








                                    Allergies











Allergy/AdvReac Type Severity Reaction Status Date / Time


 


latex Allergy Unknown Itching, Verified 03/22/24 07:54





   Blisters  


 


codeine AdvReac Severe HEADACHE Verified 03/22/24 07:54


 


hydrocodone [From Vicodin] AdvReac Severe HEADACHE Verified 03/22/24 07:54


 


Anesthetics - Amide Type - AdvReac  GAS GIVES Verified 03/22/24 07:54





Select A   HER  





   HEADACHE,  





   VOMITING,  





   HEART  





   PALIPITATIONS  


 


Anesthetics - Juliana Type- AdvReac  GAS GIVES Verified 03/22/24 07:54





Parabens   HER  





   HEADACHE,  





   VOMITING,  





   HEART  





   PALIPITATIONS  


 


ANESTHESIA AdvReac Unknown GAS GIVES Uncoded 03/10/24 17:51





   HER  





   HEADACHE,  





   VOMITING,  





   HEART  





   PALIPITATIONS  














Physical Exam


Vitals: 


                                   Vital Signs











  Temp Pulse Resp BP Pulse Ox


 


 03/22/24 14:12   85  20  123/85  94 L


 


 03/22/24 11:47   82  18  130/85  93 L


 


 03/22/24 09:19   85  16  146/89  96


 


 03/22/24 06:10   95  16  130/91  95


 


 03/22/24 04:00   102 H  16  119/76  96


 


 03/22/24 03:28   98  16  117/75  97


 


 03/22/24 02:52   96  18  110/75  97


 


 03/22/24 01:15   101 H  16  117/76  97


 


 03/21/24 22:45  98.7 F  93  22  125/77  96








                                Intake and Output











 03/21/24 03/22/24 03/22/24





 22:59 06:59 14:59


 


Other:   


 


  Weight 72.575 kg  














Results


CBC & Chem 7: 


                                 03/22/24 02:23





                                 03/22/24 10:33


Labs: 


                  Abnormal Lab Results - Last 24 Hours (Table)











  03/22/24 03/22/24 03/22/24 Range/Units





  01:45 01:45 02:23 


 


RBC    3.63 L  (3.80-5.40)  m/uL


 


MCV    103.0 H  (80.0-100.0)  fL


 


Sodium  147 H    (137-145)  mmol/L


 


Potassium  2.4 L*    (3.5-5.1)  mmol/L


 


Chloride  131 H*    ()  mmol/L


 


Carbon Dioxide  8 L*    (22-30)  mmol/L


 


Creatinine  0.18 L    (0.52-1.04)  mg/dL


 


Glucose  54 L    (74-99)  mg/dL


 


Calcium  4.6 L*    (8.4-10.2)  mg/dL


 


Magnesium  1.0 L    (1.6-2.3)  mg/dL


 


Alkaline Phosphatase  37 L    ()  U/L


 


Creatine Kinase   159 H   ()  U/L


 


Total Protein  3.8 L    (6.3-8.2)  g/dL


 


Albumin  1.7 L    (3.5-5.0)  g/dL














  03/22/24 Range/Units





  10:33 


 


RBC   (3.80-5.40)  m/uL


 


MCV   (80.0-100.0)  fL


 


Sodium   (137-145)  mmol/L


 


Potassium   (3.5-5.1)  mmol/L


 


Chloride   ()  mmol/L


 


Carbon Dioxide   (22-30)  mmol/L


 


Creatinine  0.42 L  (0.52-1.04)  mg/dL


 


Glucose   (74-99)  mg/dL


 


Calcium   (8.4-10.2)  mg/dL


 


Magnesium   (1.6-2.3)  mg/dL


 


Alkaline Phosphatase   ()  U/L


 


Creatine Kinase   ()  U/L


 


Total Protein   (6.3-8.2)  g/dL


 


Albumin   (3.5-5.0)  g/dL














Assessment and Plan


Time with Patient: Less than 30

## 2024-03-22 NOTE — CT
EXAM:

  CT Head Without Intravenous Contrast

 

CLINICAL HISTORY:

  ITS.REASON CT Reason: cva

 

TECHNIQUE:

  Axial computed tomography images of the head/brain without intravenous 

contrast.  CTDI is 45.3 mGy and DLP is 1058 mGy-cm.  This CT exam was 

performed using one or more of the following dose reduction techniques: 

automated exposure control, adjustment of the mA and/or kV according to 

patient size, and/or use of iterative reconstruction technique.

 

COMPARISON:

  No relevant prior studies available.

 

FINDINGS:

  Brain:  No hemorrhage, herniation, or mass effect.  Chronic 

microvascular ischemic changes.

  Ventricles:  No hydrocephalus.  Age related cerebral volume loss.

  Bones/joints:  Unremarkable.

  Soft tissues:  Unremarkable.

  Sinuses:  No air fluid levels.

  Mastoid air cells:  Clear.

 

IMPRESSION:     

  No acute hemorrhage, hydrocephalus, or mass effect.

 

_______________________________________________

 

EXAM:

  CT Cervical Spine Without Intravenous Contrast

 

CLINICAL HISTORY:

  ITS.REASON CT Reason: cva

 

TECHNIQUE:

  Axial computed tomography images of the cervical spine without 

intravenous contrast.  CTDI is 10.3 mGy and DLP is 289.7 mGy-cm.  This CT 

exam was performed using one or more of the following dose reduction 

techniques: automated exposure control, adjustment of the mA and/or kV 

according to patient size, and/or use of iterative reconstruction 

technique.

 

COMPARISON:

  No relevant prior studies available.

 

FINDINGS:

  Vertebrae:  No acute fracture.

  Discs/spinal canal/neural foramina:  degenerative changes.

  Soft tissues:  No prevertebral swelling.

 

IMPRESSION:     

  No acute fracture or subluxation.

## 2024-03-23 LAB
ALBUMIN SERPL-MCNC: 3.9 G/DL (ref 3.8–4.9)
ALBUMIN/GLOB SERPL: 1.86 RATIO (ref 1.6–3.17)
ALP SERPL-CCNC: 50 U/L (ref 41–126)
ALT SERPL-CCNC: 14 U/L (ref 8–44)
ANION GAP SERPL CALC-SCNC: 9.7 MMOL/L (ref 4–12)
AST SERPL-CCNC: 13 U/L (ref 13–35)
BASOPHILS # BLD AUTO: 0.04 X 10*3/UL (ref 0–0.1)
BASOPHILS NFR BLD AUTO: 0.6 %
BUN SERPL-SCNC: 18 MG/DL (ref 9–27)
BUN/CREAT SERPL: 36 RATIO (ref 12–20)
CALCIUM SPEC-MCNC: 9.7 MG/DL (ref 8.7–10.3)
CHLORIDE SERPL-SCNC: 104 MMOL/L (ref 96–109)
CO2 SERPL-SCNC: 26.3 MMOL/L (ref 21.6–31.8)
EOSINOPHIL # BLD AUTO: 0.23 X 10*3/UL (ref 0.04–0.35)
EOSINOPHIL NFR BLD AUTO: 3.4 %
ERYTHROCYTE [DISTWIDTH] IN BLOOD BY AUTOMATED COUNT: 3.71 X 10*6/UL (ref 4.1–5.2)
ERYTHROCYTE [DISTWIDTH] IN BLOOD: 12.7 % (ref 11.5–14.5)
GLOBULIN SER CALC-MCNC: 2.1 G/DL (ref 1.6–3.3)
GLUCOSE SERPL-MCNC: 102 MG/DL (ref 70–110)
HCT VFR BLD AUTO: 37.2 % (ref 37.2–46.3)
HGB BLD-MCNC: 12.2 G/DL (ref 12–15)
IMM GRANULOCYTES BLD QL AUTO: 0.1 %
LYMPHOCYTES # SPEC AUTO: 3.15 X 10*3/UL (ref 0.9–5)
LYMPHOCYTES NFR SPEC AUTO: 47.2 %
MAGNESIUM SPEC-SCNC: 2 MG/DL (ref 1.5–2.4)
MCH RBC QN AUTO: 32.9 PG (ref 27–32)
MCHC RBC AUTO-ENTMCNC: 32.8 G/DL (ref 32–37)
MCV RBC AUTO: 100.3 FL (ref 80–97)
MONOCYTES # BLD AUTO: 0.52 X 10*3/UL (ref 0.2–1)
MONOCYTES NFR BLD AUTO: 7.8 %
NEUTROPHILS # BLD AUTO: 2.72 X 10*3/UL (ref 1.8–7.7)
NEUTROPHILS NFR BLD AUTO: 40.9 %
NRBC BLD AUTO-RTO: 0 X 10*3/UL (ref 0–0.01)
PLATELET # BLD AUTO: 232 X 10*3/UL (ref 140–440)
POTASSIUM SERPL-SCNC: 4.6 MMOL/L (ref 3.5–5.5)
PROT SERPL-MCNC: 6 G/DL (ref 6.2–8.2)
SODIUM SERPL-SCNC: 140 MMOL/L (ref 135–145)
WBC # BLD AUTO: 6.67 X 10*3/UL (ref 4.5–10)

## 2024-03-23 RX ADMIN — Medication SCH MG: at 07:48

## 2024-03-23 RX ADMIN — FOLIC ACID SCH: 1 TABLET ORAL at 07:48

## 2024-03-23 RX ADMIN — Medication SCH MCG: at 07:48

## 2024-03-23 NOTE — PN
PROGRESS NOTE



DATE OF SERVICE:  03/23/2024



SUBJECTIVE:

This 63-year-old woman, who was admitted with alcohol intoxication, also had 
multiple

 no palpitations.  No fever.



OBJECTIVE:

VITAL SIGNS:  Pulse is 73, blood pressure 137/80, respirations 17.

CHEST:  Clear to auscultation.

CARDIOVASCULAR:  n

ABDOMEN:  Soft.

NERVOUS SYSTEM:  Diffusely weak.



LABORATORY DATA:

Noted.



ASSESSMENT:

1. Acute alcohol intoxication.

2. Severe hypokalemia.

3. Fall and generalized gait dysfunction.

4. Hypertension.

5. Hyperlipidemia.

6. Multiple complex medical issues.



RECOMMENDATIONS:

I recommend to continue current management and monitor lytes closely.  PT/OT

evaluation.  Increase ambulation.  Continue with CIWA protocol.  Supplement 
vitamins.

Guarded prognosis.  Further recommendations to follow.





MMGERALDINEL / IJN: [id number] / Job#: [id number]

MTDD

## 2024-03-24 LAB
ANION GAP SERPL CALC-SCNC: 9 MMOL/L
BASOPHILS # BLD AUTO: 0.1 K/UL (ref 0–0.2)
BASOPHILS NFR BLD AUTO: 1 %
BUN SERPL-SCNC: 25 MG/DL (ref 7–17)
CALCIUM SPEC-MCNC: 10.1 MG/DL (ref 8.4–10.2)
CHLORIDE SERPL-SCNC: 105 MMOL/L (ref 98–107)
CO2 SERPL-SCNC: 23 MMOL/L (ref 22–30)
EOSINOPHIL # BLD AUTO: 0.3 K/UL (ref 0–0.7)
EOSINOPHIL NFR BLD AUTO: 4 %
ERYTHROCYTE [DISTWIDTH] IN BLOOD BY AUTOMATED COUNT: 4.06 M/UL (ref 3.8–5.4)
ERYTHROCYTE [DISTWIDTH] IN BLOOD: 13 % (ref 11.5–15.5)
GLUCOSE SERPL-MCNC: 104 MG/DL (ref 74–99)
HCT VFR BLD AUTO: 41.5 % (ref 34–46)
HGB BLD-MCNC: 13.3 GM/DL (ref 11.4–16)
LYMPHOCYTES # SPEC AUTO: 2.7 K/UL (ref 1–4.8)
LYMPHOCYTES NFR SPEC AUTO: 39 %
MCH RBC QN AUTO: 32.8 PG (ref 25–35)
MCHC RBC AUTO-ENTMCNC: 32.1 G/DL (ref 31–37)
MCV RBC AUTO: 102.3 FL (ref 80–100)
MONOCYTES # BLD AUTO: 0.5 K/UL (ref 0–1)
MONOCYTES NFR BLD AUTO: 7 %
NEUTROPHILS # BLD AUTO: 3.2 K/UL (ref 1.3–7.7)
NEUTROPHILS NFR BLD AUTO: 46 %
PLATELET # BLD AUTO: 230 K/UL (ref 150–450)
POTASSIUM SERPL-SCNC: 4.8 MMOL/L (ref 3.5–5.1)
SODIUM SERPL-SCNC: 137 MMOL/L (ref 137–145)
WBC # BLD AUTO: 6.9 K/UL (ref 3.8–10.6)

## 2024-03-24 NOTE — PN
PROGRESS NOTE



DATE OF SERVICE:  03/24/2024



SUBJECTIVE:

This is a 63-year-old woman who was admitted with acute alcohol intoxication, 
also had

multiple complex medical issues.  The patient was also complaining of weakness. 
No

chest pain.  No palpitation.



PHYSICAL EXAMINATION:

VITAL SIGNS:  Pulse is 79, blood pressure n respiratory rate 20.

CHEST:  Clear to auscultation.

CARDIOVASCULAR:  n



LABORATORY DATA:

Reviewed.



ASSESSMENT:

1. Acute alcohol intoxication.

2. Severe hypokalemia.

3. Fall and generalized gait dysfunction.

4. Hypertension.

5. Hyperlipidemia.

6. Multiple complex medical issues.



RECOMMENDATIONS:

Recommend to continue current medications and symptomatic treatment.  Increase

ambulation with PT/OT.  Continue with  and  to follow.





MMODL / IJN: [id number] / Job#: [id number]

MAAME

## 2024-03-25 RX ADMIN — KETOROLAC TROMETHAMINE PRN MG: 15 INJECTION, SOLUTION INTRAMUSCULAR; INTRAVENOUS at 20:51

## 2024-03-25 RX ADMIN — AMPICILLIN SODIUM AND SULBACTAM SODIUM SCH MLS/HR: 2; 1 INJECTION, POWDER, FOR SOLUTION INTRAMUSCULAR; INTRAVENOUS at 21:02

## 2024-03-25 NOTE — CDI
Documentation Clarification Form



Date: 03/25/2024 10:32:49 AM

From: Cherelle Fernandes RN CCDS 

Phone: [phone]

MRN: [id number]

Admit Date: 03/22/2024 03:12:00 AM

Patient Name: Pura Eid

Visit Number: [id number]

Discharge Date:  





ATTENTION: The Clinical Documentation Specialists (CDI) and [facility] Coding Staff 
appreciate your assistance in clarifying documentation. Please respond to the 
clarification below the line at the bottom and electronically sign. The CDI & 
[facility] Coding staff will review the response and follow-up if needed. Please note: 
Queries are made part of the Legal Health Record. If you have any questions, 
please contact the author of this message via ITS.



Dr. Boni Larios







Your patient has MICHAEL documented in ED note, 3/25.   Based on this information 
and the findings below, is there an additional diagnosis that is clinically 
appropriate for this patient?



Patient history/risk factors:  63-year-old female presents to the ED after a 
fall from home, not eating well and was recently set up with wheels on meals22  
History: Recent admission for  4-5 muscle strength in the bilateral upper lower 
extremities as well as significant weakness of the muscles in the left hand. 
Alcohol abuse, likely dupuytrens contracture revfered to Neruomuscuar 
specialist. HTN, HLD, Depression and chronic alcohol abuse. 3/22, H&P. 



Clinical Indicators: 

3/22 BUN  9  Cr 0.18  GFR >90

3/22 BUN 16 Cr 0.42   GFR >90

3/23 BUN 18 Cr 0.50   GFR >90

3/24 BUN  25 Cr 0.54  GFR >90



Treatment: 3/22 0.9NS 1L IV fluid bolus, 3/21- 3/22 0.9NS IV 130cc/hr, 3/22 
Dextrose 5% 1/2NS IV 75cchr



Is there an additional diagnosis that is clinically appropriate for this 
patient?



[  ]  Acute Kidney Injury

[  ]  No additional diagnosis/Not clinically significant

[  ]  Unable to determine

[  ]  Other, please specify _______









Reference: KDIGO MICHAEL Criteria

   An increase in serum creatinine by greater than or equal to 0.3 mg/dL within 
48 hours;

   An increase in serum creatinine by greater than or equal to 1.5 times 
baseline, which is known or presumed to have occurred within the prior 7 days; 

   A urine volume less than 0.5 ml/kg/h for 6 hours.



When the baseline is unknown the lowest creatinine during admission assumed to 
be baseline





(Template Last Revised: February 2023)

___________________________________________________________________________

no michael

MTDD

## 2024-03-25 NOTE — XR
Right elbow.

 

HISTORY: Pain finding fall.

 

COMPARISON: None.

 

TECHNIQUE: 3 views of right elbow were obtained.

 

FINDINGS:

 

There is a fixation screw in the radial head. There is no acute fracture or dislocation. There is no 
joint effusion. The soft tissues are unremarkable.

 

IMPRESSION:

 

Postsurgical changes involving the radial head. There is no evidence of acute trauma. To feel better

## 2024-03-25 NOTE — PN
PROGRESS NOTE



DATE OF SERVICE:  03/25/2024



This 63-year-old woman with a past medical history of multiple medical problems,

admitted with EtOH intoxication, hypokalemia.  No chest pain.  No palpitations.  No

fever.



OBJECTIVE:

VITAL SIGNS:  Pulse 90, blood pressure 140/88, respirations 16.

CHEST:  Clear to auscultation.

ABDOMEN:  Soft.  Nontender.

NERVOUS SYSTEM:  Mild diffuse weakness.



LABORATORY DATA:

Reviewed.



ASSESSMENT:

1. Acute alcohol intoxication.

2. Severe hypokalemia.

3. Fall and generalized gait dysfunction.

4. Hypertension.

5. Hyperlipidemia.

6. Multiple complex medical issues.



RECOMMENDATIONS:

Recommend to continue current management and symptomatic treatment.  PT/OT evaluation.

Case Management.   for discharge planning.  Continue current medications.

Alcohol cessation recommendations.  Further recommendations to follow.  Repeat labs.





MMODL / IJN: [id number] / Job#: [id number]

## 2024-03-26 VITALS
SYSTOLIC BLOOD PRESSURE: 117 MMHG | HEART RATE: 71 BPM | DIASTOLIC BLOOD PRESSURE: 78 MMHG | TEMPERATURE: 98.5 F | RESPIRATION RATE: 18 BRPM

## 2024-03-27 NOTE — P.DS
Providers


Date of admission: 


03/22/24 03:12





Expected date of discharge: 03/26/24


Attending physician: 


Boni Larios





Primary care physician: 


Lisa Becerra





[facility] Course: 











Final diagnosis





-Acute alcohol intoxication with no active withdrawal, monitored and maintained 

on CIWA protocol


-Fall secondary to acute alcohol intoxication


-Hypokalemia, hypomagnesemia, improved post replacement


-Generalized weakness involving the bilateral upper and lower extremities this 

could be secondary to general medical deconditioning, versus neuropathy 

secondary to alcohol abuse. Underwent extensive work up previous admission was 

referred to outpatient neuromuscular specialist patient has not yet followed up.




-Left hand weakness and likely dupuytrens contracture as mentioned patient has 

already been referred to neuromuscular specialist o.p work up.


-History of hypertension 


-History of hyperlipidemia, not currently on statin therapy


-Bilateral lower extremity radiculopathy and weakness likely secondary to 

degenerative disc disease will need further outpatient workup


-Chronic alcohol use


-Elevated MCV secondary to chronic alcohol use vitamin B12 levels were checked 

and normal her previous admission 


-Depression stable on current medications


GI prophylaxis


DVT prophylaxis


Full Code








Discharge disposition


Patient is being discharged in a stable condition with guarded prognosis to 

home.  Patient will follow-up with Dr. Benjamin Larios in the outpatient setting 

upon discharge.  Patient is to continue with current medications as prescribed 

and close outpatient follow-up with neurology for further EMG studies and 

outpatient follow-up with [facility].  Total time taken is greater than 35 minutes.





Hospital course


This is a 63-year-old female who was recently admitted with acute alcohol 

intoxication with falls and significant electrolyte disturbances including 

hypokalemia and hypomagnesemia.  Patient maintained on CIWA protocol although 

not actively withdrawing and electrolytes were replaced and improved.  Patient 

continues with generalized weakness and gait dysfunction reporting she has 

difficulty walking.  Patient was seen and evaluated multiple times by physical 

therapy and does not qualify for rehab walking over 100 feet.  Patient does have

a walker.  Social work following and patient did not qualify for rehab and will 

be going home.  Encouraged patient to follow-up in the outpatient setting with 

community mental health as well as outpatient resources to establish living 

situations.  Patient has had multiple hospitalizations for continued 

noncompliance and continued alcohol abuse and extremely poor follow-up and 

noncompliance to medications. Currently no reports of chest pain, shortness of 

breath, or palpitations.  Patient is afebrile.  No reports of nausea or vomiting

and patient is tolerating diet.  Patient will be discharged home today.  Guarded

prognosis given patient's significant comorbidities and high risk for 

readmissions given patient's continued noncompliance of medication and follow-up

noncompliance along with continued alcohol abuse.  Patient has been extensively 

counseled on each admission and continues to drink alcohol and become 

intoxicated.





Physical exam:








Gen: This is a 63-year-old female who is awake, alert and oriented x 3, thin 

built, elderly appearing


HEENT: Head is atraumatic, normocephalic. Pupils equal, round. Sclerae is 

anicteric. 


NECK: Supple. No JVD. No lymphadenopathy. No thyromegaly. 


LUNGS: Diminished breath sounds bilaterally otherwise clear to auscultation. No 

wheezes or rhonchi.  No intercostal retractions.


HEART: S1, S2 are muffled


ABDOMEN: Soft. Bowel sounds are present. No masses.  No tenderness.


EXTREMITIES: No pedal edema.  No calf tenderness.


NEUROLOGICAL: Patient is awake, alert and oriented x3. Cranial nerves 2 through 

12 are grossly intact. 





Please refer to medication reconciliation sheet for a list of medications.





The impression and plan of care has been dictated by Yamileth Horne, Nurse 

Practitioner as directed.





Dr. Ric MD


I have performed a history and examination and MDM of this patient, discussed 

the same with the dictator, and  agree with the dictator's assessment and plan 

as written ,documented as a scribe. Based on total visit time,  I have performed

more than 50% of the visit.


Patient Condition at Discharge: Fair





Plan - Discharge Summary


Discharge Rx Participant: Yes


New Discharge Prescriptions: 


Continue


   Pantoprazole [Protonix] 40 mg PO DAILY #30 tab


   traZODone HCL [Desyrel] 50 mg PO HS PRN  tab


     PRN Reason: Insomnia


   Folic Acid 1 mg PO DAILY #30 tablet


   Ibuprofen [Motrin] 400 mg PO Q6HR PRN  tab


     PRN Reason: Pain


   Multivitamins, Thera [Multivitamin (formulary)] 1 tab PO DAILY #30 tablet


   Acetaminophen Tab [Tylenol] 650 mg PO Q6HR PRN  tab


     PRN Reason: Fever And/ Or Pain


   Thiamine [Vitamin B-1] 100 mg PO DAILY 30 Days #30 tab


   Escitalopram [Lexapro] 20 mg PO DAILY


   QUEtiapine [SEROquel] 200 mg PO HS 30 Days #30 tab


   Cholecalciferol [Vitamin D3 (25 Mcg = 1000 Iu)] 50 mcg PO DAILY  tab


Discharge Medication List





Pantoprazole [Protonix] 40 mg PO DAILY #30 tab 12/20/23 [Rx]


Acetaminophen Tab [Tylenol] 650 mg PO Q6HR PRN  tab 02/06/24 [Rx]


Folic Acid 1 mg PO DAILY #30 tablet 02/06/24 [Rx]


Ibuprofen [Motrin] 400 mg PO Q6HR PRN  tab 02/06/24 [Rx]


Multivitamins, Thera [Multivitamin (formulary)] 1 tab PO DAILY #30 tablet 

02/06/24 [Rx]


QUEtiapine [SEROquel] 200 mg PO HS 30 Days #30 tab 02/06/24 [Rx]


Thiamine [Vitamin B-1] 100 mg PO DAILY 30 Days #30 tab 02/06/24 [Rx]


traZODone HCL [Desyrel] 50 mg PO HS PRN  tab 02/06/24 [Rx]


Cholecalciferol [Vitamin D3 (25 Mcg = 1000 Iu)] 50 mcg PO DAILY  tab 02/29/24 

[Rx]


Escitalopram [Lexapro] 20 mg PO DAILY 03/10/24 [History]








Follow up Appointment(s)/Referral(s): 


Lisa Becerra MD [Primary Care Provider] - 1-2 days (pt will call to make her

own appointment.)


Patient Instructions/Handouts:  Hypokalemia (DC), Abuse of Alcohol (DC), 

Hypocalcemia (DC), Hypomagnesemia (DC)


Activity/Diet/Wound Care/Special Instructions: 


Activity limited until follow-up


Follow-up with primary care provider on discharge


Follow-up outpatient with [facility]


Continue taking medications as prescribed


Avoid alcohol use and exposure


Discharge/Stand Alone Forms:  [facility] Meetings [place], [facility], [facility] 

Shelters, Who Do I Call?, Community Resources, Outpatient Counseling, Inp 

Substance Abuse Facilities


Discharge Disposition: HOME SELF-CARE

## 2024-04-08 ENCOUNTER — HOSPITAL ENCOUNTER (INPATIENT)
Dept: HOSPITAL 47 - EC | Age: 64
LOS: 10 days | Discharge: HOME HEALTH | DRG: 565 | End: 2024-04-18
Payer: MEDICARE

## 2024-04-08 VITALS — BODY MASS INDEX: 26.6 KG/M2

## 2024-04-08 DIAGNOSIS — I10: ICD-10-CM

## 2024-04-08 DIAGNOSIS — F32.A: ICD-10-CM

## 2024-04-08 DIAGNOSIS — E87.29: ICD-10-CM

## 2024-04-08 DIAGNOSIS — R29.6: ICD-10-CM

## 2024-04-08 DIAGNOSIS — T79.6XXA: Primary | ICD-10-CM

## 2024-04-08 DIAGNOSIS — R19.7: ICD-10-CM
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
## 2024-04-08 DIAGNOSIS — F41.9: ICD-10-CM

## 2024-04-08 DIAGNOSIS — E83.42: ICD-10-CM

## 2024-04-08 DIAGNOSIS — E78.5: ICD-10-CM

## 2024-04-08 DIAGNOSIS — Z88.5: ICD-10-CM

## 2024-04-08 DIAGNOSIS — Z98.82: ICD-10-CM

## 2024-04-08 DIAGNOSIS — I47.19: ICD-10-CM

## 2024-04-08 DIAGNOSIS — Z91.040: ICD-10-CM

## 2024-04-08 DIAGNOSIS — Z79.899: ICD-10-CM

## 2024-04-08 DIAGNOSIS — F10.131: ICD-10-CM

## 2024-04-08 DIAGNOSIS — M19.90: ICD-10-CM

## 2024-04-08 DIAGNOSIS — F10.129: ICD-10-CM

## 2024-04-08 DIAGNOSIS — E86.0: ICD-10-CM
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
## 2024-04-08 DIAGNOSIS — Z86.010: ICD-10-CM

## 2024-04-08 DIAGNOSIS — R26.9: ICD-10-CM

## 2024-04-08 DIAGNOSIS — Z91.81: ICD-10-CM

## 2024-04-08 DIAGNOSIS — Z28.21: ICD-10-CM

## 2024-04-08 LAB
ALBUMIN SERPL-MCNC: 5.1 G/DL (ref 3.5–5)
ALP SERPL-CCNC: 81 U/L (ref 38–126)
ALT SERPL-CCNC: 47 U/L (ref 4–34)
ANION GAP SERPL CALC-SCNC: 21 MMOL/L
AST SERPL-CCNC: 65 U/L (ref 14–36)
BASOPHILS # BLD AUTO: 0.1 K/UL (ref 0–0.2)
BASOPHILS NFR BLD AUTO: 1 %
BUN SERPL-SCNC: 28 MG/DL (ref 7–17)
CALCIUM SPEC-MCNC: 10.4 MG/DL (ref 8.4–10.2)
CHLORIDE SERPL-SCNC: 102 MMOL/L (ref 98–107)
CK SERPL-CCNC: 1359 U/L (ref 30–135)
CO2 SERPL-SCNC: 17 MMOL/L (ref 22–30)
EOSINOPHIL # BLD AUTO: 0.1 K/UL (ref 0–0.7)
EOSINOPHIL NFR BLD AUTO: 1 %
ERYTHROCYTE [DISTWIDTH] IN BLOOD BY AUTOMATED COUNT: 4.38 M/UL (ref 3.8–5.4)
ERYTHROCYTE [DISTWIDTH] IN BLOOD: 12.9 % (ref 11.5–15.5)
GLUCOSE SERPL-MCNC: 157 MG/DL (ref 74–99)
HCT VFR BLD AUTO: 43.2 % (ref 34–46)
HGB BLD-MCNC: 14.1 GM/DL (ref 11.4–16)
KETONES UR QL STRIP.AUTO: (no result)
LIPASE SERPL-CCNC: 222 U/L (ref 23–300)
LYMPHOCYTES # SPEC AUTO: 2.9 K/UL (ref 1–4.8)
LYMPHOCYTES NFR SPEC AUTO: 23 %
MAGNESIUM SPEC-SCNC: 1.4 MG/DL (ref 1.6–2.3)
MCH RBC QN AUTO: 32.3 PG (ref 25–35)
MCHC RBC AUTO-ENTMCNC: 32.7 G/DL (ref 31–37)
MCV RBC AUTO: 98.8 FL (ref 80–100)
MONOCYTES # BLD AUTO: 0.5 K/UL (ref 0–1)
MONOCYTES NFR BLD AUTO: 4 %
NEUTROPHILS # BLD AUTO: 8.7 K/UL (ref 1.3–7.7)
NEUTROPHILS NFR BLD AUTO: 70 %
PH UR: 5.5 [PH] (ref 5–8)
PLATELET # BLD AUTO: 349 K/UL (ref 150–450)
POTASSIUM SERPL-SCNC: 4.9 MMOL/L (ref 3.5–5.1)
PROT SERPL-MCNC: 8 G/DL (ref 6.3–8.2)
RBC UR QL: 5 /HPF (ref 0–5)
SODIUM SERPL-SCNC: 140 MMOL/L (ref 137–145)
SP GR UR: 1.02 (ref 1–1.03)
SQUAMOUS UR QL AUTO: <1 /HPF (ref 0–4)
UROBILINOGEN UR QL STRIP: <2 MG/DL (ref ?–2)
WBC # BLD AUTO: 12.4 K/UL (ref 3.8–10.6)
WBC #/AREA URNS HPF: 9 /HPF (ref 0–5)

## 2024-04-08 PROCEDURE — 84132 ASSAY OF SERUM POTASSIUM: CPT

## 2024-04-08 PROCEDURE — 81001 URINALYSIS AUTO W/SCOPE: CPT

## 2024-04-08 PROCEDURE — 96361 HYDRATE IV INFUSION ADD-ON: CPT

## 2024-04-08 PROCEDURE — 36415 COLL VENOUS BLD VENIPUNCTURE: CPT

## 2024-04-08 PROCEDURE — 70450 CT HEAD/BRAIN W/O DYE: CPT

## 2024-04-08 PROCEDURE — 74018 RADEX ABDOMEN 1 VIEW: CPT

## 2024-04-08 PROCEDURE — 96375 TX/PRO/DX INJ NEW DRUG ADDON: CPT

## 2024-04-08 PROCEDURE — 80320 DRUG SCREEN QUANTALCOHOLS: CPT

## 2024-04-08 PROCEDURE — 80076 HEPATIC FUNCTION PANEL: CPT

## 2024-04-08 PROCEDURE — 82550 ASSAY OF CK (CPK): CPT

## 2024-04-08 PROCEDURE — 83735 ASSAY OF MAGNESIUM: CPT

## 2024-04-08 PROCEDURE — 93005 ELECTROCARDIOGRAM TRACING: CPT

## 2024-04-08 PROCEDURE — 80053 COMPREHEN METABOLIC PANEL: CPT

## 2024-04-08 PROCEDURE — 83690 ASSAY OF LIPASE: CPT

## 2024-04-08 PROCEDURE — 83605 ASSAY OF LACTIC ACID: CPT

## 2024-04-08 PROCEDURE — 80048 BASIC METABOLIC PNL TOTAL CA: CPT

## 2024-04-08 PROCEDURE — 96374 THER/PROPH/DIAG INJ IV PUSH: CPT

## 2024-04-08 PROCEDURE — 80306 DRUG TEST PRSMV INSTRMNT: CPT

## 2024-04-08 PROCEDURE — 99285 EMERGENCY DEPT VISIT HI MDM: CPT

## 2024-04-08 PROCEDURE — 87636 SARSCOV2 & INF A&B AMP PRB: CPT

## 2024-04-08 PROCEDURE — 72125 CT NECK SPINE W/O DYE: CPT

## 2024-04-08 PROCEDURE — 96376 TX/PRO/DX INJ SAME DRUG ADON: CPT

## 2024-04-08 PROCEDURE — 85025 COMPLETE CBC W/AUTO DIFF WBC: CPT

## 2024-04-08 PROCEDURE — 71045 X-RAY EXAM CHEST 1 VIEW: CPT

## 2024-04-08 RX ADMIN — NICARDIPINE HYDROCHLORIDE STA MLS/HR: 2.5 INJECTION INTRAVENOUS at 10:46

## 2024-04-08 RX ADMIN — CEFAZOLIN STA MLS/HR: 330 INJECTION, POWDER, FOR SOLUTION INTRAMUSCULAR; INTRAVENOUS at 10:46

## 2024-04-08 RX ADMIN — CEFAZOLIN SCH MLS/HR: 330 INJECTION, POWDER, FOR SOLUTION INTRAMUSCULAR; INTRAVENOUS at 16:05

## 2024-04-08 RX ADMIN — DIPHENHYDRAMINE HYDROCHLORIDE STA: 50 INJECTION, SOLUTION INTRAMUSCULAR; INTRAVENOUS at 12:11

## 2024-04-08 RX ADMIN — METOCLOPRAMIDE STA MG: 5 INJECTION, SOLUTION INTRAMUSCULAR; INTRAVENOUS at 12:32

## 2024-04-08 RX ADMIN — CEFAZOLIN ONE MLS/HR: 330 INJECTION, POWDER, FOR SOLUTION INTRAMUSCULAR; INTRAVENOUS at 11:58

## 2024-04-08 NOTE — CT
EXAMINATION TYPE: CT brain myrtle wo con

 

DATE OF EXAM: 4/8/2024

 

COMPARISON: 3/22/2024

 

HISTORY: Fall

 

CT DLP: 1283.8 mGycm, Automated exposure control for dose reduction was used.

 

CONTRAST: Patient injected with 0 mL of Isovue 300.

 

CT of the brain is performed utilizing 3 mm thick sections through the posterior fossa and 3 mm thick
 sections through the remaining calvarium.  

 

Study is performed within 24 hours of arrival to the hospital.

 

 No abnormal hyperdensity is present to suggest an acute intracranial hemorrhage.

No mass lesion is evident.

No acute infarcts are evident.  Mild periventricular white matter hypodensity is present, likely on t
he basis of chronic white matter ischemic changes.

Ventricles and sulci are appropriate for the patient age.  

 

Paranasal sinuses and mastoid air cells within the field-of-view are clear. 

 

IMPRESSIONS:

1. Mild stable chronic appearing periventricular white matter ischemic-type changes.

2. Follow-up MRI can be performed as clinically indicated.

 

CT cervical spine.

 

COMPARISON: None

 

CT of the cervical spine is performed in the axial plane at 2 mm thick sections.  Reconstructed image
s in the coronal, and sagittal plane are reviewed on the computer. 

 

No acute fractures are evident.

Vertebral body alignment has mild kyphosis which can be related to patient positioning or muscle spas
m.

Disc heights are preserved.

Vertebral body heights are preserved.

No spinal canal stenosis is evident.

No neural foraminal stenosis is evident. 

 

IMPRESSION:

1. No acute osseous abnormality cervical spine

## 2024-04-08 NOTE — ED
Fall HPI





- General


Chief Complaint: Fall


Stated Complaint: Fall


Time Seen by Provider: 04/08/24 09:28


Source: patient, EMS


Mode of arrival: EMS


Limitations: no limitations





- History of Present Illness


Initial Comments: 


63-year-old female presents emergency department via EMS chief complaint of a 

fall.  Patient reported fell out of bed.  Patient is acutely intoxicated she 

states she drinks a large amount of liquor daily.  Patient states she has not 

been eating or drinking fluids otherwise.  Patient states she has moderate 

headache, head pain.  Denies any neck, back pain no extremity injuries she 

states she is so weak that she has been crawling around her apartment.  Patient 

was screaming for help in which neighbor found her on the ground.  No lace

rations noted.








- Related Data


                                Home Medications











 Medication  Instructions  Recorded  Confirmed


 


Escitalopram [Lexapro] 20 mg PO DAILY 03/10/24 04/08/24








                                  Previous Rx's











 Medication  Instructions  Recorded


 


Pantoprazole [Protonix] 40 mg PO DAILY #30 tab 12/20/23


 


Acetaminophen Tab [Tylenol] 650 mg PO Q6HR PRN  tab 02/06/24


 


Folic Acid 1 mg PO DAILY #30 tablet 02/06/24


 


Ibuprofen [Motrin] 400 mg PO Q6HR PRN  tab 02/06/24


 


Multivitamins, Thera [Multivitamin 1 tab PO DAILY #30 tablet 02/06/24





(formulary)]  


 


QUEtiapine [SEROquel] 200 mg PO HS 30 Days #30 tab 02/06/24


 


Thiamine [Vitamin B-1] 100 mg PO DAILY 30 Days #30 tab 02/06/24


 


traZODone HCL [Desyrel] 50 mg PO HS PRN  tab 02/06/24


 


Cholecalciferol [Vitamin D3 (25 50 mcg PO DAILY  tab 02/29/24





Mcg = 1000 Iu)]  











                                    Allergies











Allergy/AdvReac Type Severity Reaction Status Date / Time


 


latex Allergy Unknown Itching, Verified 04/08/24 11:39





   Blisters  


 


codeine AdvReac Severe HEADACHE Verified 04/08/24 11:39


 


hydrocodone [From Vicodin] AdvReac Severe HEADACHE Verified 04/08/24 11:39


 


Anesthetics - Amide Type - AdvReac  GAS GIVES Verified 04/08/24 11:39





Select A   HER  





   HEADACHE,  





   VOMITING,  





   HEART  





   PALIPITATIONS  


 


Anesthetics - Juliana Type- AdvReac  GAS GIVES Verified 04/08/24 11:39





Parabens   HER  





   HEADACHE,  





   VOMITING,  





   HEART  





   PALIPITATIONS  


 


ANESTHESIA AdvReac Unknown GAS GIVES Uncoded 04/08/24 11:39





   HER  





   HEADACHE,  





   VOMITING,  





   HEART  





   PALIPITATIONS  














Review of Systems


ROS Statement: 


Those systems with pertinent positive or pertinent negative responses have been 

documented in the HPI.





ROS Other: All systems not noted in ROS Statement are negative.





Past Medical History


Past Medical History: Hyperlipidemia, Hypertension, Osteoarthritis (OA)


Additional Past Medical History / Comment(s): Multiple recent falls. hx colon 

polyps, occasional diarrhea, hx of fall Nov 2020 and has been having pain right 

hip since that radiates down right leg, walks with limp., states breast implants

moving up.


History of Any Multi-Drug Resistant Organisms: None Reported


Past Surgical History: Breast Surgery, Orthopedic Surgery, Tonsillectomy


Additional Past Surgical History / Comment(s): right elbow surgery with screws, 

right knee surgery with plate and removal ., lumpectomy left breast, breast 

implants


Past Anesthesia/Blood Transfusion Reactions: Postoperative Nausea & Vomiting 

(PONV)


Past Psychological History: Anxiety, Depression


Smoking Status: Former smoker


Past Alcohol Use History: Abuse, Daily, Heavy


Past Drug Use History: None Reported





- Past Family History


  ** Father


Family Medical History: Cancer





General Exam


Limitations: no limitations


General appearance: alert, in no apparent distress


Head exam: Present: atraumatic, normocephalic, normal inspection


Eye exam: Present: normal appearance, PERRL, EOMI.  Absent: scleral icterus, 

conjunctival injection, periorbital swelling


ENT exam: Present: normal exam, normal oropharynx, mucous membranes moist


Neck exam: Present: normal inspection, full ROM.  Absent: tenderness, 

meningismus, lymphadenopathy


Respiratory exam: Present: normal lung sounds bilaterally.  Absent: respiratory 

distress, wheezes, rales, rhonchi, stridor


Cardiovascular Exam: Present: normal rhythm, tachycardia, normal heart sounds.  

Absent: systolic murmur, diastolic murmur, rubs, gallop, clicks


GI/Abdominal exam: Present: soft, normal bowel sounds.  Absent: distended, 

tenderness, guarding, rebound, rigid


Neurological exam: Present: alert, oriented X3


Skin exam: Present: warm, dry, intact, normal color.  Absent: rash





Course


                                   Vital Signs











  04/08/24 04/08/24 04/08/24





  09:24 10:47 12:45


 


Temperature 98.1 F  


 


Pulse Rate 138 H 131 H 120 H


 


Respiratory 20  20





Rate   


 


Blood Pressure 133/88  133/74


 


O2 Sat by Pulse 96  99





Oximetry   














Medical Decision Making





- Medical Decision Making


Was pt. sent in by a medical professional or institution (, PA, NP, urgent 

care, hospital, or nursing home...) When possible be specific


@ -No


Did you speak to anyone other than the patient for history (EMS, parent, family,

police, friend...)? What history was obtained from this source 


@ -No


Did you review nursing and triage notes (agree or disagree)? Why? 


@ -I reviewed and agree with nursing and triage notes


Were old charts reviewed (outside hosp., previous admission, EMS record, old 

EKG, old radiological studies, urgent care reports/EKG's, nursing home records)?

Report findings 


@ -[Reviewed prior laboratory studies


Differential Diagnosis (chest pain, altered mental status, abdominal pain women,

 abdominal pain men, vaginal bleeding, weakness, fever, dyspnea, syncope, 

headache, dizziness, GI bleed, back pain, seizure, CVA, palpatations, mental h

ealth, musculoskeletal)? 


@ -Differential Weakness:


Hypoglycemia, shock, sepsis, hyponatremia, anemia, infection, MI, ETOH, adverse 

medicine reaction, overdose, stroke, this is not meant to be an all-inclusive 

list.





EKG interpreted by me (3pts min.).


@ -As above


X-rays interpreted by me (1pt min.).


@ -None done


CT interpreted by me (1pt min.).


@ -CT brain, C-spine showing no acute intracranial hemorrhage or mass effect.


U/S interpreted by me (1pt. min.).


@ -None done


What testing was considered but not performed or refused? (CT, X-rays, U/S, 

labs)? Why?


@ -None


What meds were considered but not given or refused? Why?


@ -None


Did you discuss the management of the patient with other professionals 

(professionals i.e. KRISTAN Wasserman, NP, lab, RT, psych nurse, , , 

teacher, , )? Give summary


@ -EMH for admission for rhabdomyolysis, metabolic acidosis, dehydration, 

alcohol intoxication


Was smoking cessation discussed for >3mins.?


@ -No


Was critical care preformed (if so, how long)?


@ -No


Were there social determinants of health that impacted care today? How? 

(Homelessness, low income, unemployed, alcoholism, drug addiction, 

transportation, low edu. Level, literacy, decrease access to med. care, [facility], 

rehab)?


@ -No


Was there de-escalation of care discussed even if they declined (Discuss DNR or 

withdrawal of care, Hospice)? DNR status


@ -No


What co-morbidities impacted this encounter? (DM, HTN, Smoking, COPD, CAD, 

Cancer, CVA, ARF, Chemo, Hep., AIDS, mental health diagnosis, sleep apnea, 

morbid obesity)?


@ -[Alcohol abuse


Was patient admitted / discharged? Hospital course, mention meds given and 

route, prescriptions, significant lab abnormalities, going to OR and other 

pertinent info.


@ -Admitted patient found to have rhabdomyolysis, dehydration, severe lactic 

acidosis without signs of infection.  Patient started on IV fluids, alcohol wit

hdraw on Ativan and will be admitted for further evaluation and treatment


Undiagnosed new problem with uncertain prognosis?


@ -No


Drug Therapy requiring intensive monitoring for toxicity (Heparin, Nitro, 

Insulin, Cardizem)?


@ -No


Were any procedures done?


@ -No


Diagnosis/symptom?


@ -Rhabdomyolysis, dehydration, lactic acidosis, metabolic acidosis


Acute, or Chronic, or Acute on Chronic?


@ -Acute


Uncomplicated (without systemic symptoms) or Complicated (systemic symptoms)?


@ -Complicated


Side effects of treatment?


@ -No


Exacerbation, Progression, or Severe Exacerbation?


@ -No


Poses a threat to life or bodily function? How? (Chest pain, USA, MI, pneumonia,

 PE, COPD, DKA, ARF, appy, cholecystitis, CVA, Diverticulitis, Homicidal, 

Suicidal, threat to staff... and all critical care pts)


@ -Yes possible renal failure,








- Lab Data


Result diagrams: 


                                 04/08/24 10:35





                                 04/08/24 10:35


                                   Lab Results











  04/08/24 04/08/24 04/08/24 Range/Units





  10:35 10:35 10:35 


 


WBC  12.4 H    (3.8-10.6)  k/uL


 


RBC  4.38    (3.80-5.40)  m/uL


 


Hgb  14.1    (11.4-16.0)  gm/dL


 


Hct  43.2    (34.0-46.0)  %


 


MCV  98.8    (80.0-100.0)  fL


 


MCH  32.3    (25.0-35.0)  pg


 


MCHC  32.7    (31.0-37.0)  g/dL


 


RDW  12.9    (11.5-15.5)  %


 


Plt Count  349    (150-450)  k/uL


 


MPV  8.0    


 


Neutrophils %  70    %


 


Lymphocytes %  23    %


 


Monocytes %  4    %


 


Eosinophils %  1    %


 


Basophils %  1    %


 


Neutrophils #  8.7 H    (1.3-7.7)  k/uL


 


Lymphocytes #  2.9    (1.0-4.8)  k/uL


 


Monocytes #  0.5    (0-1.0)  k/uL


 


Eosinophils #  0.1    (0-0.7)  k/uL


 


Basophils #  0.1    (0-0.2)  k/uL


 


Sodium   140   (137-145)  mmol/L


 


Potassium   4.9   (3.5-5.1)  mmol/L


 


Chloride   102   ()  mmol/L


 


Carbon Dioxide   17 L   (22-30)  mmol/L


 


Anion Gap   21   mmol/L


 


BUN   28 H   (7-17)  mg/dL


 


Creatinine   0.55   (0.52-1.04)  mg/dL


 


Est GFR (CKD-EPI)AfAm   >90   (>60 ml/min/1.73 sqM)  


 


Est GFR (CKD-EPI)NonAf   >90   (>60 ml/min/1.73 sqM)  


 


Glucose   157 H   (74-99)  mg/dL


 


Lactic Ac Sepsis Rflx     


 


Plasma Lactic Acid Gabe    5.6 H*  (0.7-2.0)  mmol/L


 


Calcium   10.4 H   (8.4-10.2)  mg/dL


 


Magnesium   1.4 L   (1.6-2.3)  mg/dL


 


Total Bilirubin   0.5   (0.2-1.3)  mg/dL


 


AST   65 H   (14-36)  U/L


 


ALT   47 H   (4-34)  U/L


 


Alkaline Phosphatase   81   ()  U/L


 


Creatine Kinase   1359 H*   ()  U/L


 


Total Protein   8.0   (6.3-8.2)  g/dL


 


Albumin   5.1 H   (3.5-5.0)  g/dL


 


Lipase   222   ()  U/L


 


Urine Color     


 


Urine Appearance     (Clear)  


 


Urine pH     (5.0-8.0)  


 


Ur Specific Gravity     (1.001-1.035)  


 


Urine Protein     (Negative)  


 


Urine Glucose (UA)     (Negative)  


 


Urine Ketones     (Negative)  


 


Urine Blood     (Negative)  


 


Urine Nitrite     (Negative)  


 


Urine Bilirubin     (Negative)  


 


Urine Urobilinogen     (<2.0)  mg/dL


 


Ur Leukocyte Esterase     (Negative)  


 


Urine RBC     (0-5)  /hpf


 


Urine WBC     (0-5)  /hpf


 


Ur Squamous Epith Cells     (0-4)  /hpf


 


Urine Mucus     (None)  /hpf


 


Serum Alcohol     mg/dL














  04/08/24 04/08/24 04/08/24 Range/Units





  11:15 12:20 13:52 


 


WBC     (3.8-10.6)  k/uL


 


RBC     (3.80-5.40)  m/uL


 


Hgb     (11.4-16.0)  gm/dL


 


Hct     (34.0-46.0)  %


 


MCV     (80.0-100.0)  fL


 


MCH     (25.0-35.0)  pg


 


MCHC     (31.0-37.0)  g/dL


 


RDW     (11.5-15.5)  %


 


Plt Count     (150-450)  k/uL


 


MPV     


 


Neutrophils %     %


 


Lymphocytes %     %


 


Monocytes %     %


 


Eosinophils %     %


 


Basophils %     %


 


Neutrophils #     (1.3-7.7)  k/uL


 


Lymphocytes #     (1.0-4.8)  k/uL


 


Monocytes #     (0-1.0)  k/uL


 


Eosinophils #     (0-0.7)  k/uL


 


Basophils #     (0-0.2)  k/uL


 


Sodium     (137-145)  mmol/L


 


Potassium     (3.5-5.1)  mmol/L


 


Chloride     ()  mmol/L


 


Carbon Dioxide     (22-30)  mmol/L


 


Anion Gap     mmol/L


 


BUN     (7-17)  mg/dL


 


Creatinine     (0.52-1.04)  mg/dL


 


Est GFR (CKD-EPI)AfAm     (>60 ml/min/1.73 sqM)  


 


Est GFR (CKD-EPI)NonAf     (>60 ml/min/1.73 sqM)  


 


Glucose     (74-99)  mg/dL


 


Lactic Ac Sepsis Rflx  Y    


 


Plasma Lactic Acid Gabe     (0.7-2.0)  mmol/L


 


Calcium     (8.4-10.2)  mg/dL


 


Magnesium     (1.6-2.3)  mg/dL


 


Total Bilirubin     (0.2-1.3)  mg/dL


 


AST     (14-36)  U/L


 


ALT     (4-34)  U/L


 


Alkaline Phosphatase     ()  U/L


 


Creatine Kinase     ()  U/L


 


Total Protein     (6.3-8.2)  g/dL


 


Albumin     (3.5-5.0)  g/dL


 


Lipase     ()  U/L


 


Urine Color    Colorless  


 


Urine Appearance    Clear  (Clear)  


 


Urine pH    5.5  (5.0-8.0)  


 


Ur Specific Gravity    1.020  (1.001-1.035)  


 


Urine Protein    Negative  (Negative)  


 


Urine Glucose (UA)    Negative  (Negative)  


 


Urine Ketones    2+ H  (Negative)  


 


Urine Blood    Small H  (Negative)  


 


Urine Nitrite    Negative  (Negative)  


 


Urine Bilirubin    Negative  (Negative)  


 


Urine Urobilinogen    <2.0  (<2.0)  mg/dL


 


Ur Leukocyte Esterase    Small H  (Negative)  


 


Urine RBC    5  (0-5)  /hpf


 


Urine WBC    9 H  (0-5)  /hpf


 


Ur Squamous Epith Cells    <1  (0-4)  /hpf


 


Urine Mucus    Rare H  (None)  /hpf


 


Serum Alcohol   37   mg/dL














  04/08/24 Range/Units





  14:07 


 


WBC   (3.8-10.6)  k/uL


 


RBC   (3.80-5.40)  m/uL


 


Hgb   (11.4-16.0)  gm/dL


 


Hct   (34.0-46.0)  %


 


MCV   (80.0-100.0)  fL


 


MCH   (25.0-35.0)  pg


 


MCHC   (31.0-37.0)  g/dL


 


RDW   (11.5-15.5)  %


 


Plt Count   (150-450)  k/uL


 


MPV   


 


Neutrophils %   %


 


Lymphocytes %   %


 


Monocytes %   %


 


Eosinophils %   %


 


Basophils %   %


 


Neutrophils #   (1.3-7.7)  k/uL


 


Lymphocytes #   (1.0-4.8)  k/uL


 


Monocytes #   (0-1.0)  k/uL


 


Eosinophils #   (0-0.7)  k/uL


 


Basophils #   (0-0.2)  k/uL


 


Sodium   (137-145)  mmol/L


 


Potassium   (3.5-5.1)  mmol/L


 


Chloride   ()  mmol/L


 


Carbon Dioxide   (22-30)  mmol/L


 


Anion Gap   mmol/L


 


BUN   (7-17)  mg/dL


 


Creatinine   (0.52-1.04)  mg/dL


 


Est GFR (CKD-EPI)AfAm   (>60 ml/min/1.73 sqM)  


 


Est GFR (CKD-EPI)NonAf   (>60 ml/min/1.73 sqM)  


 


Glucose   (74-99)  mg/dL


 


Lactic Ac Sepsis Rflx   


 


Plasma Lactic Acid Gabe  1.6  (0.7-2.0)  mmol/L


 


Calcium   (8.4-10.2)  mg/dL


 


Magnesium   (1.6-2.3)  mg/dL


 


Total Bilirubin   (0.2-1.3)  mg/dL


 


AST   (14-36)  U/L


 


ALT   (4-34)  U/L


 


Alkaline Phosphatase   ()  U/L


 


Creatine Kinase   ()  U/L


 


Total Protein   (6.3-8.2)  g/dL


 


Albumin   (3.5-5.0)  g/dL


 


Lipase   ()  U/L


 


Urine Color   


 


Urine Appearance   (Clear)  


 


Urine pH   (5.0-8.0)  


 


Ur Specific Gravity   (1.001-1.035)  


 


Urine Protein   (Negative)  


 


Urine Glucose (UA)   (Negative)  


 


Urine Ketones   (Negative)  


 


Urine Blood   (Negative)  


 


Urine Nitrite   (Negative)  


 


Urine Bilirubin   (Negative)  


 


Urine Urobilinogen   (<2.0)  mg/dL


 


Ur Leukocyte Esterase   (Negative)  


 


Urine RBC   (0-5)  /hpf


 


Urine WBC   (0-5)  /hpf


 


Ur Squamous Epith Cells   (0-4)  /hpf


 


Urine Mucus   (None)  /hpf


 


Serum Alcohol   mg/dL














- EKG Data


-: EKG Interpreted by Me


EKG Comments: 





EKG performed at 10: 10 sinus tachycardia with a rate of 124  QRS 96 

QT/QTc 288/362





Disposition


Clinical Impression: 


 Rhabdomyolysis, Encephalopathy acute, Alcoholic intoxication, Fall, Alcoholic 

ketoacidosis, Metabolic acidosis





Disposition: ADMITTED AS IP TO THIS HOSP


Condition: Fair


Time of Disposition: 14:31

## 2024-04-09 LAB
ALBUMIN SERPL-MCNC: 3.8 G/DL (ref 3.8–4.9)
ALBUMIN/GLOB SERPL: 1.9 RATIO (ref 1.6–3.17)
ALP SERPL-CCNC: 60 U/L (ref 41–126)
ALT SERPL-CCNC: 31 U/L (ref 8–44)
ANION GAP SERPL CALC-SCNC: 12.4 MMOL/L (ref 4–12)
AST SERPL-CCNC: 36 U/L (ref 13–35)
BASOPHILS # BLD AUTO: 0.03 X 10*3/UL (ref 0–0.1)
BASOPHILS NFR BLD AUTO: 0.4 %
BILIRUB INDIRECT SERPL-MCNC: 0.77 MG/DL (ref 0.2–1)
BUN SERPL-SCNC: 16.2 MG/DL (ref 9–27)
BUN/CREAT SERPL: 40.5 RATIO (ref 12–20)
CALCIUM SPEC-MCNC: 9 MG/DL (ref 8.7–10.3)
CHLORIDE SERPL-SCNC: 109 MMOL/L (ref 96–109)
CO2 SERPL-SCNC: 22.6 MMOL/L (ref 21.6–31.8)
EOSINOPHIL # BLD AUTO: 0.1 X 10*3/UL (ref 0.04–0.35)
EOSINOPHIL NFR BLD AUTO: 1.2 %
ERYTHROCYTE [DISTWIDTH] IN BLOOD BY AUTOMATED COUNT: 3.57 X 10*6/UL (ref 4.1–5.2)
ERYTHROCYTE [DISTWIDTH] IN BLOOD: 13.2 % (ref 11.5–14.5)
GLOBULIN SER CALC-MCNC: 2 G/DL (ref 1.6–3.3)
GLUCOSE SERPL-MCNC: 98 MG/DL (ref 70–110)
HCT VFR BLD AUTO: 36.4 % (ref 37.2–46.3)
HGB BLD-MCNC: 11.7 G/DL (ref 12–15)
IMM GRANULOCYTES BLD QL AUTO: 0.1 %
LYMPHOCYTES # SPEC AUTO: 1.99 X 10*3/UL (ref 0.9–5)
LYMPHOCYTES NFR SPEC AUTO: 24 %
MCH RBC QN AUTO: 32.8 PG (ref 27–32)
MCHC RBC AUTO-ENTMCNC: 32.1 G/DL (ref 32–37)
MCV RBC AUTO: 102 FL (ref 80–97)
MONOCYTES # BLD AUTO: 0.63 X 10*3/UL (ref 0.2–1)
MONOCYTES NFR BLD AUTO: 7.6 %
NEUTROPHILS # BLD AUTO: 5.53 X 10*3/UL (ref 1.8–7.7)
NEUTROPHILS NFR BLD AUTO: 66.7 %
NRBC BLD AUTO-RTO: 0 X 10*3/UL (ref 0–0.01)
PLATELET # BLD AUTO: 206 X 10*3/UL (ref 140–440)
POTASSIUM SERPL-SCNC: 3.5 MMOL/L (ref 3.5–5.5)
PROT SERPL-MCNC: 5.8 G/DL (ref 6.2–8.2)
SODIUM SERPL-SCNC: 144 MMOL/L (ref 135–145)
WBC # BLD AUTO: 8.29 X 10*3/UL (ref 4.5–10)

## 2024-04-09 RX ADMIN — PANTOPRAZOLE SODIUM SCH MG: 40 INJECTION, POWDER, FOR SOLUTION INTRAVENOUS at 11:23

## 2024-04-09 RX ADMIN — MAGNESIUM SULFATE IN DEXTROSE SCH MLS/HR: 10 INJECTION, SOLUTION INTRAVENOUS at 15:59

## 2024-04-09 RX ADMIN — CEFAZOLIN SCH: 330 INJECTION, POWDER, FOR SOLUTION INTRAMUSCULAR; INTRAVENOUS at 01:57

## 2024-04-09 RX ADMIN — THERA TABS SCH EACH: TAB at 11:24

## 2024-04-09 RX ADMIN — WHITE PETROLATUM,ZINC OXIDE SCH APPLIC: 57; 17 PASTE TOPICAL at 11:46

## 2024-04-09 RX ADMIN — Medication SCH MG: at 11:24

## 2024-04-09 RX ADMIN — IBUPROFEN PRN MG: 400 TABLET, FILM COATED ORAL at 16:42

## 2024-04-09 RX ADMIN — HEPARIN SODIUM SCH UNIT: 5000 INJECTION, SOLUTION INTRAVENOUS; SUBCUTANEOUS at 21:50

## 2024-04-09 RX ADMIN — ESCITALOPRAM OXALATE SCH MG: 20 TABLET, FILM COATED ORAL at 11:35

## 2024-04-09 RX ADMIN — Medication SCH MCG: at 11:24

## 2024-04-09 RX ADMIN — FOLIC ACID SCH MG: 1 TABLET ORAL at 11:24

## 2024-04-09 NOTE — XR
EXAM:

  XR Chest, 1 View

 

CLINICAL HISTORY:

  ITS.REASON XR Reason: shortness of breath

 

TECHNIQUE:

  Frontal view of the chest.

 

COMPARISON:

  No relevant prior studies available.

 

FINDINGS:

  Lungs:  Unremarkable.  No consolidation.

  Pleural space:  Small bilateral pleural effusions.  No pneumothorax.

  Heart:  Unremarkable.  No cardiomegaly.

  Mediastinum:  Unremarkable.  Normal mediastinal contour.

  Bones/joints:  Unremarkable.  No acute fracture.

 

IMPRESSION:     

  Small bilateral pleural effusions.

## 2024-04-09 NOTE — P.HPIM
History of Present Illness


H&P Date: 04/08/24


Chief Complaint: Fall








Patient is a 63-year-old female with a past medical history of severe alcohol 

abuse and prior to admission with alcohol withdrawal symptoms, hypertension, 

hyperemia, osteoarthritis and history of anxiety/depression and heavy alcohol 

abuse and prior history of smoking presents to ER status post fall.  Patient 

states that she fell out of bed.  She felt so weak that she has been crawling 

around her apartment.  Screamed for help and her neighbor found her on the 

ground.  Patient states that she was drinking alcohol this morning.  Patient 

does complain of headache.  No neck..  No other injuries..


Denies any fever or chills.  No nausea vomiting abdominal pain or diarrhea.


CT head and cervical spine was done in the ER showed mild stable chronic appe

aring periventricular white matter ischemic changes types.  Follow-up MRI can be

performed as clinically indicated.  CT cervical spine showed no acute osseous 

abnormality of the cervical spine.


EKG showed ectopic atrial tachycardia.


Laboratory data showed WBC 12.4 hemoglobin 14.1 and platelets 349


Sodium 140 potassium 4.9 chloride 102 bicarbonate 17 BUN 28 and creatinine 0.55 

and blood sugar 157 lactic acid 5.6 calcium 10.4 and magnesium 1.4 AST 65 ALT 47

alk phos 81 manage CK level 1359 lipase 222


Urinalysis showed 2+ ketones small blood nitrite negative and small leukocyte 

esterase and WBCs 9.  Serum alcohol abuse 37








Review of Systems





Complete review of systems could not be obtained from the patient except as per 

HPI


ROS unobtainable: due to mental status





Past Medical History


Past Medical History: Hyperlipidemia, Hypertension, Osteoarthritis (OA)


Additional Past Medical History / Comment(s): Multiple recent falls. hx colon 

polyps, occasional diarrhea, hx of fall Nov 2020 and has been having pain right 

hip since that radiates down right leg, walks with limp., states breast implants

moving up.


History of Any Multi-Drug Resistant Organisms: None Reported


Past Surgical History: Breast Surgery, Orthopedic Surgery, Tonsillectomy


Additional Past Surgical History / Comment(s): right elbow surgery with screws, 

right knee surgery with plate and removal ., lumpectomy left breast, breast 

implants


Past Anesthesia/Blood Transfusion Reactions: Postoperative Nausea & Vomiting 

(PONV)


Past Psychological History: Anxiety, Depression


Smoking Status: Former smoker


Past Alcohol Use History: Abuse, Daily, Heavy


Past Drug Use History: None Reported





- Past Family History


  ** Father


Family Medical History: Cancer





Medications and Allergies


                                Home Medications











 Medication  Instructions  Recorded  Confirmed  Type


 


Pantoprazole [Protonix] 40 mg PO DAILY #30 tab 12/20/23 04/08/24 Rx


 


Acetaminophen Tab [Tylenol] 650 mg PO Q6HR PRN  tab 02/06/24 04/08/24 Rx


 


Folic Acid 1 mg PO DAILY #30 tablet 02/06/24 04/08/24 Rx


 


Ibuprofen [Motrin] 400 mg PO Q6HR PRN  tab 02/06/24 04/08/24 Rx


 


Multivitamins, Thera [Multivitamin 1 tab PO DAILY #30 tablet 02/06/24 04/08/24 

Rx





(formulary)]    


 


QUEtiapine [SEROquel] 200 mg PO HS 30 Days #30 tab 02/06/24 04/08/24 Rx


 


Thiamine [Vitamin B-1] 100 mg PO DAILY 30 Days #30 tab 02/06/24 04/08/24 Rx


 


traZODone HCL [Desyrel] 50 mg PO HS PRN  tab 02/06/24 04/08/24 Rx


 


Cholecalciferol [Vitamin D3 (25 50 mcg PO DAILY  tab 02/29/24 04/08/24 Rx





Mcg = 1000 Iu)]    


 


Escitalopram [Lexapro] 20 mg PO DAILY 03/10/24 04/08/24 History








                                    Allergies











Allergy/AdvReac Type Severity Reaction Status Date / Time


 


latex Allergy Unknown Itching, Verified 04/08/24 11:39





   Blisters  


 


codeine AdvReac Severe HEADACHE Verified 04/08/24 11:39


 


hydrocodone [From Vicodin] AdvReac Severe HEADACHE Verified 04/08/24 11:39


 


Anesthetics - Amide Type - AdvReac  GAS GIVES Verified 04/08/24 11:39





Select A   HER  





   HEADACHE,  





   VOMITING,  





   HEART  





   PALIPITATIONS  


 


Anesthetics - Juliana Type- AdvReac  GAS GIVES Verified 04/08/24 11:39





Parabens   HER  





   HEADACHE,  





   VOMITING,  





   HEART  





   PALIPITATIONS  


 


ANESTHESIA AdvReac Unknown GAS GIVES Uncoded 04/08/24 11:39





   HER  





   HEADACHE,  





   VOMITING,  





   HEART  





   PALIPITATIONS  














Physical Exam


Vitals: 


                                   Vital Signs











  Temp Pulse Resp BP Pulse Ox


 


 04/08/24 12:45   120 H  20  133/74  99


 


 04/08/24 10:47   131 H   


 


 04/08/24 09:24  98.1 F  138 H  20  133/88  96








                                Intake and Output











 04/08/24 04/08/24 04/08/24





 06:59 14:59 22:59


 


Output Total  200 


 


Balance  -200 


 


Output:   


 


  Urine  200 


 


    Straight  200 


 


Other:   


 


  Weight  72.575 kg 














PHYSICAL EXAMINATION: 


Patient is lying in the bed, no acute distress, awake alert and oriented.  

Lethargic and weak. 


HEENT: Normocephalic. Neck is supple. Pupils reactive. Nostrils clear. Oral 

cavity is moist. 


Neck reveals no JVD, carotid bruits, or thyromegaly. 


CHEST EXAMINATION: Trachea is central. Symmetrical expansion. Lung fields clear 

to auscultation and percussion. 


CARDIAC: Normal S1, S2 with no gallops. No murmurs 


ABDOMEN: Soft. Bowel sounds normal. No organomegaly. No abdominal bruits. 


Extremities: reveal no edema.  No clubbing or cyanosis


Neurologically awake, alert, oriented x 2-3 with well-coordinated movements.  No

focal deficits noted


Skin: No rash or skin lesions. 


Psychiatric: Coperative.  Nonsuicidal


Musculoskeletal: No joint swelling or deformity.  Normal range of motion.








Results


CBC & Chem 7: 


                                 04/09/24 07:03





                                 04/09/24 07:03


Labs: 


                  Abnormal Lab Results - Last 24 Hours (Table)











  04/08/24 04/08/24 04/08/24 Range/Units





  10:35 10:35 10:35 


 


WBC  12.4 H    (3.8-10.6)  k/uL


 


Neutrophils #  8.7 H    (1.3-7.7)  k/uL


 


Carbon Dioxide   17 L   (22-30)  mmol/L


 


BUN   28 H   (7-17)  mg/dL


 


Glucose   157 H   (74-99)  mg/dL


 


Plasma Lactic Acid Gabe    5.6 H*  (0.7-2.0)  mmol/L


 


Calcium   10.4 H   (8.4-10.2)  mg/dL


 


Magnesium   1.4 L   (1.6-2.3)  mg/dL


 


AST   65 H   (14-36)  U/L


 


ALT   47 H   (4-34)  U/L


 


Creatine Kinase   1359 H*   ()  U/L


 


Albumin   5.1 H   (3.5-5.0)  g/dL


 


Urine Ketones     (Negative)  


 


Urine Blood     (Negative)  


 


Ur Leukocyte Esterase     (Negative)  


 


Urine WBC     (0-5)  /hpf


 


Urine Mucus     (None)  /hpf














  04/08/24 Range/Units





  13:52 


 


WBC   (3.8-10.6)  k/uL


 


Neutrophils #   (1.3-7.7)  k/uL


 


Carbon Dioxide   (22-30)  mmol/L


 


BUN   (7-17)  mg/dL


 


Glucose   (74-99)  mg/dL


 


Plasma Lactic Acid Gabe   (0.7-2.0)  mmol/L


 


Calcium   (8.4-10.2)  mg/dL


 


Magnesium   (1.6-2.3)  mg/dL


 


AST   (14-36)  U/L


 


ALT   (4-34)  U/L


 


Creatine Kinase   ()  U/L


 


Albumin   (3.5-5.0)  g/dL


 


Urine Ketones  2+ H  (Negative)  


 


Urine Blood  Small H  (Negative)  


 


Ur Leukocyte Esterase  Small H  (Negative)  


 


Urine WBC  9 H  (0-5)  /hpf


 


Urine Mucus  Rare H  (None)  /hpf














Thrombosis Risk Factor Assmnt





- DVT/VTE Prophylaxis


DVT/VTE Prophylaxis: Pharmacologic Prophylaxis ordered





Assessment and Plan


Assessment: 





Status post mechanical fall


Acute alcohol intoxication with level 37


Acute rhabdomyolysis due to fall and was found on the floor.


Lactic acidosis due to dehydration and volume depletion/tissue hypoperfusion


Hypomagnesemia.


Leukocytosis likely reactive.


Severe alcohol abuse


Prior history of smoking


Anxiety/depression


Hypertension


Hyperlipidemia


Osteoarthritis


GI and DVT prophylaxis





Plan:


Patient will be continued on IV hydration with normal saline and continue to 

monitor for alcohol withdrawal symptoms.  Continue with thiamine and 

multivitamins and repeat lactic acid level.


Replace electrolytes.  Ordered repeat CK level and renal function.  Continue to 

follow closely.


Time with Patient: Greater than 30

## 2024-04-10 LAB
ANION GAP SERPL CALC-SCNC: 5 MMOL/L
BASOPHILS # BLD AUTO: 0.03 X 10*3/UL (ref 0–0.1)
BASOPHILS NFR BLD AUTO: 0.6 %
BUN SERPL-SCNC: 10 MG/DL (ref 7–17)
CALCIUM SPEC-MCNC: 9.1 MG/DL (ref 8.4–10.2)
CHLORIDE SERPL-SCNC: 113 MMOL/L (ref 98–107)
CK SERPL-CCNC: 352 U/L (ref 30–135)
CO2 SERPL-SCNC: 23 MMOL/L (ref 22–30)
EOSINOPHIL # BLD AUTO: 0.2 X 10*3/UL (ref 0.04–0.35)
EOSINOPHIL NFR BLD AUTO: 4.3 %
ERYTHROCYTE [DISTWIDTH] IN BLOOD BY AUTOMATED COUNT: 3.46 X 10*6/UL (ref 4.1–5.2)
ERYTHROCYTE [DISTWIDTH] IN BLOOD: 13.2 % (ref 11.5–14.5)
GLUCOSE SERPL-MCNC: 97 MG/DL (ref 74–99)
HCT VFR BLD AUTO: 34.8 % (ref 37.2–46.3)
HGB BLD-MCNC: 11.4 G/DL (ref 12–15)
IMM GRANULOCYTES BLD QL AUTO: 0 %
LYMPHOCYTES # SPEC AUTO: 1.81 X 10*3/UL (ref 0.9–5)
LYMPHOCYTES NFR SPEC AUTO: 39 %
MCH RBC QN AUTO: 32.9 PG (ref 27–32)
MCHC RBC AUTO-ENTMCNC: 32.8 G/DL (ref 32–37)
MCV RBC AUTO: 100.6 FL (ref 80–97)
MONOCYTES # BLD AUTO: 0.36 X 10*3/UL (ref 0.2–1)
MONOCYTES NFR BLD AUTO: 7.8 %
NEUTROPHILS # BLD AUTO: 2.24 X 10*3/UL (ref 1.8–7.7)
NEUTROPHILS NFR BLD AUTO: 48.3 %
NRBC BLD AUTO-RTO: 0 X 10*3/UL (ref 0–0.01)
PLATELET # BLD AUTO: 188 X 10*3/UL (ref 140–440)
POTASSIUM SERPL-SCNC: 3.6 MMOL/L (ref 3.5–5.1)
SODIUM SERPL-SCNC: 141 MMOL/L (ref 137–145)
WBC # BLD AUTO: 4.64 X 10*3/UL (ref 4.5–10)

## 2024-04-10 RX ADMIN — LOPERAMIDE HYDROCHLORIDE PRN MG: 2 CAPSULE ORAL at 21:28

## 2024-04-10 RX ADMIN — FUROSEMIDE STA MG: 10 INJECTION, SOLUTION INTRAMUSCULAR; INTRAVENOUS at 16:59

## 2024-04-10 NOTE — P.PN
Subjective


Progress Note Date: 04/10/24








Patient is a 63-year-old female with a past medical history of severe alcohol 

abuse and prior to admission with alcohol withdrawal symptoms, hypertension, 

hyperemia, osteoarthritis and history of anxiety/depression and heavy alcohol 

abuse and prior history of smoking presents to ER status post fall.  Patient 

states that she fell out of bed.  She felt so weak that she has been crawling 

around her apartment.  Screamed for help and her neighbor found her on the 

ground.  Patient states that she was drinking alcohol this morning.  Patient 

does complain of headache.  No neck..  No other injuries..


Denies any fever or chills.  No nausea vomiting abdominal pain or diarrhea.


CT head and cervical spine was done in the ER showed mild stable chronic 

appearing periventricular white matter ischemic changes types.  Follow-up MRI 

can be performed as clinically indicated.  CT cervical spine showed no acute os

seous abnormality of the cervical spine.


EKG showed ectopic atrial tachycardia.


Laboratory data showed WBC 12.4 hemoglobin 14.1 and platelets 349


Sodium 140 potassium 4.9 chloride 102 bicarbonate 17 BUN 28 and creatinine 0.55 

and blood sugar 157 lactic acid 5.6 calcium 10.4 and magnesium 1.4 AST 65 ALT 47

alk phos 81 manage CK level 1359 lipase 222


Urinalysis showed 2+ ketones small blood nitrite negative and small leukocyte 

esterase and WBCs 9.  Serum alcohol abuse 37





4/10/2024





Patient is seen and evaluated in follow-up with no acute noted.  Patient 

continues to report weakness and gait dysfunction and awaiting PT/OT therapy 

evaluation from follow-up yesterday.  Patient minimally improved although 

continues to recommend subacute rehab to increase strength and mobility.  

Patient is afebrile with no reported chest pain or shortness of breath.  Patient

tolerating diet with no reported nausea or vomiting.  Case management is 

following arranging for outpatient follow-up and a court hearing for nonemergent

public legal guardian appointment.  Case management/social work also following 

currently working on possible ECF for strength and mobility.  Labs reviewed and 

within normal limits.  Patient continues on CIWA protocol and has not required 

IV Ativan all day.  Librium taper started and will continue and taper 

accordingly.





Review of systems:


Constitutional:  reports of fatigue and generalized weakness, no fever, or 

chills


Cardiovascular: No reports of chest pain or palpitations


Respiratory: No reports of shortness of breath or cough


GI: No reports of nausea, vomiting, or diarrhea


: No reports of dysuria or retention


Neurovascular: reports of generalized weakness and difficulty in ambulating





All medications have been reviewed





PHYSICAL EXAMINATION: 


Patient is lying in the bed asleep although easily arousable,  alert and 

oriented.  Lethargic and weak.  Well-developed, elderly appearing, ill-appearing


HEENT: Normocephalic. Neck is supple. Pupils reactive. Nostrils clear. Oral 

cavity is moist. 


Neck reveals no JVD, carotid bruits, or thyromegaly. 


CHEST EXAMINATION: Trachea is central. Symmetrical expansion. Lung fields clear 

to auscultation and percussion. 


CARDIAC: Normal S1, S2 with no gallops. No murmurs 


ABDOMEN: Soft. Bowel sounds normal. No organomegaly. No abdominal bruits. 


Extremities: reveal no edema.  No clubbing or cyanosis


Neurologically awake, alert, oriented x 2-3 with well-coordinated movements.  No

focal deficits noted, diffusely weak


Skin: No rash or skin lesions. 


Psychiatric: Cooperative.  Non-suicidal


Musculoskeletal: No joint swelling or deformity.  Normal range of motion.





Assessment:





Status post mechanical fall


Acute alcohol intoxication with level 37 with concerns of acute delirium tremens


Acute rhabdomyolysis due to fall and was found on the floor.  Improving, CK is 

352 from 1359 yesterday


Lactic acidosis due to dehydration and volume depletion/tissue hypoperfusion, 

improved


Hypomagnesemia.  Being replaced


Leukocytosis likely reactive.  Patient has no active signs of infection


Severe alcohol abuse


Prior history of smoking


Anxiety/depression


Hypertension


Hyperlipidemia


Osteoarthritis


GI and DVT prophylaxis


Full code





Plan:





Patient will be continued on CIWA protocol and have added Librium.  As needed 

Ativan for anxiety as needed 


PT/OT therapy evaluated the patient again and continues with significant 

weakness and gait dysfunction would benefit from ECF for continued strength and 

mobility.  Case management/social work following working on possible ECF and 

awaiting accepting facility


Patient will follow-up in the outpatient setting with a nonemergent court 

hearing to obtain a public legal guardian.  


Encouraged increase activity as tolerated


Encouraged oral intake


Labs improved and will follow-up with repeat labs and replace electrolytes per 

protocol.


Possible discharge in the next 24 to 48 hours





The impression and plan of care has been dictated by Yamileth Horne, Nurse 

Practitioner as directed.





Dr. Johanny MD


I have performed a history and examination and MDM of this patient, discussed t

he same with the dictator, and  agree with the dictator's assessment and plan as

written ,documented as a scribe. Based on total visit time,  I have performed 

more than 50% of the visit.





Objective





- Vital Signs


Vital signs: 


                                   Vital Signs











Temp  97.6 F   04/10/24 07:34


 


Pulse  86   04/10/24 09:16


 


Resp  16   04/10/24 09:16


 


BP  136/79   04/10/24 07:34


 


Pulse Ox  94 L  04/10/24 07:34


 


FiO2      








                                 Intake & Output











 04/09/24 04/10/24 04/10/24





 18:59 06:59 18:59


 


Intake Total  1200 


 


Output Total 200  


 


Balance -200 1200 


 


Intake:   


 


  Intake, IV Titration  900 





  Amount   


 


    Sodium Chloride 0.9% 1,  900 





    000 ml @ 75 mls/hr IV .   





    [id number] SAI Rx#:[id number]   


 


  Oral  300 


 


Output:   


 


  Urine 200  


 


Other:   


 


  Voiding Method Diaper  Bedpan





   Diaper


 


  # Voids 0 4 1


 


  # Bowel Movements 1  














- Labs


CBC & Chem 7: 


                                 04/10/24 06:50





                                 04/10/24 06:50


Labs: 


                  Abnormal Lab Results - Last 24 Hours (Table)











  04/09/24 04/09/24 04/10/24 Range/Units





  07:03 07:03 06:50 


 


RBC  3.57 L    (4.10-5.20)  X 10*6/uL


 


Hgb  11.7 L    (12.0-15.0)  g/dL


 


Hct  36.4 L    (37.2-46.3)  %


 


MCV  102.0 H    (80.0-97.0)  FL


 


MCH  32.8 H    (27.0-32.0)  pg


 


Chloride    113 H  ()  mmol/L


 


Anion Gap   12.40 H   (4.00-12.00)  mmol/L


 


Creatinine   0.4 L  0.36 L  (0.6-1.5)  mg/dL


 


BUN/Creatinine Ratio   40.50 H   (12.00-20.00)  Ratio


 


AST   36 H   (13-35)  U/L


 


Creatine Kinase    352 H  ()  U/L


 


Total Protein   5.8 L   (6.2-8.2)  g/dL

## 2024-04-11 LAB
ANION GAP SERPL CALC-SCNC: 12.7 MMOL/L (ref 4–12)
BUN SERPL-SCNC: 10.9 MG/DL (ref 9–27)
BUN/CREAT SERPL: 27.25 RATIO (ref 12–20)
CALCIUM SPEC-MCNC: 9.2 MG/DL (ref 8.7–10.3)
CHLORIDE SERPL-SCNC: 104 MMOL/L (ref 96–109)
CO2 SERPL-SCNC: 26.3 MMOL/L (ref 21.6–31.8)
GLUCOSE SERPL-MCNC: 98 MG/DL (ref 70–110)
MAGNESIUM SPEC-SCNC: 1.5 MG/DL (ref 1.5–2.4)
POTASSIUM SERPL-SCNC: 3.4 MMOL/L (ref 3.5–5.5)
SODIUM SERPL-SCNC: 143 MMOL/L (ref 135–145)

## 2024-04-12 LAB
MAGNESIUM SPEC-SCNC: 1.4 MG/DL (ref 1.6–2.3)
POTASSIUM SERPL-SCNC: 4 MMOL/L (ref 3.5–5.1)

## 2024-04-12 RX ADMIN — MAGNESIUM SULFATE IN DEXTROSE SCH MLS/HR: 10 INJECTION, SOLUTION INTRAVENOUS at 11:38

## 2024-04-12 NOTE — P.PN
Subjective


Progress Note Date: 04/11/24








Patient is a 63-year-old female with a past medical history of severe alcohol 

abuse and prior to admission with alcohol withdrawal symptoms, hypertension, 

hyperemia, osteoarthritis and history of anxiety/depression and heavy alcohol 

abuse and prior history of smoking presents to ER status post fall.  Patient 

states that she fell out of bed.  She felt so weak that she has been crawling 

around her apartment.  Screamed for help and her neighbor found her on the 

ground.  Patient states that she was drinking alcohol this morning.  Patient 

does complain of headache.  No neck..  No other injuries..


Denies any fever or chills.  No nausea vomiting abdominal pain or diarrhea.


CT head and cervical spine was done in the ER showed mild stable chronic 

appearing periventricular white matter ischemic changes types.  Follow-up MRI 

can be performed as clinically indicated.  CT cervical spine showed no acute os

seous abnormality of the cervical spine.


EKG showed ectopic atrial tachycardia.


Laboratory data showed WBC 12.4 hemoglobin 14.1 and platelets 349


Sodium 140 potassium 4.9 chloride 102 bicarbonate 17 BUN 28 and creatinine 0.55 

and blood sugar 157 lactic acid 5.6 calcium 10.4 and magnesium 1.4 AST 65 ALT 47

alk phos 81 manage CK level 1359 lipase 222


Urinalysis showed 2+ ketones small blood nitrite negative and small leukocyte 

esterase and WBCs 9.  Serum alcohol abuse 37





4/10/2024





Patient is seen and evaluated in follow-up with no acute noted.  Patient 

continues to report weakness and gait dysfunction and awaiting PT/OT therapy 

evaluation from follow-up yesterday.  Patient minimally improved although 

continues to recommend subacute rehab to increase strength and mobility.  

Patient is afebrile with no reported chest pain or shortness of breath.  Patient

tolerating diet with no reported nausea or vomiting.  Case management is 

following arranging for outpatient follow-up and a court hearing for nonemergent

public legal guardian appointment.  Case management/social work also following 

currently working on possible ECF for strength and mobility.  Labs reviewed and 

within normal limits.  Patient continues on CIWA protocol and has not required 

IV Ativan all day.  Librium taper started and will continue and taper 

accordingly.





4/11/2024


Patient is seen in follow-up this morning agreeable to working with physical 

therapy and has been getting stronger although continues with significant 

weakness and gait dysfunction.  Social work following working on ECF and 

multiple referrals have been placed and all declined thus far.  Looking into 

[facility] area as well for further possible ECF.  Patient is afebrile with 

no reported chest pain or shortness of breath.  Patient is tolerating diet with 

no reported nausea or vomiting.  Patient does have CIWA protocol although 

actively withdrawing.  Will add as needed oral Ativan as patient is quite 

anxious.  Continue other home medications.  A.m. labs pending and we will 

follow-up and replace electrolytes per protocol.





Review of systems:


Constitutional:  reports of fatigue and generalized weakness, no fever, or 

chills


Cardiovascular: No reports of chest pain or palpitations


Respiratory: No reports of shortness of breath or cough


GI: No reports of nausea, vomiting, or diarrhea


: No reports of dysuria or retention


Neurovascular: reports of generalized weakness and difficulty in ambulating





All medications have been reviewed





PHYSICAL EXAMINATION: 


Patient is lying in the bed asleep although easily arousable,  alert and oriente

d.  Lethargic and weak.  Well-developed, elderly appearing, ill-appearing


HEENT: Normocephalic. Neck is supple. Pupils reactive. Nostrils clear. Oral 

cavity is moist. 


Neck reveals no JVD, carotid bruits, or thyromegaly. 


CHEST EXAMINATION: Trachea is central. Symmetrical expansion. Lung fields clear 

to auscultation and percussion. 


CARDIAC: Normal S1, S2 with no gallops. No murmurs 


ABDOMEN: Soft. Bowel sounds normal. No organomegaly. No abdominal bruits. 


Extremities: reveal no edema.  No clubbing or cyanosis


Neurologically awake, alert, oriented x 2-3 with well-coordinated movements.  No

focal deficits noted, diffusely weak


Skin: No rash or skin lesions. 


Psychiatric: Cooperative.  Non-suicidal


Musculoskeletal: No joint swelling or deformity.  Normal range of motion.





Assessment:





Status post mechanical fall


Acute alcohol intoxication with level 37 with concerns of acute delirium tremens


Acute rhabdomyolysis due to fall and was found on the floor.  Improving, CK is 

352 from 1359 yesterday


Lactic acidosis due to dehydration and volume depletion/tissue hypoperfusion, 

improved


Hypomagnesemia.  Being replaced


Leukocytosis likely reactive.  Patient has no active signs of infection


Severe alcohol abuse


Prior history of smoking


Anxiety/depression


Hypertension


Hyperlipidemia


Osteoarthritis


GI and DVT prophylaxis


Full code





Plan:





Patient will be continued on CIWA protocol and continue Librium.  As needed 

Ativan for anxiety as needed.  Patient does not appear to be actively withdra

wing and will taper the Librium


PT/OT therapy evaluated the patient again and continues with significant 

weakness and gait dysfunction would benefit from ECF for continued strength and 

mobility.  Case management/social work following working on possible ECF and 

awaiting accepting facility.  Multiple locations have declined the patient and 

looking into [facility] areas as well


Patient will follow-up in the outpatient setting with a non-emergent court 

hearing to obtain a public legal guardian.  


Encouraged increase activity as tolerated


Encouraged oral intake


Labs pending and we will follow-up and replace electrolytes per protocol 


Due to multiple complex medical issues, prognosis is guarded





The impression and plan of care has been dictated by Yamileth Horne, Nurse 

Practitioner as directed.





Dr. Johanny MD


I have performed a history and examination and MDM of this patient, discussed 

the same with the dictator, and  agree with the dictator's assessment and plan 

as written ,documented as a scribe. Based on total visit time,  I have performed

more than 50% of the visit.





Objective





- Vital Signs


Vital signs: 


                                   Vital Signs











Temp  97.8 F   04/11/24 07:36


 


Pulse  88   04/11/24 07:36


 


Resp  16   04/11/24 07:36


 


BP  125/84   04/11/24 07:36


 


Pulse Ox  94 L  04/11/24 07:36


 


FiO2      








                                 Intake & Output











 04/10/24 04/11/24 04/11/24





 18:59 06:59 18:59


 


Output Total  200 


 


Balance  -200 


 


Output:   


 


  Urine  200 


 


Other:   


 


  Voiding Method Bedpan Bedside Commode Bedside Commode





 Diaper Diaper Diaper





  Indwelling Catheter 


 


  # Voids 1  


 


  # Bowel Movements 2  














- Labs


CBC & Chem 7: 


                                 04/10/24 06:50





                                 04/11/24 07:31


Labs: 


                  Abnormal Lab Results - Last 24 Hours (Table)











  04/10/24 Range/Units





  06:50 


 


RBC  3.46 L  (4.10-5.20)  X 10*6/uL


 


Hgb  11.4 L  (12.0-15.0)  g/dL


 


Hct  34.8 L  (37.2-46.3)  %


 


MCV  100.6 H  (80.0-97.0)  FL


 


MCH  32.9 H  (27.0-32.0)  pg

## 2024-04-13 RX ADMIN — Medication SCH MG: at 09:30

## 2024-04-13 NOTE — P.PN
Subjective


Progress Note Date: 04/12/24








Patient is a 63-year-old female with a past medical history of severe alcohol 

abuse and prior to admission with alcohol withdrawal symptoms, hypertension, 

hyperemia, osteoarthritis and history of anxiety/depression and heavy alcohol 

abuse and prior history of smoking presents to ER status post fall.  Patient 

states that she fell out of bed.  She felt so weak that she has been crawling 

around her apartment.  Screamed for help and her neighbor found her on the 

ground.  Patient states that she was drinking alcohol this morning.  Patient 

does complain of headache.  No neck..  No other injuries..


Denies any fever or chills.  No nausea vomiting abdominal pain or diarrhea.


CT head and cervical spine was done in the ER showed mild stable chronic 

appearing periventricular white matter ischemic changes types.  Follow-up MRI 

can be performed as clinically indicated.  CT cervical spine showed no acute os

seous abnormality of the cervical spine.


EKG showed ectopic atrial tachycardia.


Laboratory data showed WBC 12.4 hemoglobin 14.1 and platelets 349


Sodium 140 potassium 4.9 chloride 102 bicarbonate 17 BUN 28 and creatinine 0.55 

and blood sugar 157 lactic acid 5.6 calcium 10.4 and magnesium 1.4 AST 65 ALT 47

alk phos 81 manage CK level 1359 lipase 222


Urinalysis showed 2+ ketones small blood nitrite negative and small leukocyte 

esterase and WBCs 9.  Serum alcohol abuse 37





4/10/2024





Patient is seen and evaluated in follow-up with no acute noted.  Patient 

continues to report weakness and gait dysfunction and awaiting PT/OT therapy 

evaluation from follow-up yesterday.  Patient minimally improved although 

continues to recommend subacute rehab to increase strength and mobility.  

Patient is afebrile with no reported chest pain or shortness of breath.  Patient

tolerating diet with no reported nausea or vomiting.  Case management is 

following arranging for outpatient follow-up and a court hearing for nonemergent

public legal guardian appointment.  Case management/social work also following 

currently working on possible ECF for strength and mobility.  Labs reviewed and 

within normal limits.  Patient continues on CIWA protocol and has not required 

IV Ativan all day.  Librium taper started and will continue and taper 

accordingly.





4/11/2024


Patient is seen in follow-up this morning agreeable to working with physical 

therapy and has been getting stronger although continues with significant 

weakness and gait dysfunction.  Social work following working on ECF and 

multiple referrals have been placed and all declined thus far.  Looking into 

[facility] as well for further possible ECF.  Patient is afebrile with 

no reported chest pain or shortness of breath.  Patient is tolerating diet with 

no reported nausea or vomiting.  Patient does have CIWA protocol although 

actively withdrawing.  Will add as needed oral Ativan as patient is quite 

anxious.  Continue other home medications.  A.m. labs pending and we will 

follow-up and replace electrolytes per protocol.





4/12/2024


Patient is seen in follow-up today with no acute overnight issues noted.  

Patient has as needed Ativan added for anxiety as patient is not actively 

withdrawing and will discontinue CIWA scale.  Patient will continue on a Librium

taper and will titrate to twice daily.  Patient is afebrile with no reported c

hest pain or shortness of breath.  Magnesium found to be slightly low and will 

replace per protocol and follow-up on repeat labs.  Encouraged oral intake and 

increased activity as tolerated.  Recommend physical therapy daily.  Social work

is following working on discharge planning and looking for an accepting ECF for 

continued strength and mobility.





Review of systems:


Constitutional:  reports of fatigue and generalized weakness, no fever, or 

chills


Cardiovascular: No reports of chest pain or palpitations


Respiratory: No reports of shortness of breath or cough


GI: No reports of nausea, vomiting, or diarrhea


: No reports of dysuria or retention


Neurovascular: reports of generalized weakness and difficulty in ambulating





All medications have been reviewed





PHYSICAL EXAMINATION: 


Patient is lying in the bed asleep although easily arousable,  alert and 

oriented.  Lethargic and weak.  Well-developed, elderly appearing, ill-appearing


HEENT: Normocephalic. Neck is supple. Pupils reactive. Nostrils clear. Oral 

cavity is moist. 


Neck reveals no JVD, carotid bruits, or thyromegaly. 


CHEST EXAMINATION: Trachea is central. Symmetrical expansion. Lung fields clear 

to auscultation and percussion. 


CARDIAC: Normal S1, S2 with no gallops. No murmurs 


ABDOMEN: Soft. Bowel sounds normal. No organomegaly. No abdominal bruits. 


Extremities: reveal no edema.  No clubbing or cyanosis


Neurologically awake, alert, oriented x 2-3 with well-coordinated movements.  No

focal deficits noted, diffusely weak


Skin: No rash or skin lesions. 


Psychiatric: Cooperative.  Non-suicidal


Musculoskeletal: No joint swelling or deformity.  Normal range of motion.





Assessment:





Status post mechanical fall


Acute alcohol intoxication with level 37 with concerns of acute delirium tremens


Acute rhabdomyolysis due to fall and was found on the floor.  Improving


Lactic acidosis due to dehydration and volume depletion/tissue hypoperfusion, 

improved


Hypomagnesemia.  Being replaced


Leukocytosis likely reactive.  Patient has no active signs of infection


Severe alcohol abuse


Prior history of smoking


Anxiety/depression


Hypertension


Hyperlipidemia


Osteoarthritis


GI and DVT prophylaxis


Full code





Plan:





Patient was continued on CIWA, although not actively withdrawing, will continue 

Librium taper and titrate dosing.  Oral Ativan as needed for anxiety


Recommend physical therapy daily as patient continues with significant weakness 

and gait dysfunction recommending rehab and patient is agreeable.  Social work 

is following working on [place] facilities.  There is a concavity and patient 

is agreeable currently awaiting insurance authorization.  Authorization to be 

submitted.


Patient will follow-up in the outpatient setting with a non-emergent court 

hearing to obtain a public legal guardian.  


Encouraged increase activity as tolerated


Encouraged oral intake


Replace magnesium and will add daily supplementation


Due to multiple complex medical issues, prognosis is guarded





The impression and plan of care has been dictated by Yamileth Horne, Nurse 

Practitioner as directed.





Dr. Johanny MD


I have performed a history and examination and MDM of this patient, discussed 

the same with the dictator, and  agree with the dictator's assessment and plan 

as written ,documented as a scribe. Based on total visit time,  I have performed

more than 50% of the visit.





Objective





- Vital Signs


Vital signs: 


                                   Vital Signs











Temp  98.2 F   04/12/24 07:44


 


Pulse  78   04/12/24 07:44


 


Resp  16   04/12/24 07:44


 


BP  120/76   04/12/24 07:44


 


Pulse Ox  94 L  04/12/24 07:44


 


FiO2      








                                 Intake & Output











 04/11/24 04/12/24 04/12/24





 18:59 06:59 18:59


 


Intake Total 1080  


 


Balance 1080  


 


Intake:   


 


  Oral 1080  


 


Other:   


 


  Voiding Method Bedside Commode Bedside Commode 





 Diaper Diaper 


 


  # Voids 3 3 














- Labs


CBC & Chem 7: 


                                 04/10/24 06:50





                                 04/12/24 05:40


Labs: 


                  Abnormal Lab Results - Last 24 Hours (Table)











  04/11/24 04/12/24 Range/Units





  07:31 05:40 


 


Potassium  3.4 L   (3.5-5.5)  mmol/L


 


Anion Gap  12.70 H   (4.00-12.00)  mmol/L


 


Creatinine  0.4 L   (0.6-1.5)  mg/dL


 


BUN/Creatinine Ratio  27.25 H   (12.00-20.00)  Ratio


 


Magnesium   1.4 L  (1.6-2.3)  mg/dL

## 2024-04-15 LAB
ANION GAP SERPL CALC-SCNC: 11.1 MMOL/L (ref 4–12)
BASOPHILS # BLD AUTO: 0.04 X 10*3/UL (ref 0–0.1)
BASOPHILS NFR BLD AUTO: 0.7 %
BUN SERPL-SCNC: 19 MG/DL (ref 9–27)
BUN/CREAT SERPL: 31.67 RATIO (ref 12–20)
CALCIUM SPEC-MCNC: 9.7 MG/DL (ref 8.7–10.3)
CHLORIDE SERPL-SCNC: 101 MMOL/L (ref 96–109)
CO2 SERPL-SCNC: 29.9 MMOL/L (ref 21.6–31.8)
EOSINOPHIL # BLD AUTO: 0.27 X 10*3/UL (ref 0.04–0.35)
EOSINOPHIL NFR BLD AUTO: 5 %
ERYTHROCYTE [DISTWIDTH] IN BLOOD BY AUTOMATED COUNT: 3.6 X 10*6/UL (ref 4.1–5.2)
ERYTHROCYTE [DISTWIDTH] IN BLOOD: 13.5 % (ref 11.5–14.5)
GLUCOSE SERPL-MCNC: 99 MG/DL (ref 70–110)
HCT VFR BLD AUTO: 37 % (ref 37.2–46.3)
HGB BLD-MCNC: 11.7 G/DL (ref 12–15)
IMM GRANULOCYTES BLD QL AUTO: 0.4 %
LYMPHOCYTES # SPEC AUTO: 2.07 X 10*3/UL (ref 0.9–5)
LYMPHOCYTES NFR SPEC AUTO: 38.7 %
MCH RBC QN AUTO: 32.5 PG (ref 27–32)
MCHC RBC AUTO-ENTMCNC: 31.6 G/DL (ref 32–37)
MCV RBC AUTO: 102.8 FL (ref 80–97)
MONOCYTES # BLD AUTO: 0.53 X 10*3/UL (ref 0.2–1)
MONOCYTES NFR BLD AUTO: 9.9 %
NEUTROPHILS # BLD AUTO: 2.42 X 10*3/UL (ref 1.8–7.7)
NEUTROPHILS NFR BLD AUTO: 45.3 %
NRBC BLD AUTO-RTO: 0 X 10*3/UL (ref 0–0.01)
PLATELET # BLD AUTO: 205 X 10*3/UL (ref 140–440)
POTASSIUM SERPL-SCNC: 5.1 MMOL/L (ref 3.5–5.5)
SODIUM SERPL-SCNC: 142 MMOL/L (ref 135–145)
WBC # BLD AUTO: 5.35 X 10*3/UL (ref 4.5–10)

## 2024-04-15 NOTE — P.PN
Subjective


Progress Note Date: 04/14/24





Patient is a 63-year-old female with a past medical history of severe alcohol 

abuse and prior to admission with alcohol withdrawal symptoms, hypertension, 

hyperemia, osteoarthritis and history of anxiety/depression and heavy alcohol 

abuse and prior history of smoking presents to ER status post fall.  Patient 

states that she fell out of bed.  She felt so weak that she has been crawling 

around her apartment.  Screamed for help and her neighbor found her on the 

ground.  Patient states that she was drinking alcohol this morning.  Patient 

does complain of headache.  No neck..  No other injuries..


Denies any fever or chills.  No nausea vomiting abdominal pain or diarrhea.


CT head and cervical spine was done in the ER showed mild stable chronic 

appearing periventricular white matter ischemic changes types.  Follow-up MRI 

can be performed as clinically indicated.  CT cervical spine showed no acute oss

eous abnormality of the cervical spine.


EKG showed ectopic atrial tachycardia.


Laboratory data showed WBC 12.4 hemoglobin 14.1 and platelets 349


Sodium 140 potassium 4.9 chloride 102 bicarbonate 17 BUN 28 and creatinine 0.55 

and blood sugar 157 lactic acid 5.6 calcium 10.4 and magnesium 1.4 AST 65 ALT 47

alk phos 81 manage CK level 1359 lipase 222


Urinalysis showed 2+ ketones small blood nitrite negative and small leukocyte 

esterase and WBCs 9.  Serum alcohol abuse 37





4/10/2024





Patient is seen and evaluated in follow-up with no acute noted.  Patient 

continues to report weakness and gait dysfunction and awaiting PT/OT therapy 

evaluation from follow-up yesterday.  Patient minimally improved although 

continues to recommend subacute rehab to increase strength and mobility.  

Patient is afebrile with no reported chest pain or shortness of breath.  Patient

tolerating diet with no reported nausea or vomiting.  Case management is 

following arranging for outpatient follow-up and a court hearing for nonemergent

public legal guardian appointment.  Case management/social work also following 

currently working on possible ECF for strength and mobility.  Labs reviewed and 

within normal limits.  Patient continues on CIWA protocol and has not required 

IV Ativan all day.  Librium taper started and will continue and taper 

accordingly.





4/11/2024


Patient is seen in follow-up this morning agreeable to working with physical 

therapy and has been getting stronger although continues with significant 

weakness and gait dysfunction.  Social work following working on ECF and 

multiple referrals have been placed and all declined thus far.  Looking into 

[facility] as well for further possible ECF.  Patient is afebrile with 

no reported chest pain or shortness of breath.  Patient is tolerating diet with 

no reported nausea or vomiting.  Patient does have CIWA protocol although 

actively withdrawing.  Will add as needed oral Ativan as patient is quite 

anxious.  Continue other home medications.  A.m. labs pending and we will 

follow-up and replace electrolytes per protocol.





4/12/2024


Patient is seen in follow-up today with no acute overnight issues noted.  

Patient has as needed Ativan added for anxiety as patient is not actively 

withdrawing and will discontinue CIWA scale.  Patient will continue on a Librium

taper and will titrate to twice daily.  Patient is afebrile with no reported ch

est pain or shortness of breath.  Magnesium found to be slightly low and will 

replace per protocol and follow-up on repeat labs.  Encouraged oral intake and 

increased activity as tolerated.  Recommend physical therapy daily.  Social work

is following working on discharge planning and looking for an accepting ECF for 

continued strength and mobility.





4/13/2024


Patient is resting in the bed.  Awake alert and slightly confused still.  

Complains of anxiety.  Continued on CIWA protocol.  No complaints of nausea or 

vomiting.  Tolerating oral diet.





4/14/2024 patient is resting in the bed comfortably.  Awake alert and oriented. 

Mentation remains the same.  Continued on alcohol withdrawal protocol.  No fever

no chills.  No other acute overnight issues.  Patient is pending placement to 

subacute rehab.





Review of systems:


Constitutional:  reports of fatigue and generalized weakness, no fever, or 

chills


Cardiovascular: No reports of chest pain or palpitations


Respiratory: No reports of shortness of breath or cough


GI: No reports of nausea, vomiting, or diarrhea


: No reports of dysuria or retention


Neurovascular: reports of generalized weakness and difficulty in ambulating





All medications have been reviewed





PHYSICAL EXAMINATION: 


Patient is lying in the bed asleep although easily arousable,  alert and 

oriented.  Lethargic and weak.  Well-developed, elderly appearing, ill-appearing


HEENT: Normocephalic. Neck is supple. Pupils reactive. Nostrils clear. Oral 

cavity is moist. 


Neck reveals no JVD, carotid bruits, or thyromegaly. 


CHEST EXAMINATION: Trachea is central. Symmetrical expansion. Lung fields clear 

to auscultation and percussion. 


CARDIAC: Normal S1, S2 with no gallops. No murmurs 


ABDOMEN: Soft. Bowel sounds normal. No organomegaly. No abdominal bruits. 


Extremities: reveal no edema.  No clubbing or cyanosis


Neurologically awake, alert, oriented x 2-3 with well-coordinated movements.  No

focal deficits noted, diffusely weak


Skin: No rash or skin lesions. 


Psychiatric: Cooperative.  Non-suicidal


Musculoskeletal: No joint swelling or deformity.  Normal range of motion.





Assessment:





Status post mechanical fall


Acute alcohol intoxication with level 37 with concerns of acute delirium tremens


Acute rhabdomyolysis due to fall and was found on the floor.  Improving


Lactic acidosis due to dehydration and volume depletion/tissue hypoperfusion, 

improved


Hypomagnesemia.  Being replaced


Leukocytosis likely reactive.  Patient has no active signs of infection


Severe alcohol abuse


Prior history of smoking


Anxiety/depression


Hypertension


Hyperlipidemia


Osteoarthritis


GI and DVT prophylaxis


Full code





Plan:





Patient was continued on CIWA, although not actively withdrawing, will continue 

Librium taper and titrate dosing.  Oral Ativan as needed for anxiety


Recommend physical therapy daily as patient continues with significant weakness 

and gait dysfunction recommending rehab and patient is agreeable.  Social work 

is following working on [facility] facilities.  There is a concavity and patient 

is agreeable currently awaiting insurance authorization.  Authorization to be 

submitted.


Patient will follow-up in the outpatient setting with a non-emergent court 

hearing to obtain a public legal guardian.  


Encouraged increase activity as tolerated


Encouraged oral intake


Replace magnesium and will add daily supplementation


Due to multiple complex medical issues, prognosis is guarded





Objective





- Vital Signs


Vital signs: 


                                   Vital Signs











Temp  98.2 F   04/14/24 19:14


 


Pulse  91   04/14/24 19:14


 


Resp  17   04/14/24 19:14


 


BP  151/84   04/14/24 19:14


 


Pulse Ox  94 L  04/14/24 19:14


 


FiO2      








                                 Intake & Output











 04/14/24 04/14/24 04/15/24





 06:59 18:59 06:59


 


Intake Total  1620 


 


Balance  1620 


 


Intake:   


 


  Oral  1620 


 


Other:   


 


  Voiding Method  Bedside Commode 


 


  # Voids 4 1 


 


  # Bowel Movements  1 














- Labs


CBC & Chem 7: 


                                 04/15/24 06:16





                                 04/15/24 06:16

## 2024-04-15 NOTE — P.PN
Subjective


Progress Note Date: 04/13/24





Patient is a 63-year-old female with a past medical history of severe alcohol 

abuse and prior to admission with alcohol withdrawal symptoms, hypertension, 

hyperemia, osteoarthritis and history of anxiety/depression and heavy alcohol 

abuse and prior history of smoking presents to ER status post fall.  Patient 

states that she fell out of bed.  She felt so weak that she has been crawling 

around her apartment.  Screamed for help and her neighbor found her on the 

ground.  Patient states that she was drinking alcohol this morning.  Patient 

does complain of headache.  No neck..  No other injuries..


Denies any fever or chills.  No nausea vomiting abdominal pain or diarrhea.


CT head and cervical spine was done in the ER showed mild stable chronic 

appearing periventricular white matter ischemic changes types.  Follow-up MRI 

can be performed as clinically indicated.  CT cervical spine showed no acute oss

eous abnormality of the cervical spine.


EKG showed ectopic atrial tachycardia.


Laboratory data showed WBC 12.4 hemoglobin 14.1 and platelets 349


Sodium 140 potassium 4.9 chloride 102 bicarbonate 17 BUN 28 and creatinine 0.55 

and blood sugar 157 lactic acid 5.6 calcium 10.4 and magnesium 1.4 AST 65 ALT 47

alk phos 81 manage CK level 1359 lipase 222


Urinalysis showed 2+ ketones small blood nitrite negative and small leukocyte 

esterase and WBCs 9.  Serum alcohol abuse 37





4/10/2024





Patient is seen and evaluated in follow-up with no acute noted.  Patient 

continues to report weakness and gait dysfunction and awaiting PT/OT therapy 

evaluation from follow-up yesterday.  Patient minimally improved although 

continues to recommend subacute rehab to increase strength and mobility.  

Patient is afebrile with no reported chest pain or shortness of breath.  Patient

tolerating diet with no reported nausea or vomiting.  Case management is 

following arranging for outpatient follow-up and a court hearing for nonemergent

public legal guardian appointment.  Case management/social work also following 

currently working on possible ECF for strength and mobility.  Labs reviewed and 

within normal limits.  Patient continues on CIWA protocol and has not required 

IV Ativan all day.  Librium taper started and will continue and taper 

accordingly.





4/11/2024


Patient is seen in follow-up this morning agreeable to working with physical 

therapy and has been getting stronger although continues with significant 

weakness and gait dysfunction.  Social work following working on ECF and 

multiple referrals have been placed and all declined thus far.  Looking into 

[facility] as well for further possible ECF.  Patient is afebrile with 

no reported chest pain or shortness of breath.  Patient is tolerating diet with 

no reported nausea or vomiting.  Patient does have CIWA protocol although 

actively withdrawing.  Will add as needed oral Ativan as patient is quite 

anxious.  Continue other home medications.  A.m. labs pending and we will 

follow-up and replace electrolytes per protocol.





4/12/2024


Patient is seen in follow-up today with no acute overnight issues noted.  

Patient has as needed Ativan added for anxiety as patient is not actively 

withdrawing and will discontinue CIWA scale.  Patient will continue on a Librium

taper and will titrate to twice daily.  Patient is afebrile with no reported ch

est pain or shortness of breath.  Magnesium found to be slightly low and will 

replace per protocol and follow-up on repeat labs.  Encouraged oral intake and 

increased activity as tolerated.  Recommend physical therapy daily.  Social work

is following working on discharge planning and looking for an accepting ECF for 

continued strength and mobility.





4/13/2024


Patient is resting in the bed.  Awake alert and slightly confused still.  

Complains of anxiety.  Continued on CIWA protocol.  No complaints of nausea or 

vomiting.  Tolerating oral diet.





Review of systems:


Constitutional:  reports of fatigue and generalized weakness, no fever, or 

chills


Cardiovascular: No reports of chest pain or palpitations


Respiratory: No reports of shortness of breath or cough


GI: No reports of nausea, vomiting, or diarrhea


: No reports of dysuria or retention


Neurovascular: reports of generalized weakness and difficulty in ambulating





All medications have been reviewed





PHYSICAL EXAMINATION: 


Patient is lying in the bed asleep although easily arousable,  alert and 

oriented.  Lethargic and weak.  Well-developed, elderly appearing, ill-appearing


HEENT: Normocephalic. Neck is supple. Pupils reactive. Nostrils clear. Oral 

cavity is moist. 


Neck reveals no JVD, carotid bruits, or thyromegaly. 


CHEST EXAMINATION: Trachea is central. Symmetrical expansion. Lung fields clear 

to auscultation and percussion. 


CARDIAC: Normal S1, S2 with no gallops. No murmurs 


ABDOMEN: Soft. Bowel sounds normal. No organomegaly. No abdominal bruits. 


Extremities: reveal no edema.  No clubbing or cyanosis


Neurologically awake, alert, oriented x 2-3 with well-coordinated movements.  No

focal deficits noted, diffusely weak


Skin: No rash or skin lesions. 


Psychiatric: Cooperative.  Non-suicidal


Musculoskeletal: No joint swelling or deformity.  Normal range of motion.





Assessment:





Status post mechanical fall


Acute alcohol intoxication with level 37 with concerns of acute delirium tremens


Acute rhabdomyolysis due to fall and was found on the floor.  Improving


Lactic acidosis due to dehydration and volume depletion/tissue hypoperfusion, 

improved


Hypomagnesemia.  Being replaced


Leukocytosis likely reactive.  Patient has no active signs of infection


Severe alcohol abuse


Prior history of smoking


Anxiety/depression


Hypertension


Hyperlipidemia


Osteoarthritis


GI and DVT prophylaxis


Full code





Plan:





Patient was continued on CIWA, although not actively withdrawing, will continue 

Librium taper and titrate dosing.  Oral Ativan as needed for anxiety


Recommend physical therapy daily as patient continues with significant weakness 

and gait dysfunction recommending rehab and patient is agreeable.  Social work 

is following working on [facility] facilities.  There is a concavity and patient 

is agreeable currently awaiting insurance authorization.  Authorization to be 

submitted.


Patient will follow-up in the outpatient setting with a non-emergent court 

hearing to obtain a public legal guardian.  


Encouraged increase activity as tolerated


Encouraged oral intake


Replace magnesium and will add daily supplementation


Due to multiple complex medical issues, prognosis is guarded





Objective





- Vital Signs


Vital signs: 


                                   Vital Signs











Temp  98.4 F   04/13/24 12:55


 


Pulse  94   04/13/24 12:55


 


Resp  19   04/13/24 12:55


 


BP  129/84   04/13/24 12:55


 


Pulse Ox  97   04/13/24 12:55


 


FiO2      








                                 Intake & Output











 04/12/24 04/13/24 04/13/24





 18:59 06:59 18:59


 


Intake Total 1460  


 


Output Total   1


 


Balance 1460  -1


 


Intake:   


 


  Oral 1460  


 


Output:   


 


  Urine   1


 


Other:   


 


  Voiding Method Bedside Commode  Bedside Commode





 Diaper  


 


  # Voids 2 1 1














- Labs


CBC & Chem 7: 


                                 04/15/24 06:16





                                 04/15/24 06:16

## 2024-04-16 RX ADMIN — PANTOPRAZOLE SODIUM SCH MG: 40 INJECTION, POWDER, FOR SOLUTION INTRAVENOUS at 15:36

## 2024-04-16 RX ADMIN — ONDANSETRON PRN MG: 2 INJECTION INTRAMUSCULAR; INTRAVENOUS at 09:16

## 2024-04-16 RX ADMIN — CEFAZOLIN SCH MLS/HR: 330 INJECTION, POWDER, FOR SOLUTION INTRAMUSCULAR; INTRAVENOUS at 15:36

## 2024-04-16 NOTE — P.PN
Subjective


Progress Note Date: 04/15/24








Patient is a 63-year-old female with a past medical history of severe alcohol 

abuse and prior to admission with alcohol withdrawal symptoms, hypertension, 

hyperemia, osteoarthritis and history of anxiety/depression and heavy alcohol 

abuse and prior history of smoking presents to ER status post fall.  Patient 

states that she fell out of bed.  She felt so weak that she has been crawling 

around her apartment.  Screamed for help and her neighbor found her on the 

ground.  Patient states that she was drinking alcohol this morning.  Patient 

does complain of headache.  No neck..  No other injuries..


Denies any fever or chills.  No nausea vomiting abdominal pain or diarrhea.


CT head and cervical spine was done in the ER showed mild stable chronic 

appearing periventricular white matter ischemic changes types.  Follow-up MRI 

can be performed as clinically indicated.  CT cervical spine showed no acute os

seous abnormality of the cervical spine.


EKG showed ectopic atrial tachycardia.


Laboratory data showed WBC 12.4 hemoglobin 14.1 and platelets 349


Sodium 140 potassium 4.9 chloride 102 bicarbonate 17 BUN 28 and creatinine 0.55 

and blood sugar 157 lactic acid 5.6 calcium 10.4 and magnesium 1.4 AST 65 ALT 47

alk phos 81 manage CK level 1359 lipase 222


Urinalysis showed 2+ ketones small blood nitrite negative and small leukocyte 

esterase and WBCs 9.  Serum alcohol abuse 37





4/10/2024





Patient is seen and evaluated in follow-up with no acute noted.  Patient 

continues to report weakness and gait dysfunction and awaiting PT/OT therapy 

evaluation from follow-up yesterday.  Patient minimally improved although 

continues to recommend subacute rehab to increase strength and mobility.  

Patient is afebrile with no reported chest pain or shortness of breath.  Patient

tolerating diet with no reported nausea or vomiting.  Case management is 

following arranging for outpatient follow-up and a court hearing for nonemergent

public legal guardian appointment.  Case management/social work also following 

currently working on possible ECF for strength and mobility.  Labs reviewed and 

within normal limits.  Patient continues on CIWA protocol and has not required 

IV Ativan all day.  Librium taper started and will continue and taper 

accordingly.





4/11/2024


Patient is seen in follow-up this morning agreeable to working with physical 

therapy and has been getting stronger although continues with significant 

weakness and gait dysfunction.  Social work following working on ECF and 

multiple referrals have been placed and all declined thus far.  Looking into 

[facility] as well for further possible ECF.  Patient is afebrile with 

no reported chest pain or shortness of breath.  Patient is tolerating diet with 

no reported nausea or vomiting.  Patient does have CIWA protocol although 

actively withdrawing.  Will add as needed oral Ativan as patient is quite 

anxious.  Continue other home medications.  A.m. labs pending and we will 

follow-up and replace electrolytes per protocol.





4/12/2024


Patient is seen in follow-up today with no acute overnight issues noted.  

Patient has as needed Ativan added for anxiety as patient is not actively 

withdrawing and will discontinue CIWA scale.  Patient will continue on a Librium

taper and will titrate to twice daily.  Patient is afebrile with no reported c

hest pain or shortness of breath.  Magnesium found to be slightly low and will 

replace per protocol and follow-up on repeat labs.  Encouraged oral intake and 

increased activity as tolerated.  Recommend physical therapy daily.  Social work

is following working on discharge planning and looking for an accepting ECF for 

continued strength and mobility.





4/13/2024


Patient is resting in the bed.  Awake alert and slightly confused still.  

Complains of anxiety.  Continued on CIWA protocol.  No complaints of nausea or 

vomiting.  Tolerating oral diet.





4/14/2024 patient is resting in the bed comfortably.  Awake alert and oriented. 

Mentation remains the same.  .  No fever no chills.  No other acute overnight 

issues.  Patient is pending placement to subacute rehab.








4/15/2024





Patient is seen in follow-up today continues to await insurance authorization 

for rehab for continued strength and mobility.  Patient continues to report 

significant weakness and difficulty with ambulation.  Case management/social 

work following working on insurance authorization which remains pending.  

Patient is afebrile denies chest pain or shortness of breath.  Librium taper has

been titrated to daily and will discontinue after tomorrow's dose.  Patient is 

not requiring any CIWA and not actively withdrawing.  Patient reports to 

tolerating oral intake and needs encouragement with meals.  Recommend physical 

therapy daily





Review of systems:


Constitutional:  reports of fatigue and generalized weakness, no fever, or 

chills


Cardiovascular: No reports of chest pain or palpitations


Respiratory: No reports of shortness of breath or cough


GI: No reports of nausea, vomiting, or diarrhea


: No reports of dysuria or retention


Neurovascular: reports of generalized weakness and difficulty in ambulating due 

to lower extremity weakness





All medications have been reviewed





PHYSICAL EXAMINATION: 


Patient is sitting up in the chair,  alert and oriented.  Lethargic and weak.  

Well-developed, elderly appearing, ill-appearing


HEENT: Normocephalic. Neck is supple. Pupils reactive. Nostrils clear. Oral 

cavity is moist. 


Neck reveals no JVD, carotid bruits, or thyromegaly. 


CHEST EXAMINATION: Trachea is central. Symmetrical expansion. Lung fields clear 

to auscultation and percussion. 


CARDIAC: Normal S1, S2 with no gallops. No murmurs 


ABDOMEN: Soft. Bowel sounds normal. No organomegaly. No abdominal bruits. 


Extremities: reveal no edema.  No clubbing or cyanosis


Neurologically awake, alert, oriented x 2-3 with well-coordinated movements.  No

focal deficits noted, diffusely weak


Skin: No rash or skin lesions. 


Psychiatric: Cooperative.  Non-suicidal


Musculoskeletal: No joint swelling or deformity.  Normal range of motion.





Assessment:





Status post mechanical fall


Acute alcohol intoxication with level 37 with concerns of acute delirium tremens


Acute rhabdomyolysis due to fall and was found on the floor.  Improving


Lactic acidosis due to dehydration and volume depletion/tissue hypoperfusion, 

improved


Hypomagnesemia.  Improving


Leukocytosis likely reactive.  Patient has no active signs of infection


Severe alcohol abuse 


gait dysfunction with generalized weakness


Prior history of smoking


Anxiety/depression


Hypertension


Hyperlipidemia


Osteoarthritis


GI and DVT prophylaxis


Full code





Plan:





Patient was continued on CIWA, although not actively withdrawing, will continue 

Librium taper and titrate dosing.  Patient due for last dosing tomorrow and will

discontinue Librium.  Oral Ativan as needed for anxiety


Recommend physical therapy daily as patient continues with significant weakness 

and gait dysfunction recommending rehab and patient is agreeable.  Social work 

is following working on accepting facilities.  There is an ECF in [facility] 

and patient is agreeable currently awaiting insurance authorization.  

Authorization has been submitted.


Patient will follow-up in the outpatient setting with a non-emergent court 

hearing to obtain a public legal guardian.  Hearing is set for May 2024


Encouraged increase activity as tolerated


Encouraged oral intake


Replace electrolytes per protocol


Due to multiple complex medical issues, prognosis is guarded


Possible discharge planning in the next 24 to 48 hours





The impression and plan of care has been dictated by Yamileth Horne, Nurse 

Practitioner as directed.





Dr Ric MD


I have performed a history and examination and MDM of this patient, discussed 

the same with the dictator, and  agree with the dictator's assessment and plan 

as written ,documented as a scribe. Based on total visit time,  I have performed

more than 50% of the visit.





Objective





- Vital Signs


Vital signs: 


                                   Vital Signs











Temp  98.6 F   04/15/24 12:20


 


Pulse  89   04/15/24 12:20


 


Resp  18   04/15/24 12:20


 


BP  120/78   04/15/24 12:20


 


Pulse Ox  97   04/15/24 12:20


 


FiO2      








                                 Intake & Output











 04/14/24 04/15/24 04/15/24





 18:59 06:59 18:59


 


Intake Total 1620  


 


Balance 1620  


 


Intake:   


 


  Oral 1620  


 


Other:   


 


  Voiding Method Bedside Commode  Toilet


 


  # Voids 1 1 1


 


  # Bowel Movements 1 1 














- Labs


CBC & Chem 7: 


                                 04/15/24 06:16





                                 04/15/24 06:16


Labs: 


                  Abnormal Lab Results - Last 24 Hours (Table)











  04/15/24 04/15/24 Range/Units





  06:16 06:16 


 


RBC  3.60 L   (4.10-5.20)  X 10*6/uL


 


Hgb  11.7 L   (12.0-15.0)  g/dL


 


Hct  37.0 L   (37.2-46.3)  %


 


MCV  102.8 H   (80.0-97.0)  FL


 


MCH  32.5 H   (27.0-32.0)  pg


 


MCHC  31.6 L   (32.0-37.0)  g/dL


 


BUN/Creatinine Ratio   31.67 H  (12.00-20.00)  Ratio

## 2024-04-16 NOTE — XR
EXAMINATION TYPE: XR abdomen 1V

 

DATE OF EXAM: 4/16/2024

 

COMPARISON: None

 

INDICATION: Nausea vomiting

 

TECHNIQUE: Single view abdomen supine view

 

FINDINGS:  

Nonspecific bowel gas is present through the abdomen. There is within the ascending colon.

Psoas margins are normal.

No organomegaly is present.

 

 

IMPRESSION: 

1. Nonspecific abdomen

## 2024-04-17 LAB
ANION GAP SERPL CALC-SCNC: 10.6 MMOL/L (ref 4–12)
BUN SERPL-SCNC: 13.7 MG/DL (ref 9–27)
BUN/CREAT SERPL: 27.4 RATIO (ref 12–20)
CALCIUM SPEC-MCNC: 9.6 MG/DL (ref 8.7–10.3)
CHLORIDE SERPL-SCNC: 104 MMOL/L (ref 96–109)
CO2 SERPL-SCNC: 26.4 MMOL/L (ref 21.6–31.8)
GLUCOSE SERPL-MCNC: 98 MG/DL (ref 70–110)
MAGNESIUM SPEC-SCNC: 2.1 MG/DL (ref 1.5–2.4)
POTASSIUM SERPL-SCNC: 4.3 MMOL/L (ref 3.5–5.5)
SODIUM SERPL-SCNC: 141 MMOL/L (ref 135–145)

## 2024-04-17 RX ADMIN — ONDANSETRON PRN MG: 2 INJECTION INTRAMUSCULAR; INTRAVENOUS at 17:52

## 2024-04-17 NOTE — P.PN
Subjective


Progress Note Date: 04/17/24








Patient is a 63-year-old female with a past medical history of severe alcohol 

abuse and prior to admission with alcohol withdrawal symptoms, hypertension, 

hyperemia, osteoarthritis and history of anxiety/depression and heavy alcohol 

abuse and prior history of smoking presents to ER status post fall.  Patient 

states that she fell out of bed.  She felt so weak that she has been crawling 

around her apartment.  Screamed for help and her neighbor found her on the 

ground.  Patient states that she was drinking alcohol this morning.  Patient 

does complain of headache.  No neck..  No other injuries..


Denies any fever or chills.  No nausea vomiting abdominal pain or diarrhea.


CT head and cervical spine was done in the ER showed mild stable chronic 

appearing periventricular white matter ischemic changes types.  Follow-up MRI 

can be performed as clinically indicated.  CT cervical spine showed no acute os

seous abnormality of the cervical spine.


EKG showed ectopic atrial tachycardia.


Laboratory data showed WBC 12.4 hemoglobin 14.1 and platelets 349


Sodium 140 potassium 4.9 chloride 102 bicarbonate 17 BUN 28 and creatinine 0.55 

and blood sugar 157 lactic acid 5.6 calcium 10.4 and magnesium 1.4 AST 65 ALT 47

alk phos 81 manage CK level 1359 lipase 222


Urinalysis showed 2+ ketones small blood nitrite negative and small leukocyte 

esterase and WBCs 9.  Serum alcohol abuse 37





4/10/2024





Patient is seen and evaluated in follow-up with no acute noted.  Patient 

continues to report weakness and gait dysfunction and awaiting PT/OT therapy 

evaluation from follow-up yesterday.  Patient minimally improved although 

continues to recommend subacute rehab to increase strength and mobility.  

Patient is afebrile with no reported chest pain or shortness of breath.  Patient

tolerating diet with no reported nausea or vomiting.  Case management is 

following arranging for outpatient follow-up and a court hearing for nonemergent

public legal guardian appointment.  Case management/social work also following 

currently working on possible ECF for strength and mobility.  Labs reviewed and 

within normal limits.  Patient continues on CIWA protocol and has not required 

IV Ativan all day.  Librium taper started and will continue and taper 

accordingly.





4/11/2024


Patient is seen in follow-up this morning agreeable to working with physical 

therapy and has been getting stronger although continues with significant 

weakness and gait dysfunction.  Social work following working on ECF and 

multiple referrals have been placed and all declined thus far.  Looking into 

[facility] as well for further possible ECF.  Patient is afebrile with 

no reported chest pain or shortness of breath.  Patient is tolerating diet with 

no reported nausea or vomiting.  Patient does have CIWA protocol although 

actively withdrawing.  Will add as needed oral Ativan as patient is quite 

anxious.  Continue other home medications.  A.m. labs pending and we will 

follow-up and replace electrolytes per protocol.





4/12/2024


Patient is seen in follow-up today with no acute overnight issues noted.  

Patient has as needed Ativan added for anxiety as patient is not actively 

withdrawing and will discontinue CIWA scale.  Patient will continue on a Librium

taper and will titrate to twice daily.  Patient is afebrile with no reported c

hest pain or shortness of breath.  Magnesium found to be slightly low and will 

replace per protocol and follow-up on repeat labs.  Encouraged oral intake and 

increased activity as tolerated.  Recommend physical therapy daily.  Social work

is following working on discharge planning and looking for an accepting ECF for 

continued strength and mobility.





4/13/2024


Patient is resting in the bed.  Awake alert and slightly confused still.  

Complains of anxiety.  Continued on CIWA protocol.  No complaints of nausea or 

vomiting.  Tolerating oral diet.





4/14/2024 patient is resting in the bed comfortably.  Awake alert and oriented. 

Mentation remains the same.  .  No fever no chills.  No other acute overnight 

issues.  Patient is pending placement to subacute rehab.








4/15/2024





Patient is seen in follow-up today continues to await insurance authorization 

for rehab for continued strength and mobility.  Patient continues to report 

significant weakness and difficulty with ambulation.  Case management/social 

work following working on insurance authorization which remains pending.  

Patient is afebrile denies chest pain or shortness of breath.  Librium taper has

been titrated to daily and will discontinue after tomorrow's dose.  Patient is 

not requiring any CIWA and not actively withdrawing.  Patient reports to 

tolerating oral intake and needs encouragement with meals.  Recommend physical 

therapy daily





4/16/2024





Patient is seen in follow-up this morning currently sitting up in the chair re

porting she feels abdominal pain with nausea and vomiting and has just received 

Zofran.  Patient reports she is voiding and having bowel movements with no 

difficulties.  Will obtain an abdominal x-ray, initiate some gentle hydration 

and continue with Zofran, recommend clear liquid diet slowly advance once 

feeling better.  Case management/social work following awaiting authorization 

from insurance regarding ECF for discharge planning.  There is also a court 

hearing in May regarding obtaining a public guardian.  Patient was notified of 

the court date.  Patient is afebrile with no reports of chest pain or shortness 

of breath.





4/17/2024





Patient is seen in follow-up today reports no abdominal pain and tolerating 

diet.  Patient was concerned with yesterday feeling nauseated having vomiting 

although is improved today.  Patient reports is having bowel movements and 

voiding with no difficulties.  Patient is afebrile and denies chest pain or 

shortness of breath.  Patient continues with unsteady gait and generalized 

weakness especially in the lower extremities and continues to await for insur

ance authorization.  Per social work insurance authorization was denied and 

currently awaiting to undergo a peer to peer evaluation.





Review of systems:


Constitutional:  reports of fatigue and generalized weakness, no fever, or 

chills


Cardiovascular: No reports of chest pain or palpitations


Respiratory: No reports of shortness of breath or cough


GI: No further reports of nausea, no further episodes of vomiting, no diarrhea, 

reports no further abdominal pain


: No reports of dysuria or retention


Neurovascular: reports of generalized weakness and difficulty in ambulating due 

to lower extremity weakness





All medications have been reviewed





PHYSICAL EXAMINATION: 





Patient is sitting up in the chair,  alert and oriented.  weak.  Well-developed,

elderly appearing, ill-appearing


HEENT: Normocephalic. Neck is supple. Pupils reactive. Nostrils clear. Oral 

cavity is moist. 


Neck reveals no JVD, carotid bruits, or thyromegaly. 


CHEST EXAMINATION: Trachea is central. Symmetrical expansion. Lung fields clear 

to auscultation and percussion. 


CARDIAC: Normal S1, S2 with no gallops. No murmurs 


ABDOMEN: Soft. Bowel sounds normal. No organomegaly. No abdominal bruits.  

Nontender


Extremities: reveal no edema.  No clubbing or cyanosis


Neurologically awake, alert, oriented x 2-3 with well-coordinated movements.  No

focal deficits noted, diffusely weak


Skin: No rash or skin lesions. 


Psychiatric: Cooperative.  Non-suicidal


Musculoskeletal: No joint swelling or deformity.  Normal range of motion.





Assessment:





Status post mechanical fall


Acute alcohol intoxication with level 37 with concerns of acute delirium tremens


Acute rhabdomyolysis due to fall and was found on the floor.  Improving


Lactic acidosis due to dehydration and volume depletion/tissue hypoperfusion, 

improved


Hypomagnesemia.  Improving


Leukocytosis likely reactive.  Patient has no active signs of infection


Severe alcohol abuse 


gait dysfunction with generalized weakness


Prior history of smoking


Anxiety/depression


Hypertension


Hyperlipidemia


Osteoarthritis


GI and DVT prophylaxis


Full code





Plan:





Patient has completed Librium taper and is not actively withdrawing.  Patient 

does have Ativan as needed for anxiety as patient is anxious about being 

discharged.


Patient was reporting some diffuse abdominal pain associated with nausea and 1 

episode of vomiting.  Vomiting was unwitnessed and asked patient along with 

nursing staff to show the nurses when she vomited to evaluate.  Will continue 

Zofran and give some gentle hydration.  Abdominal x-ray was done which was 

normal with no acute findings.  Patient reports abdominal pain is improved and 

no further episodes of nausea or vomiting at this time.  Patient is tolerating 

diet and recommended slowly advance as tolerated.


Recommend physical therapy daily as patient continues with significant weakness 

and gait dysfunction recommending rehab and patient is agreeable.  Social work 

is following working on accepting facilities.  There is an ECF in [facility] 

and patient is agreeable currently awaiting insurance authorization.  

Authorization has been denied at this time and currently requesting a peer to 

peer evaluation.  Dr. Cintron to call around 3:30 PM Eastern time to perform this

peer to peer evaluation.


Patient will follow-up in the outpatient setting with a non-emergent court 

hearing to obtain a public legal guardian.  Hearing is set for May 2024


Encouraged increase activity as tolerated


Continue to encourage small frequent meals and slowly advancing as tolerated


Replace electrolytes per protocol.  Magnesium chronically low and have provided 

daily supplementation


Due to multiple complex medical issues, prognosis is guarded


Possible discharge planning in the next 24 hours





The impression and plan of care has been dictated by Yamileth Horne, Nurse 

Practitioner as directed.





Dr Ric MD


I have performed a history and examination and MDM of this patient, discussed 

the same with the dictator, and  agree with the dictator's assessment and plan 

as written ,documented as a scribe. Based on total visit time,  I have performed

more than 50% of the visit.





Objective





- Vital Signs


Vital signs: 


                                   Vital Signs











Temp  98.7 F   04/17/24 12:48


 


Pulse  85   04/17/24 12:48


 


Resp  16   04/17/24 12:48


 


BP  101/69   04/17/24 12:48


 


Pulse Ox  91 L  04/17/24 12:48


 


FiO2      








                                 Intake & Output











 04/16/24 04/17/24 04/17/24





 18:59 06:59 18:59


 


Intake Total 300 590 332


 


Balance 300 590 332


 


Intake:   


 


  Intake, IV Titration 300  





  Amount   


 


    Sodium Chloride 0.9% 1, 300  





    000 ml @ 60 mls/hr IV .   





    [id number] [facility] Rx#:[id number]   


 


  Oral  590 332


 


Other:   


 


  Voiding Method Toilet Toilet Toilet


 


  # Voids 3 2 1


 


  # Bowel Movements 1 0 














- Labs


CBC & Chem 7: 


                                 04/15/24 06:16





                                 04/17/24 07:54


Labs: 


                  Abnormal Lab Results - Last 24 Hours (Table)











  04/17/24 Range/Units





  07:54 


 


Creatinine  0.5 L  (0.6-1.5)  mg/dL


 


BUN/Creatinine Ratio  27.40 H  (12.00-20.00)  Ratio

## 2024-04-17 NOTE — P.PN
Subjective


Progress Note Date: 04/09/24





Patient is a 63-year-old female with a past medical history of severe alcohol 

abuse and prior to admission with alcohol withdrawal symptoms, hypertension, 

hyperemia, osteoarthritis and history of anxiety/depression and heavy alcohol 

abuse and prior history of smoking presents to ER status post fall.  Patient 

states that she fell out of bed.  She felt so weak that she has been crawling 

around her apartment.  Screamed for help and her neighbor found her on the 

ground.  Patient states that she was drinking alcohol this morning.  Patient 

does complain of headache.  No neck..  No other injuries..


Denies any fever or chills.  No nausea vomiting abdominal pain or diarrhea.


CT head and cervical spine was done in the ER showed mild stable chronic 

appearing periventricular white matter ischemic changes types.  Follow-up MRI 

can be performed as clinically indicated.  CT cervical spine showed no acute oss

eous abnormality of the cervical spine.


EKG showed ectopic atrial tachycardia.


Laboratory data showed WBC 12.4 hemoglobin 14.1 and platelets 349


Sodium 140 potassium 4.9 chloride 102 bicarbonate 17 BUN 28 and creatinine 0.55 

and blood sugar 157 lactic acid 5.6 calcium 10.4 and magnesium 1.4 AST 65 ALT 47

alk phos 81 manage CK level 1359 lipase 222


Urinalysis showed 2+ ketones small blood nitrite negative and small leukocyte 

esterase and WBCs 9.  Serum alcohol abuse 37





4/9/2024


Patient is lying in bed.  Awake alert but confused and shaky.  Still requiring 

Ativan for alcohol withdrawals.  Denies any complaints of chest pain or 

shortness of breath.  No nausea vomiting or abdominal pain.  Tolerating oral 

diet slowly.  No cough or sputum production.  Patient has been afebrile.


Laboratory data showed WBC 8.2 improved from 12.4 yesterday.


Hemoglobin 11.7 and platelets 206


Sodium 144, potassium 3.5 chloride 109 bicarb is 22.6 BUN 16.20 creatinine 0.4 

and blood sugar 98 total bili 1.1 AST 36 ALT 31 alk phos 60 and CK level 

pending.  And UDS negative.





Current medications reviewed.





Objective





- Vital Signs


Vital signs: 


                                   Vital Signs











Temp  97.4 F L  04/09/24 07:32


 


Pulse  115 H  04/09/24 12:01


 


Resp  20   04/09/24 12:01


 


BP  155/75   04/09/24 12:01


 


Pulse Ox  90 L  04/09/24 12:01


 


FiO2      








                                 Intake & Output











 04/08/24 04/09/24 04/09/24





 18:59 06:59 18:59


 


Intake Total  1080 


 


Output Total 200 450 200


 


Balance -200 630 -200


 


Weight 72.575 kg  


 


Intake:   


 


  Intake, IV Titration  960 





  Amount   


 


    Sodium Chloride 0.9% 1,  260 





    000 ml @ 130 mls/hr IV .   





    [id number] [facility] Rx#:[id number]   


 


    Sodium Chloride 0.9% 1,  700 





    000 ml @ 75 mls/hr IV .   





    [id number] [facility] Rx#:[id number]   


 


  Oral  120 


 


Output:   


 


  Urine 200 450 200


 


    Straight 200 450 


 


Other:   


 


  Voiding Method   Diaper


 


  # Voids 0 0 1


 


  # Bowel Movements   1














- Exam








PHYSICAL EXAMINATION: 


Patient is lying in the bed, no acute distress, awake alert and oriented, shaky 


HEENT: Normocephalic. Neck is supple. Pupils reactive. Nostrils clear. Oral 

cavity is moist. 


Neck reveals no JVD, carotid bruits, or thyromegaly. 


CHEST EXAMINATION: Trachea is central. Symmetrical expansion. Lung fields clear 

to auscultation and percussion. 


CARDIAC: Normal S1, S2 with no gallops. No murmurs 


ABDOMEN: Soft. Bowel sounds normal. No organomegaly. No abdominal bruits. 


Extremities: reveal no edema.  No clubbing or cyanosis


Neurologically awake, alert, oriented x 2-3 with well-coordinated movements.  No

focal deficits noted


Skin: No rash or skin lesions. 


Psychiatric: Coperative.  Nonsuicidal


Musculoskeletal: No joint swelling or deformity.  Normal range of motion.





- Labs


CBC & Chem 7: 


                                 04/15/24 06:16





                                 04/17/24 07:54


Labs: 


                  Abnormal Lab Results - Last 24 Hours (Table)











  04/09/24 04/09/24 Range/Units





  07:03 07:03 


 


RBC  3.57 L   (4.10-5.20)  X 10*6/uL


 


Hgb  11.7 L   (12.0-15.0)  g/dL


 


Hct  36.4 L   (37.2-46.3)  %


 


MCV  102.0 H   (80.0-97.0)  FL


 


MCH  32.8 H   (27.0-32.0)  pg


 


Anion Gap   12.40 H  (4.00-12.00)  mmol/L


 


Creatinine   0.4 L  (0.6-1.5)  mg/dL


 


BUN/Creatinine Ratio   40.50 H  (12.00-20.00)  Ratio


 


AST   36 H  (13-35)  U/L


 


Total Protein   5.8 L  (6.2-8.2)  g/dL














Assessment and Plan


Assessment: 





Status post mechanical fall


Acute alcohol intoxication with level 37 on admission


Acute alcohol withdrawal symptoms


Acute rhabdomyolysis due to fall and was found on the floor.


Lactic acidosis due to dehydration and volume depletion/tissue hypoperfusion


Hypomagnesemia.


Leukocytosis likely reactive.


Severe alcohol abuse


Prior history of smoking


Anxiety/depression


Hypertension


Hyperlipidemia


Osteoarthritis


GI and DVT prophylaxis





Plan:


Patient will be continued on IV hydration with normal saline and continue to 

monitor for alcohol withdrawal symptoms.  Continue with thiamine and 

multivitamins and repeat lactic acid level.


Replace electrolytes.  Ordered repeat CK level and follow-up renal function.


Continue with CIWA protocol for alcohol withdrawal symptoms.  Continue to follow

closely.

## 2024-04-17 NOTE — XR
EXAMINATION TYPE: XR chest 1V portable

 

DATE OF EXAM: 4/17/2024

 

CLINICAL HISTORY: Difficulty breathing progress study.  

 

TECHNIQUE: Single AP portable upright view of the chest is obtained.

 

COMPARISON: Chest x-ray from 8 days earlier

 

FINDINGS:  Persistent low lung volumes and bibasilar opacities. Upper lungs remain clear. Cardiac jcarlos
houette size is stable and within normal limits. Old right posterior lateral rib fractures redemonstr
ated.

 

IMPRESSION: Low lung volumes with bibasilar opacities favoring atelectasis redemonstrated. No new acu
te pulmonary infiltrate.

## 2024-04-18 VITALS — TEMPERATURE: 98.8 F | RESPIRATION RATE: 18 BRPM | DIASTOLIC BLOOD PRESSURE: 79 MMHG | SYSTOLIC BLOOD PRESSURE: 127 MMHG

## 2024-04-18 VITALS — HEART RATE: 90 BPM

## 2024-04-21 ENCOUNTER — HOSPITAL ENCOUNTER (OUTPATIENT)
Dept: HOSPITAL 47 - EC | Age: 64
Setting detail: OBSERVATION
LOS: 4 days | Discharge: HOME | End: 2024-04-25
Attending: HOSPITALIST | Admitting: HOSPITALIST
Payer: MEDICARE

## 2024-04-21 VITALS — BODY MASS INDEX: 26.6 KG/M2

## 2024-04-21 DIAGNOSIS — R26.9: ICD-10-CM

## 2024-04-21 DIAGNOSIS — E72.20: ICD-10-CM

## 2024-04-21 DIAGNOSIS — F32.A: ICD-10-CM

## 2024-04-21 DIAGNOSIS — E86.0: ICD-10-CM

## 2024-04-21 DIAGNOSIS — R29.6: ICD-10-CM

## 2024-04-21 DIAGNOSIS — I10: ICD-10-CM

## 2024-04-21 DIAGNOSIS — Z11.59: ICD-10-CM

## 2024-04-21 DIAGNOSIS — F10.229: Primary | ICD-10-CM

## 2024-04-21 DIAGNOSIS — Z91.040: ICD-10-CM

## 2024-04-21 DIAGNOSIS — R74.01: ICD-10-CM

## 2024-04-21 DIAGNOSIS — M19.90: ICD-10-CM

## 2024-04-21 DIAGNOSIS — F41.9: ICD-10-CM

## 2024-04-21 DIAGNOSIS — Z87.891: ICD-10-CM

## 2024-04-21 DIAGNOSIS — E78.5: ICD-10-CM
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
## 2024-04-21 DIAGNOSIS — E87.5: ICD-10-CM

## 2024-04-21 DIAGNOSIS — Z91.199: ICD-10-CM

## 2024-04-21 DIAGNOSIS — Z79.899: ICD-10-CM

## 2024-04-21 DIAGNOSIS — M62.82: ICD-10-CM

## 2024-04-21 DIAGNOSIS — Z88.4: ICD-10-CM

## 2024-04-21 DIAGNOSIS — R62.7: ICD-10-CM

## 2024-04-21 DIAGNOSIS — Z88.5: ICD-10-CM

## 2024-04-21 DIAGNOSIS — M25.551: ICD-10-CM

## 2024-04-21 DIAGNOSIS — R32: ICD-10-CM

## 2024-04-21 DIAGNOSIS — R15.9: ICD-10-CM

## 2024-04-21 DIAGNOSIS — Z11.52: ICD-10-CM

## 2024-04-21 LAB
ALBUMIN SERPL-MCNC: 4.9 G/DL (ref 3.5–5)
ALP SERPL-CCNC: 55 U/L (ref 38–126)
ALT SERPL-CCNC: 166 U/L (ref 4–34)
ANION GAP SERPL CALC-SCNC: 17 MMOL/L
AST SERPL-CCNC: 103 U/L (ref 14–36)
BASOPHILS # BLD AUTO: 0.1 K/UL (ref 0–0.2)
BASOPHILS NFR BLD AUTO: 1 %
BUN SERPL-SCNC: 22 MG/DL (ref 7–17)
CALCIUM SPEC-MCNC: 9.7 MG/DL (ref 8.4–10.2)
CHLORIDE SERPL-SCNC: 102 MMOL/L (ref 98–107)
CK SERPL-CCNC: 524 U/L (ref 30–135)
CO2 SERPL-SCNC: 23 MMOL/L (ref 22–30)
EOSINOPHIL # BLD AUTO: 0.1 K/UL (ref 0–0.7)
EOSINOPHIL NFR BLD AUTO: 1 %
ERYTHROCYTE [DISTWIDTH] IN BLOOD BY AUTOMATED COUNT: 4.24 M/UL (ref 3.8–5.4)
ERYTHROCYTE [DISTWIDTH] IN BLOOD: 13.4 % (ref 11.5–15.5)
GLUCOSE BLD-MCNC: 89 MG/DL (ref 70–110)
GLUCOSE SERPL-MCNC: 133 MG/DL (ref 74–99)
HCT VFR BLD AUTO: 41.6 % (ref 34–46)
HGB BLD-MCNC: 14.1 GM/DL (ref 11.4–16)
KETONES UR QL STRIP.AUTO: (no result)
LIPASE SERPL-CCNC: 288 U/L (ref 23–300)
LYMPHOCYTES # SPEC AUTO: 2.8 K/UL (ref 1–4.8)
LYMPHOCYTES NFR SPEC AUTO: 28 %
MAGNESIUM SPEC-SCNC: 1.8 MG/DL (ref 1.6–2.3)
MCH RBC QN AUTO: 33.3 PG (ref 25–35)
MCHC RBC AUTO-ENTMCNC: 33.9 G/DL (ref 31–37)
MCV RBC AUTO: 98.2 FL (ref 80–100)
MONOCYTES # BLD AUTO: 0.6 K/UL (ref 0–1)
MONOCYTES NFR BLD AUTO: 6 %
NEUTROPHILS # BLD AUTO: 6.2 K/UL (ref 1.3–7.7)
NEUTROPHILS NFR BLD AUTO: 63 %
PH UR: 5.5 [PH] (ref 5–8)
PLATELET # BLD AUTO: 316 K/UL (ref 150–450)
POTASSIUM SERPL-SCNC: 5.3 MMOL/L (ref 3.5–5.1)
PROT SERPL-MCNC: 8.1 G/DL (ref 6.3–8.2)
SODIUM SERPL-SCNC: 142 MMOL/L (ref 137–145)
SP GR UR: 1.03 (ref 1–1.03)
UROBILINOGEN UR QL STRIP: <2 MG/DL (ref ?–2)
WBC # BLD AUTO: 9.9 K/UL (ref 3.8–10.6)

## 2024-04-21 PROCEDURE — 97162 PT EVAL MOD COMPLEX 30 MIN: CPT

## 2024-04-21 PROCEDURE — 85025 COMPLETE CBC W/AUTO DIFF WBC: CPT

## 2024-04-21 PROCEDURE — 80048 BASIC METABOLIC PNL TOTAL CA: CPT

## 2024-04-21 PROCEDURE — 84484 ASSAY OF TROPONIN QUANT: CPT

## 2024-04-21 PROCEDURE — 80320 DRUG SCREEN QUANTALCOHOLS: CPT

## 2024-04-21 PROCEDURE — 71046 X-RAY EXAM CHEST 2 VIEWS: CPT

## 2024-04-21 PROCEDURE — 81003 URINALYSIS AUTO W/O SCOPE: CPT

## 2024-04-21 PROCEDURE — 96374 THER/PROPH/DIAG INJ IV PUSH: CPT

## 2024-04-21 PROCEDURE — 82550 ASSAY OF CK (CPK): CPT

## 2024-04-21 PROCEDURE — 82140 ASSAY OF AMMONIA: CPT

## 2024-04-21 PROCEDURE — 93005 ELECTROCARDIOGRAM TRACING: CPT

## 2024-04-21 PROCEDURE — 99285 EMERGENCY DEPT VISIT HI MDM: CPT

## 2024-04-21 PROCEDURE — 96361 HYDRATE IV INFUSION ADD-ON: CPT

## 2024-04-21 PROCEDURE — 80306 DRUG TEST PRSMV INSTRMNT: CPT

## 2024-04-21 PROCEDURE — 96376 TX/PRO/DX INJ SAME DRUG ADON: CPT

## 2024-04-21 PROCEDURE — 80053 COMPREHEN METABOLIC PANEL: CPT

## 2024-04-21 PROCEDURE — 83735 ASSAY OF MAGNESIUM: CPT

## 2024-04-21 PROCEDURE — 87636 SARSCOV2 & INF A&B AMP PRB: CPT

## 2024-04-21 PROCEDURE — 83690 ASSAY OF LIPASE: CPT

## 2024-04-21 PROCEDURE — 97166 OT EVAL MOD COMPLEX 45 MIN: CPT

## 2024-04-21 PROCEDURE — 73502 X-RAY EXAM HIP UNI 2-3 VIEWS: CPT

## 2024-04-21 PROCEDURE — 97530 THERAPEUTIC ACTIVITIES: CPT

## 2024-04-21 PROCEDURE — 36415 COLL VENOUS BLD VENIPUNCTURE: CPT

## 2024-04-21 PROCEDURE — 96372 THER/PROPH/DIAG INJ SC/IM: CPT

## 2024-04-21 PROCEDURE — 97116 GAIT TRAINING THERAPY: CPT

## 2024-04-21 PROCEDURE — 96375 TX/PRO/DX INJ NEW DRUG ADDON: CPT

## 2024-04-21 PROCEDURE — 94760 N-INVAS EAR/PLS OXIMETRY 1: CPT

## 2024-04-21 RX ADMIN — IBUPROFEN PRN MG: 400 TABLET, FILM COATED ORAL at 21:55

## 2024-04-21 RX ADMIN — CEFAZOLIN SCH MLS/HR: 330 INJECTION, POWDER, FOR SOLUTION INTRAMUSCULAR; INTRAVENOUS at 19:24

## 2024-04-21 RX ADMIN — KETOROLAC TROMETHAMINE STA MG: 15 INJECTION, SOLUTION INTRAMUSCULAR; INTRAVENOUS at 16:26

## 2024-04-21 RX ADMIN — SODIUM CHLORIDE STA MG: 900 INJECTION, SOLUTION INTRAVENOUS at 19:24

## 2024-04-21 RX ADMIN — Medication SCH MG: at 20:09

## 2024-04-21 NOTE — XR
EXAMINATION TYPE: XR Hip RT and AP Pelvis

 

DATE OF EXAM: 4/21/2024 4:41 PM

 

CLINICAL INDICATION:Female, 63 years old with history of Right hip pain; PHH

 

COMPARISON: None.

 

TECHNIQUE: The right hip was examined in the frontal and lateral projections and a AP pelvis. 

 

FINDINGS: Mild degenerative changes of the lower lumbar spine. Pelvis is symmetrical and intact. No h
ip fracture or dislocation. No lytic/blastic lesion. Unremarkable soft tissues.

 

IMPRESSION:

 

No acute radiographic abnormality of the right hip.

## 2024-04-21 NOTE — ED
Weakness HPI





- General


Stated complaint: ETOH


Time Seen by Provider: 04/21/24 14:51


Source: patient, EMS, RN notes reviewed


Mode of arrival: EMS





- History of Present Illness


Initial comments: 





63-year-old female with a history of alcohol abuse also history of 

rhabdomyolysis history of hypertension anxiety depression who is here by EMS 

today because they were called upon a food delivery when the patient cannot go 

to the door to let the food delivery people when she crawled to the door to let 

them and she apparently had been incontinent of urine and stool.  She was just 

discharged from the hospital 2 days ago and was at home.  She denies any recent 

fall but she complains of right hip pain and inability to use the right leg very

well for the past year she does normally use a walker.  She denies any head neck

or back pain he is not really sure why she is here she states


MD Complaint: generalized weakness





- Related Data


                                Home Medications











 Medication  Instructions  Recorded  Confirmed


 


Escitalopram [Lexapro] 20 mg PO DAILY 03/10/24 04/08/24








                                  Previous Rx's











 Medication  Instructions  Recorded


 


Pantoprazole [Protonix] 40 mg PO DAILY #30 tab 12/20/23


 


Acetaminophen Tab [Tylenol] 650 mg PO Q6HR PRN  tab 02/06/24


 


Folic Acid 1 mg PO DAILY #30 tablet 02/06/24


 


Ibuprofen [Motrin] 400 mg PO Q6HR PRN  tab 02/06/24


 


Multivitamins, Thera [Multivitamin 1 tab PO DAILY #30 tablet 02/06/24





(formulary)]  


 


QUEtiapine [SEROquel] 200 mg PO HS 30 Days #30 tab 02/06/24


 


Thiamine [Vitamin B-1] 100 mg PO DAILY 30 Days #30 tab 02/06/24


 


traZODone HCL [Desyrel] 50 mg PO HS PRN  tab 02/06/24


 


Cholecalciferol [Vitamin D3 (25 50 mcg PO DAILY  tab 02/29/24





Mcg = 1000 Iu)]  


 


Magnesium Oxide [Mag-Ox] 400 mg PO BID #60 tab 04/18/24











                                    Allergies











Allergy/AdvReac Type Severity Reaction Status Date / Time


 


latex Allergy Unknown Itching, Verified 04/21/24 16:21





   Blisters  


 


codeine AdvReac Severe HEADACHE Verified 04/21/24 16:21


 


hydrocodone [From Vicodin] AdvReac Severe HEADACHE Verified 04/21/24 16:21


 


Anesthetics - Amide Type - AdvReac  GAS GIVES Verified 04/21/24 16:21





Select A   HER  





   HEADACHE,  





   VOMITING,  





   HEART  





   PALIPITATIONS  


 


Anesthetics - Juliana Type- AdvReac  GAS GIVES Verified 04/21/24 16:21





Parabens   HER  





   HEADACHE,  





   VOMITING,  





   HEART  





   PALIPITATIONS  


 


ANESTHESIA AdvReac Unknown GAS GIVES Uncoded 04/21/24 16:21





   HER  





   HEADACHE,  





   VOMITING,  





   HEART  





   PALIPITATIONS  














Review of Systems


ROS Statement: 


Those systems with pertinent positive or pertinent negative responses have been 

documented in the HPI.





ROS Other: All systems not noted in ROS Statement are negative.





Past Medical History


Past Medical History: Hyperlipidemia, Hypertension, Osteoarthritis (OA)


Additional Past Medical History / Comment(s): Multiple recent falls. hx colon 

polyps, occasional diarrhea, hx of fall Nov 2020 and has been having pain right 

hip since that radiates down right leg, walks with limp., states breast implants

moving up.


History of Any Multi-Drug Resistant Organisms: None Reported


Past Surgical History: Breast Surgery, Orthopedic Surgery, Tonsillectomy


Additional Past Surgical History / Comment(s): right elbow surgery with screws, 

right knee surgery with plate and removal ., lumpectomy left breast, breast 

implants


Past Anesthesia/Blood Transfusion Reactions: Postoperative Nausea & Vomiting 

(PONV)


Past Psychological History: Anxiety, Depression


Smoking Status: Former smoker


Past Alcohol Use History: Abuse, Daily, Heavy


Past Drug Use History: None Reported





- Past Family History


  ** Father


Family Medical History: Cancer





General Exam





- General Exam Comments


Initial Comments: 





This is a well-developed well-nourished awake alert oriented x 4 female


Limitations: physical limitation


General appearance: alert, anxious


Head exam: Present: atraumatic, normocephalic, normal inspection


Eye exam: Present: normal appearance, PERRL, EOMI.  Absent: scleral icterus, 

conjunctival injection, periorbital swelling


ENT exam: Present: mucous membranes dry


Neck exam: Present: normal inspection, other (No stridor JVD or bruits).  

Absent: tenderness, meningismus, lymphadenopathy


Respiratory exam: Present: normal lung sounds bilaterally.  Absent: respiratory 

distress, wheezes, rales, rhonchi, stridor


Cardiovascular Exam: Present: regular rate, normal rhythm, normal heart sounds. 

Absent: systolic murmur, diastolic murmur, rubs, gallop, clicks


GI/Abdominal exam: Present: soft, normal bowel sounds.  Absent: distended, 

tenderness, guarding, rebound, rigid


Rectal exam: Present: deferred


Extremities exam: Present: normal inspection, tenderness (To palpation of the 

right hip though there is no evidence of deformity no shortening rotation), 

normal capillary refill.  Absent: full ROM, pedal edema, joint swelling, calf 

tenderness


Back exam: Present: normal inspection, full ROM, other (Patient was noted to be 

incontinent of urine and stool).  Absent: tenderness


Neurological exam: Present: alert, oriented X3, CN II-XII intact, reflexes 

normal.  Absent: motor sensory deficit


Psychiatric exam: Present: normal affect, anxious


Skin exam: Present: warm, dry, intact, normal color.  Absent: rash





Course


                                   Vital Signs











  04/21/24





  14:55


 


Temperature 97.8 F


 


Pulse Rate 98


 


Respiratory 18





Rate 


 


Blood Pressure 111/60


 


O2 Sat by Pulse 98





Oximetry 














Medical Decision Making





- Medical Decision Making





I did discuss the findings with the patient and also with Emelina Toussaint covering

for Dr. Larios the patient be admitted for evaluation of alcohol intoxication and

abuse rhabdomyolysis dehydration elevated ammonia levels and failure to thrive. 

MDM was pt. sent in by a medical professional or institution (, PA, NP, 

urgent care, hospital, or nursing home...) When possible be specific


@  -No


Did you speak to anyone other than the patient for history (EMS, parent, family,

police, friend...)? What history was obtained from this source 


@  -No


Did you review nursing and triage notes (agree or disagree)?  Why? 


@  -I reviewed and agree with nursing and triage notes


Were old charts reviewed (outside hosp., previous admission, EMS record, old 

EKG, old radiological studies, urgent care reports/EKG's, nursing home records)?

Report findings 


@  -Recent old charts were reviewed


Differential Diagnosis (chest pain, altered mental status, abdominal pain women,

abdominal pain men, vaginal bleeding, weakness, fever, dyspnea, syncope, 

headache, dizziness, GI bleed, back pain, seizure, CVA, palpatations, mental 

health, musculoskeletal)? 


@  -Yes, alcohol abuse


EKG interpreted by me (3pts min.).


@  -As above EKG interpreted by me sinus tachycardia rate 118 parable 161 QRS 

duration 101 QT/QTc 332/402 possible left atrial enlargement nonspecific T wave 

configuration


X-rays interpreted by me (1pt min.).


@  -X-rays interpreted by me no evidence of acute fractures or abnormalities


CT interpreted by me (1pt min.).


@  -None done


U/S interpreted by me (1pt. min.).


@  -None done


What testing was considered but not performed or refused? (CT, X-rays, U/S, 

labs)? Why?


@  -None


What meds were considered but not given or refused? Why?


@  -None


Did you discuss the management of the patient with other professionals 

(professionals i.e. DrMoriah, PA, NP, lab, RT, psych nurse, , , 

teacher, , )? Give summary


@  -Emelina Toussaint for Dr. Larios


Was smoking cessation discussed for >3mins.?


@  -No


Was critical care preformed (if so, how long)?


@  -No


Were there social determinants of health that impacted care today? How? 

(Homelessness, low income, unemployed, alcoholism, drug addiction, 

transportation, low edu. Level, literacy, decrease access to med. care, skilled nursing, 

rehab)?


@  -Call-ism and inability to ambulate


Was there de-escalation of care discussed even if they declined (Discuss DNR or 

withdrawal of care, Hospice)? DNR status


@  -No


What co-morbidities impacted this encounter? (DM, HTN, Smoking, COPD, CAD, 

Cancer, CVA, ARF, Chemo, Hep., AIDS, mental health diagnosis, sleep apnea, 

morbid obesity)?


@  -Alcohol abuse, hypertension, anxiety and depression


Was patient admitted / discharged? Hospital course, mention meds given and 

route, prescriptions, significant lab abnormalities, going to OR and other 

pertinent info.


@  -Hospital course patient was admitted for inpatient evaluation of alcohol 

abuse and inability to care for self, failure to thrive, alcoholism, 

rhabdomyolysis


Undiagnosed new problem with uncertain prognosis?


@  -Alcoholism not new, dehydration not new, inability to ambulate and failure 

to thrive]


Drug Therapy requiring intensive monitoring for toxicity (Heparin, Nitro, 

Insulin, Cardizem)?


@  -No


Were any procedures done?


@  -No


Diagnosis/symptom?


@  -Failure to thrive, alcohol abuse, rhabdomyolysis, elevated ammonia level, 

dehydration


Acute, or Chronic, or Acute on Chronic?


@  -Acute on chronic


Uncomplicated (without systemic symptoms) or Complicated (systemic symptoms)?


@  -Complicated


Side effects of treatment?


@  -No


Exacerbation, Progression, or Severe Exacerbation?


@  -No


Poses a threat to life or bodily function? How? (Chest pain, USA, MI, pneumonia,

PE, COPD, DKA, ARF, appy, cholecystitis, CVA, Diverticulitis, Homicidal, 

Suicidal, threat to staff... and all critical care pts)


@  -Potential








- Lab Data


Result diagrams: 


                                 04/21/24 15:59





                                 04/21/24 15:59


                                   Lab Results











  04/21/24 04/21/24 04/21/24 Range/Units





  15:59 15:59 15:59 


 


WBC  9.9    (3.8-10.6)  k/uL


 


RBC  4.24    (3.80-5.40)  m/uL


 


Hgb  14.1    (11.4-16.0)  gm/dL


 


Hct  41.6    (34.0-46.0)  %


 


MCV  98.2    (80.0-100.0)  fL


 


MCH  33.3    (25.0-35.0)  pg


 


MCHC  33.9    (31.0-37.0)  g/dL


 


RDW  13.4    (11.5-15.5)  %


 


Plt Count  316    (150-450)  k/uL


 


MPV  8.5    


 


Neutrophils %  63    %


 


Lymphocytes %  28    %


 


Monocytes %  6    %


 


Eosinophils %  1    %


 


Basophils %  1    %


 


Neutrophils #  6.2    (1.3-7.7)  k/uL


 


Lymphocytes #  2.8    (1.0-4.8)  k/uL


 


Monocytes #  0.6    (0-1.0)  k/uL


 


Eosinophils #  0.1    (0-0.7)  k/uL


 


Basophils #  0.1    (0-0.2)  k/uL


 


Sodium   142   (137-145)  mmol/L


 


Potassium   5.3 H   (3.5-5.1)  mmol/L


 


Chloride   102   ()  mmol/L


 


Carbon Dioxide   23   (22-30)  mmol/L


 


Anion Gap   17   mmol/L


 


BUN   22 H   (7-17)  mg/dL


 


Creatinine   0.43 L   (0.52-1.04)  mg/dL


 


Est GFR (CKD-EPI)AfAm   >90   (>60 ml/min/1.73 sqM)  


 


Est GFR (CKD-EPI)NonAf   >90   (>60 ml/min/1.73 sqM)  


 


Glucose   133 H   (74-99)  mg/dL


 


Calcium   9.7   (8.4-10.2)  mg/dL


 


Magnesium   1.8   (1.6-2.3)  mg/dL


 


Total Bilirubin   0.6   (0.2-1.3)  mg/dL


 


AST   103 H   (14-36)  U/L


 


ALT   166 H   (4-34)  U/L


 


Alkaline Phosphatase   55   ()  U/L


 


Ammonia    41 H  (<30)  umol/L


 


Creatine Kinase   524 H   ()  U/L


 


Troponin I     (0.000-0.034)  ng/mL


 


Total Protein   8.1   (6.3-8.2)  g/dL


 


Albumin   4.9   (3.5-5.0)  g/dL


 


Lipase   288   ()  U/L


 


Urine Color     


 


Urine Appearance     (Clear)  


 


Urine pH     (5.0-8.0)  


 


Ur Specific Gravity     (1.001-1.035)  


 


Urine Protein     (Negative)  


 


Urine Glucose (UA)     (Negative)  


 


Urine Ketones     (Negative)  


 


Urine Blood     (Negative)  


 


Urine Nitrite     (Negative)  


 


Urine Bilirubin     (Negative)  


 


Urine Urobilinogen     (<2.0)  mg/dL


 


Ur Leukocyte Esterase     (Negative)  


 


Urine Opiates Screen     (NotDetected)  


 


Ur Oxycodone Screen     (NotDetected)  


 


Urine Methadone Screen     (NotDetected)  


 


Ur Barbiturates Screen     (NotDetected)  


 


U Tricyclic Antidepress     (NotDetected)  


 


Ur Phencyclidine Scrn     (NotDetected)  


 


Ur Amphetamines Screen     (NotDetected)  


 


U Methamphetamines Scrn     (NotDetected)  


 


U Benzodiazepines Scrn     (NotDetected)  


 


Urine Cocaine Screen     (NotDetected)  


 


U Marijuana (THC) Screen     (NotDetected)  


 


Serum Alcohol   109   mg/dL


 


Influenza Type A (PCR)     (Not Detectd)  


 


Influenza Type B (PCR)     (Not Detectd)  


 


RSV (PCR)     (Not Detectd)  


 


SARS-CoV-2 (PCR)     (Not Detectd)  














  04/21/24 04/21/24 04/21/24 Range/Units





  15:59 15:59 17:25 


 


WBC     (3.8-10.6)  k/uL


 


RBC     (3.80-5.40)  m/uL


 


Hgb     (11.4-16.0)  gm/dL


 


Hct     (34.0-46.0)  %


 


MCV     (80.0-100.0)  fL


 


MCH     (25.0-35.0)  pg


 


MCHC     (31.0-37.0)  g/dL


 


RDW     (11.5-15.5)  %


 


Plt Count     (150-450)  k/uL


 


MPV     


 


Neutrophils %     %


 


Lymphocytes %     %


 


Monocytes %     %


 


Eosinophils %     %


 


Basophils %     %


 


Neutrophils #     (1.3-7.7)  k/uL


 


Lymphocytes #     (1.0-4.8)  k/uL


 


Monocytes #     (0-1.0)  k/uL


 


Eosinophils #     (0-0.7)  k/uL


 


Basophils #     (0-0.2)  k/uL


 


Sodium     (137-145)  mmol/L


 


Potassium     (3.5-5.1)  mmol/L


 


Chloride     ()  mmol/L


 


Carbon Dioxide     (22-30)  mmol/L


 


Anion Gap     mmol/L


 


BUN     (7-17)  mg/dL


 


Creatinine     (0.52-1.04)  mg/dL


 


Est GFR (CKD-EPI)AfAm     (>60 ml/min/1.73 sqM)  


 


Est GFR (CKD-EPI)NonAf     (>60 ml/min/1.73 sqM)  


 


Glucose     (74-99)  mg/dL


 


Calcium     (8.4-10.2)  mg/dL


 


Magnesium     (1.6-2.3)  mg/dL


 


Total Bilirubin     (0.2-1.3)  mg/dL


 


AST     (14-36)  U/L


 


ALT     (4-34)  U/L


 


Alkaline Phosphatase     ()  U/L


 


Ammonia     (<30)  umol/L


 


Creatine Kinase     ()  U/L


 


Troponin I  0.025    (0.000-0.034)  ng/mL


 


Total Protein     (6.3-8.2)  g/dL


 


Albumin     (3.5-5.0)  g/dL


 


Lipase     ()  U/L


 


Urine Color     


 


Urine Appearance     (Clear)  


 


Urine pH     (5.0-8.0)  


 


Ur Specific Gravity     (1.001-1.035)  


 


Urine Protein     (Negative)  


 


Urine Glucose (UA)     (Negative)  


 


Urine Ketones     (Negative)  


 


Urine Blood     (Negative)  


 


Urine Nitrite     (Negative)  


 


Urine Bilirubin     (Negative)  


 


Urine Urobilinogen     (<2.0)  mg/dL


 


Ur Leukocyte Esterase     (Negative)  


 


Urine Opiates Screen    Not Detected  (NotDetected)  


 


Ur Oxycodone Screen    Not Detected  (NotDetected)  


 


Urine Methadone Screen    Not Detected  (NotDetected)  


 


Ur Barbiturates Screen    Not Detected  (NotDetected)  


 


U Tricyclic Antidepress    Not Detected  (NotDetected)  


 


Ur Phencyclidine Scrn    Not Detected  (NotDetected)  


 


Ur Amphetamines Screen    Not Detected  (NotDetected)  


 


U Methamphetamines Scrn    Not Detected  (NotDetected)  


 


U Benzodiazepines Scrn    Detected H  (NotDetected)  


 


Urine Cocaine Screen    Not Detected  (NotDetected)  


 


U Marijuana (THC) Screen    Not Detected  (NotDetected)  


 


Serum Alcohol     mg/dL


 


Influenza Type A (PCR)   Not Detected   (Not Detectd)  


 


Influenza Type B (PCR)   Not Detected   (Not Detectd)  


 


RSV (PCR)   Not Detected   (Not Detectd)  


 


SARS-CoV-2 (PCR)   Not Detected   (Not Detectd)  














  04/21/24 Range/Units





  17:25 


 


WBC   (3.8-10.6)  k/uL


 


RBC   (3.80-5.40)  m/uL


 


Hgb   (11.4-16.0)  gm/dL


 


Hct   (34.0-46.0)  %


 


MCV   (80.0-100.0)  fL


 


MCH   (25.0-35.0)  pg


 


MCHC   (31.0-37.0)  g/dL


 


RDW   (11.5-15.5)  %


 


Plt Count   (150-450)  k/uL


 


MPV   


 


Neutrophils %   %


 


Lymphocytes %   %


 


Monocytes %   %


 


Eosinophils %   %


 


Basophils %   %


 


Neutrophils #   (1.3-7.7)  k/uL


 


Lymphocytes #   (1.0-4.8)  k/uL


 


Monocytes #   (0-1.0)  k/uL


 


Eosinophils #   (0-0.7)  k/uL


 


Basophils #   (0-0.2)  k/uL


 


Sodium   (137-145)  mmol/L


 


Potassium   (3.5-5.1)  mmol/L


 


Chloride   ()  mmol/L


 


Carbon Dioxide   (22-30)  mmol/L


 


Anion Gap   mmol/L


 


BUN   (7-17)  mg/dL


 


Creatinine   (0.52-1.04)  mg/dL


 


Est GFR (CKD-EPI)AfAm   (>60 ml/min/1.73 sqM)  


 


Est GFR (CKD-EPI)NonAf   (>60 ml/min/1.73 sqM)  


 


Glucose   (74-99)  mg/dL


 


Calcium   (8.4-10.2)  mg/dL


 


Magnesium   (1.6-2.3)  mg/dL


 


Total Bilirubin   (0.2-1.3)  mg/dL


 


AST   (14-36)  U/L


 


ALT   (4-34)  U/L


 


Alkaline Phosphatase   ()  U/L


 


Ammonia   (<30)  umol/L


 


Creatine Kinase   ()  U/L


 


Troponin I   (0.000-0.034)  ng/mL


 


Total Protein   (6.3-8.2)  g/dL


 


Albumin   (3.5-5.0)  g/dL


 


Lipase   ()  U/L


 


Urine Color  Yellow  


 


Urine Appearance  Clear  (Clear)  


 


Urine pH  5.5  (5.0-8.0)  


 


Ur Specific Gravity  1.028  (1.001-1.035)  


 


Urine Protein  Trace H  (Negative)  


 


Urine Glucose (UA)  Negative  (Negative)  


 


Urine Ketones  1+ H  (Negative)  


 


Urine Blood  Negative  (Negative)  


 


Urine Nitrite  Negative  (Negative)  


 


Urine Bilirubin  Negative  (Negative)  


 


Urine Urobilinogen  <2.0  (<2.0)  mg/dL


 


Ur Leukocyte Esterase  Negative  (Negative)  


 


Urine Opiates Screen   (NotDetected)  


 


Ur Oxycodone Screen   (NotDetected)  


 


Urine Methadone Screen   (NotDetected)  


 


Ur Barbiturates Screen   (NotDetected)  


 


U Tricyclic Antidepress   (NotDetected)  


 


Ur Phencyclidine Scrn   (NotDetected)  


 


Ur Amphetamines Screen   (NotDetected)  


 


U Methamphetamines Scrn   (NotDetected)  


 


U Benzodiazepines Scrn   (NotDetected)  


 


Urine Cocaine Screen   (NotDetected)  


 


U Marijuana (THC) Screen   (NotDetected)  


 


Serum Alcohol   mg/dL


 


Influenza Type A (PCR)   (Not Detectd)  


 


Influenza Type B (PCR)   (Not Detectd)  


 


RSV (PCR)   (Not Detectd)  


 


SARS-CoV-2 (PCR)   (Not Detectd)  














Disposition


Clinical Impression: 


 Alcoholic intoxication, Dehydration, Rhabdomyolysis, Failure to thrive, 

Hyperammonemia





Disposition: ADMITTED AS IP TO THIS HOSP


Condition: Fair


Referrals: 


Lisa Becerra MD [Primary Care Provider] - 1-2 days


Time of Disposition: 19:02


Decision Date: 04/21/24


Decision Time: 19:02

## 2024-04-21 NOTE — XR
EXAMINATION TYPE: XR chest 2V

 

DATE OF EXAM: 4/21/2024 4:41 PM

 

CLINICAL INDICATION:Female, 63 years old with history of Weakness; PHH

 

COMPARISON: None

 

TECHNIQUE: XR chest 2V. Frontal and lateral views of the chest..

 

FINDINGS: 

Lines/Tubes/Devices:

EKG leads overlie the chest.  No indwelling lines are seen.

 

Heart/mediastinum: Heart size upper normal.  Atherosclerotic calcifications are seen in the aorta.

 

Pulmonary vascularity: Pulmonary vascular congestion. Increased interstitial markings can be seen wit
h edema or pneumonitis. An element of chronic change is possible.

 

Lungs/Pleura: Possible tiny left pleural effusion. No focal consolidation or pneumothorax. No conflue
nt consolidation or pneumothorax.  

 

Musculoskeletal: No acute osseous abnormality demonstrated in the limits of the exam.  Suspect old he
aled rib fracture deformities in the upper right chest. 

 

Other findings: None.

 

IMPRESSION: 

Mild pulmonary vascular congestion, with increased interstitial markings concerning for edema, pneumo
nitis, and/or chronic changes.

## 2024-04-22 LAB
ANION GAP SERPL CALC-SCNC: 10.2 MMOL/L (ref 4–12)
BUN SERPL-SCNC: 20.6 MG/DL (ref 9–27)
BUN/CREAT SERPL: 41.2 RATIO (ref 12–20)
CALCIUM SPEC-MCNC: 9.5 MG/DL (ref 8.7–10.3)
CHLORIDE SERPL-SCNC: 103 MMOL/L (ref 96–109)
CO2 SERPL-SCNC: 29.8 MMOL/L (ref 21.6–31.8)
GLUCOSE SERPL-MCNC: 146 MG/DL (ref 70–110)
POTASSIUM SERPL-SCNC: 4.3 MMOL/L (ref 3.5–5.5)
SODIUM SERPL-SCNC: 143 MMOL/L (ref 135–145)

## 2024-04-22 RX ADMIN — THERA TABS SCH EACH: TAB at 08:30

## 2024-04-22 RX ADMIN — PANTOPRAZOLE SODIUM SCH MG: 40 INJECTION, POWDER, FOR SOLUTION INTRAVENOUS at 08:30

## 2024-04-22 RX ADMIN — LACTULOSE SCH: 20 SOLUTION ORAL at 08:30

## 2024-04-22 RX ADMIN — ESCITALOPRAM OXALATE SCH MG: 20 TABLET, FILM COATED ORAL at 08:30

## 2024-04-22 RX ADMIN — Medication SCH MG: at 08:30

## 2024-04-22 RX ADMIN — FOLIC ACID SCH MG: 1 TABLET ORAL at 08:30

## 2024-04-22 RX ADMIN — CEFAZOLIN SCH MLS/HR: 330 INJECTION, POWDER, FOR SOLUTION INTRAMUSCULAR; INTRAVENOUS at 23:13

## 2024-04-22 RX ADMIN — HEPARIN SODIUM SCH UNIT: 5000 INJECTION, SOLUTION INTRAVENOUS; SUBCUTANEOUS at 16:45

## 2024-04-22 RX ADMIN — Medication SCH MCG: at 08:30

## 2024-04-22 NOTE — P.HPIM
History of Present Illness


H&P Date: 04/22/24


Chief Complaint: Weakness





Patient is a 63-year-old female with a past medical history of hypertension, 

hyperlipidemia, osteoarthritis, severe alcohol abuse and multiple admissions due

to alcohol withdrawal symptoms recently, anxiety/depression, prior history of 

smoking and other multiple medical problems was brought to the hospital by EMS. 

Patient states that her neighbor called EMS when she found her crawling to the 

door to get the food from the delivery people.  Patient was also found to be 

incontinent of bowels and bladder.


Patient states that she has been very weak and could not walk by herself without

support..  Patient normally walks with a walker.  Denies any recent falls.  

Denies any neck Pain or headache.


Patient did drink alcohol yesterday morning.  Denies any fever or chills.  

Patient was recently discharged from hospital, admitted due to alcohol with

drawal symptoms.


Chest x-ray showed mild pulmonary vascular congestion, with increased 

interstitial markings concerning for edema, pneumonitis, and/or chronic changes.


Hip x-ray showed no acute radiographic abnormality of the right hip.


EKG showed sinus tachycardia.


Laboratory data showed sodium 142 potassium 5.3 chloride 102 bicarb is 23 BUN 22

and creatinine 0.43 and blood sugar 133


Magnesium 1.8, total bili 0.6   and alk phos 55 and CK5 24 

troponin x 1 negative and lipase level 288


Urinalysis is negative for infection.  UDS is positive for benzodiazepines and 

serum alcohol level 109 on admission


Influenza A B RSV and COVID-19 PCR not detected.








Review of Systems





Constitutional: Patient denies any fever or chills .  Complains of generalized 

weakness.  No loss of appetite.  


Abdomen: Patient denied nausea vomiting and diarrhea and abdominal pain.


Cardiovascular: Patient denies any chest pain or short of breath no 

palpitations.


Respiratory: patient denied any cough is from production.  No shortness of 

breath


Neurologic: Patient denied any numbness or tingling headache.


Musculoskeletal: Patient denies any complaints of joint swelling or deformity.


Skin: Negative


Psychiatric: Negative


Endocrine: No heat or cold intolerance.  No recent weight gain.


Genitourinary: No dysuria or hematuria.


All other 14 point ROS negative except the above





Past Medical History


Past Medical History: Hyperlipidemia, Hypertension, Osteoarthritis (OA)


Additional Past Medical History / Comment(s): Multiple recent falls. hx colon 

polyps, occasional diarrhea, hx of fall Nov 2020 and has been having pain right 

hip since that radiates down right leg, walks with limp., states breast implants

moving up.


History of Any Multi-Drug Resistant Organisms: None Reported


Past Surgical History: Breast Surgery, Orthopedic Surgery, Tonsillectomy


Additional Past Surgical History / Comment(s): right elbow surgery with screws, 

right knee surgery with plate and removal ., lumpectomy left breast, breast 

implants


Past Anesthesia/Blood Transfusion Reactions: Postoperative Nausea & Vomiting 

(PONV)


Past Psychological History: Anxiety, Depression


Smoking Status: Former smoker


Past Alcohol Use History: Abuse, Daily, Heavy


Additional Past Alcohol Use History / Comment(s): quit smoking 35 yrs ago 

(1986), hx of 2ppd, started age 15.


Past Drug Use History: None Reported


Additional Drug Use History / Comment(s): Patient states she opens a new bottle 

twice a month  gallon of vodka-drinks until its gone. Last drink in 3/21/2024





- Past Family History


  ** Father


Family Medical History: Cancer





Medications and Allergies


                                Home Medications











 Medication  Instructions  Recorded  Confirmed  Type


 


Pantoprazole [Protonix] 40 mg PO DAILY #30 tab 12/20/23 04/21/24 Rx


 


Acetaminophen Tab [Tylenol] 650 mg PO Q6HR PRN  tab 02/06/24 04/21/24 Rx


 


Folic Acid 1 mg PO DAILY #30 tablet 02/06/24 04/21/24 Rx


 


Ibuprofen [Motrin] 400 mg PO Q6HR PRN  tab 02/06/24 04/21/24 Rx


 


Multivitamins, Thera [Multivitamin 1 tab PO DAILY #30 tablet 02/06/24 04/21/24 

Rx





(formulary)]    


 


QUEtiapine [SEROquel] 200 mg PO HS 30 Days #30 tab 02/06/24 04/21/24 Rx


 


Thiamine [Vitamin B-1] 100 mg PO DAILY 30 Days #30 tab 02/06/24 04/21/24 Rx


 


traZODone HCL [Desyrel] 50 mg PO HS PRN  tab 02/06/24 04/21/24 Rx


 


Cholecalciferol [Vitamin D3 (25 50 mcg PO DAILY  tab 02/29/24 04/21/24 Rx





Mcg = 1000 Iu)]    


 


Escitalopram [Lexapro] 20 mg PO DAILY 03/10/24 04/21/24 History


 


Magnesium Oxide [Mag-Ox] 400 mg PO BID #60 tab 04/18/24 04/21/24 Rx








                                    Allergies











Allergy/AdvReac Type Severity Reaction Status Date / Time


 


latex Allergy Unknown Itching, Verified 04/21/24 19:00





   Blisters  


 


codeine AdvReac Severe HEADACHE Verified 04/21/24 19:00


 


hydrocodone [From Vicodin] AdvReac Severe HEADACHE Verified 04/21/24 19:00


 


Anesthetics - Amide Type - AdvReac  GAS GIVES Verified 04/21/24 19:00





Select A   HER  





   HEADACHE,  





   VOMITING,  





   HEART  





   PALIPITATIONS  


 


Anesthetics - Juliana Type- AdvReac  GAS GIVES Verified 04/21/24 19:00





Parabens   HER  





   HEADACHE,  





   VOMITING,  





   HEART  





   PALIPITATIONS  


 


ANESTHESIA AdvReac Unknown GAS GIVES Uncoded 04/21/24 19:00





   HER  





   HEADACHE,  





   VOMITING,  





   HEART  





   PALIPITATIONS  














Physical Exam


Vitals: 


                                   Vital Signs











  Temp Pulse Pulse Pulse Resp BP BP


 


 04/22/24 06:50  98.5 F    97  17   123/75


 


 04/22/24 01:03  98.6 F    109 H  18   99/65


 


 04/21/24 21:04  98.2 F    118 H  20   125/74


 


 04/21/24 20:37   104 H    18  


 


 04/21/24 19:56   105 H    16  115/75 


 


 04/21/24 19:36       


 


 04/21/24 15:30    118 H   20  


 


 04/21/24 14:55  97.8 F  98    18  111/60 














  Pulse Ox


 


 04/22/24 06:50  97


 


 04/22/24 01:03  95


 


 04/21/24 21:04  95


 


 04/21/24 20:37  97


 


 04/21/24 19:56  97


 


 04/21/24 19:36  91 L


 


 04/21/24 15:30 


 


 04/21/24 14:55  98








                                Intake and Output











 04/21/24 04/22/24 04/22/24





 22:59 06:59 14:59


 


Output Total 450  


 


Balance -450  


 


Output:   


 


  Urine 450  


 


    Straight 450  


 


Other:   


 


  # Voids  1 


 


  Weight 72.575 kg  














PHYSICAL EXAMINATION: 


Patient is sitting in the chair comfortably, no acute distress, awake alert and 

oriented.. 


HEENT: Normocephalic. Neck is supple. Pupils reactive. Nostrils clear. Oral 

cavity is moist. 


Neck reveals no JVD, carotid bruits, or thyromegaly. 


CHEST EXAMINATION: Trachea is central. Symmetrical expansion. Lung fields clear 

to auscultation and percussion. 


CARDIAC: Normal S1, S2 with no gallops. No murmurs 


ABDOMEN: Soft. Bowel sounds normal. No organomegaly. No abdominal bruits. 


Extremities: reveal no edema.  No clubbing or cyanosis


Neurologically awake, alert, oriented x3 with well-coordinated movements.  No 

focal deficits noted


Skin: No rash or skin lesions. 


Psychiatric: Coperative.  Nonsuicidal


Musculoskeletal: No joint swelling or deformity.  Normal range of motion.








Results


CBC & Chem 7: 


                                 04/21/24 15:59





                                 04/22/24 10:49


Labs: 


                  Abnormal Lab Results - Last 24 Hours (Table)











  04/21/24 04/21/24 04/21/24 Range/Units





  15:59 15:59 17:25 


 


Potassium  5.3 H    (3.5-5.1)  mmol/L


 


BUN  22 H    (7-17)  mg/dL


 


Creatinine  0.43 L    (0.52-1.04)  mg/dL


 


Glucose  133 H    (74-99)  mg/dL


 


AST  103 H    (14-36)  U/L


 


ALT  166 H    (4-34)  U/L


 


Ammonia   41 H   (<30)  umol/L


 


Creatine Kinase  524 H    ()  U/L


 


Urine Protein     (Negative)  


 


Urine Ketones     (Negative)  


 


U Benzodiazepines Scrn    Detected H  (NotDetected)  














  04/21/24 Range/Units





  17:25 


 


Potassium   (3.5-5.1)  mmol/L


 


BUN   (7-17)  mg/dL


 


Creatinine   (0.52-1.04)  mg/dL


 


Glucose   (74-99)  mg/dL


 


AST   (14-36)  U/L


 


ALT   (4-34)  U/L


 


Ammonia   (<30)  umol/L


 


Creatine Kinase   ()  U/L


 


Urine Protein  Trace H  (Negative)  


 


Urine Ketones  1+ H  (Negative)  


 


U Benzodiazepines Scrn   (NotDetected)  














Thrombosis Risk Factor Assmnt





- DVT/VTE Prophylaxis


DVT/VTE Prophylaxis: Pharmacologic Prophylaxis ordered





- Choose All That Apply


Any of the Below Risk Factors Present?: Yes


Each Risk Factor Represents 2 Points: Age 61-74 years


Thrombosis Risk Factor Assessment Total Risk Factor Score: 2


Thrombosis Risk Factor Assessment Level: Low Risk





Assessment and Plan


Assessment: 








Acute alcohol intoxication on admission


Generalized weakness and debility likely due to deconditioning


Mild hyperkalemia with slight hemolysis on admission


Mild transaminitis


Acute rhabdomyolysis with a CK level 524


UDS positive for benzodiazepines


Severe alcohol use disorder and multiple admissions recently due to alcohol 

withdrawal symptoms


Anxiety/depression


Hypertension


Hyperlipidemia


Osteoarthritis


DVT prophylaxis with heparin subcu and GI prophylaxis with PPI





Plan:


Patient will be continued on IV hydration with normal saline and monitor 

respiratory status closely.  Patient is able to tolerate oral diet this morning.

 Continue with thiamine and multivitamins and monitor for alcohol withdrawal 

symptoms.


Continue with home medications and follow-up closely.  PT OT will be consulted 

and patient may need rehab transfer.


Time with Patient: Greater than 30

## 2024-04-23 LAB
ALBUMIN SERPL-MCNC: 3.6 G/DL (ref 3.8–4.9)
ALBUMIN/GLOB SERPL: 1.8 RATIO (ref 1.6–3.17)
ALP SERPL-CCNC: 50 U/L (ref 41–126)
ALT SERPL-CCNC: 82 U/L (ref 8–44)
ANION GAP SERPL CALC-SCNC: 11 MMOL/L (ref 4–12)
AST SERPL-CCNC: 38 U/L (ref 13–35)
BASOPHILS # BLD AUTO: 0.05 X 10*3/UL (ref 0–0.1)
BASOPHILS NFR BLD AUTO: 0.9 %
BUN SERPL-SCNC: 16.7 MG/DL (ref 9–27)
BUN/CREAT SERPL: 33.4 RATIO (ref 12–20)
CALCIUM SPEC-MCNC: 9.5 MG/DL (ref 8.7–10.3)
CHLORIDE SERPL-SCNC: 108 MMOL/L (ref 96–109)
CK SERPL-CCNC: 197 U/L (ref 26–186)
CO2 SERPL-SCNC: 24 MMOL/L (ref 21.6–31.8)
EOSINOPHIL # BLD AUTO: 0.14 X 10*3/UL (ref 0.04–0.35)
EOSINOPHIL NFR BLD AUTO: 2.6 %
ERYTHROCYTE [DISTWIDTH] IN BLOOD BY AUTOMATED COUNT: 3.35 X 10*6/UL (ref 4.1–5.2)
ERYTHROCYTE [DISTWIDTH] IN BLOOD: 13.3 % (ref 11.5–14.5)
GLOBULIN SER CALC-MCNC: 2 G/DL (ref 1.6–3.3)
GLUCOSE SERPL-MCNC: 96 MG/DL (ref 70–110)
HCT VFR BLD AUTO: 33.8 % (ref 37.2–46.3)
HGB BLD-MCNC: 11.1 G/DL (ref 12–15)
IMM GRANULOCYTES BLD QL AUTO: 0.2 %
LYMPHOCYTES # SPEC AUTO: 2.84 X 10*3/UL (ref 0.9–5)
LYMPHOCYTES NFR SPEC AUTO: 52.9 %
MCH RBC QN AUTO: 33.1 PG (ref 27–32)
MCHC RBC AUTO-ENTMCNC: 32.8 G/DL (ref 32–37)
MCV RBC AUTO: 100.9 FL (ref 80–97)
MONOCYTES # BLD AUTO: 0.43 X 10*3/UL (ref 0.2–1)
MONOCYTES NFR BLD AUTO: 8 %
NEUTROPHILS # BLD AUTO: 1.9 X 10*3/UL (ref 1.8–7.7)
NEUTROPHILS NFR BLD AUTO: 35.4 %
NRBC BLD AUTO-RTO: 0 X 10*3/UL (ref 0–0.01)
PLATELET # BLD AUTO: 264 X 10*3/UL (ref 140–440)
POTASSIUM SERPL-SCNC: 3.8 MMOL/L (ref 3.5–5.5)
PROT SERPL-MCNC: 5.6 G/DL (ref 6.2–8.2)
SODIUM SERPL-SCNC: 143 MMOL/L (ref 135–145)
WBC # BLD AUTO: 5.37 X 10*3/UL (ref 4.5–10)

## 2024-04-23 NOTE — P.PN
Subjective


Progress Note Date: 04/23/24











Patient is a 63-year-old female with a past medical history of hypertension, 

hyperlipidemia, osteoarthritis, severe alcohol abuse and multiple admissions due

to alcohol withdrawal symptoms recently, anxiety/depression, prior history of 

smoking and other multiple medical problems was brought to the hospital by EMS. 

Patient states that her neighbor called EMS when she found her crawling to the 

door to get the food from the delivery people.  Patient was also found to be 

incontinent of bowels and bladder.


Patient states that she has been very weak and could not walk by herself without

support..  Patient normally walks with a walker.  Denies any recent falls.  

Denies any neck Pain or headache.


Patient did drink alcohol yesterday morning.  Denies any fever or chills.  

Patient was recently discharged from hospital, admitted due to alcohol 

withdrawal symptoms.


Chest x-ray showed mild pulmonary vascular congestion, with increased 

interstitial markings concerning for edema, pneumonitis, and/or chronic changes.


Hip x-ray showed no acute radiographic abnormality of the right hip.


EKG showed sinus tachycardia.


Laboratory data showed sodium 142 potassium 5.3 chloride 102 bicarb is 23 BUN 22

and creatinine 0.43 and blood sugar 133


Magnesium 1.8, total bili 0.6   and alk phos 55 and CK5 24 

troponin x 1 negative and lipase level 288


Urinalysis is negative for infection.  UDS is positive for benzodiazepines and 

serum alcohol level 109 on admission


Influenza A B RSV and COVID-19 PCR not detected.





4/23/2024





Patient is seen in follow-up today maintained on CIWA protocol and also being 

evaluated by physical therapy.  Social work following working on discharge 

planning for possible ECF.  Multiple referrals have been placed and ECF's here 

in town have denied the patient.  There are some accepting facilities in [facility] and patient will require insurance authorization.  Patient has had recent

hospitalization and frequent hospitalizations regarding this and continued 

noncompliance with follow-up, continued alcohol abuse, and worsening weakness 

and gait dysfunction.  Patient is also scheduled to have a legal guardian 

hearing at the end of May which is pending at this time.  Patient is afebrile 

with no reports of chest pain or shortness of breath.  Patient has been tole

rating diet and electrolytes were replaced per protocol and within normal limits

today.  LFTs are trending down.  Continue CIWA protocol for now.  Will add 

Librium taper





Review of systems:


Constitutional: No reports of fatigue, fever, or chills


Cardiovascular: No reports of chest pain or palpitations


Respiratory: No reports of shortness of breath or cough


GI: No reports of nausea, vomiting, or diarrhea


: No reports of dysuria or retention


Neurovascular:  reports of generalized weakness and gait dysfunction





All medications have been reviewed





PHYSICAL EXAMINATION: 


Patient is sitting in the chair comfortably, no acute distress, awake alert and 

oriented..  Elderly appearing, well-developed


HEENT: Normocephalic. Neck is supple. Pupils reactive. Nostrils clear. Oral 

cavity is moist. 


Neck reveals no JVD, carotid bruits, or thyromegaly. 


CHEST EXAMINATION: Trachea is central. Symmetrical expansion. Lung fields clear 

to auscultation and percussion. 


CARDIAC: Normal S1, S2 with no gallops. No murmurs 


ABDOMEN: Soft. Bowel sounds normal. No organomegaly. No abdominal bruits. 


Extremities: reveal no edema.  No clubbing or cyanosis


Neurologically awake, alert, oriented x3 with well-coordinated movements.  No 

focal deficits noted, diffusely weak


Skin: No rash or skin lesions. 


Psychiatric: Cooperative.  Non-suicidal


Musculoskeletal: No joint swelling or deformity.  Normal range of motion.





Assessment:





Acute alcohol intoxication on admission


Generalized weakness and debility likely due to deconditioning


Mild hyperkalemia with slight hemolysis on admission


Mild transaminitis


Acute rhabdomyolysis with a CK level 524


UDS positive for benzodiazepines


Severe alcohol use disorder and multiple admissions recently due to alcohol 

withdrawal symptoms


Anxiety/depression


Hypertension


Hyperlipidemia


Osteoarthritis


DVT prophylaxis with heparin subcu and GI prophylaxis with PPI





Plan:





Patient was continued on IV hydration with normal saline and electrolytes 

improved post replacement.  Patient is eating and drinking and will decrease 

fluids 


Continue CIWA protocol although patient is not requiring any Ativan and no 

active withdrawals noted.  Will monitor for any withdrawals and start Librium


Home medications reviewed and resumed as appropriate


PT/OT therapy evaluating and social work following working on discharge planning

including a possible ECF for continued strength and mobility


Patient has had multiple hospitalizations for this and has undergone multiple 

peer to peer reviews in regards to generalized weakness and gait dysfunction and

have all been denied.  Will await insurance authorization and continue to 

monitor closely


Due to multiple complex medical issues, prognosis is guarded


Possible discharge in the next 24 to 48 hours





The impression and plan of care has been dictated by Yamileth Horne, Nurse 

Practitioner as directed.





Dr. Johanny MD


I have performed a history and examination and MDM of this patient, discussed 

the same with the dictator, and  agree with the dictator's assessment and plan 

as written ,documented as a scribe. Based on total visit time,  I have performed

more than 50% of the visit.





Objective





- Vital Signs


Vital signs: 


                                   Vital Signs











Temp  98.0 F   04/23/24 07:15


 


Pulse  83   04/23/24 07:15


 


Resp  15   04/23/24 07:15


 


BP  118/75   04/23/24 07:15


 


Pulse Ox  93 L  04/23/24 07:15


 


FiO2      








                                 Intake & Output











 04/22/24 04/23/24 04/23/24





 18:59 06:59 18:59


 


Intake Total  480 


 


Balance  480 


 


Intake:   


 


  Oral  480 


 


Other:   


 


  Voiding Method External Catheter Bedside Commode Bedside Commode


 


  # Voids 4 2 1


 


  # Bowel Movements 1  1














- Labs


CBC & Chem 7: 


                                 04/23/24 05:08





                                 04/23/24 05:08


Labs: 


                  Abnormal Lab Results - Last 24 Hours (Table)











  04/22/24 04/23/24 04/23/24 Range/Units





  10:49 05:08 05:08 


 


RBC   3.35 L   (4.10-5.20)  X 10*6/uL


 


Hgb   11.1 L   (12.0-15.0)  g/dL


 


Hct   33.8 L   (37.2-46.3)  %


 


MCV   100.9 H   (80.0-97.0)  FL


 


MCH   33.1 H   (27.0-32.0)  pg


 


Creatinine  0.5 L   0.5 L  (0.6-1.5)  mg/dL


 


BUN/Creatinine Ratio  41.20 H   33.40 H  (12.00-20.00)  Ratio


 


Glucose  146 H    ()  mg/dL


 


AST    38 H  (13-35)  U/L


 


ALT    82 H  (8-44)  U/L


 


Creatine Kinase    197 H  ()  U/L


 


Total Protein    5.6 L  (6.2-8.2)  g/dL


 


Albumin    3.6 L  (3.8-4.9)  g/dL

## 2024-04-24 RX ADMIN — PANTOPRAZOLE SODIUM SCH MG: 40 TABLET, DELAYED RELEASE ORAL at 06:43

## 2024-04-24 RX ADMIN — KETOROLAC TROMETHAMINE PRN MG: 15 INJECTION, SOLUTION INTRAMUSCULAR; INTRAVENOUS at 17:10

## 2024-04-25 VITALS — HEART RATE: 79 BPM | TEMPERATURE: 98.8 F | DIASTOLIC BLOOD PRESSURE: 91 MMHG | SYSTOLIC BLOOD PRESSURE: 147 MMHG

## 2024-04-25 VITALS — RESPIRATION RATE: 18 BRPM

## 2024-04-25 NOTE — P.PN
Subjective


Progress Note Date: 04/24/24











Patient is a 63-year-old female with a past medical history of hypertension, 

hyperlipidemia, osteoarthritis, severe alcohol abuse and multiple admissions due

to alcohol withdrawal symptoms recently, anxiety/depression, prior history of 

smoking and other multiple medical problems was brought to the hospital by EMS. 

Patient states that her neighbor called EMS when she found her crawling to the 

door to get the food from the delivery people.  Patient was also found to be 

incontinent of bowels and bladder.


Patient states that she has been very weak and could not walk by herself without

support..  Patient normally walks with a walker.  Denies any recent falls.  

Denies any neck Pain or headache.


Patient did drink alcohol yesterday morning.  Denies any fever or chills.  

Patient was recently discharged from hospital, admitted due to alcohol 

withdrawal symptoms.


Chest x-ray showed mild pulmonary vascular congestion, with increased 

interstitial markings concerning for edema, pneumonitis, and/or chronic changes.


Hip x-ray showed no acute radiographic abnormality of the right hip.


EKG showed sinus tachycardia.


Laboratory data showed sodium 142 potassium 5.3 chloride 102 bicarb is 23 BUN 22

and creatinine 0.43 and blood sugar 133


Magnesium 1.8, total bili 0.6   and alk phos 55 and CK5 24 

troponin x 1 negative and lipase level 288


Urinalysis is negative for infection.  UDS is positive for benzodiazepines and 

serum alcohol level 109 on admission


Influenza A B RSV and COVID-19 PCR not detected.





4/23/2024





Patient is seen in follow-up today maintained on CIWA protocol and also being 

evaluated by physical therapy.  Social work following working on discharge 

planning for possible ECF.  Multiple referrals have been placed and ECF's here 

in town have denied the patient.  There are some accepting facilities in [facility] and patient will require insurance authorization.  Patient has had recent

hospitalization and frequent hospitalizations regarding this and continued 

noncompliance with follow-up, continued alcohol abuse, and worsening weakness 

and gait dysfunction.  Patient is also scheduled to have a legal guardian 

hearing at the end of May which is pending at this time.  Patient is afebrile 

with no reports of chest pain or shortness of breath.  Patient has been tole

rating diet and electrolytes were replaced per protocol and within normal limits

today.  LFTs are trending down.  Continue CIWA protocol for now.  Will add 

Librium taper





4/24/2024





Patient is seen and evaluated in follow-up today with no acute overnight issues 

noted.  Patient is not requiring any Ativan and is not actively withdrawing.  

Patient is having some bilateral upper extremity hand cramping that has been 

ongoing.  Patient has been evaluated by neurology recently recommending 

outpatient follow-up with EMG studies although patient has yet to follow-up.  

Patient is extremely noncompliant with follow-ups and has not seen her primary 

care provider in some time.  Currently awaiting insurance authorization for ECF 

for continued strength and mobility.  Patient has had multiple hospitalizations 

regarding this generalized weakness and looking to improve strength and mobility

with continued rehab.  Social work is following and authorization remains 

pending at this time.  Patient also looking into possible inpatient alcohol 

rehab as an option.








Review of systems:


Constitutional: No reports of fatigue, fever, or chills


Cardiovascular: No reports of chest pain or palpitations


Respiratory: No reports of shortness of breath or cough


GI: No reports of nausea, vomiting, or diarrhea


: No reports of dysuria or retention


Neurovascular:  reports of generalized weakness, bilateral hand cramping, and 

gait dysfunction





All medications have been reviewed





PHYSICAL EXAMINATION: 


Patient is sitting in the chair comfortably, no acute distress, awake alert and 

oriented..  Elderly appearing, well-developed


HEENT: Normocephalic. Neck is supple. Pupils reactive. Nostrils clear. Oral 

cavity is moist. 


Neck reveals no JVD, carotid bruits, or thyromegaly. 


CHEST EXAMINATION: Trachea is central. Symmetrical expansion. Lung fields clear 

to auscultation and percussion. 


CARDIAC: Normal S1, S2 with no gallops. No murmurs 


ABDOMEN: Soft. Bowel sounds normal. No organomegaly. No abdominal bruits. 


Extremities: reveal no edema.  No clubbing or cyanosis


Neurologically awake, alert, oriented x3 with well-coordinated movements.  No 

focal deficits noted, diffusely weak


Skin: No rash or skin lesions. 


Psychiatric: Cooperative.  Non-suicidal


Musculoskeletal: No joint swelling or deformity.  Normal range of motion.





Assessment:





Acute alcohol intoxication on admission


Generalized weakness and debility likely due to deconditioning


Mild hyperkalemia with slight hemolysis on admission


Mild transaminitis


Acute rhabdomyolysis with a CK level 524, nontraumatic secondary to prolonged 

downtime, improved


UDS positive for benzodiazepines


Severe alcohol use disorder and multiple admissions recently due to alcohol 

withdrawal symptoms


Anxiety/depression


Hypertension


Hyperlipidemia


Osteoarthritis


DVT prophylaxis with heparin subcu and GI prophylaxis with PPI


Full code











Plan:





Patient was continued on IV hydration with normal saline and electrolytes 

improved post replacement.  Patient is eating and drinking and will decrease 

fluids 


Patient had been continued on CIWA protocol although not requiring any Ativan 

and does not appear to be actively withdrawing.  Monitor for any withdrawals and

will use Librium taper as needed 


Home medications reviewed and resumed as appropriate


PT/OT therapy evaluating and social work following working on discharge planning

including a possible ECF for continued strength and mobility


Patient has had multiple hospitalizations for this and has undergone multiple 

peer to peer reviews in regards to generalized weakness and gait dysfunction and

have all been denied.  Will await insurance authorization and continue to 

monitor closely


Patient also looking into possible inpatient alcohol rehab on discharge


Due to multiple complex medical issues, prognosis is guarded


Possible discharge in the next 24 to 48 hours





The impression and plan of care has been dictated by Yamileth Horne, Nurse 

Practitioner as directed.





Dr. Johanny MD


I have performed a history and examination and MDM of this patient, discussed 

the same with the dictator, and  agree with the dictator's assessment and plan 

as written ,documented as a scribe. Based on total visit time,  I have performed

more than 50% of the visit.





Objective





- Vital Signs


Vital signs: 


                                   Vital Signs











Temp  98.0 F   04/24/24 07:25


 


Pulse  74   04/24/24 07:25


 


Resp  18   04/24/24 07:25


 


BP  142/89   04/24/24 07:25


 


Pulse Ox  94 L  04/24/24 07:25


 


FiO2      








                                 Intake & Output











 04/23/24 04/24/24 04/24/24





 18:59 06:59 18:59


 


Intake Total  10 


 


Balance  10 


 


Intake:   


 


  IV  10 


 


    Invasive Line 1  10 


 


Other:   


 


  Voiding Method Bedside Commode Bedside Commode 


 


  # Voids 2 1 


 


  # Bowel Movements 1  














- Labs


CBC & Chem 7: 


                                 04/23/24 05:08





                                 04/23/24 05:08

## 2024-04-26 NOTE — P.DS
Providers


Date of admission: 


04/21/24 19:03





Expected date of discharge: 04/25/24


Attending physician: 


Boni Larios





Primary care physician: 


Lisa Becerra





Hospital Course: 











Final diagnosis





Acute alcohol intoxication on admission


Generalized weakness and debility likely due to deconditioning


Mild hyperkalemia with slight hemolysis on admission


Mild transaminitis


Acute rhabdomyolysis with a CK level 524, nontraumatic secondary to prolonged 

downtime, improved


UDS positive for benzodiazepines


Severe alcohol use disorder and multiple admissions recently due to alcohol 

withdrawal symptoms


Anxiety/depression


Hypertension


Hyperlipidemia


Osteoarthritis


DVT prophylaxis


GI prophylaxis


Full code











Discharge disposition


Patient is being discharged in a stable condition with guarded prognosis to 

home.  Patient will follow-up with Dr. Becerra in the outpatient setting upon

discharge.  Patient is to continue with court hearing for legal guardian in May 

as scheduled.  Patient to follow-up with neurology outpatient.  Total time taken

is greater than 35 minutes.





Hospital course


This is a 63-year-old female who was recently admitted with generalized weakness

and acute alcohol intoxication being closely monitored.  Patient was on CIWA 

protocol although not withdrawing and did not require any Ativan.  Per patient 

she was noted to be on the floor when the grocery  brought her 

groceries and and a neighbor noticed her on the floor and called EMS to bring 

her here for further evaluation.  Patient reports she did not fall and is 

frequently on the floor with generalized weakness.  Patient was noted to be 

acutely intoxicated on admission.  Patient evaluated by physical therapy and 

initially doing poorly recommending rehab although showing improvement over the 

next few days. insurance denied authorization for ECF and patient will be going 

home.  Patient working on other living arrangements and also possibly looking 

into inpatient alcohol rehab.  Patient to follow-up with [facility] outpatient.  

Patient has been instructed on multiple other hospitalizations to follow-up with

neurology outpatient for further studies and has failed to do so.  Patient has 

not seen her primary care provider in over a year either. Currently no reports 

of chest pain, shortness of breath, or palpitations.  Patient is afebrile.  No 

reports of nausea or vomiting and patient is tolerating diet.  Patient will be 

discharged home today.  Guarded prognosis and high risk for readmissions given 

patient's noncompliance and continued alcohol abuse





Physical exam:








Gen: This is a 63-year-old female who is awake, alert and oriented x 3, well-de

veloped, well-nourished, elderly appearing


HEENT: Head is atraumatic, normocephalic. Pupils equal, round. Sclerae is 

anicteric. 


NECK: Supple. No JVD. No lymphadenopathy. No thyromegaly. 


LUNGS: Clear to auscultation. No wheezes or rhonchi.  No intercostal 

retractions.


HEART: Regular rate and rhythm. No murmur. 


ABDOMEN: Soft. Bowel sounds are present. No masses.  No tenderness.


EXTREMITIES: No pedal edema.  No calf tenderness.  Diffusely weak in lower 

extremities


NEUROLOGICAL: Patient is awake, alert and oriented x3. Cranial nerves 2 through 

12 are grossly intact. 





Please refer to medication reconciliation sheet for a list of medications.





The impression and plan of care has been dictated by Yamileth Horne, Nurse 

Practitioner as directed.





Dr. Johanny MD


I have performed a history and examination and MDM of this patient, discussed 

the same with the dictator, and  agree with the dictator's assessment and plan 

as written ,documented as a scribe. Based on total visit time,  I have performed

more than 50% of the visit.


Patient Condition at Discharge: Fair





Plan - Discharge Summary


New Discharge Prescriptions: 


Continue


   Pantoprazole [Protonix] 40 mg PO DAILY #30 tab


   traZODone HCL [Desyrel] 50 mg PO HS PRN  tab


     PRN Reason: Insomnia


   Folic Acid 1 mg PO DAILY #30 tablet


   Ibuprofen [Motrin] 400 mg PO Q6HR PRN  tab


     PRN Reason: Pain


   Multivitamins, Thera [Multivitamin (formulary)] 1 tab PO DAILY #30 tablet


   Acetaminophen Tab [Tylenol] 650 mg PO Q6HR PRN  tab


     PRN Reason: Fever And/ Or Pain


   Thiamine [Vitamin B-1] 100 mg PO DAILY 30 Days #30 tab


   Escitalopram [Lexapro] 20 mg PO DAILY


   Magnesium Oxide [Mag-Ox] 400 mg PO BID #60 tab


   QUEtiapine [SEROquel] 200 mg PO HS 30 Days #30 tab


   Cholecalciferol [Vitamin D3 (25 Mcg = 1000 Iu)] 50 mcg PO DAILY  tab


Discharge Medication List





Pantoprazole [Protonix] 40 mg PO DAILY #30 tab 12/20/23 [Rx]


Acetaminophen Tab [Tylenol] 650 mg PO Q6HR PRN  tab 02/06/24 [Rx]


Folic Acid 1 mg PO DAILY #30 tablet 02/06/24 [Rx]


Ibuprofen [Motrin] 400 mg PO Q6HR PRN  tab 02/06/24 [Rx]


Multivitamins, Thera [Multivitamin (formulary)] 1 tab PO DAILY #30 tablet 

02/06/24 [Rx]


QUEtiapine [SEROquel] 200 mg PO HS 30 Days #30 tab 02/06/24 [Rx]


Thiamine [Vitamin B-1] 100 mg PO DAILY 30 Days #30 tab 02/06/24 [Rx]


traZODone HCL [Desyrel] 50 mg PO HS PRN  tab 02/06/24 [Rx]


Cholecalciferol [Vitamin D3 (25 Mcg = 1000 Iu)] 50 mcg PO DAILY  tab 02/29/24 

[Rx]


Escitalopram [Lexapro] 20 mg PO DAILY 03/10/24 [History]


Magnesium Oxide [Mag-Ox] 400 mg PO BID #60 tab 04/18/24 [Rx]








Follow up Appointment(s)/Referral(s): 


Lisa Becerra MD [Primary Care Provider] - 1-2 days


Madyson Ray MD [Medical Doctor] - 1 Week


Patient Instructions/Handouts:  Weakness (DC), Fall Prevention (DC)


Activity/Diet/Wound Care/Special Instructions: 


Activity limited until follow-up


Follow-up with primary care provider on discharge


Follow-up with neurology outpatient for further studies including EMG


Continue taking medications as prescribed


Avoid all alcohol intake and exposure





Discharge Disposition: HOME SELF-CARE

## 2024-04-29 ENCOUNTER — HOSPITAL ENCOUNTER (OUTPATIENT)
Dept: HOSPITAL 47 - EC | Age: 64
Setting detail: OBSERVATION
LOS: 2 days | Discharge: HOME | End: 2024-05-01
Attending: HOSPITALIST | Admitting: HOSPITALIST
Payer: MEDICARE

## 2024-04-29 DIAGNOSIS — R29.6: ICD-10-CM

## 2024-04-29 DIAGNOSIS — I10: ICD-10-CM

## 2024-04-29 DIAGNOSIS — F10.229: Primary | ICD-10-CM

## 2024-04-29 DIAGNOSIS — M24.541: ICD-10-CM

## 2024-04-29 DIAGNOSIS — R53.1: ICD-10-CM

## 2024-04-29 DIAGNOSIS — Z91.040: ICD-10-CM

## 2024-04-29 DIAGNOSIS — Z88.5: ICD-10-CM

## 2024-04-29 DIAGNOSIS — J98.11: ICD-10-CM

## 2024-04-29 DIAGNOSIS — R74.01: ICD-10-CM

## 2024-04-29 DIAGNOSIS — E83.42: ICD-10-CM

## 2024-04-29 DIAGNOSIS — M19.90: ICD-10-CM

## 2024-04-29 DIAGNOSIS — F32.A: ICD-10-CM

## 2024-04-29 DIAGNOSIS — M24.542: ICD-10-CM

## 2024-04-29 DIAGNOSIS — E87.20: ICD-10-CM

## 2024-04-29 DIAGNOSIS — E86.0: ICD-10-CM

## 2024-04-29 DIAGNOSIS — Z79.899: ICD-10-CM
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
## 2024-04-29 DIAGNOSIS — Z87.891: ICD-10-CM

## 2024-04-29 DIAGNOSIS — F41.9: ICD-10-CM

## 2024-04-29 DIAGNOSIS — E78.5: ICD-10-CM

## 2024-04-29 DIAGNOSIS — Z79.82: ICD-10-CM

## 2024-04-29 LAB
ALBUMIN SERPL-MCNC: 4.8 G/DL (ref 3.5–5)
ALP SERPL-CCNC: 76 U/L (ref 38–126)
ALT SERPL-CCNC: 81 U/L (ref 4–34)
ANION GAP SERPL CALC-SCNC: 13 MMOL/L
AST SERPL-CCNC: 68 U/L (ref 14–36)
BASOPHILS # BLD AUTO: 0.1 K/UL (ref 0–0.2)
BASOPHILS NFR BLD AUTO: 1 %
BUN SERPL-SCNC: 19 MG/DL (ref 7–17)
CALCIUM SPEC-MCNC: 9.7 MG/DL (ref 8.4–10.2)
CHLORIDE SERPL-SCNC: 100 MMOL/L (ref 98–107)
CO2 SERPL-SCNC: 25 MMOL/L (ref 22–30)
EOSINOPHIL # BLD AUTO: 0.1 K/UL (ref 0–0.7)
EOSINOPHIL NFR BLD AUTO: 1 %
ERYTHROCYTE [DISTWIDTH] IN BLOOD BY AUTOMATED COUNT: 4.06 M/UL (ref 3.8–5.4)
ERYTHROCYTE [DISTWIDTH] IN BLOOD: 13.7 % (ref 11.5–15.5)
GLUCOSE SERPL-MCNC: 158 MG/DL (ref 74–99)
HCT VFR BLD AUTO: 40 % (ref 34–46)
HGB BLD-MCNC: 13.2 GM/DL (ref 11.4–16)
LYMPHOCYTES # SPEC AUTO: 1.7 K/UL (ref 1–4.8)
LYMPHOCYTES NFR SPEC AUTO: 12 %
MCH RBC QN AUTO: 32.5 PG (ref 25–35)
MCHC RBC AUTO-ENTMCNC: 33 G/DL (ref 31–37)
MCV RBC AUTO: 98.4 FL (ref 80–100)
MONOCYTES # BLD AUTO: 0.7 K/UL (ref 0–1)
MONOCYTES NFR BLD AUTO: 5 %
NEUTROPHILS # BLD AUTO: 10.9 K/UL (ref 1.3–7.7)
NEUTROPHILS NFR BLD AUTO: 80 %
PLATELET # BLD AUTO: 308 K/UL (ref 150–450)
POTASSIUM SERPL-SCNC: 4 MMOL/L (ref 3.5–5.1)
PROT SERPL-MCNC: 7.9 G/DL (ref 6.3–8.2)
SODIUM SERPL-SCNC: 138 MMOL/L (ref 137–145)
WBC # BLD AUTO: 13.6 K/UL (ref 3.8–10.6)

## 2024-04-29 PROCEDURE — 97116 GAIT TRAINING THERAPY: CPT

## 2024-04-29 PROCEDURE — 85025 COMPLETE CBC W/AUTO DIFF WBC: CPT

## 2024-04-29 PROCEDURE — 83605 ASSAY OF LACTIC ACID: CPT

## 2024-04-29 PROCEDURE — 70450 CT HEAD/BRAIN W/O DYE: CPT

## 2024-04-29 PROCEDURE — 80048 BASIC METABOLIC PNL TOTAL CA: CPT

## 2024-04-29 PROCEDURE — 96366 THER/PROPH/DIAG IV INF ADDON: CPT

## 2024-04-29 PROCEDURE — 97162 PT EVAL MOD COMPLEX 30 MIN: CPT

## 2024-04-29 PROCEDURE — 97166 OT EVAL MOD COMPLEX 45 MIN: CPT

## 2024-04-29 PROCEDURE — 93005 ELECTROCARDIOGRAM TRACING: CPT

## 2024-04-29 PROCEDURE — 72170 X-RAY EXAM OF PELVIS: CPT

## 2024-04-29 PROCEDURE — 99285 EMERGENCY DEPT VISIT HI MDM: CPT

## 2024-04-29 PROCEDURE — 71045 X-RAY EXAM CHEST 1 VIEW: CPT

## 2024-04-29 PROCEDURE — 80320 DRUG SCREEN QUANTALCOHOLS: CPT

## 2024-04-29 PROCEDURE — 96365 THER/PROPH/DIAG IV INF INIT: CPT

## 2024-04-29 PROCEDURE — 96361 HYDRATE IV INFUSION ADD-ON: CPT

## 2024-04-29 PROCEDURE — 36415 COLL VENOUS BLD VENIPUNCTURE: CPT

## 2024-04-29 PROCEDURE — 83735 ASSAY OF MAGNESIUM: CPT

## 2024-04-29 PROCEDURE — 83880 ASSAY OF NATRIURETIC PEPTIDE: CPT

## 2024-04-29 PROCEDURE — 96375 TX/PRO/DX INJ NEW DRUG ADDON: CPT

## 2024-04-29 PROCEDURE — 80053 COMPREHEN METABOLIC PANEL: CPT

## 2024-04-29 PROCEDURE — 81001 URINALYSIS AUTO W/SCOPE: CPT

## 2024-04-29 PROCEDURE — 73552 X-RAY EXAM OF FEMUR 2/>: CPT

## 2024-04-29 RX ADMIN — CEFAZOLIN STA MLS/HR: 330 INJECTION, POWDER, FOR SOLUTION INTRAMUSCULAR; INTRAVENOUS at 23:46

## 2024-04-29 RX ADMIN — KETOROLAC TROMETHAMINE STA MG: 15 INJECTION, SOLUTION INTRAMUSCULAR; INTRAVENOUS at 23:56

## 2024-04-29 RX ADMIN — ACETAMINOPHEN STA MG: 325 TABLET, FILM COATED ORAL at 22:21

## 2024-04-29 NOTE — XR
EXAMINATION TYPE: XR pelvis AP view, XR femur LT

 

DATE OF EXAM: 4/29/2024 6:06 PM

 

CLINICAL INDICATION:Female, 63 years old with history of s/p fall r/o fx;

 

COMPARISON: None

 

TECHNIQUE: The pelvis was examined in a single projection. 

Frontal and lateral views of the left femur also performed.

 

 

FINDINGS: There is no evidence of fracture or dislocation. There is no soft tissue abnormality.  No a
bnormal calcifications are present. The spine appears intact. The hips appear intact. Osteophyte form
ation of the superior acetabulum bilaterally with mild joint space narrowing.

 

IMPRESSION: 

No acute osseous pathology. If there remains concern for fracture consider CT.

## 2024-04-29 NOTE — CT
EXAM:

  CT Head Without Intravenous Contrast

 

CLINICAL HISTORY:

  ITS.REASON CT Reason: fall with head injury

 

TECHNIQUE:

  Axial computed tomography images of the head/brain without intravenous 

contrast.  CTDI is 58 mGy and DLP is 1075.4 mGy-cm.  This CT exam was 

performed using one or more of the following dose reduction techniques: 

automated exposure control, adjustment of the mA and/or kV according to 

patient size, and/or use of iterative reconstruction technique.

 

COMPARISON:

  No relevant prior studies available.

 

FINDINGS:

  Limitations:  Mild motion artifact.

  Brain:  Generalized parenchymal volume loss.  Chronic small vessel 

ischemic changes in the periventricular cerebral white matter.  Gray-

white matter differentiation maintained.  No hemorrhage, mass-effect, or 

parenchymal edema.

  Ventricles:  Unremarkable.  No hydrocephalus.

  Bones/joints:  Unremarkable.  No acute fracture.

  Soft tissues:  Unremarkable.

  Vasculature:  Intracranial atherosclerosis.

  Sinuses:  Unremarkable as visualized.  No acute sinusitis.

  Mastoid air cells:  Unremarkable as visualized.  No mastoid effusion.

 

IMPRESSION:     

  No acute intracranial process.

## 2024-04-29 NOTE — XR
EXAMINATION TYPE: XR chest 1V

 

DATE OF EXAM: 4/29/2024 6:07 PM

 

CLINICAL INDICATION:Female, 63 years old with history of s/p fall r/o fx. r/o pna; PH[facility]

 

COMPARISON: Chest radiographs from 4/21/2024

 

TECHNIQUE: XR chest 1V Frontal view of the chest.

 

FINDINGS: 

Lungs/Pleura: There is no evidence of pleural effusion, focal consolidation, or pneumothorax.  

Pulmonary vascularity: Unremarkable.

Heart/mediastinum: Cardiomediastinal silhouette is unremarkable.

Musculoskeletal: No acute osseous pathology. Remote appearing right-sided rib deformities.

 

IMPRESSION: 

Low lung volumes with a generalized hazy appearance which could represent atelectasis versus pulmonar
y edema correlate with serum BNP.

## 2024-04-29 NOTE — ED
Female Urogenital HPI





- General


Source: EMS, RN notes reviewed


Mode of arrival: EMS


Limitations: no limitations





<Azar Mckinney - Last Filed: 04/29/24 17:16>





- General


Source: patient, EMS, RN notes reviewed


Mode of arrival: EMS


Limitations: no limitations





<Maria Eugenia Bowers - Last Filed: 04/30/24 03:52>





<EthanArminda GIOVANNA - Last Filed: 05/02/24 23:57>





- General


Chief complaint: Urogenital


Stated complaint: UTI


Time Seen by Provider: 04/29/24 17:00





- History of Present Illness


Initial comments: 


63-year-old female presenting to the ED with a chief complaint of fall.  Patient

reports she is an alcoholic and falls often secondary to this.  Now reports pain

of her right leg.  Also reports that the "" told her she has a 

UTI.  Patient denies urinary symptoms at this time. (Azar Mckinney)


63-year-old female presenting to the ER via EMS with a chief complaint of a 

weakness.  Patient states she had a fall 4 days ago was and was recently 

discharged from the hospital.  She states she has been at home and been having 

to crawl around as she is unable to stand on her own feet.  She states she lives

at home alone and has no caregiver and is unable to care for herself.  She 

reports pain in bilateral lower extremities.  She denies any headache, double 

blurry vision, chest pain, shortness of breath, abdominal pain. (Maria Eugenia Bowers)





- Related Data


                                Home Medications











 Medication  Instructions  Recorded  Confirmed


 


Anxiety Med ([place]) 1 tab PO DAILY 04/30/24 04/30/24


 


Aspirin EC [Ecotrin Low Dose] 81 mg PO DAILY 04/30/24 04/30/24


 


Cholesterol Med (Unknown) 1 tab PO DAILY 04/30/24 04/30/24








                                  Previous Rx's











 Medication  Instructions  Recorded


 


Escitalopram [Lexapro] 20 mg PO DAILY #30 tablet 04/30/24


 


Folic Acid 1 mg PO DAILY #30 tablet 04/30/24


 


Pantoprazole [Protonix] 40 mg PO DAILY #30 tab 04/30/24


 


Thiamine [Vitamin B-1] 100 mg PO DAILY #30 tab 04/30/24











                                    Allergies











Allergy/AdvReac Type Severity Reaction Status Date / Time


 


latex Allergy Unknown Itching, Verified 04/30/24 08:11





   Blisters  


 


codeine AdvReac Severe HEADACHE Verified 04/30/24 08:11


 


hydrocodone [From Vicodin] AdvReac Severe HEADACHE Verified 04/30/24 08:11


 


Anesthetics - [place] Type - AdvReac  GAS GIVES Verified 04/30/24 08:11





Select A   HER  





   HEADACHE,  





   VOMITING,  





   HEART  





   PALIPITATIONS  


 


Anesthetics - [place] Type- AdvReac  GAS GIVES Verified 04/30/24 08:11





Parabens   HER  





   HEADACHE,  





   VOMITING,  





   HEART  





   PALIPITATIONS  


 


ANESTHESIA AdvReac Unknown GAS GIVES Uncoded 04/29/24 17:01





   HER  





   HEADACHE,  





   VOMITING,  





   HEART  





   PALIPITATIONS  














Review of Systems


ROS Other: All systems not noted in ROS Statement are negative.





<Azar Mckinney - Last Filed: 04/29/24 17:16>


ROS Other: All systems not noted in ROS Statement are negative.





<Maria Eugenia Bowers - Last Filed: 04/30/24 03:52>


ROS Other: All systems not noted in ROS Statement are negative.





<Arminda Long - Last Filed: 05/02/24 23:57>


ROS Statement: 


Those systems with pertinent positive or pertinent negative responses have been 

documented in the HPI.








Past Medical History


Past Medical History: Hyperlipidemia, Hypertension, Osteoarthritis (OA)


Additional Past Medical History / Comment(s): Multiple recent falls. hx colon 

polyps, occasional diarrhea, hx of fall Nov 2020 and has been having pain right 

hip since that radiates down right leg, walks with limp., states breast implants

moving up.


History of Any Multi-Drug Resistant Organisms: None Reported


Past Surgical History: Breast Surgery, Orthopedic Surgery, Tonsillectomy


Additional Past Surgical History / Comment(s): right elbow surgery with screws, 

right knee surgery with plate and removal ., lumpectomy left breast, breast 

implants


Past Anesthesia/Blood Transfusion Reactions: Postoperative Nausea & Vomiting 

(PONV)


Past Psychological History: Anxiety, Depression


Smoking Status: Former smoker


Past Alcohol Use History: Abuse, Daily, Heavy


Past Drug Use History: None Reported





- Past Family History


  ** Father


Family Medical History: Cancer





<Azar Mckinney - Last Filed: 04/29/24 17:16>





General Exam


Limitations: no limitations





<Azar Mckinney - Last Filed: 04/29/24 17:16>


Limitations: no limitations


General appearance: alert, in no apparent distress, other (Strong odor of urine)


Head exam: Present: atraumatic, normocephalic, normal inspection


Eye exam: Present: normal appearance, PERRL, EOMI.  Absent: scleral icterus, 

conjunctival injection, periorbital swelling


Pupils: Present: normal accommodation


ENT exam: Present: normal exam, normal oropharynx, mucous membranes moist


Neck exam: Present: normal inspection.  Absent: tenderness, meningismus, lymp

hadenopathy


Respiratory exam: Present: normal lung sounds bilaterally.  Absent: respiratory 

distress, wheezes, rales, rhonchi, stridor


Cardiovascular Exam: Present: normal rhythm, tachycardia, normal heart sounds


GI/Abdominal exam: Present: soft, normal bowel sounds, other (Multiple bruises 

noted to lower abdomen).  Absent: distended, tenderness, guarding, rebound, 

rigid


Neurological exam: Present: alert, oriented X3, CN II-XII intact


Skin exam: Present: warm, dry, intact, normal color.  Absent: rash





<Maria Eugenia Bowers - Last Filed: 04/30/24 03:52>





- General Exam Comments


Initial Comments: 


Visual Physical Exam





Vital signs reviewed





General: no acute distress.


Head: Normocephalic, atraumatic


Eyes: PERRLA, EOMI


ENT: Airway patent


Chest: Nonlabored breathing


Skin: No visual rash, normal skin tone


Neuro: Alert and oriented 3


 (Azar Mckinney)





Course





                                   Vital Signs











  04/29/24 04/29/24 04/30/24





  16:59 22:15 06:29


 


Temperature 100.2 F H 98.1 F 97.6 F


 


Pulse Rate 127 H 105 H 89


 


Pulse Rate [   





Right]   


 


Respiratory 22 18 16





Rate   


 


Blood Pressure 143/89 155/87 133/82


 


Blood Pressure   





[Left Arm]   


 


O2 Sat by Pulse 99 95 97





Oximetry   














  04/30/24





  07:00


 


Temperature 98.2 F


 


Pulse Rate 


 


Pulse Rate [ 95





Right] 


 


Respiratory 16





Rate 


 


Blood Pressure 


 


Blood Pressure 121/72





[Left Arm] 


 


O2 Sat by Pulse 94 L





Oximetry 














Medical Decision Making





<Azar Mckinney - Last Filed: 04/29/24 17:16>





- Lab Data


Result diagrams: 


                                 04/29/24 18:31





                                 04/29/24 18:42





- EKG Data


-: EKG Interpreted by Me





- Radiology Data


Radiology results: report reviewed, image reviewed





<Maria Eugenia Bowers - Last Filed: 04/30/24 03:52>





- Lab Data


Result diagrams: 


                                 04/30/24 13:13





                                 04/30/24 13:13





<Arminda Long - Last Filed: 05/02/24 23:57>





- Medical Decision Making


Quicknote portion performed. Signed Azar Mckinney PA-C (Azar Mckinney)


Was pt. sent in by a medical professional or institution (Dr. PA, NP, urgent 

care, hospital, or nursing home...) When possible be specific


@  -No


Did you speak to anyone other than the patient for history (EMS, parent, family,

police, friend...)? What history was obtained from this source 


@  -No


Did you review nursing and triage notes (agree or disagree)?  Why? 


@  -I reviewed and agree with nursing and triage notes


Were old charts reviewed (outside hosp., previous admission, EMS record, old 

EKG, old radiological studies, urgent care reports/EKG's, nursing home records)?

Report findings 


@  -Yes, ER visit and admission from 4-21-24.  Patient admitted for a fall with 

rhabdomyolysis.  Patient discharged on 4-.


Differential Diagnosis (chest pain, altered mental status, abdominal pain women,

abdominal pain men, vaginal bleeding, weakness, fever, dyspnea, syncope, headach

e, dizziness, GI bleed, back pain, seizure, CVA, palpatations, mental health, 

musculoskeletal)? 


@  -Differential Weakness:Hypoglycemia, shock, sepsis, hyponatremia, anemia, 

infection, MI, ETOH, adverse medicine reaction, overdose, stroke, this is not 

meant to be an all-inclusive list.


EKG interpreted by me (3pts min.).


@  -As above


X-rays interpreted by me (1pt min.).


@  -Chest x-ray interpreted by me negative for acute cardiopulmonary process.  

Femur and pelvis x-rays interpreted by me negative for acute process.


CT interpreted by me (1pt min.).


@  -CT  brain negative for acute process


U/S interpreted by me (1pt. min.).


@  -None done


What testing was considered but not performed or refused? (CT, X-rays, U/S, 

labs)? Why?


@  -None


What meds were considered but not given or refused? Why?


@  -None


Did you discuss the management of the patient with other professionals 

(professionals i.e. , PA, NP, lab, RT, psych nurse, , , 

teacher, , )? Give summary


@  -Yes, case discussed [facility], who accepts medical admission. 


Was smoking cessation discussed for >3mins.?


@  -No


Was critical care preformed (if so, how long)?


@  -No


Were there social determinants of health that impacted care today? How? 

(Homelessness, low income, unemployed, alcoholism, drug addiction, 

transportation, low edu. Level, literacy, decrease access to med. care, [facility], 

rehab)?


@  -Patient lives at home alone and has no family.  Patient is unable to care 

for herself.


Was there de-escalation of care discussed even if they declined (Discuss DNR or 

withdrawal of care, Hospice)? DNR status


@  -No


What co-morbidities impacted this encounter? (DM, HTN, Smoking, COPD, CAD, 

Cancer, CVA, ARF, Chemo, Hep., AIDS, mental health diagnosis, sleep apnea, 

morbid obesity)?


@  -None


Was patient admitted / discharged? Hospital course, mention meds given and 

route, prescriptions, significant lab abnormalities, going to OR and other 

pertinent info.


@  -Admitted. 63 year old female presenting to the ER with a chief complant of 

weakness.  Patient recently released on 4- from a fall.  Patient reports 

she has been crawling around on her floor to get around as she is unable to 

ambulate on her feet.  History and physical exam completed.  Vital stable.  

Patient in no signs acute distress and nontoxic-appearing.  Labs obtained 

significant for white blood cell count 13.6, lactic 2.3 which resolved after 

liter bolus.  Urine without signs of infection.  Serum alcohol less than 10.  

Imaging obtained in the ER negative for acute process.  Patient was ambulated 

without success.  Admission considered due to patient's living situation and 

ability to ambulate independently.  Case discussed with [facility], who accept 

medical admission.  PT/OT and social work on consult.  Patient agreeable for 

admission.  Case discussed with ED attending, Dr. Long.


Undiagnosed new problem with uncertain prognosis?


@  -No


Drug Therapy requiring intensive monitoring for toxicity (Heparin, Nitro, 

Insulin, Cardizem)?


@  -No


Were any procedures done?


@  -No


Diagnosis/symptom?


@  -Weakness


Acute, or Chronic, or Acute on Chronic?


@  -Acute


Uncomplicated (without systemic symptoms) or Complicated (systemic symptoms)?


@  -Uncomplicated


Side effects of treatment?


@  -No


Exacerbation, Progression, or Severe Exacerbation?


@  -No


Poses a threat to life or bodily function? How? (Chest pain, USA, MI, pneumonia,

PE, COPD, DKA, ARF, appy, cholecystitis, CVA, Diverticulitis, Homicidal, Suicid

al, threat to staff... and all critical care pts)


@  -No


 (Maria Eugenia Bowers)





- Lab Data





                                   Lab Results











  04/29/24 04/29/24 04/29/24 Range/Units





  18:31 18:42 18:42 


 


WBC  13.6 H    (3.8-10.6)  k/uL


 


RBC  4.06    (3.80-5.40)  m/uL


 


Hgb  13.2    (11.4-16.0)  gm/dL


 


Hct  40.0    (34.0-46.0)  %


 


MCV  98.4    (80.0-100.0)  fL


 


MCH  32.5    (25.0-35.0)  pg


 


MCHC  33.0    (31.0-37.0)  g/dL


 


RDW  13.7    (11.5-15.5)  %


 


Plt Count  308    (150-450)  k/uL


 


MPV  8.0    


 


Neutrophils %  80    %


 


Lymphocytes %  12    %


 


Monocytes %  5    %


 


Eosinophils %  1    %


 


Basophils %  1    %


 


Neutrophils #  10.9 H    (1.3-7.7)  k/uL


 


Lymphocytes #  1.7    (1.0-4.8)  k/uL


 


Monocytes #  0.7    (0-1.0)  k/uL


 


Eosinophils #  0.1    (0-0.7)  k/uL


 


Basophils #  0.1    (0-0.2)  k/uL


 


Sodium   138   (137-145)  mmol/L


 


Potassium   4.0   (3.5-5.1)  mmol/L


 


Chloride   100   ()  mmol/L


 


Carbon Dioxide   25   (22-30)  mmol/L


 


Anion Gap   13   mmol/L


 


BUN   19 H   (7-17)  mg/dL


 


Creatinine   0.40 L   (0.52-1.04)  mg/dL


 


Est GFR (CKD-EPI)AfAm   >90   (>60 ml/min/1.73 sqM)  


 


Est GFR (CKD-EPI)NonAf   >90   (>60 ml/min/1.73 sqM)  


 


Glucose   158 H   (74-99)  mg/dL


 


Lactic Ac Sepsis Rflx     


 


Plasma Lactic Acid Gabe    2.3 H*  (0.7-2.0)  mmol/L


 


Calcium   9.7   (8.4-10.2)  mg/dL


 


Magnesium     (1.6-2.3)  mg/dL


 


Total Bilirubin   1.0   (0.2-1.3)  mg/dL


 


AST   68 H   (14-36)  U/L


 


ALT   81 H   (4-34)  U/L


 


Alkaline Phosphatase   76   ()  U/L


 


Total Protein   7.9   (6.3-8.2)  g/dL


 


Albumin   4.8   (3.5-5.0)  g/dL


 


Urine Color     


 


Urine Appearance     (Clear)  


 


Urine pH     (5.0-8.0)  


 


Ur Specific Gravity     (1.001-1.035)  


 


Urine Protein     (Negative)  


 


Urine Glucose (UA)     (Negative)  


 


Urine Ketones     (Negative)  


 


Urine Blood     (Negative)  


 


Urine Nitrite     (Negative)  


 


Urine Bilirubin     (Negative)  


 


Urine Urobilinogen     (<2.0)  mg/dL


 


Ur Leukocyte Esterase     (Negative)  


 


Urine WBC     (0-5)  /hpf


 


Serum Alcohol   <10   mg/dL














  04/29/24 04/29/24 04/29/24 Range/Units





  18:42 19:12 22:28 


 


WBC     (3.8-10.6)  k/uL


 


RBC     (3.80-5.40)  m/uL


 


Hgb     (11.4-16.0)  gm/dL


 


Hct     (34.0-46.0)  %


 


MCV     (80.0-100.0)  fL


 


MCH     (25.0-35.0)  pg


 


MCHC     (31.0-37.0)  g/dL


 


RDW     (11.5-15.5)  %


 


Plt Count     (150-450)  k/uL


 


MPV     


 


Neutrophils %     %


 


Lymphocytes %     %


 


Monocytes %     %


 


Eosinophils %     %


 


Basophils %     %


 


Neutrophils #     (1.3-7.7)  k/uL


 


Lymphocytes #     (1.0-4.8)  k/uL


 


Monocytes #     (0-1.0)  k/uL


 


Eosinophils #     (0-0.7)  k/uL


 


Basophils #     (0-0.2)  k/uL


 


Sodium     (137-145)  mmol/L


 


Potassium     (3.5-5.1)  mmol/L


 


Chloride     ()  mmol/L


 


Carbon Dioxide     (22-30)  mmol/L


 


Anion Gap     mmol/L


 


BUN     (7-17)  mg/dL


 


Creatinine     (0.52-1.04)  mg/dL


 


Est GFR (CKD-EPI)AfAm     (>60 ml/min/1.73 sqM)  


 


Est GFR (CKD-EPI)NonAf     (>60 ml/min/1.73 sqM)  


 


Glucose     (74-99)  mg/dL


 


Lactic Ac Sepsis Rflx   Y   


 


Plasma Lactic Acid Gabe    1.5  (0.7-2.0)  mmol/L


 


Calcium     (8.4-10.2)  mg/dL


 


Magnesium  1.5 L    (1.6-2.3)  mg/dL


 


Total Bilirubin     (0.2-1.3)  mg/dL


 


AST     (14-36)  U/L


 


ALT     (4-34)  U/L


 


Alkaline Phosphatase     ()  U/L


 


Total Protein     (6.3-8.2)  g/dL


 


Albumin     (3.5-5.0)  g/dL


 


Urine Color     


 


Urine Appearance     (Clear)  


 


Urine pH     (5.0-8.0)  


 


Ur Specific Gravity     (1.001-1.035)  


 


Urine Protein     (Negative)  


 


Urine Glucose (UA)     (Negative)  


 


Urine Ketones     (Negative)  


 


Urine Blood     (Negative)  


 


Urine Nitrite     (Negative)  


 


Urine Bilirubin     (Negative)  


 


Urine Urobilinogen     (<2.0)  mg/dL


 


Ur Leukocyte Esterase     (Negative)  


 


Urine WBC     (0-5)  /hpf


 


Serum Alcohol     mg/dL














  04/29/24 Range/Units





  23:53 


 


WBC   (3.8-10.6)  k/uL


 


RBC   (3.80-5.40)  m/uL


 


Hgb   (11.4-16.0)  gm/dL


 


Hct   (34.0-46.0)  %


 


MCV   (80.0-100.0)  fL


 


MCH   (25.0-35.0)  pg


 


MCHC   (31.0-37.0)  g/dL


 


RDW   (11.5-15.5)  %


 


Plt Count   (150-450)  k/uL


 


MPV   


 


Neutrophils %   %


 


Lymphocytes %   %


 


Monocytes %   %


 


Eosinophils %   %


 


Basophils %   %


 


Neutrophils #   (1.3-7.7)  k/uL


 


Lymphocytes #   (1.0-4.8)  k/uL


 


Monocytes #   (0-1.0)  k/uL


 


Eosinophils #   (0-0.7)  k/uL


 


Basophils #   (0-0.2)  k/uL


 


Sodium   (137-145)  mmol/L


 


Potassium   (3.5-5.1)  mmol/L


 


Chloride   ()  mmol/L


 


Carbon Dioxide   (22-30)  mmol/L


 


Anion Gap   mmol/L


 


BUN   (7-17)  mg/dL


 


Creatinine   (0.52-1.04)  mg/dL


 


Est GFR (CKD-EPI)AfAm   (>60 ml/min/1.73 sqM)  


 


Est GFR (CKD-EPI)NonAf   (>60 ml/min/1.73 sqM)  


 


Glucose   (74-99)  mg/dL


 


Lactic Ac Sepsis Rflx   


 


Plasma Lactic Acid Gabe   (0.7-2.0)  mmol/L


 


Calcium   (8.4-10.2)  mg/dL


 


Magnesium   (1.6-2.3)  mg/dL


 


Total Bilirubin   (0.2-1.3)  mg/dL


 


AST   (14-36)  U/L


 


ALT   (4-34)  U/L


 


Alkaline Phosphatase   ()  U/L


 


Total Protein   (6.3-8.2)  g/dL


 


Albumin   (3.5-5.0)  g/dL


 


Urine Color  Yellow  


 


Urine Appearance  Turbid H  (Clear)  


 


Urine pH  6.0  (5.0-8.0)  


 


Ur Specific Gravity  1.028  (1.001-1.035)  


 


Urine Protein  1+ H  (Negative)  


 


Urine Glucose (UA)  Negative  (Negative)  


 


Urine Ketones  1+ H  (Negative)  


 


Urine Blood  Negative  (Negative)  


 


Urine Nitrite  Negative  (Negative)  


 


Urine Bilirubin  Negative  (Negative)  


 


Urine Urobilinogen  <2.0  (<2.0)  mg/dL


 


Ur Leukocyte Esterase  Negative  (Negative)  


 


Urine WBC  3  (0-5)  /hpf


 


Serum Alcohol   mg/dL














- EKG Data


EKG Comments: 





EKG taken at 23: 20 showing sinus tachycardia no acute ST segment or T wave 

abnormalities.  Ventricular rate 101, KY interval 150, QRS duration 98, QT/QTc 

350/408. (Maria Eugenia Bowers)





Disposition





<Azar Mckinney - Last Filed: 04/29/24 17:16>


Is patient prescribed a controlled substance at d/c from ED?: No


Time of Disposition: 00:58





<Maria Eugenia Bowers - Last Filed: 04/30/24 03:52>





<Arminda Long - Last Filed: 05/02/24 23:57>


Clinical Impression: 


 Fall





Disposition: ADMITTED AS IP TO THIS HOSP


Condition: Fair

## 2024-04-30 LAB
ANION GAP SERPL CALC-SCNC: 10 MMOL/L
BASOPHILS # BLD AUTO: 0.1 K/UL (ref 0–0.2)
BASOPHILS NFR BLD AUTO: 1 %
BUN SERPL-SCNC: 19 MG/DL (ref 7–17)
CALCIUM SPEC-MCNC: 9.8 MG/DL (ref 8.4–10.2)
CHLORIDE SERPL-SCNC: 102 MMOL/L (ref 98–107)
CO2 SERPL-SCNC: 28 MMOL/L (ref 22–30)
EOSINOPHIL # BLD AUTO: 0.1 K/UL (ref 0–0.7)
EOSINOPHIL NFR BLD AUTO: 2 %
ERYTHROCYTE [DISTWIDTH] IN BLOOD BY AUTOMATED COUNT: 4.02 M/UL (ref 3.8–5.4)
ERYTHROCYTE [DISTWIDTH] IN BLOOD: 13.2 % (ref 11.5–15.5)
GLUCOSE SERPL-MCNC: 107 MG/DL (ref 74–99)
HCT VFR BLD AUTO: 40.6 % (ref 34–46)
HGB BLD-MCNC: 13 GM/DL (ref 11.4–16)
KETONES UR QL STRIP.AUTO: (no result)
LYMPHOCYTES # SPEC AUTO: 1.6 K/UL (ref 1–4.8)
LYMPHOCYTES NFR SPEC AUTO: 28 %
MAGNESIUM SPEC-SCNC: 1.6 MG/DL (ref 1.6–2.3)
MCH RBC QN AUTO: 32.4 PG (ref 25–35)
MCHC RBC AUTO-ENTMCNC: 32.1 G/DL (ref 31–37)
MCV RBC AUTO: 100.9 FL (ref 80–100)
MONOCYTES # BLD AUTO: 0.4 K/UL (ref 0–1)
MONOCYTES NFR BLD AUTO: 7 %
NEUTROPHILS # BLD AUTO: 3.4 K/UL (ref 1.3–7.7)
NEUTROPHILS NFR BLD AUTO: 60 %
PH UR: 6 [PH] (ref 5–8)
PLATELET # BLD AUTO: 257 K/UL (ref 150–450)
POTASSIUM SERPL-SCNC: 3.5 MMOL/L (ref 3.5–5.1)
PROT UR QL: (no result)
SODIUM SERPL-SCNC: 140 MMOL/L (ref 137–145)
SP GR UR: 1.03 (ref 1–1.03)
UROBILINOGEN UR QL STRIP: <2 MG/DL (ref ?–2)
WBC # BLD AUTO: 5.7 K/UL (ref 3.8–10.6)
WBC # UR AUTO: 3 /HPF (ref 0–5)

## 2024-04-30 RX ADMIN — IBUPROFEN PRN MG: 400 TABLET, FILM COATED ORAL at 04:03

## 2024-04-30 RX ADMIN — SODIUM CHLORIDE STA: 900 INJECTION, SOLUTION INTRAVENOUS at 05:27

## 2024-04-30 RX ADMIN — MAGNESIUM SULFATE IN DEXTROSE SCH MLS/HR: 10 INJECTION, SOLUTION INTRAVENOUS at 12:34

## 2024-04-30 NOTE — P.HPIM
History of Present Illness


H&P Date: 04/30/24


This is a 63-year-old female with medical history of chronic alcohol use, 

anxiety depression, hypertension, hyperlipidemia, osteoarthritis, concern for a 

neuromuscular disorder has recommended to see a neuromuscular specialist 

outpatient multiple admissions.  Patient comes in after being discharged states 

that she has been drinking alcohol daily states that she drinks any between a 

fifth to a 1/2 a gallon of alcohol every day.  When patient is drinking she is 

weak and falls and this is what brought her back into the hospital.  Patient 

does state that when she fell she was not using her walker.  Patient admits that

she has a drinking problem and does want to quit alcohol on admission her alcoh

ol level is less than 10.  She is not having any signs of acute alcohol 

withdrawal at this time.  She did have a mild low-grade fever 100.2 on admission

and has not been febrile since.  Her urinalysis is not suggestive of any type of

infection and she is currently denying dysuria urgency or burning.  She had 

multiple x-rays taken which are not suggesting of any acute fracture of the 

pelvic or hip region.  CT was negative for acute intracranial process.  Her 

chest x-ray shows low lung volumes with a generalized hazy appearance could 

represent atelectasis versus pulmonary edema correlate with a serum BNP.  This 

was most likely an atelectasis as patient has had multiple hospitalizations and 

has been mostly bedrest during those hospital stays.  Her white blood cell count

was 13.6 on admission and is now down to 5.7, she also had a mild elevated 

lactic acid of 2.3 on admission which is now down to 1.5.  She has been 

evaluated by physical therapy multiple times and does not meet qualifications 

for subacute rehab.  Patient would benefit from home physical therapy however 

because she does not have a PCP at this time is unable to get this set up and 

she needs to establish care with a new primary care provider before we can set 

her up with home care.  Patient was given information for outpatient alcohol 

rehabilitation her prior admission as well as multiple resources for housing and

assistance.  Patient has a guardianship hearing on May 20, 2024.








REVIEW OF SYSTEMS: 


CONSTITUTIONAL: No fever, no malaise, no fatigue. 


HEENT: No recent visual problems or hearing problems. Denied any sore throat. 


CARDIOVASCULAR: No chest pain, orthopnea, PND, no palpitations, no syncope. 


PULMONARY: No shortness of breath, no cough, no hemoptysis. 


GASTROINTESTINAL: No diarrhea, no nausea, no vomiting, no abdominal pain. 


NEUROLOGICAL: No headaches, no weakness, no numbness. 


HEMATOLOGICAL: Denies any bleeding or petechiae. 


GENITOURINARY: Denies any burning micturition, frequency, or urgency. 


MUSCULOSKELETAL/RHEUMATOLOGICAL: Denies any joint pain, swelling, or any muscle 

pain. 


ENDOCRINE: Denies any polyuria or polydipsia. 





The rest of the 14-point review of systems is negative.





PHYSICAL EXAMINATION: 





GENERAL: The patient is alert and oriented x3, not in any acute distress. Well 

developed, well nourished. 


HEENT: Pupils are round and equally reacting to light. EOMI. No scleral icterus.

No conjunctival pallor. Normocephalic, atraumatic. No pharyngeal erythema. No 

thyromegaly. 


CARDIOVASCULAR: S1 and S2 present. No murmurs, rubs, or gallops. 


PULMONARY: Chest is clear to auscultation, no wheezing or crackles. 


ABDOMEN: Soft, nontender, nondistended, normoactive bowel sounds. No palpable 

organomegaly. 


MUSCULOSKELETAL: No joint swelling or deformity.


EXTREMITIES: No cyanosis, clubbing, or pedal edema. 


NEUROLOGICAL: Gross neurological examination did not reveal any focal deficits. 


SKIN: No rashes. 





Assessment and plan


-Fall secondary to acute alcohol intoxication patient needs to establish care 

with a PCP before she can be set up with physical therapy outpatient.


-Generalized weakness and debility likely due to deconditioning


-Mild transaminitis likely alcoholic hepatitis patient reports to continue 

drinking daily


-Severe alcohol use disorder and multiple admissions recently due to alcohol 

withdrawal symptoms. Patient is not currently withdrawing has been educated 

extensively on the need for alcohol cessation and has resources for AA, 

outpatient rehab. 


-Lactic acidosis likely due to dehydration


-Atelectasis patient will be given an incentive spirometer prior to discharge


-Hypomagnesemia due to chronic alcohol use this was supplemented with IV 

magnesium and recommended to continue oral magnesium supplementation daily on 


discharge


-Bilateral hand weakness and contractures patient has already been referred to 

neuromuscular specialist o.p work up and has undergone extensive orthopedic and 

neurological examinations prior admission.


-Anxiety/depression


-Hypertension


-Hyperlipidemia


-Osteoarthritis


DVT prophylaxis


GI prophylaxis


Full code





Discharge home and continue outpatient follow up with neuromuscular specialist 

and needs to establish care with a PCP and neurologist. Patient has a hearing 

for guardianship on 05/20/2024.





The impression and plan of care has been dictated by Linda Dietz Nurse 

Practitioner as directed.





Dr. Latrell MD


I have performed a history and physical examination and medical decision making 

of this patient, discussed the same with the dictator, and agree with the 

dictators assessment and plan as written, documented as a scribe. Based on total

visit time, I have performed more than 50% of this visit.





Past Medical History


Past Medical History: Hyperlipidemia, Hypertension, Osteoarthritis (OA)


Additional Past Medical History / Comment(s): Multiple recent falls. hx colon 

polyps, occasional diarrhea, hx of fall Nov 2020 and has been having pain right 

hip since that radiates down right leg, walks with limp., states breast implants

moving up.


History of Any Multi-Drug Resistant Organisms: None Reported


Past Surgical History: Breast Surgery, Orthopedic Surgery, Tonsillectomy


Additional Past Surgical History / Comment(s): right elbow surgery with screws, 

right knee surgery with plate and removal ., lumpectomy left breast, breast 

implants


Past Anesthesia/Blood Transfusion Reactions: Postoperative Nausea & Vomiting 

(PONV)


Past Psychological History: Anxiety, Depression


Smoking Status: Former smoker


Past Alcohol Use History: Abuse, Daily, Heavy


Past Drug Use History: None Reported





- Past Family History


  ** Father


Family Medical History: Cancer





Medications and Allergies


                                Home Medications











 Medication  Instructions  Recorded  Confirmed  Type


 


Anxiety Med ([place]) 1 tab PO DAILY 04/30/24 04/30/24 History


 


Aspirin EC [Ecotrin Low Dose] 81 mg PO DAILY 04/30/24 04/30/24 History


 


Cholesterol Med (Unknown) 1 tab PO DAILY 04/30/24 04/30/24 History


 


Escitalopram [Lexapro] 20 mg PO DAILY #30 tablet 04/30/24  Rx


 


Folic Acid 1 mg PO DAILY #30 tablet 04/30/24  Rx


 


Pantoprazole [Protonix] 40 mg PO DAILY #30 tab 04/30/24  Rx


 


Thiamine [Vitamin B-1] 100 mg PO DAILY #30 tab 04/30/24  Rx








                                    Allergies











Allergy/AdvReac Type Severity Reaction Status Date / Time


 


latex Allergy Unknown Itching, Verified 04/30/24 08:11





   Blisters  


 


codeine AdvReac Severe HEADACHE Verified 04/30/24 08:11


 


hydrocodone [From Vicodin] AdvReac Severe HEADACHE Verified 04/30/24 08:11


 


Anesthetics - [place] Type - AdvReac  GAS GIVES Verified 04/30/24 08:11





Select A   HER  





   HEADACHE,  





   VOMITING,  





   HEART  





   PALIPITATIONS  


 


Anesthetics - Juliana Type- AdvReac  GAS GIVES Verified 04/30/24 08:11





Parabens   HER  





   HEADACHE,  





   VOMITING,  





   HEART  





   PALIPITATIONS  


 


ANESTHESIA AdvReac Unknown GAS GIVES Uncoded 04/29/24 17:01





   HER  





   HEADACHE,  





   VOMITING,  





   HEART  





   PALIPITATIONS  














Physical Exam


Vitals: 


                                   Vital Signs











  Temp Pulse Pulse Resp BP BP Pulse Ox


 


 04/30/24 07:00  98.2 F   95  16   121/72  94 L


 


 04/30/24 06:29  97.6 F  89   16  133/82   97


 


 04/29/24 22:15  98.1 F  105 H   18  155/87   95


 


 04/29/24 16:59  100.2 F H  127 H   22  143/89   99








                                Intake and Output











 04/29/24 04/30/24 04/30/24





 22:59 06:59 14:59


 


Other:   


 


  # Voids   1


 


  Weight 72.575 kg  














Results


CBC & Chem 7: 


                                 04/30/24 13:13





                                 04/30/24 13:13


Labs: 


                  Abnormal Lab Results - Last 24 Hours (Table)











  04/29/24 04/29/24 04/29/24 Range/Units





  18:31 18:42 18:42 


 


WBC  13.6 H    (3.8-10.6)  k/uL


 


Neutrophils #  10.9 H    (1.3-7.7)  k/uL


 


BUN   19 H   (7-17)  mg/dL


 


Creatinine   0.40 L   (0.52-1.04)  mg/dL


 


Glucose   158 H   (74-99)  mg/dL


 


Plasma Lactic Acid Gabe    2.3 H*  (0.7-2.0)  mmol/L


 


Magnesium     (1.6-2.3)  mg/dL


 


AST   68 H   (14-36)  U/L


 


ALT   81 H   (4-34)  U/L


 


Urine Appearance     (Clear)  


 


Urine Protein     (Negative)  


 


Urine Ketones     (Negative)  














  04/29/24 04/29/24 Range/Units





  18:42 23:53 


 


WBC    (3.8-10.6)  k/uL


 


Neutrophils #    (1.3-7.7)  k/uL


 


BUN    (7-17)  mg/dL


 


Creatinine    (0.52-1.04)  mg/dL


 


Glucose    (74-99)  mg/dL


 


Plasma Lactic Acid Gabe    (0.7-2.0)  mmol/L


 


Magnesium  1.5 L   (1.6-2.3)  mg/dL


 


AST    (14-36)  U/L


 


ALT    (4-34)  U/L


 


Urine Appearance   Turbid H  (Clear)  


 


Urine Protein   1+ H  (Negative)  


 


Urine Ketones   1+ H  (Negative)  














Assessment and Plan


Time with Patient: Greater than 30

## 2024-05-01 VITALS
RESPIRATION RATE: 16 BRPM | SYSTOLIC BLOOD PRESSURE: 137 MMHG | HEART RATE: 116 BPM | TEMPERATURE: 98.5 F | DIASTOLIC BLOOD PRESSURE: 92 MMHG

## 2024-05-01 RX ADMIN — Medication SCH MG: at 09:50

## 2024-05-01 NOTE — P.DS
Providers


Date of admission: 


04/30/24 03:36





Attending physician: 


Boni Larios





Primary care physician: 


Stated None





Hospital Course: 


Final Diagnosis


-Fall secondary to acute alcohol intoxication patient needs to establish care 

with a PCP before she can be set up with physical therapy outpatient.


-Generalized weakness and debility likely due to deconditioning


-Mild transaminitis likely alcoholic hepatitis patient reports to continue 

drinking daily


-Severe alcohol use disorder and multiple admissions recently due to alcohol 

withdrawal symptoms. Patient is not currently withdrawing has been educated 

extensively on the need for alcohol cessation and has resources for AA, 

outpatient rehab. 


-Lactic acidosis likely due to dehydration


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


-Atelectasis patient will be given an incentive spirometer prior to discharge


-Hypomagnesemia due to chronic alcohol use this was supplemented with IV 

magnesium and recommended to continue oral magnesium supplementation daily on 

discharge


discharge


-Bilateral hand weakness and contractures patient has already been referred to 

neuromuscular specialist o.p work up and has undergone extensive orthopedic and 

neurological examinations prior admission.


-Anxiety/depression


-Hypertension


-Hyperlipidemia


-Osteoarthritis





Discharge Disposition


Stable medically for discharge home. Recommended to establish care with a PCP on

discharge. Information has been given on local area providers. Patient to avoid 

all alcohol. Needs to use walker when up ambulating. Patient recommended for 

home with homecare. Has been denied for subacute rehab multiple admissions and 

will not qualify this admission for subacute rehab either. Patient given 

information for outpatient alcohol rehab. She has not made any phone calls for 

housing, rehab, or to get set up with a family doctor. She has a guardianship 

hearing on May 20th. 





Hospital Course


This is a 63-year-old female with medical history of chronic alcohol use, 

anxiety depression, hypertension, hyperlipidemia, osteoarthritis, concern for a 

neuromuscular disorder has recommended to see a neuromuscular specialist 

outpatient multiple admissions.  Patient comes in after being discharged states 

that she has been drinking alcohol daily states that she drinks any between a 

fifth to a 1/2 a gallon of alcohol every day.  When patient is drinking she is 

weak and falls and this is what brought her back into the hospital.  Patient 

does state that when she fell she was not using her walker.  Patient admits that

she has a drinking problem and does want to quit alcohol on admission her 

alcohol level is less than 10.  She is not having any signs of acute alcohol 

withdrawal at this time.  She did have a mild low-grade fever 100.2 on admission

and has not been febrile since.  Her urinalysis is not suggestive of any type of

infection and she is currently denying dysuria urgency or burning.  She had 

multiple x-rays taken which are not suggesting of any acute fracture of the 

pelvic or hip region.  CT was negative for acute intracranial process.  Her 

chest x-ray shows low lung volumes with a generalized hazy appearance could 

represent atelectasis versus pulmonary edema correlate with a serum BNP.  This 

was most likely an atelectasis as patient has had multiple hospitalizations and 

has been mostly bedrest during those hospital stays.  Her white blood cell count

was 13.6 on admission and is now down to 5.7, she also had a mild elevated 

lactic acid of 2.3 on admission which is now down to 1.5.  She has been 

evaluated by physical therapy multiple times and does not meet qualifications 

for subacute rehab.  Patient would benefit from home physical therapy however 

because she does not have a PCP at this time is unable to get this set up and 

she needs to establish care with a new primary care provider before we can set 

her up with home care.  Patient was given information for outpatient alcohol 

rehabilitation her prior admission as well as multiple resources for housing and

assistance.  Patient has a guardianship hearing on May 20, 2024. Physical 

therapy has recommended home with homecare vs. ECF 24/7 care due to frequent 

falls and frequent hospitalizations which is because of the continued alcohol 

use. Patient verbalizes understanding of the need to quit alcohol and that she 

needs to participate in her care and outpatient follow ups. She is not having 

any chest pain, denies shortness of breath. No nausea, vomiting or diarrhea. 

Hemodynamically she is stable for discharge home.





Please see medication reconciliation for a list of current medications. Thank 

you for allowing us to participate in the care of this patient. 





The impression and plan of care has been dictated by Linda Dietz Nurse 

Practitioner as directed.





Dr. Latrell MD


I have performed a history and physical examination and medical decision making 

of this patient, discussed the same with the dictator, and agree with the 

dictators assessment and plan as written, documented as a scribe. Based on total

visit time, I have performed more than 50% of this visit.





Patient Condition at Discharge: Fair





Plan - Discharge Summary


Discharge Rx Participant: No


New Discharge Prescriptions: 


New


   Escitalopram [Lexapro] 20 mg PO DAILY #30 tablet


   Folic Acid 1 mg PO DAILY #30 tablet


   Pantoprazole [Protonix] 40 mg PO DAILY #30 tab


   Thiamine [Vitamin B-1] 100 mg PO DAILY #30 tab





Continue


   Aspirin EC [Ecotrin Low Dose] 81 mg PO DAILY





No Action


   Cholesterol Med (Unknown) 1 tab PO DAILY


   Anxiety Med ([place]) 1 tab PO DAILY


Discharge Medication List





Anxiety Med ([place]) 1 tab PO DAILY 04/30/24 [History]


Aspirin EC [Ecotrin Low Dose] 81 mg PO DAILY 04/30/24 [History]


Cholesterol Med (Unknown) 1 tab PO DAILY 04/30/24 [History]


Escitalopram [Lexapro] 20 mg PO DAILY #30 tablet 04/30/24 [Rx]


Folic Acid 1 mg PO DAILY #30 tablet 04/30/24 [Rx]


Pantoprazole [Protonix] 40 mg PO DAILY #30 tab 04/30/24 [Rx]


Thiamine [Vitamin B-1] 100 mg PO DAILY #30 tab 04/30/24 [Rx]








Follow up Appointment(s)/Referral(s): 


None,Stated [Primary Care Provider] - 1-2 days


[facility] [NON-STAFF] - 1-2 Days


Patient Instructions/Handouts:  Fall Prevention for Older Adults (ED)


Activity/Diet/Wound Care/Special Instructions: 


GREGORY Price





[facility]





Adult Services





[address] [place] [id number]





P: [phone]





F: [phone]





E: [email or web address]


Discharge/Stand Alone Forms:  AA Meetings [facility], AA Meetings [place], Who Do I Call?, Community Resources, Outpatient Counseling, Inp Substance

Abuse Facilities,  Area PCPs


Discharge Disposition: HOME SELF-CARE

## 2024-05-03 ENCOUNTER — HOSPITAL ENCOUNTER (EMERGENCY)
Dept: HOSPITAL 47 - EC | Age: 64
Discharge: HOME | End: 2024-05-03
Payer: MEDICARE

## 2024-05-03 VITALS — DIASTOLIC BLOOD PRESSURE: 86 MMHG | RESPIRATION RATE: 18 BRPM | HEART RATE: 87 BPM | SYSTOLIC BLOOD PRESSURE: 136 MMHG

## 2024-05-03 VITALS — TEMPERATURE: 99.1 F

## 2024-05-03 DIAGNOSIS — Z91.040: ICD-10-CM

## 2024-05-03 DIAGNOSIS — Z87.891: ICD-10-CM

## 2024-05-03 DIAGNOSIS — Z88.5: ICD-10-CM

## 2024-05-03 DIAGNOSIS — R53.1: Primary | ICD-10-CM

## 2024-05-03 DIAGNOSIS — Z88.4: ICD-10-CM

## 2024-05-03 LAB
ALBUMIN SERPL-MCNC: 4.3 G/DL (ref 3.5–5)
ALP SERPL-CCNC: 61 U/L (ref 38–126)
ALT SERPL-CCNC: 47 U/L (ref 4–34)
ANION GAP SERPL CALC-SCNC: 9 MMOL/L
APTT BLD: 21.2 SEC (ref 22–30)
AST SERPL-CCNC: 36 U/L (ref 14–36)
BASOPHILS # BLD AUTO: 0.1 K/UL (ref 0–0.2)
BASOPHILS NFR BLD AUTO: 1 %
BUN SERPL-SCNC: 18 MG/DL (ref 7–17)
CALCIUM SPEC-MCNC: 10.2 MG/DL (ref 8.4–10.2)
CHLORIDE SERPL-SCNC: 105 MMOL/L (ref 98–107)
CK SERPL-CCNC: 124 U/L (ref 30–135)
CO2 SERPL-SCNC: 27 MMOL/L (ref 22–30)
EOSINOPHIL # BLD AUTO: 0.2 K/UL (ref 0–0.7)
EOSINOPHIL NFR BLD AUTO: 3 %
ERYTHROCYTE [DISTWIDTH] IN BLOOD BY AUTOMATED COUNT: 4.1 M/UL (ref 3.8–5.4)
ERYTHROCYTE [DISTWIDTH] IN BLOOD: 13.3 % (ref 11.5–15.5)
GLUCOSE SERPL-MCNC: 121 MG/DL (ref 74–99)
HCT VFR BLD AUTO: 40.7 % (ref 34–46)
HGB BLD-MCNC: 13.7 GM/DL (ref 11.4–16)
INR PPP: 0.9 (ref ?–1.2)
LYMPHOCYTES # SPEC AUTO: 2.3 K/UL (ref 1–4.8)
LYMPHOCYTES NFR SPEC AUTO: 26 %
MAGNESIUM SPEC-SCNC: 1.4 MG/DL (ref 1.6–2.3)
MCH RBC QN AUTO: 33.4 PG (ref 25–35)
MCHC RBC AUTO-ENTMCNC: 33.6 G/DL (ref 31–37)
MCV RBC AUTO: 99.2 FL (ref 80–100)
MONOCYTES # BLD AUTO: 0.4 K/UL (ref 0–1)
MONOCYTES NFR BLD AUTO: 4 %
NEUTROPHILS # BLD AUTO: 5.6 K/UL (ref 1.3–7.7)
NEUTROPHILS NFR BLD AUTO: 64 %
PLATELET # BLD AUTO: 268 K/UL (ref 150–450)
POTASSIUM SERPL-SCNC: 3.7 MMOL/L (ref 3.5–5.1)
PROT SERPL-MCNC: 7.3 G/DL (ref 6.3–8.2)
PT BLD: 10.3 SEC (ref 10–12.5)
SODIUM SERPL-SCNC: 141 MMOL/L (ref 137–145)
T4 FREE SERPL-MCNC: 1.7 NG/DL (ref 0.78–2.19)
WBC # BLD AUTO: 8.6 K/UL (ref 3.8–10.6)

## 2024-05-03 PROCEDURE — 83735 ASSAY OF MAGNESIUM: CPT

## 2024-05-03 PROCEDURE — 85025 COMPLETE CBC W/AUTO DIFF WBC: CPT

## 2024-05-03 PROCEDURE — 93005 ELECTROCARDIOGRAM TRACING: CPT

## 2024-05-03 PROCEDURE — 85730 THROMBOPLASTIN TIME PARTIAL: CPT

## 2024-05-03 PROCEDURE — 99285 EMERGENCY DEPT VISIT HI MDM: CPT

## 2024-05-03 PROCEDURE — 83605 ASSAY OF LACTIC ACID: CPT

## 2024-05-03 PROCEDURE — 80320 DRUG SCREEN QUANTALCOHOLS: CPT

## 2024-05-03 PROCEDURE — 36415 COLL VENOUS BLD VENIPUNCTURE: CPT

## 2024-05-03 PROCEDURE — 84481 FREE ASSAY (FT-3): CPT

## 2024-05-03 PROCEDURE — 96360 HYDRATION IV INFUSION INIT: CPT

## 2024-05-03 PROCEDURE — 84484 ASSAY OF TROPONIN QUANT: CPT

## 2024-05-03 PROCEDURE — 96372 THER/PROPH/DIAG INJ SC/IM: CPT

## 2024-05-03 PROCEDURE — 80053 COMPREHEN METABOLIC PANEL: CPT

## 2024-05-03 PROCEDURE — 84443 ASSAY THYROID STIM HORMONE: CPT

## 2024-05-03 PROCEDURE — 96361 HYDRATE IV INFUSION ADD-ON: CPT

## 2024-05-03 PROCEDURE — 85610 PROTHROMBIN TIME: CPT

## 2024-05-03 PROCEDURE — 84439 ASSAY OF FREE THYROXINE: CPT

## 2024-05-03 PROCEDURE — 82550 ASSAY OF CK (CPK): CPT

## 2024-05-03 PROCEDURE — 71046 X-RAY EXAM CHEST 2 VIEWS: CPT

## 2024-05-03 RX ADMIN — Medication STA MG: at 15:56

## 2024-05-03 RX ADMIN — SODIUM CHLORIDE STA MG: 900 INJECTION, SOLUTION INTRAVENOUS at 14:44

## 2024-05-03 RX ADMIN — CEFAZOLIN STA MLS/HR: 330 INJECTION, POWDER, FOR SOLUTION INTRAMUSCULAR; INTRAVENOUS at 14:43

## 2024-05-03 NOTE — ED
General Adult HPI





- General


Stated complaint: Weakness


Time Seen by Provider: 05/03/24 12:23


Source: patient, EMS, RN notes reviewed


Mode of arrival: EMS


Limitations: no limitations





- History of Present Illness


Initial comments: 





Patient is a 63-year-old female presenting to the emergency department with leg 

weakness.  Patient was discharged from the hospital around 4 days ago.  Patient 

states on day of discharge she was able to make a few steps with a walker.   

Patient has been crawling around her residence.  No new fall.  Weakness his legs

and has been chronic.  No confusion or speech problems.  Patient has not drink 

alcohol in the last 5 or 10 days





- Related Data


                                Home Medications











 Medication  Instructions  Recorded  Confirmed


 


Cholesterol Med (Unknown) 1 tab PO DAILY 04/30/24 05/03/24


 


Bp Med (Unknown) 1 tab PO DAILY 05/03/24 05/03/24


 


Escitalopram [Lexapro] 20 mg PO AS DIRECTED 05/03/24 05/03/24


 


Folic Acid 1 mg PO AS DIRECTED 05/03/24 05/03/24


 


Pantoprazole [Protonix] 40 mg PO AS DIRECTED 05/03/24 05/03/24


 


Thiamine [Vitamin B-1] 100 mg PO AS DIRECTED 05/03/24 05/03/24











                                    Allergies











Allergy/AdvReac Type Severity Reaction Status Date / Time


 


latex Allergy Unknown Itching, Verified 05/03/24 12:45





   Blisters  


 


codeine AdvReac Severe HEADACHE Verified 05/03/24 12:45


 


hydrocodone [From Vicodin] AdvReac Severe HEADACHE Verified 05/03/24 12:45


 


Anesthetics - Amide Type - AdvReac  GAS GIVES Verified 05/03/24 12:45





Select A   HER  





   HEADACHE,  





   VOMITING,  





   HEART  





   PALIPITATIONS  


 


Anesthetics - Juliana Type- AdvReac  GAS GIVES Verified 05/03/24 12:45





Parabens   HER  





   HEADACHE,  





   VOMITING,  





   HEART  





   PALIPITATIONS  


 


ANESTHESIA AdvReac Unknown GAS GIVES Uncoded 05/03/24 12:45





   HER  





   HEADACHE,  





   VOMITING,  





   HEART  





   PALIPITATIONS  














Review of Systems


ROS Statement: 


Those systems with pertinent positive or pertinent negative responses have been 

documented in the HPI.





ROS Other: All systems not noted in ROS Statement are negative.


Constitutional: Denies: fever


Eyes: Denies: eye pain


ENT: Denies: ear pain


Respiratory: Denies: dyspnea


Musculoskeletal: Denies: back pain


Neurological: Reports: as per HPI, weakness.  Denies: headache, confusion





Past Medical History


Past Medical History: Hyperlipidemia, Hypertension, Osteoarthritis (OA)


Additional Past Medical History / Comment(s): Multiple recent falls. hx colon 

polyps, occasional diarrhea, hx of fall Nov 2020 and has been having pain right 

hip since that radiates down right leg, walks with limp., states breast implants

moving up.


History of Any Multi-Drug Resistant Organisms: None Reported


Past Surgical History: Breast Surgery, Orthopedic Surgery, Tonsillectomy


Additional Past Surgical History / Comment(s): right elbow surgery with screws, 

right knee surgery with plate and removal ., lumpectomy left breast, breast 

implants


Past Anesthesia/Blood Transfusion Reactions: Postoperative Nausea & Vomiting 

(PONV)


Past Psychological History: Anxiety, Depression


Smoking Status: Former smoker


Past Alcohol Use History: Abuse, Daily, Heavy


Past Drug Use History: None Reported





- Past Family History


  ** Father


Family Medical History: Cancer





General Exam


Limitations: no limitations


General appearance: alert, in no apparent distress


Head exam: Present: normocephalic


Eye exam: Present: normal appearance, PERRL, EOMI


ENT exam: Present: normal oropharynx


Neck exam: Present: normal inspection.  Absent: tenderness


Respiratory exam: Present: normal lung sounds bilaterally


Cardiovascular Exam: Present: regular rate, normal rhythm


GI/Abdominal exam: Present: soft.  Absent: tenderness


Back exam: Present: normal inspection


Neurological exam: Present: alert, oriented X3, CN II-XII intact


  ** Expanded


Neurological exam: Present: protecting the airway


Speech: Present: fluid speech


Cranial nerves: EOM's Intact: Normal


Motor strength exam: RUE: 5, LUE: 5, RLE: 3, LLE: 3


Eye Response: (4) open spontaneously


Motor Response: (6) obeys commands


Verbal Response: (5) oriented


Psychiatric exam: Present: normal affect, normal mood


Skin exam: Present: normal color





Course


                                   Vital Signs











  05/03/24 05/03/24 05/03/24





  12:37 12:44 13:22


 


Temperature 99.1 F  


 


Pulse Rate 92 85 79


 


Respiratory 16 16 16





Rate   


 


Blood Pressure 135/91  


 


O2 Sat by Pulse 97 97 98





Oximetry   














  05/03/24





  13:53


 


Temperature 


 


Pulse Rate 108 H


 


Respiratory 16





Rate 


 


Blood Pressure 


 


O2 Sat by Pulse 98





Oximetry 














EKG Findings





- EKG Results:


EKG: interpreted by ERMD, sinus rhythm, normal axis, normal QRS, normal ST/T





Medical Decision Making





- Medical Decision Making





Was pt. sent in by a medical professional or institution (KRISTAN Wasserman, NP, urgent 

care, hospital, or nursing home...) When possible be specific


@  -No


Did you speak to anyone other than the patient for history (EMS, parent, family,

police, friend...)? What history was obtained from this source 


@  -EMS provides history and transportation


Did you review nursing and triage notes (agree or disagree)?  Why? 


@  -I reviewed and agree with nursing and triage notes


Were old charts reviewed (outside hosp., previous admission, EMS record, old 

EKG, old radiological studies, urgent care reports/EKG's, nursing home records)?

Report findings 


@  -No old charts were reviewed


Differential Diagnosis (chest pain, altered mental status, abdominal pain women,

abdominal pain men, vaginal bleeding, weakness, fever, dyspnea, syncope, 

headache, dizziness, GI bleed, back pain, seizure, CVA, palpatations, mental 

health, musculoskeletal)? 


@  -Differential Weakness:


Hypoglycemia, shock, sepsis, hyponatremia, anemia, infection, MI, ETOH, adverse 

medicine reaction, overdose, stroke, this is not meant to be an all-inclusive 

list.





EKG interpreted by me (3pts min.).


@  -As above


X-rays interpreted by me (1pt min.).


@  -None done


CT interpreted by me (1pt min.).


@  -None done


U/S interpreted by me (1pt. min.).


@  -None done


What testing was considered but not performed or refused? (CT, X-rays, U/S, 

labs)? Why?


@  -None


What meds were considered but not given or refused? Why?


@  -None


Did you discuss the management of the patient with other professionals 

(professionals i.e. KRISTAN Wasserman, NP, lab, RT, psych nurse, , , 

teacher, , )? Give summary


@  -Case was discussed with Dr. Sams who is familiar with this patient.  He 

did come down and evaluate patient and determined that patient should be 

discharged.  He did inform patient of this.  He states patient does have chronic

problems and should be using a walker.  He is very failure with this patient and

has admitted her several times previously


Was smoking cessation discussed for >3mins.?


@  -No


Was critical care preformed (if so, how long)?


@  -No


Were there social determinants of health that impacted care today? How? 

(Homelessness, low income, unemployed, alcoholism, drug addiction, 

transportation, low edu. Level, literacy, decrease access to med. care, long-term, 

rehab)?


@  -No


Was there de-escalation of care discussed even if they declined (Discuss DNR or 

withdrawal of care, Hospice)? DNR status


@  -No


What co-morbidities impacted this encounter? (DM, HTN, Smoking, COPD, CAD, 

Cancer, CVA, ARF, Chemo, Hep., AIDS, mental health diagnosis, sleep apnea, 

morbid obesity)?


@  -None


Was patient admitted / discharged? Hospital course, mention meds given and 

route, prescriptions, significant lab abnormalities, going to OR and other 

pertinent info.


@  -Patient replaced magnesium.  Patient was seen by admitting physician who is 

familiar with her and does recommend discharge.


Undiagnosed new problem with uncertain prognosis?


@  -No


Drug Therapy requiring intensive monitoring for toxicity (Heparin, Nitro, Insuli

n, Cardizem)?


@  -No


Were any procedures done?


@  -No


Diagnosis/symptom?


@  -Leg weakness, hypomagnesemia


Acute, or Chronic, or Acute on Chronic?


@  -Acute on chronic, acute on chronic


Uncomplicated (without systemic symptoms) or Complicated (systemic symptoms)?


@  -Default


Side effects of treatment?


@  -No


Exacerbation, Progression, or Severe Exacerbation?


@  -No


Poses a threat to life or bodily function? How? (Chest pain, USA, MI, pneumonia,

PE, COPD, DKA, ARF, appy, cholecystitis, CVA, Diverticulitis, Homicidal, 

Suicidal, threat to staff... and all critical care pts)


@  -No








- Lab Data


Result diagrams: 


                                 05/03/24 14:00





                                 05/03/24 14:00


                                   Lab Results











  05/03/24 05/03/24 05/03/24 Range/Units





  14:00 14:00 14:00 


 


WBC   8.6   (3.8-10.6)  k/uL


 


RBC   4.10   (3.80-5.40)  m/uL


 


Hgb   13.7   (11.4-16.0)  gm/dL


 


Hct   40.7   (34.0-46.0)  %


 


MCV   99.2   (80.0-100.0)  fL


 


MCH   33.4   (25.0-35.0)  pg


 


MCHC   33.6   (31.0-37.0)  g/dL


 


RDW   13.3   (11.5-15.5)  %


 


Plt Count   268   (150-450)  k/uL


 


MPV   9.1   


 


Neutrophils %   64   %


 


Lymphocytes %   26   %


 


Monocytes %   4   %


 


Eosinophils %   3   %


 


Basophils %   1   %


 


Neutrophils #   5.6   (1.3-7.7)  k/uL


 


Lymphocytes #   2.3   (1.0-4.8)  k/uL


 


Monocytes #   0.4   (0-1.0)  k/uL


 


Eosinophils #   0.2   (0-0.7)  k/uL


 


Basophils #   0.1   (0-0.2)  k/uL


 


PT    10.3  (10.0-12.5)  sec


 


INR    0.9  (<1.2)  


 


APTT    21.2 L  (22.0-30.0)  sec


 


Sodium  141    (137-145)  mmol/L


 


Potassium  3.7    (3.5-5.1)  mmol/L


 


Chloride  105    ()  mmol/L


 


Carbon Dioxide  27    (22-30)  mmol/L


 


Anion Gap  9    mmol/L


 


BUN  18 H    (7-17)  mg/dL


 


Creatinine  0.44 L    (0.52-1.04)  mg/dL


 


Est GFR (CKD-EPI)AfAm  >90    (>60 ml/min/1.73 sqM)  


 


Est GFR (CKD-EPI)NonAf  >90    (>60 ml/min/1.73 sqM)  


 


Glucose  121 H    (74-99)  mg/dL


 


Plasma Lactic Acid Gabe     (0.7-2.0)  mmol/L


 


Calcium  10.2    (8.4-10.2)  mg/dL


 


Magnesium  1.4 L    (1.6-2.3)  mg/dL


 


Total Bilirubin  0.9    (0.2-1.3)  mg/dL


 


AST  36    (14-36)  U/L


 


ALT  47 H    (4-34)  U/L


 


Alkaline Phosphatase  61    ()  U/L


 


Creatine Kinase  124    ()  U/L


 


Troponin I     (0.000-0.034)  ng/mL


 


Total Protein  7.3    (6.3-8.2)  g/dL


 


Albumin  4.3    (3.5-5.0)  g/dL


 


TSH  0.285 L    (0.465-4.680)  mIU/L


 


Free T4  1.70    (0.78-2.19)  ng/dL


 


Serum Alcohol  <10    mg/dL














  05/03/24 05/03/24 Range/Units





  14:00 14:00 


 


WBC    (3.8-10.6)  k/uL


 


RBC    (3.80-5.40)  m/uL


 


Hgb    (11.4-16.0)  gm/dL


 


Hct    (34.0-46.0)  %


 


MCV    (80.0-100.0)  fL


 


MCH    (25.0-35.0)  pg


 


MCHC    (31.0-37.0)  g/dL


 


RDW    (11.5-15.5)  %


 


Plt Count    (150-450)  k/uL


 


MPV    


 


Neutrophils %    %


 


Lymphocytes %    %


 


Monocytes %    %


 


Eosinophils %    %


 


Basophils %    %


 


Neutrophils #    (1.3-7.7)  k/uL


 


Lymphocytes #    (1.0-4.8)  k/uL


 


Monocytes #    (0-1.0)  k/uL


 


Eosinophils #    (0-0.7)  k/uL


 


Basophils #    (0-0.2)  k/uL


 


PT    (10.0-12.5)  sec


 


INR    (<1.2)  


 


APTT    (22.0-30.0)  sec


 


Sodium    (137-145)  mmol/L


 


Potassium    (3.5-5.1)  mmol/L


 


Chloride    ()  mmol/L


 


Carbon Dioxide    (22-30)  mmol/L


 


Anion Gap    mmol/L


 


BUN    (7-17)  mg/dL


 


Creatinine    (0.52-1.04)  mg/dL


 


Est GFR (CKD-EPI)AfAm    (>60 ml/min/1.73 sqM)  


 


Est GFR (CKD-EPI)NonAf    (>60 ml/min/1.73 sqM)  


 


Glucose    (74-99)  mg/dL


 


Plasma Lactic Acid Gabe  1.0   (0.7-2.0)  mmol/L


 


Calcium    (8.4-10.2)  mg/dL


 


Magnesium    (1.6-2.3)  mg/dL


 


Total Bilirubin    (0.2-1.3)  mg/dL


 


AST    (14-36)  U/L


 


ALT    (4-34)  U/L


 


Alkaline Phosphatase    ()  U/L


 


Creatine Kinase    ()  U/L


 


Troponin I   <0.012  (0.000-0.034)  ng/mL


 


Total Protein    (6.3-8.2)  g/dL


 


Albumin    (3.5-5.0)  g/dL


 


TSH    (0.465-4.680)  mIU/L


 


Free T4    (0.78-2.19)  ng/dL


 


Serum Alcohol    mg/dL














Disposition


Clinical Impression: 


 Weakness





Disposition: HOME SELF-CARE


Condition: Stable


Instructions (If sedation given, give patient instructions):  Fall Prevention 

(ED), Weakness (ED)


Additional Instructions: 


Please do follow-up with primary care physician in the next 1 or 2 days for 

recheck.  Have primary care physician recheck magnesium level.  Continue to take

thiamine daily.  Continue to avoid alcohol.  Return for increased weakness, 

worsening or changing symptoms or any other concerns.


Is patient prescribed a controlled substance at d/c from ED?: No


Referrals: 


Cordell Miguel MD [STAFF PHYSICIAN] - 1-2 days


Time of Disposition: 16:03

## 2024-05-03 NOTE — XR
EXAMINATION TYPE: XR chest 2V

 

DATE OF EXAM: 5/3/2024 2:56 PM

 

CLINICAL INDICATION:Female, 63 years old with history of Weakness; [facility]

 

COMPARISON: Chest radiographs from 4/20/2024

 

TECHNIQUE: XR chest 2V Frontal and lateral views of the chest.

 

FINDINGS: 

Lungs/Pleura: There is no evidence of pleural effusion, focal consolidation, or pneumothorax.  

Pulmonary vascularity: Unremarkable.

Heart/mediastinum: Cardiomediastinal silhouette is unremarkable.

Musculoskeletal: No acute osseous pathology. Deformity left proximal humerus.

 

IMPRESSION: 

No acute cardiopulmonary disease/process.

## 2024-05-08 ENCOUNTER — HOSPITAL ENCOUNTER (EMERGENCY)
Dept: HOSPITAL 47 - EC | Age: 64
Discharge: HOME | End: 2024-05-08
Payer: MEDICARE

## 2024-05-08 ENCOUNTER — HOSPITAL ENCOUNTER (INPATIENT)
Dept: HOSPITAL 47 - EC | Age: 64
LOS: 13 days | Discharge: HOME HEALTH | DRG: 57 | End: 2024-05-21
Attending: HOSPITALIST | Admitting: HOSPITALIST
Payer: MEDICARE

## 2024-05-08 VITALS — RESPIRATION RATE: 18 BRPM

## 2024-05-08 VITALS — DIASTOLIC BLOOD PRESSURE: 80 MMHG | TEMPERATURE: 97.5 F | HEART RATE: 84 BPM | SYSTOLIC BLOOD PRESSURE: 113 MMHG

## 2024-05-08 VITALS — BODY MASS INDEX: 26.6 KG/M2

## 2024-05-08 DIAGNOSIS — M43.12: ICD-10-CM

## 2024-05-08 DIAGNOSIS — S14.129A: ICD-10-CM

## 2024-05-08 DIAGNOSIS — R25.3: ICD-10-CM

## 2024-05-08 DIAGNOSIS — M19.90: ICD-10-CM

## 2024-05-08 DIAGNOSIS — Z87.891: ICD-10-CM

## 2024-05-08 DIAGNOSIS — Z71.41: ICD-10-CM

## 2024-05-08 DIAGNOSIS — Z79.899: ICD-10-CM

## 2024-05-08 DIAGNOSIS — Z88.4: ICD-10-CM

## 2024-05-08 DIAGNOSIS — Z88.5: ICD-10-CM

## 2024-05-08 DIAGNOSIS — R29.6: ICD-10-CM

## 2024-05-08 DIAGNOSIS — Z91.81: ICD-10-CM

## 2024-05-08 DIAGNOSIS — Z60.2: ICD-10-CM

## 2024-05-08 DIAGNOSIS — R79.1: ICD-10-CM

## 2024-05-08 DIAGNOSIS — F32.A: ICD-10-CM

## 2024-05-08 DIAGNOSIS — Z53.29: ICD-10-CM

## 2024-05-08 DIAGNOSIS — M24.541: ICD-10-CM

## 2024-05-08 DIAGNOSIS — G43.909: ICD-10-CM

## 2024-05-08 DIAGNOSIS — E83.42: ICD-10-CM

## 2024-05-08 DIAGNOSIS — I10: ICD-10-CM

## 2024-05-08 DIAGNOSIS — Z88.8: ICD-10-CM

## 2024-05-08 DIAGNOSIS — F41.9: ICD-10-CM

## 2024-05-08 DIAGNOSIS — M24.542: ICD-10-CM

## 2024-05-08 DIAGNOSIS — Z91.040: ICD-10-CM

## 2024-05-08 DIAGNOSIS — G97.1: ICD-10-CM

## 2024-05-08 DIAGNOSIS — Z98.82: ICD-10-CM

## 2024-05-08 DIAGNOSIS — Z86.010: ICD-10-CM

## 2024-05-08 DIAGNOSIS — E78.5: ICD-10-CM

## 2024-05-08 DIAGNOSIS — Z60.8: ICD-10-CM

## 2024-05-08 DIAGNOSIS — F10.288: ICD-10-CM

## 2024-05-08 DIAGNOSIS — G62.1: ICD-10-CM

## 2024-05-08 DIAGNOSIS — Z91.199: ICD-10-CM

## 2024-05-08 DIAGNOSIS — K02.9: ICD-10-CM

## 2024-05-08 DIAGNOSIS — G12.21: Primary | ICD-10-CM

## 2024-05-08 DIAGNOSIS — M48.02: ICD-10-CM

## 2024-05-08 DIAGNOSIS — R62.7: ICD-10-CM

## 2024-05-08 DIAGNOSIS — R53.1: Primary | ICD-10-CM

## 2024-05-08 LAB
ALBUMIN SERPL-MCNC: 4.1 G/DL (ref 3.5–5)
ALBUMIN SERPL-MCNC: 4.4 G/DL (ref 3.5–5)
ALP SERPL-CCNC: 63 U/L (ref 38–126)
ALP SERPL-CCNC: 67 U/L (ref 38–126)
ALT SERPL-CCNC: 71 U/L (ref 4–34)
ALT SERPL-CCNC: 73 U/L (ref 4–34)
ANION GAP SERPL CALC-SCNC: 6 MMOL/L
ANION GAP SERPL CALC-SCNC: 9 MMOL/L
AST SERPL-CCNC: 57 U/L (ref 14–36)
AST SERPL-CCNC: 75 U/L (ref 14–36)
BASOPHILS # BLD AUTO: 0.1 K/UL (ref 0–0.2)
BASOPHILS # BLD AUTO: 0.1 K/UL (ref 0–0.2)
BASOPHILS NFR BLD AUTO: 1 %
BASOPHILS NFR BLD AUTO: 1 %
BILIRUB UR QL STRIP.AUTO: (no result)
BUN SERPL-SCNC: 12 MG/DL (ref 7–17)
BUN SERPL-SCNC: 16 MG/DL (ref 7–17)
CALCIUM SPEC-MCNC: 10.2 MG/DL (ref 8.4–10.2)
CALCIUM SPEC-MCNC: 9.8 MG/DL (ref 8.4–10.2)
CHLORIDE SERPL-SCNC: 101 MMOL/L (ref 98–107)
CHLORIDE SERPL-SCNC: 102 MMOL/L (ref 98–107)
CK SERPL-CCNC: 495 U/L (ref 30–135)
CO2 SERPL-SCNC: 30 MMOL/L (ref 22–30)
CO2 SERPL-SCNC: 32 MMOL/L (ref 22–30)
EOSINOPHIL # BLD AUTO: 0.1 K/UL (ref 0–0.7)
EOSINOPHIL # BLD AUTO: 0.1 K/UL (ref 0–0.7)
EOSINOPHIL NFR BLD AUTO: 2 %
EOSINOPHIL NFR BLD AUTO: 2 %
ERYTHROCYTE [DISTWIDTH] IN BLOOD BY AUTOMATED COUNT: 3.99 M/UL (ref 3.8–5.4)
ERYTHROCYTE [DISTWIDTH] IN BLOOD BY AUTOMATED COUNT: 4.07 M/UL (ref 3.8–5.4)
ERYTHROCYTE [DISTWIDTH] IN BLOOD: 13.1 % (ref 11.5–15.5)
ERYTHROCYTE [DISTWIDTH] IN BLOOD: 13.1 % (ref 11.5–15.5)
GLUCOSE SERPL-MCNC: 90 MG/DL (ref 74–99)
GLUCOSE SERPL-MCNC: 96 MG/DL (ref 74–99)
HCT VFR BLD AUTO: 40.1 % (ref 34–46)
HCT VFR BLD AUTO: 41 % (ref 34–46)
HGB BLD-MCNC: 13.2 GM/DL (ref 11.4–16)
HGB BLD-MCNC: 13.5 GM/DL (ref 11.4–16)
KETONES UR QL STRIP.AUTO: (no result)
LYMPHOCYTES # SPEC AUTO: 1.4 K/UL (ref 1–4.8)
LYMPHOCYTES # SPEC AUTO: 2.5 K/UL (ref 1–4.8)
LYMPHOCYTES NFR SPEC AUTO: 26 %
LYMPHOCYTES NFR SPEC AUTO: 38 %
MAGNESIUM SPEC-SCNC: 1.5 MG/DL (ref 1.6–2.3)
MCH RBC QN AUTO: 33.1 PG (ref 25–35)
MCH RBC QN AUTO: 33.2 PG (ref 25–35)
MCHC RBC AUTO-ENTMCNC: 32.9 G/DL (ref 31–37)
MCHC RBC AUTO-ENTMCNC: 33 G/DL (ref 31–37)
MCV RBC AUTO: 100.3 FL (ref 80–100)
MCV RBC AUTO: 100.7 FL (ref 80–100)
MONOCYTES # BLD AUTO: 0.5 K/UL (ref 0–1)
MONOCYTES # BLD AUTO: 0.6 K/UL (ref 0–1)
MONOCYTES NFR BLD AUTO: 9 %
MONOCYTES NFR BLD AUTO: 9 %
NEUTROPHILS # BLD AUTO: 3 K/UL (ref 1.3–7.7)
NEUTROPHILS # BLD AUTO: 3.2 K/UL (ref 1.3–7.7)
NEUTROPHILS NFR BLD AUTO: 47 %
NEUTROPHILS NFR BLD AUTO: 61 %
PH UR: 6.5 [PH] (ref 5–8)
PLATELET # BLD AUTO: 200 K/UL (ref 150–450)
PLATELET # BLD AUTO: 207 K/UL (ref 150–450)
POTASSIUM SERPL-SCNC: 3 MMOL/L (ref 3.5–5.1)
POTASSIUM SERPL-SCNC: 3.6 MMOL/L (ref 3.5–5.1)
PROT SERPL-MCNC: 7.1 G/DL (ref 6.3–8.2)
PROT SERPL-MCNC: 7.5 G/DL (ref 6.3–8.2)
RBC UR QL: 16 /HPF (ref 0–5)
SODIUM SERPL-SCNC: 140 MMOL/L (ref 137–145)
SODIUM SERPL-SCNC: 140 MMOL/L (ref 137–145)
SP GR UR: 1.02 (ref 1–1.03)
SQUAMOUS UR QL AUTO: 1 /HPF (ref 0–4)
UROBILINOGEN UR QL STRIP: 3 MG/DL (ref ?–2)
WBC # BLD AUTO: 5.3 K/UL (ref 3.8–10.6)
WBC # BLD AUTO: 6.4 K/UL (ref 3.8–10.6)
WBC #/AREA URNS HPF: 5 /HPF (ref 0–5)

## 2024-05-08 PROCEDURE — 84157 ASSAY OF PROTEIN OTHER: CPT

## 2024-05-08 PROCEDURE — 96366 THER/PROPH/DIAG IV INF ADDON: CPT

## 2024-05-08 PROCEDURE — 87205 SMEAR GRAM STAIN: CPT

## 2024-05-08 PROCEDURE — 96361 HYDRATE IV INFUSION ADD-ON: CPT

## 2024-05-08 PROCEDURE — 80048 BASIC METABOLIC PNL TOTAL CA: CPT

## 2024-05-08 PROCEDURE — 96365 THER/PROPH/DIAG IV INF INIT: CPT

## 2024-05-08 PROCEDURE — 82607 VITAMIN B-12: CPT

## 2024-05-08 PROCEDURE — 93005 ELECTROCARDIOGRAM TRACING: CPT

## 2024-05-08 PROCEDURE — 81001 URINALYSIS AUTO W/SCOPE: CPT

## 2024-05-08 PROCEDURE — 86780 TREPONEMA PALLIDUM: CPT

## 2024-05-08 PROCEDURE — 36415 COLL VENOUS BLD VENIPUNCTURE: CPT

## 2024-05-08 PROCEDURE — 82550 ASSAY OF CK (CPK): CPT

## 2024-05-08 PROCEDURE — 72100 X-RAY EXAM L-S SPINE 2/3 VWS: CPT

## 2024-05-08 PROCEDURE — 99285 EMERGENCY DEPT VISIT HI MDM: CPT

## 2024-05-08 PROCEDURE — 72125 CT NECK SPINE W/O DYE: CPT

## 2024-05-08 PROCEDURE — 82040 ASSAY OF SERUM ALBUMIN: CPT

## 2024-05-08 PROCEDURE — 71275 CT ANGIOGRAPHY CHEST: CPT

## 2024-05-08 PROCEDURE — 82784 ASSAY IGA/IGD/IGG/IGM EACH: CPT

## 2024-05-08 PROCEDURE — 85025 COMPLETE CBC W/AUTO DIFF WBC: CPT

## 2024-05-08 PROCEDURE — 96372 THER/PROPH/DIAG INJ SC/IM: CPT

## 2024-05-08 PROCEDURE — 85379 FIBRIN DEGRADATION QUANT: CPT

## 2024-05-08 PROCEDURE — 96360 HYDRATION IV INFUSION INIT: CPT

## 2024-05-08 PROCEDURE — 80053 COMPREHEN METABOLIC PANEL: CPT

## 2024-05-08 PROCEDURE — 72141 MRI NECK SPINE W/O DYE: CPT

## 2024-05-08 PROCEDURE — 82042 OTHER SOURCE ALBUMIN QUAN EA: CPT

## 2024-05-08 PROCEDURE — 96375 TX/PRO/DX INJ NEW DRUG ADDON: CPT

## 2024-05-08 PROCEDURE — 85652 RBC SED RATE AUTOMATED: CPT

## 2024-05-08 PROCEDURE — 86140 C-REACTIVE PROTEIN: CPT

## 2024-05-08 PROCEDURE — 83735 ASSAY OF MAGNESIUM: CPT

## 2024-05-08 PROCEDURE — 87535 HIV-1 PROBE&REVERSE TRNSCRPJ: CPT

## 2024-05-08 PROCEDURE — 87070 CULTURE OTHR SPECIMN AEROBIC: CPT

## 2024-05-08 PROCEDURE — 82164 ANGIOTENSIN I ENZYME TEST: CPT

## 2024-05-08 PROCEDURE — 80320 DRUG SCREEN QUANTALCOHOLS: CPT

## 2024-05-08 PROCEDURE — 82747 ASSAY OF FOLIC ACID RBC: CPT

## 2024-05-08 PROCEDURE — 70450 CT HEAD/BRAIN W/O DYE: CPT

## 2024-05-08 PROCEDURE — 83873 ASSAY OF CSF PROTEIN: CPT

## 2024-05-08 PROCEDURE — 89050 BODY FLUID CELL COUNT: CPT

## 2024-05-08 PROCEDURE — 83916 OLIGOCLONAL BANDS: CPT

## 2024-05-08 PROCEDURE — 82945 GLUCOSE OTHER FLUID: CPT

## 2024-05-08 RX ADMIN — ACETAMINOPHEN STA MG: 500 TABLET ORAL at 12:40

## 2024-05-08 RX ADMIN — CEFAZOLIN STA MLS/HR: 330 INJECTION, POWDER, FOR SOLUTION INTRAMUSCULAR; INTRAVENOUS at 08:31

## 2024-05-08 RX ADMIN — MAGNESIUM SULFATE IN DEXTROSE SCH MLS/HR: 10 INJECTION, SOLUTION INTRAVENOUS at 23:34

## 2024-05-08 RX ADMIN — CEFAZOLIN SCH MLS/HR: 330 INJECTION, POWDER, FOR SOLUTION INTRAMUSCULAR; INTRAVENOUS at 22:45

## 2024-05-08 RX ADMIN — KETOROLAC TROMETHAMINE STA MG: 15 INJECTION, SOLUTION INTRAMUSCULAR; INTRAVENOUS at 23:34

## 2024-05-08 RX ADMIN — SODIUM CHLORIDE STA MG: 900 INJECTION, SOLUTION INTRAVENOUS at 22:45

## 2024-05-08 SDOH — SOCIAL STABILITY - SOCIAL INSECURITY: PROBLEMS RELATED TO LIVING ALONE: Z60.2

## 2024-05-08 SDOH — SOCIAL STABILITY - SOCIAL INSECURITY: OTHER PROBLEMS RELATED TO SOCIAL ENVIRONMENT: Z60.8

## 2024-05-08 NOTE — CT
EXAMINATION TYPE: CT brain cspine wo con

 

DATE OF EXAM: 5/8/2024

 

COMPARISON: Brain 4/29/2024

 

HISTORY: 63-year-old female with pain after Fall

 

CT DLP: 1224.9 mGycm

Automated exposure control for dose reduction was used.

 

Technique:  Examination of the head was done in axial plane without intravenous contrast. Coronal and
 sagittal reconstructions performed.

 

CT of the cervical spine was obtained in axial plane without intravenous injection of  contrast mater
ial.  Coronal and sagittal reformatted images were obtained from the axial views for evaluation of  f
ractures, spinal alignment and canal.

 

 

FINDINGS:

 

Head:

There is no evidence of  acute intracranial hemorrhage, acute ischemic changes, mass, mass-effect, or
 extra-axial fluid collection.  There is no effacement of cerebral sulci or basal subarachnoid cister
ns.  There is no hydrocephalus.  There is no midline shift.  Gray-white matter distinction is preserv
ed.

 

Moderate patchy white matter hypodensities in both cerebral hemispheres. Mild metastatic calcificatio
ns in the carotid siphons.

 

Slight leftward nasal septal deviation. Trace mucosal thickening ethmoid air cells.

 

 

Cervical spine:

No craniocervical junction abnormality, predental space widening, or prevertebral soft tissue swellin
g.

 

Moderate multilevel hypertrophic facet arthropathy, greatest towards the left at C3-C4. End of the ri
ght at C2-C3. Incidental large periapical lucency involving the right maxillary canine. Multiple dent
al caries are present.

 

Reversal of the normal cervical lordosis.

 

Trace grade 1 anterolisthesis C5-C6. Remainder of the alignment is maintained.

 

No acute fracture of the cervical spine.

 

Severe left neural foraminal stenosis at C3-C4 and mild-to-moderate at additional levels.

 

Sagittal and coronal reformatted images confirm above findings.

 

 

COMBINED IMPRESSION:

1. Moderate patchy burden of chronic small vessel schema disease. No acute intracranial abnormality s
een.

2. No acute fracture in cervical spine. Moderate multilevel spondylotic change with degenerative crit
 one anterolisthesis C5-C6.

3. Incidental scattered ductal disease with dental caries and a prominent periapical lucency involvin
g the right maxillary canine.

## 2024-05-08 NOTE — ED
Weakness HPI





- General


Source: patient, EMS, RN notes reviewed


Mode of arrival: EMS


Limitations: no limitations





<Sommer Jason - Last Filed: 05/08/24 20:28>





<Saul Nelson - Last Filed: 05/08/24 21:29>





- General


Chief complaint: Weakness


Stated complaint: Inmobility


Time Seen by Provider: 05/08/24 20:29





- History of Present Illness


Initial comments: 





Quick note63-year-old female presenting with generalized weakness x 1 month.  

States she had a fall 1 week ago where she admits to losing consciousness.  Chung cano was here earlier today and had CT of head performed as well as lab work 

and urine.  She reports there was no diagnosis at time of discharge. 

(Sommer Jason)


63-year-old female with increased weakness.  Patient was seen earlier today, 

head CT imaging, laboratory testing, full workup.  She was discharged home in 

stable condition.  She returns stating that she cannot take care of herself at 

home and that she is having difficulty ambulating.  She is requesting placement 

to nursing home for rehabilitation.  She has history of alcohol abuse and 

frequent falls. (Saul Nelson)





- Related Data


                                Home Medications











 Medication  Instructions  Recorded  Confirmed


 


Cholesterol Med (Unknown) 1 tab PO DAILY 04/30/24 05/08/24


 


Bp Med (Unknown) 1 tab PO DAILY 05/03/24 05/08/24


 


Escitalopram [Lexapro] 20 mg PO AS DIRECTED 05/03/24 05/08/24


 


Folic Acid 1 mg PO AS DIRECTED 05/03/24 05/08/24


 


Pantoprazole [Protonix] 40 mg PO AS DIRECTED 05/03/24 05/08/24


 


Thiamine [Vitamin B-1] 100 mg PO AS DIRECTED 05/03/24 05/08/24











                                    Allergies











Allergy/AdvReac Type Severity Reaction Status Date / Time


 


latex Allergy Unknown Itching, Verified 05/08/24 18:58





   Blisters  


 


codeine AdvReac Severe HEADACHE Verified 05/08/24 18:58


 


hydrocodone [From Vicodin] AdvReac Severe HEADACHE Verified 05/08/24 18:58


 


Anesthetics - Amide Type - AdvReac  GAS GIVES Verified 05/08/24 18:58





Select A   HER  





   HEADACHE,  





   VOMITING,  





   HEART  





   PALIPITATIONS  


 


Anesthetics - Juliana Type- AdvReac  GAS GIVES Verified 05/08/24 18:58





Parabens   HER  





   HEADACHE,  





   VOMITING,  





   HEART  





   PALIPITATIONS  


 


ANESTHESIA AdvReac Unknown GAS GIVES Uncoded 05/08/24 18:58





   HER  





   HEADACHE,  





   VOMITING,  





   HEART  





   PALIPITATIONS  














Review of Systems


ROS Other: All systems not noted in ROS Statement are negative.





<Sommer Jason - Last Filed: 05/08/24 20:28>


ROS Other: All systems not noted in ROS Statement are negative.





<Saul Nelson - Last Filed: 05/08/24 21:29>


ROS Statement: 


Those systems with pertinent positive or pertinent negative responses have been 

documented in the HPI.








Past Medical History


Past Medical History: Hyperlipidemia, Hypertension, Osteoarthritis (OA)


Additional Past Medical History / Comment(s): Multiple recent falls. hx colon 

polyps, occasional diarrhea, hx of fall Nov 2020 and has been having pain right 

hip since that radiates down right leg, walks with limp., states breast implants

moving up.


History of Any Multi-Drug Resistant Organisms: None Reported


Past Surgical History: Breast Surgery, Orthopedic Surgery, Tonsillectomy


Additional Past Surgical History / Comment(s): right elbow surgery with screws, 

right knee surgery with plate and removal ., lumpectomy left breast, breast 

implants


Past Anesthesia/Blood Transfusion Reactions: Postoperative Nausea & Vomiting 

(PONV)


Past Psychological History: Anxiety, Depression


Smoking Status: Former smoker


Past Alcohol Use History: Abuse, Daily, Heavy


Past Drug Use History: None Reported





- Past Family History


  ** Father


Family Medical History: Cancer





<Sommer Jason - Last Filed: 05/08/24 20:28>





General Exam


Limitations: no limitations





<Sommer Jason - Last Filed: 05/08/24 20:28>


General appearance: alert, in no apparent distress


Head exam: Present: atraumatic, normocephalic


Eye exam: Present: normal appearance, PERRL


ENT exam: Present: normal exam


Neck exam: Present: normal inspection


Respiratory exam: Present: normal lung sounds bilaterally.  Absent: respiratory 

distress, wheezes


Cardiovascular Exam: Present: regular rate, normal rhythm


GI/Abdominal exam: Present: soft.  Absent: distended, tenderness


Extremities exam: Present: normal inspection


Neurological exam: Present: alert, oriented X3


Psychiatric exam: Present: normal affect, normal mood


Skin exam: Present: warm, dry, intact





<Saul Nelson - Last Filed: 05/08/24 21:29>





- General Exam Comments


Initial Comments: 





Visual Physical Exam


 


Vital signs reviewed


 


General: Well-appearing, nontoxic, no acute distress.


Head: Normocephalic, atraumatic


Eyes: PERRLA, EOMI


ENT: Airway patent


Chest: Nonlabored breathing


Skin: No visual rash, normal skin tone


Neuro: Alert and oriented 3


Musculoskeletal: No gross abnormalities


 (ErenSommer)





Course





                                   Vital Signs











  05/08/24





  18:53


 


Temperature 98.7 F


 


Pulse Rate 94


 


Respiratory 18





Rate 


 


Blood Pressure 136/84


 


O2 Sat by Pulse 96





Oximetry 














Medical Decision Making





<ErenSommer - Last Filed: 05/08/24 20:28>





<Saul Nelson STAR - Last Filed: 05/08/24 21:29>





- Medical Decision Making





I completed the quick note portion of this chart signed Sommer Jason PA-C 

(Sommer Jason)


Was pt. sent in by a medical professional or institution (CHUNG Wasserman, NP, urgent 

care, hospital, or nursing home...) When possible be specific


@  -No


Did you speak to anyone other than the patient for history (EMS, parent, family,

police, friend...)? What history was obtained from this source 


@  -No


Did you review nursing and triage notes (agree or disagree)?  Why? 


@  -I reviewed and agree with nursing and triage notes


Were old charts reviewed (outside hosp., previous admission, EMS record, old 

EKG, old radiological studies, urgent care reports/EKG's, nursing home records)?

Report findings 


@  -No old charts were reviewed


Differential Weakness:


Hypoglycemia, shock, sepsis, hyponatremia, anemia, infection, MI, ETOH, adverse 

medicine reaction, overdose, stroke, this is not meant to be an all-inclusive 

list.





EKG interpreted by me (3pts min.).


@  -As above


X-rays interpreted by me (1pt min.).


@  -None done


CT interpreted by me (1pt min.).


@  -None done


U/S interpreted by me (1pt. min.).


@  -None done


What testing was considered but not performed or refused? (CT, X-rays, U/S, 

labs)? Why?


@  -None


What meds were considered but not given or refused? Why?


@  -None


Did you discuss the management of the patient with other professionals 

(professionals i.e. , PA, NP, lab, RT, psych nurse, , , 

teacher, , )? Give summary


@  -No


Was smoking cessation discussed for >3mins.?


@  -No


Was critical care preformed (if so, how long)?


@  -No


Were there social determinants of health that impacted care today? How? 

(Homelessness, low income, unemployed, alcoholism, drug addiction, 

transportation, low edu. Level, literacy, decrease access to med. care, shelter, 

rehab)?


@  -No


Was there de-escalation of care discussed even if they declined (Discuss DNR or 

withdrawal of care, Hospice)? DNR status


@  -No


What co-morbidities impacted this encounter? (DM, HTN, Smoking, COPD, CAD, 

Cancer, CVA, ARF, Chemo, Hep., AIDS, mental health diagnosis, sleep apnea, 

morbid obesity)?


@  -None


Was patient admitted / discharged? Hospital course, mention meds given and 

route, prescriptions, significant lab abnormalities, going to OR and other 

pertinent info.


@  -Patient presenting within the same day for reevaluation and requesting 

placement.  Patient is having difficulty ambulating I do have concerns about her

ability to care for herself at home.  She will be admitted to the hospital, may 

require placement or further services.  Not safe to be discharged home at this 

time.


Undiagnosed new problem with uncertain prognosis?


@  -No


Drug Therapy requiring intensive monitoring for toxicity (Heparin, Nitro, 

Insulin, Cardizem)?


@  -No


Were any procedures done?


@  -No


Diagnosis/symptom?


@  -[Weakness, failure to thrive, difficulty ambulating


Acute, or Chronic, or Acute on Chronic?


@  -Acute on chronic


Uncomplicated (without systemic symptoms) or Complicated (systemic symptoms)?


@  -[default


Side effects of treatment?


@  -No


Exacerbation, Progression, or Severe Exacerbation?


@  -No


Poses a threat to life or bodily function? How? (Chest pain, USA, MI, pneumonia,

 PE, COPD, DKA, ARF, appy, cholecystitis, CVA, Diverticulitis, Homicidal, 

Suicidal, threat to staff... and all critical care pts)


@  -No


 (Saul Nelson)





Disposition





<Sommer Jason - Last Filed: 05/08/24 20:28>


Is patient prescribed a controlled substance at d/c from ED?: No


Time of Disposition: 21:29





<Saul Nelson - Last Filed: 05/08/24 21:29>


Clinical Impression: 


 Falls frequently, Failure to thrive





Disposition: ADMITTED AS IP [facility]


Condition: Stable


Referrals: 


Adam Chinchilla DO [Primary Care Provider] - 1-2 days

## 2024-05-08 NOTE — ED
General Adult HPI





- General


Stated complaint: Fall


Time Seen by Provider: 05/08/24 07:10





- History of Present Illness


Initial comments: 


Dictation was produced using dragon dictation software. please excuse any 

grammatical, word or spelling errors. 











Chief Complaint: 63-year-old female well-known to emergency department for 

frequent visitations presents to the ER for weakness





History of Present Illness: Patient 63-year-old female she has history of alc

ohol dependence.  Patient was just discharged 5 days ago.  Patient allegedly 

returned to the emergency department for generalized weakness.  Patient states 

that she cannot walk and that she is scooting around because she cannot stand.  

She currently lives at a living facility.  She was just discharged 5 days ago.  

Patient apparently had been working with social work and does not qualify for 

subacute rehab.  She apparently has a pending guardianship on May 20.  Patient 

denies any pain complaints.  She states she fell the day after being discharged 

and has not been able to get up and stand.  She feels that she has to drag 

herself around.  Denies any focal pain.  Denies any numbness tingling pa

resthesias.  Denies any fever, chills or night sweats.








The ROS documented in this emergency department record has been reviewed and 

confirmed by me.  Those systems with pertinent positive or negative responses 

have been documented in the HPI.  All other systems are other negative and/or 

noncontributory.




















- Related Data


                                Home Medications











 Medication  Instructions  Recorded  Confirmed


 


Cholesterol Med (Unknown) 1 tab PO DAILY 04/30/24 05/08/24


 


Bp Med (Unknown) 1 tab PO DAILY 05/03/24 05/08/24


 


Escitalopram [Lexapro] 20 mg PO AS DIRECTED 05/03/24 05/08/24


 


Folic Acid 1 mg PO AS DIRECTED 05/03/24 05/08/24


 


Pantoprazole [Protonix] 40 mg PO AS DIRECTED 05/03/24 05/08/24


 


Thiamine [Vitamin B-1] 100 mg PO AS DIRECTED 05/03/24 05/08/24











                                    Allergies











Allergy/AdvReac Type Severity Reaction Status Date / Time


 


latex Allergy Unknown Itching, Verified 05/08/24 11:24





   Blisters  


 


codeine AdvReac Severe HEADACHE Verified 05/08/24 11:24


 


hydrocodone [From Vicodin] AdvReac Severe HEADACHE Verified 05/08/24 11:24


 


Anesthetics - Amide Type - AdvReac  GAS GIVES Verified 05/08/24 11:24





Select A   HER  





   HEADACHE,  





   VOMITING,  





   HEART  





   PALIPITATIONS  


 


Anesthetics - Juliana Type- AdvReac  GAS GIVES Verified 05/08/24 11:24





Parabens   HER  





   HEADACHE,  





   VOMITING,  





   HEART  





   PALIPITATIONS  


 


ANESTHESIA AdvReac Unknown GAS GIVES Uncoded 05/08/24 07:03





   HER  





   HEADACHE,  





   VOMITING,  





   HEART  





   PALIPITATIONS  














Review of Systems


ROS Statement: 


Those systems with pertinent positive or pertinent negative responses have been 

documented in the HPI.





ROS Other: All systems not noted in ROS Statement are negative.





Past Medical History


Past Medical History: Hyperlipidemia, Hypertension, Osteoarthritis (OA)


Additional Past Medical History / Comment(s): Multiple recent falls. hx colon 

polyps, occasional diarrhea, hx of fall Nov 2020 and has been having pain right 

hip since that radiates down right leg, walks with limp., states breast implants

moving up.


History of Any Multi-Drug Resistant Organisms: None Reported


Past Surgical History: Breast Surgery, Orthopedic Surgery, Tonsillectomy


Additional Past Surgical History / Comment(s): right elbow surgery with screws, 

right knee surgery with plate and removal ., lumpectomy left breast, breast 

implants


Past Anesthesia/Blood Transfusion Reactions: Postoperative Nausea & Vomiting 

(PONV)


Past Psychological History: Anxiety, Depression


Smoking Status: Former smoker


Past Alcohol Use History: Abuse, Daily, Heavy


Past Drug Use History: None Reported





- Past Family History


  ** Father


Family Medical History: Cancer





General Exam





- General Exam Comments


Initial Comments: 











PHYSICAL EXAM:


General Impression: Alert and oriented x3, not in acute distress


HEENT: Normocephalic atraumatic, extra-ocular movements intact, pupils equal and

reactive to light bilaterally, mucous membranes moist.


Cardiovascular: Heart regular rate and rhythm


Chest: Able to complete full sentences, no retractions, no tachypnea


Abdomen: abdomen soft, non-tender, non-distended, no organomegaly


Musculoskeletal: Pulses present and equal in all extremities, no peripheral 

edema


Motor:  no focal deficits noted


Neurological: CN II-XII grossly intact, no focal motor or sensory deficits noted


Skin: mild skin breakdown of the skin of the buttocks


Psych: Normal affect and mood














Course


                                   Vital Signs











  05/08/24 05/08/24





  07:46 08:31


 


Temperature 98.6 F 


 


Pulse Rate  95


 


Respiratory 14 24





Rate  


 


Blood Pressure  145/83


 


O2 Sat by Pulse  99





Oximetry  














EKG Findings





- EKG Comments:


EKG Findings:: My EKG interpretation: Ventricular rate 92, sinus rhythm,.  165, 

cures 94, QTc 428. No AR prolongation, no QTC prolongation, no ST or T-wave 

changes noted.  .  Overall, this EKG is unremarkable





Medical Decision Making





- Medical Decision Making


Was pt. sent in by a medical professional or institution (KRISTAN Wasserman, NP, urgent 

care, hospital, or nursing home...) When possible be specific


@  -No


Did you speak to anyone other than the patient for history (EMS, parent, family,

police, friend...)? What history was obtained from this source 


@  -No


Did you review nursing and triage notes (agree or disagree)?  Why? 


@  -I reviewed and agree with nursing and triage notes


Were old charts reviewed (outside hosp., previous admission, EMS record, old 

EKG, old radiological studies, urgent care reports/EKG's, nursing home records)?

Report findings 


@  -Previous discharge summary reviewed


Differential Diagnosis (chest pain, altered mental status, abdominal pain women,

abdominal pain men, vaginal bleeding, musculoskeletal, weakness, fever, dyspnea,

syncope, headache, dizziness, GI bleed, back pain, seizure, CVA, palpatations, 

mental health)? 


@  -Differential Weakness:


Hypoglycemia, shock, sepsis, hyponatremia, anemia, infection, MI, ETOH, adverse 

medicine reaction, overdose, stroke, this is not meant to be an all-inclusive 

list.





EKG interpreted by me (3pts min.).


@  -None done


X-rays interpreted by me (1pt min.).


@  -None done


CT interpreted by me (1pt min.).


@  -None done


U/S interpreted by me (1pt. min.).


@  -None done


What testing was considered but not performed or refused? (CT, X-rays, U/S, 

labs)? Why?


@  -None


What meds were considered but not given or refused? Why?


@  -None


Did you discuss the management of the patient with other professionals 

(professionals i.e. KRISTAN Wasserman, NP, lab, RT, psych nurse, , , 

teacher, , )? Give summary


@  -Case discussed with .  Patient is well-known to the emergency 

department.   scheduled primary care physician appointment on behalf

of the patient. 


Was smoking cessation discussed for >3mins.?


@  -No


Was critical care preformed (if so, how long)?


@  -No


Were there social determinants of health that impacted care today? How? 

(Homelessness, low income, unemployed, alcoholism, drug addiction, 

transportation, low edu. Level, literacy, decrease access to med. care, long-term, 

rehab)?


@  -No


Was there de-escalation of care discussed even if they declined (Discuss DNR or 

withdrawal of care, Hospice)? DNR status


@  -No


What co-morbidities impacted this encounter? (DM, HTN, Smoking, COPD, CAD, 

Cancer, CVA, ARF, Chemo, Hep., AIDS, mental health diagnosis, sleep apnea, 

morbid obesity)?


@  -None


Was patient admitted / discharged? Hospital course, mention meds given and 

route, prescriptions, significant lab abnormalities, going to OR and other 

pertinent info.


@  -63-year-old female well-known to emergency department for grave disability. 

She had been admitted multiple occasions.  She was discharged with pending 

hearing for guardianship.  Patient is a known alcoholic.  Vital signs stable.  

Her complaint is weakness.  Physical examination is benign.  She has no focal 

neurologic deficits.  Laboratory evaluation is unremarkable.  Imaging studies 

are negative.   reviewed patient's case.  She provided patient with 

primary care physician appointment.  Patient agreeable with discharge.


Undiagnosed new problem with uncertain prognosis?


@  -No


Drug Therapy requiring intensive monitoring for toxicity (Heparin, Nitro, 

Insulin, Cardizem)?


@  -No


Were any procedures done?


@  -No


Diagnosis/symptom?  Acute, or Chronic, or Acute on Chronic?  Uncomplicated 

(without systemic symptoms) or Complicated (systemic symptoms)?


@  -Generalized weakness


Side effects of treatment?


@  -No


Exacerbation, Progression, or Severe Exacerbation?


@  -No


Poses a threat to life or bodily function? How? (Chest pain, USA, MI, pneumonia,

PE, COPD, DKA, ARF, appy, cholecystitis, CVA, Diverticulitis, Homicidal, 

Suicidal, threat to staff... and all critical care pts)


@  -No











- Lab Data


Result diagrams: 


                                 05/08/24 08:17





                                 05/08/24 08:17


                                   Lab Results











  05/08/24 05/08/24 05/08/24 Range/Units





  08:17 08:17 10:52 


 


WBC  5.3    (3.8-10.6)  k/uL


 


RBC  3.99    (3.80-5.40)  m/uL


 


Hgb  13.2    (11.4-16.0)  gm/dL


 


Hct  40.1    (34.0-46.0)  %


 


MCV  100.3 H    (80.0-100.0)  fL


 


MCH  33.2    (25.0-35.0)  pg


 


MCHC  33.0    (31.0-37.0)  g/dL


 


RDW  13.1    (11.5-15.5)  %


 


Plt Count  200    (150-450)  k/uL


 


MPV  8.9    


 


Neutrophils %  61    %


 


Lymphocytes %  26    %


 


Monocytes %  9    %


 


Eosinophils %  2    %


 


Basophils %  1    %


 


Neutrophils #  3.2    (1.3-7.7)  k/uL


 


Lymphocytes #  1.4    (1.0-4.8)  k/uL


 


Monocytes #  0.5    (0-1.0)  k/uL


 


Eosinophils #  0.1    (0-0.7)  k/uL


 


Basophils #  0.1    (0-0.2)  k/uL


 


Sodium   140   (137-145)  mmol/L


 


Potassium   3.6   (3.5-5.1)  mmol/L


 


Chloride   102   ()  mmol/L


 


Carbon Dioxide   32 H   (22-30)  mmol/L


 


Anion Gap   6   mmol/L


 


BUN   12   (7-17)  mg/dL


 


Creatinine   0.41 L   (0.52-1.04)  mg/dL


 


Est GFR (CKD-EPI)AfAm   >90   (>60 ml/min/1.73 sqM)  


 


Est GFR (CKD-EPI)NonAf   >90   (>60 ml/min/1.73 sqM)  


 


Glucose   96   (74-99)  mg/dL


 


Calcium   9.8   (8.4-10.2)  mg/dL


 


Total Bilirubin   1.5 H   (0.2-1.3)  mg/dL


 


AST   75 H   (14-36)  U/L


 


ALT   73 H   (4-34)  U/L


 


Alkaline Phosphatase   63   ()  U/L


 


Creatine Kinase   495 H   ()  U/L


 


Total Protein   7.1   (6.3-8.2)  g/dL


 


Albumin   4.1   (3.5-5.0)  g/dL


 


Urine Color    Yellow  


 


Urine Appearance    Cloudy H  (Clear)  


 


Urine pH    6.5  (5.0-8.0)  


 


Ur Specific Gravity    1.022  (1.001-1.035)  


 


Urine Protein    Trace H  (Negative)  


 


Urine Glucose (UA)    Negative  (Negative)  


 


Urine Ketones    2+ H  (Negative)  


 


Urine Blood    Negative  (Negative)  


 


Urine Nitrite    Negative  (Negative)  


 


Urine Bilirubin    1+ H  (Negative)  


 


Urine Urobilinogen    3.0  (<2.0)  mg/dL


 


Ur Leukocyte Esterase    Negative  (Negative)  


 


Urine RBC    16 H  (0-5)  /hpf


 


Urine WBC    5  (0-5)  /hpf


 


Ur Squamous Epith Cells    1  (0-4)  /hpf


 


Calcium Oxalate Crystal    Moderate H  (None)  /hpf


 


Urine Bacteria    Rare H  (None)  /hpf


 


Urine Mucus    Many H  (None)  /hpf


 


Serum Alcohol   <10   mg/dL














Disposition


Clinical Impression: 


 Weakness





Disposition: HOME SELF-CARE


Condition: Fair


Instructions (If sedation given, give patient instructions):  Fall Prevention 

for Older Adults (ED)


Is patient prescribed a controlled substance at d/c from ED?: No


Referrals: 


Adam Chinchilla DO [Primary Care Provider] - 05/10/24 8:30 am


(NP [place] 


Bring ID Cards and Insurance cards. You will have new patient paperwork to 

complete. )


Forms:  [employer] Meetings [address] - [facility], AA Meetings [place], Outpatient 

Counseling, In Substance Abuse Facilities, Personal Care Manager


Time of Disposition: 12:49

## 2024-05-09 LAB
ANION GAP SERPL CALC-SCNC: 7 MMOL/L
BUN SERPL-SCNC: 19 MG/DL (ref 7–17)
CALCIUM SPEC-MCNC: 9.5 MG/DL (ref 8.4–10.2)
CHLORIDE SERPL-SCNC: 108 MMOL/L (ref 98–107)
CO2 SERPL-SCNC: 24 MMOL/L (ref 22–30)
GLUCOSE SERPL-MCNC: 102 MG/DL (ref 74–99)
MAGNESIUM SPEC-SCNC: 1.7 MG/DL (ref 1.6–2.3)
POTASSIUM SERPL-SCNC: 3.7 MMOL/L (ref 3.5–5.1)
SODIUM SERPL-SCNC: 139 MMOL/L (ref 137–145)

## 2024-05-09 RX ADMIN — POTASSIUM CHLORIDE SCH MEQ: 20 TABLET, EXTENDED RELEASE ORAL at 10:13

## 2024-05-09 RX ADMIN — Medication SCH MG: at 10:14

## 2024-05-09 RX ADMIN — ACETAMINOPHEN PRN MG: 325 TABLET, FILM COATED ORAL at 00:42

## 2024-05-09 RX ADMIN — KETOROLAC TROMETHAMINE PRN MG: 15 INJECTION, SOLUTION INTRAMUSCULAR; INTRAVENOUS at 10:13

## 2024-05-09 RX ADMIN — HEPARIN SODIUM SCH: 5000 INJECTION, SOLUTION INTRAVENOUS; SUBCUTANEOUS at 10:14

## 2024-05-09 NOTE — P.HPIM
History of Present Illness


H&P Date: 05/09/24


This is a 63 year old female who comes back into the hospital after falling at 

home and states she has hit her head. Patient states she was not using her 

walker when she fell as she has boxes piled in her house states she is unable to

use her walker in the pathways. Has been wearing hospital gain and lying on the 

floor since the fall. She called [facility] requesting admission and was advised 

to call EMS and was brought back to the hospital. Patent has had frequent 

readmission secondary to weakness, falls and chronic alcohol use. States she has

not been drinking. Has been in the ER twice for falls since her last admission. 

Patient has been discharged from her prior PCP office and dose have an appt at 

[address] Schwarz to establish care and cannot get set up with homecare 

until a new PCP sends the referral. This has been a barrier to discharge 

planning. She has not been able to follow up with neuromuscular specialist as 

recommended prior hospital stay. Head and cervical spine CT reveal moderate 

patchy burden of chronic small vessel ischemic disease. No acute fracture in 

cervical spine. Moderate multilevel spondylytic change with degenerative crit 

one anterolisthesis C5-C6. Incidental scattered ductal disease with dental 

carries and a prominent periapical lucency involving the right maxillary canine.

patient is requesting subacute rehab. Has not been approved for rehab multiple 

prior hospitalizations. Patient is reporting increased pain to the left leg 

after fall and also lower back pain. Patient feels increasingly weak. Reports no

chest pain, no shortness of breath. No nausea vomiting or diarrhea. Potassium 

3.0, magnesium 1.5. 





REVIEW OF SYSTEMS: 


CONSTITUTIONAL: No fever, no malaise, no fatigue. 


HEENT: No recent visual problems or hearing problems. Denied any sore throat. 


CARDIOVASCULAR: No chest pain, orthopnea, PND, no palpitations, no syncope. 


PULMONARY: No shortness of breath, no cough, no hemoptysis. 


GASTROINTESTINAL: No diarrhea, no nausea, no vomiting, no abdominal pain. 


NEUROLOGICAL: No headaches. Reports bilateral upper and lower extremity weakness

and parasthesias. 


HEMATOLOGICAL: Denies any bleeding or petechiae. 


GENITOURINARY: Denies any burning micturition, frequency, or urgency. 


MUSCULOSKELETAL/RHEUMATOLOGICAL: Reports left leg pain. 


ENDOCRINE: Denies any polyuria or polydipsia. 





The rest of the 14-point review of systems is negative.





PHYSICAL EXAMINATION: 





GENERAL: The patient is alert and oriented x3, not in any acute distress. Well 

developed, well nourished. 


HEENT: Pupils are round and equally reacting to light. EOMI. No scleral icterus.

No conjunctival pallor. Normocephalic, atraumatic. No pharyngeal erythema. No 

thyromegaly. 


CARDIOVASCULAR: S1 and S2 present. No murmurs, rubs, or gallops. 


PULMONARY: Chest is clear to auscultation, no wheezing or crackles. 


ABDOMEN: Soft, nontender, nondistended, normoactive bowel sounds. No palpable 

organomegaly. 


MUSCULOSKELETAL: No joint swelling or deformity.


EXTREMITIES: No cyanosis, clubbing, or pedal edema. 


NEUROLOGICAL: LE weakess 3/5 strength. Curling of the fingers bilaterally. 4/5 

Strength. 


SKIN: No rashes. 





Assessment and plan


-Fall secondary to medical debility and weakness as well as not using walker 

when ambulating. Needs home care however needs to establish care with a PCP 

before this can be set up. 


-Generalized weakness and debility likely due to deconditioning


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


-C5-C6 anterolisthesis and severe left neural foraminal stenosis C3-C4, prior 

cervical spine MRI reports as mild neural foraminal stenosis. orthopedics will 

be consulted. 


-Severe alcohol use disorder and multiple admissions recently due to alcohol 

withdrawal symptoms. Patient is not currently withdrawing has been educated 

extensively on the need for alcohol cessation and has resources for AA, 

outpatient rehab. 


-Hypomagnesemia due to chronic alcohol use this was supplemented with IV 

magnesium and recommended to continue oral magnesium supplementation daily on 


discharge


-Bilateral hand weakness and contractures patient has already been referred to 

neuromuscular specialist o.p work up and has undergone extensive orthopedic and 

neurological examinations prior admission


-Anxiety/depression


-Hypertension


-Hyperlipidemia


-Osteoarthritis


DVT prophylaxis


Full code





Discharge home and continue outpatient follow up with neuromuscular specialist 

and needs to establish care with a PCP and neurologist. Patient has a hearing 

for guardianship on 05/20/2024.





The impression and plan of care has been dictated by Linda Dietz Nurse 

Practitioner as directed.





Dr. Latrell MD


I have performed a history and physical examination and medical decision making 

of this patient, discussed the same with the dictator, and agree with the 

dictators assessment and plan as written, documented as a scribe. Based on total

visit time, I have performed more than 50% of this visit.








Past Medical History


Past Medical History: Hyperlipidemia, Hypertension, Osteoarthritis (OA)


Additional Past Medical History / Comment(s): Multiple recent falls. hx colon 

polyps, occasional diarrhea, hx of fall Nov 2020 and has been having pain right 

hip since that radiates down right leg, walks with limp., states breast implants

moving up.


History of Any Multi-Drug Resistant Organisms: None Reported


Past Surgical History: Breast Surgery, Orthopedic Surgery, Tonsillectomy


Additional Past Surgical History / Comment(s): right elbow surgery with screws, 

right knee surgery with plate and removal ., lumpectomy left breast, breast 

implants


Past Anesthesia/Blood Transfusion Reactions: Postoperative Nausea & Vomiting 

(PONV)


Smoking Status: Former smoker





- Past Family History


  ** Father


Family Medical History: Cancer





Medications and Allergies


                                Home Medications











 Medication  Instructions  Recorded  Confirmed  Type


 


Cholesterol Med (Unknown) 1 tab PO DAILY 04/30/24 05/09/24 History


 


Bp Med (Unknown) 1 tab PO DAILY 05/03/24 05/09/24 History


 


Escitalopram [Lexapro] 20 mg PO AS DIRECTED 05/03/24 05/09/24 History


 


Folic Acid 1 mg PO AS DIRECTED 05/03/24 05/09/24 History


 


Pantoprazole [Protonix] 40 mg PO AS DIRECTED 05/03/24 05/09/24 History


 


Thiamine [Vitamin B-1] 100 mg PO AS DIRECTED 05/03/24 05/09/24 History








                                    Allergies











Allergy/AdvReac Type Severity Reaction Status Date / Time


 


latex Allergy Unknown Itching, Verified 05/08/24 18:58





   Blisters  


 


codeine AdvReac Severe HEADACHE Verified 05/08/24 18:58


 


hydrocodone [From Vicodin] AdvReac Severe HEADACHE Verified 05/08/24 18:58


 


Anesthetics - Amide Type - AdvReac  GAS GIVES Verified 05/08/24 18:58





Select A   HER  





   HEADACHE,  





   VOMITING,  





   HEART  





   PALIPITATIONS  


 


Anesthetics - Juliana Type- AdvReac  GAS GIVES Verified 05/08/24 18:58





Parabens   HER  





   HEADACHE,  





   VOMITING,  





   HEART  





   PALIPITATIONS  


 


ANESTHESIA AdvReac Unknown GAS GIVES Uncoded 05/08/24 18:58





   HER  





   HEADACHE,  





   VOMITING,  





   HEART  





   PALIPITATIONS  














Physical Exam


Vitals: 


                                   Vital Signs











  Temp Pulse Pulse Resp BP BP BP


 


 05/09/24 20:41    87  16   


 


 05/09/24 20:00  98.1 F   87  16    152/78


 


 05/09/24 15:00  98.2 F   91  16    163/100


 


 05/09/24 07:35   87   20  145/78  


 


 05/09/24 07:00  98.1 F   83  16   146/90 


 


 05/09/24 06:36   86   18  148/82  


 


 05/09/24 03:52   75   16  147/87  


 


 05/09/24 01:34   98   19  117/65  


 


 05/09/24 00:44   113 H   22  116/79  


 


 05/08/24 23:41   94   19  145/95  














  Pulse Ox


 


 05/09/24 20:41 


 


 05/09/24 20:00  95


 


 05/09/24 15:00  100


 


 05/09/24 07:35  96


 


 05/09/24 07:00  97


 


 05/09/24 06:36  94 L


 


 05/09/24 03:52  97


 


 05/09/24 01:34  96


 


 05/09/24 00:44  96


 


 05/08/24 23:41  98








                                Intake and Output











 05/09/24 05/09/24 05/10/24





 14:59 22:59 06:59


 


Intake Total 118 360 


 


Balance 118 360 


 


Intake:   


 


  Oral 118 360 


 


Other:   


 


  # Voids 1 1 


 


  # Bowel Movements  1 


 


  Weight  72.575 kg 














Results


CBC & Chem 7: 


                                 05/08/24 22:20





                                 05/09/24 13:06


Labs: 


                  Abnormal Lab Results - Last 24 Hours (Table)











  05/09/24 Range/Units





  13:06 


 


Chloride  108 H  ()  mmol/L


 


BUN  19 H  (7-17)  mg/dL


 


Creatinine  0.46 L  (0.52-1.04)  mg/dL


 


Glucose  102 H  (74-99)  mg/dL














Thrombosis Risk Factor Assmnt





- Choose All That Apply


Any of the Below Risk Factors Present?: No


Other Risk Factors: Yes


Each Risk Factor Represents 2 Points: Age 61-74 years


Thrombosis Risk Factor Assessment Total Risk Factor Score: 2


Thrombosis Risk Factor Assessment Level: Low Risk





Assessment and Plan


Time with Patient: Greater than 30

## 2024-05-10 LAB
ANION GAP SERPL CALC-SCNC: 11.8 MMOL/L (ref 4–12)
BUN SERPL-SCNC: 13.9 MG/DL (ref 9–27)
BUN/CREAT SERPL: 27.8 RATIO (ref 12–20)
CALCIUM SPEC-MCNC: 8.9 MG/DL (ref 8.7–10.3)
CHLORIDE SERPL-SCNC: 106 MMOL/L (ref 96–109)
CO2 SERPL-SCNC: 22.2 MMOL/L (ref 21.6–31.8)
GLUCOSE SERPL-MCNC: 88 MG/DL (ref 70–110)
MAGNESIUM SPEC-SCNC: 1.6 MG/DL (ref 1.5–2.4)
POTASSIUM SERPL-SCNC: 4.6 MMOL/L (ref 3.5–5.5)
SODIUM SERPL-SCNC: 140 MMOL/L (ref 135–145)

## 2024-05-11 RX ADMIN — FOLIC ACID SCH MG: 1 TABLET ORAL at 15:15

## 2024-05-11 RX ADMIN — PANTOPRAZOLE SODIUM SCH MG: 40 TABLET, DELAYED RELEASE ORAL at 15:15

## 2024-05-11 RX ADMIN — HYDROCODONE BITARTRATE AND ACETAMINOPHEN PRN EACH: 5; 325 TABLET ORAL at 12:49

## 2024-05-11 RX ADMIN — Medication SCH MG: at 08:34

## 2024-05-11 RX ADMIN — DEXAMETHASONE SODIUM PHOSPHATE SCH MG: 4 INJECTION, SOLUTION INTRAMUSCULAR; INTRAVENOUS at 12:13

## 2024-05-11 NOTE — PN
PROGRESS NOTE



DATE OF SERVICE:  05/11/2024



This 63-year-old woman with a past medical history of multiple medical problems,
was

admitted with fall and weakness.  The patient was evaluated by Dr. tSubbs 
and

recommended MRI of the neck. Spondylosis C4-5 and C5-6 that is severe was noted.
 The

patient was thought to have a possibly central cord syndrome at this time.



PAST MEDICAL HISTORY:

Reviewed.



REVIEW OF SYSTEMS:

14-point review is negative.



CURRENT MEDICATIONS:

Reviewed include Cymbalta.



PHYSICAL EXAMINATION:

VITAL SIGNS: Pulse is 79, blood pressure n, respirations 16.

CHEST:  Clear to auscultation.

ABDOMEN:  Soft.

NERVOUS SYSTEM: Diffusely weak.



LABORATORY DATA:

Reviewed.



ASSESSMENT:

1. Fall secondary to medical debility, possible cervical spondylosis, rule out 
central

    cord syndrome.

2. Generalized weakness.

3. Alcoholic peripheral neuropathy.

4. Severe alcohol use disorder.

5. Hypomagnesemia.

6. Multiple medical issues.



RECOMMENDATIONS AND DISCUSSION:

I recommend to continue current medications, symptomatic treatment.  MRI of the 
neck

and possible surgery if there is abnormality per Dr. Stubbs.  Continue the 
current

medications.  Stop the pain medications.  Recommend Ativan p.r.n.  I would also

recommend Dilaudid  IV q.3 p.r.n.  Guarded prognosis because of multiple complex

medical issues.  Further recommendations to follow.  See orders for detail.  DVT

prophylaxis.





MMODL / IJN: [id number] / Job#: [id number]

MTDD

## 2024-05-11 NOTE — MR
EXAMINATION TYPE: MR cervical spine wo con

 

DATE OF EXAM: 5/11/2024 2:47 PM

 

CLINICAL INDICATION:Female, 63 years old with history of Central cord syndrome and Myelopathy; PHH, C
entral cord syndrome and myelopathy.

 

COMPARISON: 2/24/2024.

 

TECHNIQUE: Multi planar, multi sequence imaging was performed utilizing: T1-weighted, T2-weighted, an
d turbo inversion recovery imaging of the cervical spine. 

IV Contrast: cc (none if empty)

 

FINDINGS: 

Alignment: The cervical vertebral bodies have preserved heights. Alignment is within normal limits gi
yulia patient positioning. 

 

Bones: Osteophytes and disc space narrowing with facet and uncovertebral joint arthropathy most prono
unced at the C5-C7 vertebral levels.

 

Cord: The spinal cord is unremarkable with regards to their signal intensity and morphology. 

 

Discs:  Multilevel disc desiccation is present.

 

C2-C3: No significant disc pathology. The spinal canal is patent.  Bilateral facet and uncovertebral 
joint arthropathy are present with mild bilateral neural foraminal stenosis.

 

C3-C4: No significant disc pathology. The spinal canal is patent.  Bilateral facet and uncovertebral 
joint arthropathy are present with mild bilateral neural foraminal stenosis.

 

C4-C5: No significant disc pathology. The spinal canal is patent.  No neural foraminal stenosis.

 

C5-C6: No significant disc pathology. The spinal canal is patent.  No neural foraminal stenosis.

 

C6-C7: No significant disc pathology. The spinal canal is patent.  No neural foraminal stenosis.

 

C7-T1: No significant disc pathology. The spinal canal is patent.  No neural foraminal stenosis.

 

IMPRESSION:

1. The cord signal is unchanged from prior. No evidence for myelomalacia or abnormal cord signal. No 
evidence for disc herniation or significant spinal canal stenosis.

 

2. Multilevel disc degeneration with associated osteoarthritic changes.

## 2024-05-11 NOTE — P.PN
Subjective


Progress Note Date: 05/10/24











This is a 63 year old female who comes back into the hospital after falling at 

home and states she has hit her head. Patient states she was not using her 

walker when she fell as she has boxes piled in her house states she is unable to

use her walker in the pathways. Has been wearing hospital gain and lying on the 

floor since the fall. She called [facility] requesting admission and was advised 

to call EMS and was brought back to the hospital. Patent has had frequent 

readmission secondary to weakness, falls and chronic alcohol use. States she has

not been drinking. Has been in the ER twice for falls since her last admission. 

Patient has been discharged from her prior PCP office and dose have an appt at 

0800 with Dr. Schwarz to establish care and cannot get set up with homecare 

until a new PCP sends the referral. This has been a barrier to discharge 

planning. She has not been able to follow up with neuromuscular specialist as 

recommended prior hospital stay. Head and cervical spine CT reveal moderate 

patchy burden of chronic small vessel ischemic disease. No acute fracture in 

cervical spine. Moderate multilevel spondylytic change with degenerative crit 

one anterolisthesis C5-C6. Incidental scattered ductal disease with dental 

carries and a prominent periapical lucency involving the right maxillary canine.

patient is requesting subacute rehab. Has not been approved for rehab multiple 

prior hospitalizations. Patient is reporting increased pain to the left leg 

after fall and also lower back pain. Patient feels increasingly weak. Reports no

chest pain, no shortness of breath. No nausea vomiting or diarrhea. Potassium 

3.0, magnesium 1.5. 





5/10/2024


Patient seen and evaluated in follow-up this morning awaiting orthopedic 

evaluation as patient reports she has continued weakness and inability to 

ambulate.  Patient reports she has continued weakness with frequent following 

and then is unable to get up off of the floor.  Potassium and magnesium 

improving post supplementation would recommend daily supplements of magnesium as

patient has chronic low magnesium.  Encouraged oral intake.  Will await PT/OT 

therapy evaluation.  Social work following as well as they are currently recomm

ending rehab and patient is agreeable.  Referrals have been placed to [facility]  

Patient is afebrile with no reports of chest pain or shortness of breath.  

Patient is tolerating diet with no reported nausea or vomiting.





Review of systems:


Constitutional: No reports of fatigue, fever, or chills


Cardiovascular: No reports of chest pain or palpitations


Respiratory: No reports of shortness of breath or cough


GI: No reports of nausea, vomiting, or diarrhea


: No reports of dysuria or retention


Neurovascular:  reports of generalized weakness ability to ambulate





All medications have been reviewed








PHYSICAL EXAMINATION: 





GENERAL: The patient is alert and oriented x3, early appearing. Well developed, 

well nourished. 


HEENT: Pupils are round and equally reacting to light. EOMI. No scleral icterus.

No conjunctival pallor. Normocephalic, atraumatic. No pharyngeal erythema. No 

thyromegaly. 


CARDIOVASCULAR: S1 and S2 muffled


PULMONARY: Chest is clear to auscultation, no wheezing or crackles. 


ABDOMEN: Soft, nontender, nondistended, normoactive bowel sounds. No palpable 

organomegaly. 


MUSCULOSKELETAL: No joint swelling or deformity.


EXTREMITIES: No cyanosis, clubbing, or pedal edema. 


NEUROLOGICAL: LE weakess 3/5 strength. Curling of the fingers bilaterally. 4/5 

Strength. 


SKIN: No rashes. 





Assessment:





-Fall secondary to medical debility and weakness as well as not using walker 

when ambulating. Needs home care however needs to establish care with a PCP 

before this can be set up. 


-Generalized weakness and debility likely due to deconditioning


-Alcohol neuropathy


-C5-C6 anterolisthesis and severe left neural foraminal stenosis C3-C4, prior 

cervical spine MRI reports as mild neural foraminal stenosis. 


-Severe alcohol use disorder and multiple admissions recently due to alcohol 

withdrawal symptoms. Patient is not currently withdrawing has been educated 

extensively on the need for alcohol cessation and has resources for AA, 

outpatient rehab. 


-Hypomagnesemia due to chronic alcohol use this was supplemented with IV 

magnesium and recommended to continue oral magnesium supplementation daily on 


discharge


-Bilateral hand weakness and contractures patient has already been referred to 

neuromuscular specialist o.p work up and has undergone extensive orthopedic and 

neurological examinations prior admission


-Anxiety/depression


-Hypertension


-Hyperlipidemia


-Osteoarthritis


DVT prophylaxis


GI prophylaxis


Full code





Plan:





Recommend PT/OT therapy daily


Case management/social work following working on discharge planning and multiple

referrals have been made to [facility]


Replace electrolytes per protocol will initiate magnesium daily supplements


Encouraged oral intake


Patient does have a guardianship hearing on 5/20/2024 to obtain a public 

guardian


Patient has been set up to establish with a new primary care provider if she was

discharged from Dr. Benjamin aLrios's office for noncompliance and has not shown up 

for appointments multiple times.


Encourage complete alcohol cessation and patient has been counseled extensively 

regarding needing inpatient alcohol rehab


Due to multiple complex medical issues, prognosis is guarded








The impression and plan of care has been dictated by Yamileth Horne, Nurse 

Practitioner as directed.





Dr. Ric MD


I have performed a history and physical examination and medical decision making 

of this patient, discussed the same with the dictator, and agree with the 

dictators assessment and plan as written, documented as a scribe. Based on total

visit time, I have performed more than 50% of this visit.





Objective





- Vital Signs


Vital signs: 


                                   Vital Signs











Temp  98.2 F   05/10/24 07:00


 


Pulse  80   05/10/24 07:00


 


Resp  16   05/10/24 07:00


 


BP  136/84   05/10/24 07:00


 


Pulse Ox  96   05/10/24 07:00


 


FiO2      








                                 Intake & Output











 05/09/24 05/10/24 05/10/24





 18:59 06:59 18:59


 


Intake Total 478  


 


Balance 478  


 


Weight 72.575 kg  


 


Intake:   


 


  Oral 478  


 


Other:   


 


  # Voids 1 1 


 


  # Bowel Movements 1 1 














- Labs


CBC & Chem 7: 


                                 05/08/24 22:20





                                 05/10/24 05:54


Labs: 


                  Abnormal Lab Results - Last 24 Hours (Table)











  05/09/24 Range/Units





  13:06 


 


Chloride  108 H  ()  mmol/L


 


BUN  19 H  (7-17)  mg/dL


 


Creatinine  0.46 L  (0.52-1.04)  mg/dL


 


Glucose  102 H  (74-99)  mg/dL

## 2024-05-11 NOTE — P.PN
Progress Note - Text


Progress Note Date: 05/11/24





MRI of the cervical spine taken on 5/11/2024 has been reviewed and demonstrates 

that the cord signal is unchanged from prior.  There is no evidence for 

myelomalacia or abnormal cord signal.  No evidence for disc herniation or 

significant spinal canal stenosis.





At this time we do not recommend any emergent/urgent orthopedic surgical 

intervention.  Recommend neurology consult. Orthopedics is signing off at this 

time.  Please do not hesitate to contact us for any further questions.  

Appreciate consult.

## 2024-05-11 NOTE — P.CNOR
History of Present Illness





- HPI


Consult date: 05/11/24


Requesting physician: Boni Larios


Consult reason: neck pain, other (Bilateral upper extremity weakness)


History of present illness: 





63-year-old female presenting to the emergency department in the hospital with 

complaints of bilateral upper extremity weakness as well as inability to ambu

late after a fall from standing at home.  The patient states that she fell and 

hit her head and was knocked out unconscious after this fall when she hit her 

head but she is unsure of exactly when she hit it on.  She states after that she

was unable to ambulate and had to crawl the phone.  States bilateral upper 

showing weakness after this incident as well as burning and paresthesias in her 

arms.  She states inability to grasp things afterwards.  She does have a history

of neck issues however she has never had anything like this before this is new 

for her.  She denies any fevers chills shortness of breath or chest pain at this

time.  She has a bowel or bladder issues.  She states neck pain is better with 

rest worse with movement.  She states inability to grasp things that she used to

be able to.





Review of Systems





14 points review of systems completed and as stated in HPI, all other systems 

reviewed are negative. 





Past Medical History


Past Medical History: Hyperlipidemia, Hypertension, Osteoarthritis (OA)


Additional Past Medical History / Comment(s): Multiple recent falls. hx colon 

polyps, occasional diarrhea, hx of fall Nov 2020 and has been having pain right 

hip since that radiates down right leg, walks with limp., states breast implants

moving up.


History of Any Multi-Drug Resistant Organisms: None Reported


Past Surgical History: Breast Surgery, Orthopedic Surgery, Tonsillectomy


Additional Past Surgical History / Comment(s): right elbow surgery with screws, 

right knee surgery with plate and removal ., lumpectomy left breast, breast 

implants


Past Anesthesia/Blood Transfusion Reactions: Postoperative Nausea & Vomiting 

(PONV)


Smoking Status: Former smoker





- Past Family History


  ** Father


Family Medical History: Cancer





Medications and Allergies


                                Home Medications











 Medication  Instructions  Recorded  Confirmed  Type


 


Cholesterol Med (Unknown) 1 tab PO DAILY 04/30/24 05/09/24 History


 


Bp Med (Unknown) 1 tab PO DAILY 05/03/24 05/09/24 History


 


Escitalopram [Lexapro] 20 mg PO AS DIRECTED 05/03/24 05/09/24 History


 


Folic Acid 1 mg PO AS DIRECTED 05/03/24 05/09/24 History


 


Pantoprazole [Protonix] 40 mg PO AS DIRECTED 05/03/24 05/09/24 History


 


Thiamine [Vitamin B-1] 100 mg PO AS DIRECTED 05/03/24 05/09/24 History








                                    Allergies











Allergy/AdvReac Type Severity Reaction Status Date / Time


 


latex Allergy Unknown Itching, Verified 05/08/24 18:58





   Blisters  


 


codeine AdvReac Severe HEADACHE Verified 05/08/24 18:58


 


hydrocodone [From Vicodin] AdvReac Severe HEADACHE Verified 05/08/24 18:58


 


Anesthetics - Amide Type - AdvReac  GAS GIVES Verified 05/08/24 18:58





Select A   HER  





   HEADACHE,  





   VOMITING,  





   HEART  





   PALIPITATIONS  


 


Anesthetics - Juliana Type- AdvReac  GAS GIVES Verified 05/08/24 18:58





Parabens   HER  





   HEADACHE,  





   VOMITING,  





   HEART  





   PALIPITATIONS  


 


ANESTHESIA AdvReac Unknown GAS GIVES Uncoded 05/08/24 18:58





   HER  





   HEADACHE,  





   VOMITING,  





   HEART  





   PALIPITATIONS  














Physical Examination


Osteopathic Statement: *.  No significant issues noted on an osteopathic 

structural exam other than those noted in the History and Physical/Consult.





PHYSICAL  EXAMINATION:  


Vitals: Stable at this time


General: Awake, alert, appropriate for age, in no acute distress.


HEENT: No unusual neck masses around region of lateral neck triangle, thyroid, 

supraclavicular groove.








Extremities:    Skin warm and dry without no acute lesions, coloration, 

temperature, skin intact, no tenderness or erythema.








Integument:     


Hairy patches: Absent


Dorsal skin dimples: Absent


Cafe au lait spots:     Absent


Surgical incisions: Non-








Palpation:    


Please see Pain drawing on Intake sheet for further detail.


(Tenderness = T, Nontender = NT, Swelling = S, Ecchymosis = E)


Findings on Midline and paraspinal palpation and percussion:


Cervical: TTP posterior midline and paraspinal


Thoracic: NT


Lumbar: NT


Sacral: NT            


Special findings: None








POSTURAL and MUSCULO-SKELETAL EVALUATION:


Coronal Balance:   Neutral


Recumbent testing:   Patient can lay flat on back


Sagittal Balance:    POS


Shoulder Profile:    [Level]


Pelvic Girdle:    [Level]


Neck ROM:   RESTRICTED SECONDARY TO PAIN


Lumbar ROM:  [Unrestricted in six directions]


Shoulder ROM:  Symmetric in abduction, ER/IR


Hip ROM:  Symmetric in abduction, adduction, ER/IR


Knee ROM:  Symmetric and intact in Flexion / extension


Hands:   ATROPHY NOTED B/L UE AND HANDS. INSTRINSIC WASTING NOTED WITH WEBSPACE 

WASTING. PT HOLDS HAND IN A POSTURED POSITION AND IS UNABLE TO MANIPULATE THEM 

WELL. 


Feet:  Normal appearing structure  L and R








VASCULAR STATUS :                                                


Wrist Pulses: [2/4 bilateral radial and ulnar]


Pedal Pulses: [2/4 bilateral DP and PT]


Color: [Normal]


Edema: [None]











NEUROLOGIC  EXAMINATION:


Mental Status:     Awake and alert, fully oriented, with normal attention, 

concentration and memory, and fluent, appropriate speech.








Cranial Nerves:


 I: Olfactory not tested. 


II: Visual acuity normal, no visual field deficit noted with confrontation. 


III,IV: Normal pupillary reflexes & intact extraocular movements without 

nystagmus. 


V,VI:  Intact symmetrical facial sensation. 


VII: Intact symmetrical facial motor movement


VIII: Hearing intact. 


IX,X:  Intact gag, swallow, & normal voice. 


XI: Sternocleidomastoid, trapezius function intact. 


XII: Tongue midline with normal movements. 





Special Tests:


L'hermitte's Sign:  Absent


Spurling'Sign:  PRESENT


Cubital percussion test:  Absent Bilateral


Preeti-Tinel sign - Carpal region: Absent Bilateral


Straight Leg Raising:  Absent Bilateral








Motor Exam (0-5/5, N/T)


STRENGTH   





UPPER EXTREMITY               


4-/5 in all major muscle groups of the UE b/l 





LOWER EXTREMITY


3/5 in all major muscle groups of the LE b/l -- EXCEPT FOR DF/PF B/L IS 4+.  

UNABLE TO LIFT LEGS OFF BED.  








REFLEXES


Upper Extremity:  RIGHT 3/4    LEFT  3/4


-BRISK UE REFLEXES





Lower Extremity:  RIGHT [2]/4    LEFT [2]/4





Pathological Reflexes


Garcia's: RIGHT PRESENT  LEFT PRESENT





Babinski: RIGHT [Absent]  LEFT [Absent]





Clonus: RIGHT 3 BEATS  LEFT 10 BEATS   





DIADIOKINESIS NOT INTACT B/L HANDS








SENSORY


Pain and LT sense [Intact C5-T1 and L2-S1]


Dermatomal deficit DECREASED OVER C3-7 REGION B/L    








Gait and Functional Evaluation:


Ambulatory aids: CANNOT CURRENTLY AMBULATE.  USES CANE AT HOME SOMETIMES. 








Results





CT CERVICAL SPINE:


-SPONDYLOSIS C4-5 AND C5-6 THAT IS SEVERE


-MODERATE STENOSIS NOTED C4-7 DUE TO SPONDYLOTIC CHANGES


-C5-6 GRADE I SPONDYLOLISTHESIS WITH KYPHOSIS


-C0-1 STABLE


-C1-2 STABLE


-NO ACUTE FRACTURES NOTED


-NO LESIONS NOTED








MRI WARRANTED AND PENDING DUE TO MYELOPATHIC AND CENTRAL CORD SYMPTOMS.  





- Labs


Labs: 


                  Abnormal Lab Results - Last 24 Hours (Table)











  05/10/24 Range/Units





  05:54 


 


Creatinine  0.5 L  (0.6-1.5)  mg/dL


 


BUN/Creatinine Ratio  27.80 H  (12.00-20.00)  Ratio








                                      H & H











  05/08/24 Range/Units





  22:20 


 


Hgb  13.5  (11.4-16.0)  gm/dL


 


Hct  41.0  (34.0-46.0)  %











Result Diagrams: 


                                 05/08/24 22:20





                                 05/10/24 05:54





Assessment and Plan


Assessment: 





62 YO FEMALE WITH ACUTE ONSENT B/L UE/LE WEAKNESS, PARESTHESIAS





S/P FFS WITH BHT/LOC





C4-7 SPONDYLOSIS WITH STENOSIS





LIKELY CENTRAL CORD SYNDROME





COMPLEX MEDICAL PATIENT








Plan: 





-Appreciate consultant and team management.  





-MRI CERVICAL SPINE DUE TO CENTRAL CORD SYNDROME AND MYELOPATHY





-START DECADRON





-Activity: AS TOLERATED





   -Daily PT/OT, increase ambulation strength and balance. 


   





-Pain control: [Adequate at this time]





-Meds: [reviewed]





-GI ppx: senna, Miralax





-DVT PPX: MECHANICAL ONLY





-PENDING MRI RESULTS WITH MAKE FURTHER RECOMMENDATIONS

## 2024-05-12 NOTE — XR
EXAMINATION TYPE: XR lumbar spine 3V

 

DATE OF EXAM: 5/10/2024

 

Comparison: 12/21/2023

 

Clinical History: 63-year-old female increased pain after fall

 

Findings:

Leftward truncal shift is similar. Atrophic facet arthropathy lower lumbar spine. 5 lumbar type verte
bral bodies. Trace degenerative grade 1 retrolisthesis L3-L4 and grade 1 anterolisthesis L4-L5. Verte
bral body height and alignment otherwise maintained. Scattered mild degenerative disc disease through
out. Osteopenia.

 

 

Impression:

 

1. Hypertrophic facet arthropathy throughout with degenerative grade 1 spondylolisthesis at L3-L4 and
 L4-L5.

2. Mild degenerative disc disease.

3. No vertebral compression collapse.

## 2024-05-12 NOTE — PN
PROGRESS NOTE



DATE OF SERVICE:  05/12/2024



SUBJECTIVE:

This 63-year-old woman was admitted with significant weakness.  The patient has

significant wasting and weakness of the limbs, and wasting and weakness of the lower

limbs also, some hyperreflexia also noted.  The MRI of the cervical spine was ordered

by Orthopedic Surgery, showed multilevel DJD, but no evidence of any myelomalacia or

abnormal cord signal was noted.  Neurology evaluation is in progress.  I have discussed

the case at length with Dr. Mark personally.



PAST MEDICAL HISTORY:

Reviewed.



REVIEW OF SYSTEMS:

14-point review is negative except as mentioned earlier.



CURRENT MEDICATIONS:

Reviewed.  Include Dilaudid, doses and rest of medications noted.



PHYSICAL EXAM:

VITAL SIGNS:  Pulse is 81, blood pressure 148/83, respirations 18.

HEENT:  Conjunctivae normal.

NECK:  No jugular venous distention.

CARDIOVASCULAR:  S1, S2.

RESPIRATION:  Diminished at the bases.

ABDOMEN:  Soft.

NERVOUS SYSTEM:  As mentioned has diffuse weakness.



LABORATORY DATA:

Noted.



ASSESSMENT:

1. Fall secondary to medical debility with possible cervical spondylosis, rule out

    central cord syndrome or amyotrophic lateral sclerosis or neuropathic syndrome.

2. Generalized weakness.

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

4. Severe alcohol use disorder.

5. Hypomagnesemia.

6. Multiple medical issues.



RECOMMENDATIONS AND DISCUSSION:

I recommend to continue current medications, symptomatic treatment.  Otherwise,

Neurology evaluation.  The patient might require possible nerve conduction studies as

well as EMG.  The patient is started on steroids empirically.  We will continue to

monitor.  Repeat labs will be ordered.  PT/OT evaluation.  This patient will definitely

need ECF rehab.  Apparently, the ECF rehab was denied last time, but the patient has

significant deformities.  The patient had further workup and further evaluation.

Prognosis extremely guarded because of multiple complex medical issues.  Further

recommendations to follow.  See orders for detail.





MMODL / IJN: [id number] / Job#: [id number]

## 2024-05-13 LAB
ALBUMIN SERPL-MCNC: 3.9 G/DL (ref 3.8–4.9)
ALBUMIN/GLOB SERPL: 1.7 RATIO (ref 1.6–3.17)
ALP SERPL-CCNC: 45 U/L (ref 41–126)
ALT SERPL-CCNC: 20 U/L (ref 8–44)
ANION GAP SERPL CALC-SCNC: 8.8 MMOL/L (ref 4–12)
AST SERPL-CCNC: 13 U/L (ref 13–35)
BASOPHILS # BLD AUTO: 0.01 X 10*3/UL (ref 0–0.1)
BASOPHILS NFR BLD AUTO: 0.1 %
BUN SERPL-SCNC: 19.5 MG/DL (ref 9–27)
BUN/CREAT SERPL: 39 RATIO (ref 12–20)
CALCIUM SPEC-MCNC: 9.9 MG/DL (ref 8.7–10.3)
CHLORIDE SERPL-SCNC: 102 MMOL/L (ref 96–109)
CO2 SERPL-SCNC: 25.2 MMOL/L (ref 21.6–31.8)
EOSINOPHIL # BLD AUTO: 0 X 10*3/UL (ref 0.04–0.35)
EOSINOPHIL NFR BLD AUTO: 0 %
ERYTHROCYTE [DISTWIDTH] IN BLOOD BY AUTOMATED COUNT: 3.76 X 10*6/UL (ref 4.1–5.2)
ERYTHROCYTE [DISTWIDTH] IN BLOOD: 13.7 % (ref 11.5–14.5)
GLOBULIN SER CALC-MCNC: 2.3 G/DL (ref 1.6–3.3)
GLUCOSE SERPL-MCNC: 159 MG/DL (ref 70–110)
HCT VFR BLD AUTO: 38 % (ref 37.2–46.3)
HGB BLD-MCNC: 12.5 G/DL (ref 12–15)
IMM GRANULOCYTES BLD QL AUTO: 0.4 %
LYMPHOCYTES # SPEC AUTO: 0.64 X 10*3/UL (ref 0.9–5)
LYMPHOCYTES NFR SPEC AUTO: 8.2 %
MCH RBC QN AUTO: 33.2 PG (ref 27–32)
MCHC RBC AUTO-ENTMCNC: 32.9 G/DL (ref 32–37)
MCV RBC AUTO: 101.1 FL (ref 80–97)
MONOCYTES # BLD AUTO: 0.28 X 10*3/UL (ref 0.2–1)
MONOCYTES NFR BLD AUTO: 3.6 %
NEUTROPHILS # BLD AUTO: 6.87 X 10*3/UL (ref 1.8–7.7)
NEUTROPHILS NFR BLD AUTO: 87.7 %
NRBC BLD AUTO-RTO: 0 X 10*3/UL (ref 0–0.01)
PLATELET # BLD AUTO: 254 X 10*3/UL (ref 140–440)
POTASSIUM SERPL-SCNC: 5 MMOL/L (ref 3.5–5.5)
PROT SERPL-MCNC: 6.2 G/DL (ref 6.2–8.2)
SODIUM SERPL-SCNC: 136 MMOL/L (ref 135–145)
VIT B12 SERPL-MCNC: 442 PG/ML (ref 200–944)
WBC # BLD AUTO: 7.83 X 10*3/UL (ref 4.5–10)

## 2024-05-13 NOTE — CT
EXAMINATION TYPE: CT chest angio for PE

CT DLP: 233.4 mGycm, Automated exposure control for dose reduction was used.

 

DATE OF EXAM: 5/13/2024 5:08 PM

 

COMPARISON: Chest radiograph from 5/3/2024. 

 

CLINICAL INDICATION:Female, 63 years old with history of elevated d dimer; elevated d dimer. SOB

 

TECHNIQUE/CONTRAST: 

CTA scan of the thorax is performed with IV Contrast, patient injected with 100ml mL of Isovue 370, M
IP images are created and reviewed these are created on a separate workstation..  

 

FINDINGS: 

 

Pulmonary Artery: There is no evidence for a filling defect within the pulmonary vasculature to sugge
st acute pulmonary embolism.  The pulmonary artery is of normal size. 

Lungs/Pleura: Elevated right diaphragm with associated atelectasis. Atelectasis is seen in the left l
ower lung on the diaphragm. No evidence of focal consolidation, pleural effusion or pneumothorax. 

Airway: Large airways are patent.

Heart: Heart is within normal limits for size.

Vasculature: No evidence of aortic aneurysm.

Mediastinum: No gross evidence of adenopathy.

Musculoskeletal: Mild degenerative disc disease changes are present throughout the thoracolumbar spin
e.

Soft Tissues/lymph nodes: Bilateral breast implants appear intact.

Lower neck: No significant findings.

Upper Abdomen: No significant findings.

 

IMPRESSION:

1. No evidence of pulmonary embolism.

 

2. No evidence for acute thoracic process.

## 2024-05-13 NOTE — P.PN
Subjective


Progress Note Date: 05/13/24











This is a 63 year old female who comes back into the hospital after falling at 

home and states she has hit her head. Patient states she was not using her 

walker when she fell as she has boxes piled in her house states she is unable to

use her walker in the pathways. Has been wearing hospital gain and lying on the 

floor since the fall. She called [facility] requesting admission and was advised 

to call EMS and was brought back to the hospital. Patent has had frequent 

readmission secondary to weakness, falls and chronic alcohol use. States she has

not been drinking. Has been in the ER twice for falls since her last admission. 

Patient has been discharged from her prior PCP office and dose have an appt at 

0800 with Dr. Schwarz to establish care and cannot get set up with homecare 

until a new PCP sends the referral. This has been a barrier to discharge 

planning. She has not been able to follow up with neuromuscular specialist as 

recommended prior hospital stay. Head and cervical spine CT reveal moderate 

patchy burden of chronic small vessel ischemic disease. No acute fracture in 

cervical spine. Moderate multilevel spondylytic change with degenerative crit 

one anterolisthesis C5-C6. Incidental scattered ductal disease with dental 

carries and a prominent periapical lucency involving the right maxillary canine.

patient is requesting subacute rehab. Has not been approved for rehab multiple 

prior hospitalizations. Patient is reporting increased pain to the left leg 

after fall and also lower back pain. Patient feels increasingly weak. Reports no

chest pain, no shortness of breath. No nausea vomiting or diarrhea. Potassium 

3.0, magnesium 1.5. 





5/10/2024


Patient seen and evaluated in follow-up this morning awaiting orthopedic 

evaluation as patient reports she has continued weakness and inability to 

ambulate.  Patient reports she has continued weakness with frequent following 

and then is unable to get up off of the floor.  Potassium and magnesium 

improving post supplementation would recommend daily supplements of magnesium as

patient has chronic low magnesium.  Encouraged oral intake.  Will await PT/OT 

therapy evaluation.  Social work following as well as they are currently recomm

ending rehab and patient is agreeable.  Referrals have been placed to ECF.  

Patient is afebrile with no reports of chest pain or shortness of breath.  

Patient is tolerating diet with no reported nausea or vomiting.





5/13/2024


Patient is seen in follow-up today and has been evaluated by orthopedics i

nitially recommending surgery although no plans for surgical intervention 

recommending conservative management and neurology evaluation.  Neurology has 

been consulted and pending.  Patient has been evaluated by neurology previously 

with this ongoing significant lower extremity weakness and difficulty in 

ambulation.  Patient has significant radicular myopathy and has had multiple 

hospitalizations regarding this with difficulty in ambulating and has been 

crawling around the home unable to use her walker.  Patient also noted to have 

significant curling of the fingers bilaterally and has been instructed on 

previous admissions to follow-up with neuro for further nerve studies although 

does not drive and has no ability for transportation to get to appointments.  

Patient has very poor social support and history of alcohol use and has also 

been hospitalized multiple times for EtOH.  Patient is not actively withdrawing 

and will discontinue CIWA protocol.  Continue with pain management as needed and

will await neurology evaluation.  Case management is following working on 

possible ECF.  Recommend PT/OT therapy daily.  Additional testing ordered 

including VDRL, HIV, D-dimer and if positive will order CTA.  CRP and sed rate 

are within normal limits.





Review of systems:


Constitutional: No reports of fatigue, fever, or chills


Cardiovascular: No reports of chest pain or palpitations


Respiratory: No reports of shortness of breath or cough


GI: No reports of nausea, vomiting, or diarrhea


: No reports of dysuria or retention


Neurovascular:  reports of generalized weakness and inability to ambulate with 

continued leg weakness





All medications have been reviewed








PHYSICAL EXAMINATION: 





GENERAL: The patient is alert and oriented x3, early appearing. Well developed, 

well nourished.  Elderly appearing, unkempt


HEENT: Pupils are round and equally reacting to light. EOMI. No scleral icterus.

No conjunctival pallor. Normocephalic, atraumatic. No pharyngeal erythema. No 

thyromegaly. 


CARDIOVASCULAR: S1 and S2 muffled


PULMONARY: Chest is clear to auscultation, no wheezing or crackles. 


ABDOMEN: Soft, nontender, nondistended, normoactive bowel sounds. No palpable 

organomegaly. 


MUSCULOSKELETAL: No joint swelling or deformity.


EXTREMITIES: No cyanosis, clubbing, or pedal edema. 


NEUROLOGICAL: LE weakess 3/5 strength. Curling of the fingers bilaterally. 4/5 

Strength.  Diffusely weak


SKIN: No rashes. 





Assessment:





-Fall secondary to medical debility and weakness as well as not using walker 

when ambulating.  Possible cervical spondylosis, rule out central cord syndrome 

or amyotrophic lateral sclerosis or neuropathic syndrome


-Generalized weakness and debility likely due to deconditioning


-Alcohol neuropathy


-Gait dysfunction possibly secondary to radicular myopathy


-Elevated D-dimer, rule out PE


-C5-C6 anterolisthesis and severe left neural foraminal stenosis C3-C4, prior 

cervical spine MRI reports as mild neural foraminal stenosis. 


-Severe alcohol use disorder and multiple admissions recently due to alcohol 

withdrawal symptoms. Patient is not currently withdrawing has been educated 

extensively on the need for alcohol cessation and has resources for AA, 

outpatient rehab. 


-Hypomagnesemia due to chronic alcohol use this was supplemented with IV 

magnesium and recommended to continue oral magnesium supplementation daily on 


discharge


-Bilateral hand weakness and contractures with hyperreflexia bilateral hands.  

patient has already been referred to neuromuscular specialist o.p work up and 

has undergone extensive orthopedic and neurological examinations prior admission


-Anxiety/depression


-Hypertension


-Hyperlipidemia


-Osteoarthritis


DVT prophylaxis


GI prophylaxis


Full code





Plan:





Recommend PT/OT therapy daily


Orthopedics evaluated the patient with no plans of surgical intervention has 

started IV dexamethasone with no significant improvement.  Neurology consulted 

and currently pending at this time


Case management/social work following working on discharge planning and multiple

referrals have been made to [facility]


Follow-up on repeat labs and replace electrolytes per protocol.  Increase 

magnesium supplements to twice daily


Encouraged oral intake


Patient does have a guardianship hearing on 5/20/2024 to obtain a public 

guardian


Patient has been set up to establish with a new primary care provider if she was

discharged from Dr. Benjamin Larios's office for noncompliance and has not shown up 

for appointments multiple times.


Encourage complete alcohol cessation and patient has been counseled extensively 

regarding needing inpatient alcohol rehab


Due to multiple complex medical issues, prognosis is guarded








The impression and plan of care has been dictated by Yamileth Horne, Nurse 

Practitioner as directed.





Dr. Ric MD


I have performed a history and physical examination and medical decision making 

of this patient, discussed the same with the dictator, and agree with the 

dictators assessment and plan as written, documented as a scribe. Based on total

visit time, I have performed more than 50% of this visit.





Objective





- Vital Signs


Vital signs: 


                                   Vital Signs











Temp  98.5 F   05/13/24 07:13


 


Pulse  77   05/13/24 07:13


 


Resp  16   05/13/24 07:13


 


BP  160/95   05/13/24 07:13


 


Pulse Ox  95   05/13/24 07:13


 


FiO2      








                                 Intake & Output











 05/12/24 05/13/24 05/13/24





 18:59 06:59 18:59


 


Other:   


 


  # Voids 3 1 














- Labs


CBC & Chem 7: 


                                 05/13/24 04:17





                                 05/13/24 04:17


Labs: 


                  Abnormal Lab Results - Last 24 Hours (Table)











  05/13/24 05/13/24 Range/Units





  04:17 04:17 


 


RBC  3.76 L   (4.10-5.20)  X 10*6/uL


 


MCV  101.1 H   (80.0-97.0)  FL


 


MCH  33.2 H   (27.0-32.0)  pg


 


Lymphocytes #  0.64 L   (0.90-5.00)  X 10*3/uL


 


Eosinophils #  0 L   (0.04-0.35)  X 10*3/uL


 


Creatinine   0.5 L  (0.6-1.5)  mg/dL


 


BUN/Creatinine Ratio   39.00 H  (12.00-20.00)  Ratio


 


Glucose   159 H  ()  mg/dL

## 2024-05-14 LAB
GLUCOSE CSF-MCNC: 103 MG/DL (ref 40–70)
NUC CELL # CSF: 1 U/L (ref 0–5)
RBC # CSF: 0 U/L (ref 0–10)
SPECIMEN VOL CSF: 3 ML

## 2024-05-14 PROCEDURE — 009U3ZX DRAINAGE OF SPINAL CANAL, PERCUTANEOUS APPROACH, DIAGNOSTIC: ICD-10-PCS

## 2024-05-14 NOTE — P.CNNES
History of Present Illness


Consult date: 05/13/24


Requesting physician: Boni Larios


Reason for Consult: Weakness


History of Present Illness: 





Patient is a 63-year-old female came to the hospital by ambulance on 5/8/2024.  

As per EMS flowsheet, they were called for increased in immobility.  Patient was

just released from [facility] earlier the same day on 5/8/2024 at around

2 PM for the same issue.  Patient wanted to go back to the hospital so she could

be admitted to the nursing home.  It was noted patient was unable to walk due to

weakness.  Patient was oriented to time place and person.  Patient has increased

muscle atrophy due to decreased activity.





Patient states that her symptoms started about 2 years ago with pins and needle 

sensation in the hands and also around the knees.  She also has pins and needle 

sensation from toes up to above the ankles and somewhat socks distribution.  She

then developed arthritis and stiffness of the hands for last 1 year.  In the 

last few months she has developed weakness of the hands, difficulty holding 

utensils, cannot write.  She came because she got up and went to the 

refrigerator, legs gave out and she fell, hitting head on the corner of the 

wall.  She passed out for about 2 hours.  When she woke up, she could not walk. 

She had to pull herself, crawled to the couch and sat for about a day.  She saw 

phone on the coffee table, called her sister down, who told her to call 911.  

Patient states that she cannot walk, walks with walker and is very slow.  She is

afraid will fall.  She can go to the restroom, cannot get up, cannot get down.  

Once she is on the floor, she cannot get up.  She has 2 sons, who do not visit 

much.  Patient states that her hands hurt all the time.  Patient denies any 

dysphagia, although patient greatly drank some pop, and because of the gas, she 

was choking but otherwise has no issues with the water, iced tea that she drinks

frequently and with solids.  She denies any slurred speech, although she 

believes that her mind runs fast in her head and her mouth do not keep up with 

it.  Therefore she stumbles on the words.  She has difficulty with breathing 

lately, and cannot lay flat, has to lay on the right side.  In the last 2 months

she has developed claw hands.  She lives alone, does not drive.  Patient states 

that since last fall, she is noticing some bubbling popping sensation in the 

neck with movement.  She believes her legs are weak, she is afraid to fall.  She

cannot go downstairs, has to hang on.  At home she does have a walker and a 

wheelchair, but she usually sits in the wheelchair.





Patient's blood test shows elevated MCV.  Otherwise CBC, CMP is normal.  B12 

442, MMA 0.37, vitamin D19.8, folate 16.4, TSH slightly low 0.285 but normal 

free T41.70.  UA shows no infection.  HIV testing negative.  Patient's 

hemoglobin A1c 6.0 on 12/19/2023.  Ammonia is normal.  CK 33.





MRI of the cervical spine without contrast performed 5/11/2024 revealed the cord

signal is unchanged from prior.  No evidence for myelomalacia or abnormal cord 

signal.  No evidence for disc herniation or spinal canal stenosis.  Multilevel 

disc degeneration with associated osteoarthritic changes.





On reviewing records, it appears patient has been seen by Dr. Manriquez and Dr. Jackson in February 2024.  Patient was noted to have signs of upper and lower 

motor neuron involvement.  Atrophy of the muscle and hyperreflexia was 

concerning for motor neuron disease.  Cannot exclude neuropathy secondary to 

alcohol abuse.  Patient also has history of alcohol dependence, vitamin D 

deficiency, history of hypertension and hyperlipidemia.  It was recommended to 

outpatient follow-up with neuromuscular specialist for EMG nerve conduction.





Patient denies any tobacco, alcohol use, diabetes or cholesterol.





Review of Systems





As mentioned in detail in HPI, including all pertinent positives and negatives.





Past Medical History


Past Medical History: Hyperlipidemia, Hypertension, Osteoarthritis (OA)


Additional Past Medical History / Comment(s): Multiple recent falls. hx colon p

olyps, occasional diarrhea, hx of fall Nov 2020 and has been having pain right 

hip since that radiates down right leg, walks with limp., states breast implants

moving up.


History of Any Multi-Drug Resistant Organisms: None Reported


Past Surgical History: Breast Surgery, Orthopedic Surgery, Tonsillectomy


Additional Past Surgical History / Comment(s): right elbow surgery with screws, 

right knee surgery with plate and removal ., lumpectomy left breast, breast 

implants


Past Anesthesia/Blood Transfusion Reactions: Postoperative Nausea & Vomiting 

(PONV)


Smoking Status: Former smoker





- Past Family History


  ** Father


Family Medical History: Cancer





Medications and Allergies


                                Home Medications











 Medication  Instructions  Recorded  Confirmed  Type


 


Cholesterol Med (Unknown) 1 tab PO DAILY 04/30/24 05/09/24 History


 


Bp Med (Unknown) 1 tab PO DAILY 05/03/24 05/09/24 History


 


Escitalopram [Lexapro] 20 mg PO AS DIRECTED 05/03/24 05/09/24 History


 


Folic Acid 1 mg PO AS DIRECTED 05/03/24 05/09/24 History


 


Pantoprazole [Protonix] 40 mg PO AS DIRECTED 05/03/24 05/09/24 History


 


Thiamine [Vitamin B-1] 100 mg PO AS DIRECTED 05/03/24 05/09/24 History








                                    Allergies











Allergy/AdvReac Type Severity Reaction Status Date / Time


 


latex Allergy Unknown Itching, Verified 05/08/24 18:58





   Blisters  


 


codeine AdvReac Severe HEADACHE Verified 05/08/24 18:58


 


hydrocodone [From Vicodin] AdvReac Severe HEADACHE Verified 05/08/24 18:58


 


Anesthetics - Amide Type - AdvReac  GAS GIVES Verified 05/08/24 18:58





Select A   HER  





   HEADACHE,  





   VOMITING,  





   HEART  





   PALIPITATIONS  


 


Anesthetics - Juliana Type- AdvReac  GAS GIVES Verified 05/08/24 18:58





Parabens   HER  





   HEADACHE,  





   VOMITING,  





   HEART  





   PALIPITATIONS  


 


ANESTHESIA AdvReac Unknown GAS GIVES Uncoded 05/08/24 18:58





   HER  





   HEADACHE,  





   VOMITING,  





   HEART  





   PALIPITATIONS  














Physical Examination





- Vital Signs


Vital Signs: 


                                   Vital Signs











  Temp Pulse Resp BP BP Pulse Ox


 


 05/13/24 13:00  98.3 F  77  17   149/87  95


 


 05/13/24 07:13  98.5 F  77  16   160/95  95


 


 05/13/24 02:00  98.3 F  83    159/93  93 L


 


 05/12/24 20:00  97.7 F  90   148/101   94 L








                                Intake and Output











 05/13/24 05/13/24 05/13/24





 06:59 14:59 22:59


 


Other:   


 


  # Voids 1 2 














Patient is a late middle aged  female, very pleasant, in no acute 

distress.


Patient is alert awake oriented to time place and person.  Speech and language 

functions are normal.  Patient can name and repeat very well.  No aphasia or 

dysarthria.  Attention, concentration and fund of knowledge is adequate. 





On cranial nerve examination, pupils are equal, round and reacting to light, vis

ual fields are full on confrontation, with no neglect on double simultaneous 

stimulation.  Extraocular muscles are intact with no nystagmus.  Face is 

symmetric, tongue protrudes to the midline.  Palatal elevation and sensation 

normal, hearing and shoulder shrug normal, facial sensation normal.  Patient has

mild fasciculations of the facial muscles noticed.  Hide neck flexion is weak 

about 4+5-, various neck extension is normal.





On muscle strength testing, the strength is (right/left) deltoid 5-/4, biceps 

4/4, triceps 4/4-,  4/4-, interossei 3+/3, hip flexion 3/3, ankle 

dorsiflexion 4 4-/4 4-





Deep tendon reflexes are slightly asymmetric (right/left) biceps 2+/2+, 

brachioradialis 2+3/2+3, triceps 2+/2+, knees 2+/2, ankles 1+/0 and plantars are

flat bilaterally.





Sensory to touch is equal with no neglect on double simultaneous stimulation.





Cerebellar function showed no ataxia for finger-to-nose testing.  No 

dysdiadochokinesia.   Tone is normal and bulk of muscles decreased particularly 

in the interosseous muscles of the hands, and also the distal forearm.





Gait deferred..





On general examination, there is no carotid bruit or murmur, S1-S2 audible.  

Chest is clear on consultation.  Abdomen is soft nontender.  No organomegaly, 

bowel sounds present.   Peripheral pulses are present.  No peripheral edema.  

Patient has fasciculations observed in the triceps, bilateral quadriceps, 

gastrocnemius muscles.





Results





- Laboratory Findings


CBC and BMP: 


                                 05/13/24 04:17





                                 05/13/24 04:17


Abnormal Lab Findings: 


                                  Abnormal Labs











  05/08/24 05/08/24 05/09/24





  22:20 22:20 13:06


 


RBC   


 


MCV  100.7 H  


 


MCH   


 


Lymphocytes #   


 


Eosinophils #   


 


D-Dimer   


 


Potassium   3.0 L 


 


Chloride    108 H


 


BUN    19 H


 


Creatinine   0.46 L  0.46 L


 


BUN/Creatinine Ratio   


 


Glucose    102 H


 


Magnesium   1.5 L 


 


AST   57 H 


 


ALT   71 H 














  05/10/24 05/13/24 05/13/24





  05:54 04:17 04:17


 


RBC   3.76 L 


 


MCV   101.1 H 


 


MCH   33.2 H 


 


Lymphocytes #   0.64 L 


 


Eosinophils #   0 L 


 


D-Dimer   


 


Potassium   


 


Chloride   


 


BUN   


 


Creatinine  0.5 L   0.5 L


 


BUN/Creatinine Ratio  27.80 H   39.00 H


 


Glucose    159 H


 


Magnesium   


 


AST   


 


ALT   














  05/13/24





  12:52


 


RBC 


 


MCV 


 


MCH 


 


Lymphocytes # 


 


Eosinophils # 


 


D-Dimer  0.60 H


 


Potassium 


 


Chloride 


 


BUN 


 


Creatinine 


 


BUN/Creatinine Ratio 


 


Glucose 


 


Magnesium 


 


AST 


 


ALT 














Assessment and Plan


Assessment: 





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 

  diplegia variant of ALS.  Patient has clinical evidence of upper and lower 

  motor neuron dysfunction, with atrophy, fasciculations and hyperreflexia.  

  Patient also has developed 6 some difficulty with breathing consistent with 

  involvement of respiratory muscles.


* Hypertension 


* Hyperlipidemia


* Vitamin D deficiency











Plan: 





* Patient's neurological symptoms started more like peripheral neuropathy with 

  paresthesias of the hands and feet.  However at this time, she has developed 

  signs and symptoms of motor neuron disease.


* B12 442, MMA 0.37, folate 16.40, TSH 0.28, with free T4 normal 1.70, 

  hemoglobin A1c 6.0, CK 33


* Patient needs to undergo EMG and nerve conductions of upper and lower 

  extremities including thoracic paraspinals to confirm the diagnosis.


* We may consider lumbar puncture to rule out inflammatory process.


* Recommend patient follow-up with neuromuscular specialist at tertiary care 

  center.


* Thank you for the consultation.

## 2024-05-15 RX ADMIN — METHYLPREDNISOLONE SCH MG: 4 TABLET ORAL at 10:31

## 2024-05-15 NOTE — P.PN
Subjective


Progress Note Date: 05/14/24





No change.





Objective





- Vital Signs


Vital signs: 


                                   Vital Signs











Temp  98.3 F   05/14/24 13:08


 


Pulse  96   05/14/24 13:08


 


Resp  18   05/14/24 13:08


 


BP  114/77   05/14/24 13:08


 


Pulse Ox  96   05/14/24 13:08


 


FiO2      








                                 Intake & Output











 05/13/24 05/14/24 05/14/24





 18:59 06:59 18:59


 


Output Total  200 


 


Balance  -200 


 


Output:   


 


  Urine  200 


 


Other:   


 


  Voiding Method   Bedside Commode


 


  # Voids 2  














- Exam





Mentation normal.  No change in the examination.





- Labs


CBC & Chem 7: 


                                 05/13/24 04:17





                                 05/13/24 04:17





Assessment and Plan


Assessment: 





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 

  diplegia variant of ALS.  Patient has clinical evidence of upper and lower 

  motor neuron dysfunction, with atrophy, fasciculations and hyperreflexia.  

  Patient also has developed some difficulty with breathing consistent with 

  involvement of respiratory muscles.


* Hypertension 


* Hyperlipidemia


* Vitamin D deficiency











Plan: 





* Patient's neurological symptoms started more like peripheral neuropathy with 

  paresthesias of the hands and feet.  However at this time, she has developed 

  signs and symptoms of motor neuron disease.


* B12 442, MMA 0.37, folate 16.40, TSH 0.28, with free T4 normal 1.70, 

  hemoglobin A1c 6.0, CK 33


* Patient needs to undergo EMG and nerve conductions of upper and lower 

  extremities including thoracic paraspinals to confirm the diagnosis.


* Patient agreed to go for lumbar puncture to look for any evidence of 

  inflammatory polyneuropathy.  We will perform lumbar puncture.


* Recommend patient follow-up with neuromuscular specialist at tertiary care 

  center.

## 2024-05-15 NOTE — P.PCN
Date of Procedure: 05/14/24


Preoperative Diagnosis: 


ALS versus inflammatory polyneuropathy


Postoperative Diagnosis: 


ALS versus inflammatory polyneuropathy


Procedure(s) Performed: 


Lumbar puncture


Anesthesia: local


Surgeon: Apollo Wray


Estimated Blood Loss (ml): 0


Pathology: none sent


Condition: stable


Disposition: floor


Description of Procedure: 


Informed consent was obtained from patient.  Very detailed risks and benefits of

the procedure, and the indication of procedure was explained to the patient in d

etail in the presence of nurse.  Patient was informed of the risk of infection, 

bleeding, numbness, back pain, and the features of post spinal headache and the 

treatment.





Patient was placed in the sitting position on the side of the bed.  The 

procedure was performed under strict aseptic conditions.  L4 lumbar space was 

identified and marked.  Low back region was sterilized with ChloraPrep and then 

with Betadine, and anesthetized with 1% lidocaine.  A spinal needle 20-gauge, 

3.5 inch inserted at L4 lumbar space.  I was able to enter subarachnoid space in

2nd pass.  The spinal fluid was slightly blood-tinged in the beginning but then 

became colorless and clear.   About  10 mL of spinal fluid was collected in 4 

tubes.  Stylette was reintroduced, spinal needle withdrawn.  Band-Aid applied.  

Patient was recommended to lay flat for half an hour.  Patient tolerated the 

procedure very well.

## 2024-05-15 NOTE — P.PN
Subjective


Progress Note Date: 05/14/24











This is a 63 year old female who comes back into the hospital after falling at 

home and states she has hit her head. Patient states she was not using her 

walker when she fell as she has boxes piled in her house states she is unable to

use her walker in the pathways. Has been wearing hospital gain and lying on the 

floor since the fall. She called [facility] requesting admission and was advised 

to call EMS and was brought back to the hospital. Patent has had frequent 

readmission secondary to weakness, falls and chronic alcohol use. States she has

not been drinking. Has been in the ER twice for falls since her last admission. 

Patient has been discharged from her prior PCP office and dose have an appt at 

0800 with Dr. Schwarz to establish care and cannot get set up with homecare 

until a new PCP sends the referral. This has been a barrier to discharge 

planning. She has not been able to follow up with neuromuscular specialist as 

recommended prior hospital stay. Head and cervical spine CT reveal moderate 

patchy burden of chronic small vessel ischemic disease. No acute fracture in 

cervical spine. Moderate multilevel spondylytic change with degenerative crit 

one anterolisthesis C5-C6. Incidental scattered ductal disease with dental 

carries and a prominent periapical lucency involving the right maxillary canine.

patient is requesting subacute rehab. Has not been approved for rehab multiple 

prior hospitalizations. Patient is reporting increased pain to the left leg 

after fall and also lower back pain. Patient feels increasingly weak. Reports no

chest pain, no shortness of breath. No nausea vomiting or diarrhea. Potassium 

3.0, magnesium 1.5. 





5/10/2024


Patient seen and evaluated in follow-up this morning awaiting orthopedic 

evaluation as patient reports she has continued weakness and inability to 

ambulate.  Patient reports she has continued weakness with frequent following 

and then is unable to get up off of the floor.  Potassium and magnesium 

improving post supplementation would recommend daily supplements of magnesium as

patient has chronic low magnesium.  Encouraged oral intake.  Will await PT/OT 

therapy evaluation.  Social work following as well as they are currently recomm

ending rehab and patient is agreeable.  Referrals have been placed to ECF.  

Patient is afebrile with no reports of chest pain or shortness of breath.  

Patient is tolerating diet with no reported nausea or vomiting.





5/13/2024


Patient is seen in follow-up today and has been evaluated by orthopedics i

nitially recommending surgery although no plans for surgical intervention 

recommending conservative management and neurology evaluation.  Neurology has 

been consulted and pending.  Patient has been evaluated by neurology previously 

with this ongoing significant lower extremity weakness and difficulty in 

ambulation.  Patient has significant radicular myopathy and has had multiple 

hospitalizations regarding this with difficulty in ambulating and has been 

crawling around the home unable to use her walker.  Patient also noted to have 

significant curling of the fingers bilaterally and has been instructed on 

previous admissions to follow-up with neuro for further nerve studies although 

does not drive and has no ability for transportation to get to appointments.  

Patient has very poor social support and history of alcohol use and has also 

been hospitalized multiple times for EtOH.  Patient is not actively withdrawing 

and will discontinue CIWA protocol.  Continue with pain management as needed and

will await neurology evaluation.  Case management is following working on 

possible ECF.  Recommend PT/OT therapy daily.  Additional testing ordered 

including VDRL, HIV, D-dimer and if positive will order CTA.  CRP and sed rate 

are within normal limits.





5/14/2021


Patient is seen in follow-up today with multiple medical consultations 

following.  Neurology has been consulted and following recommending LP which 

will be performed sometime later today.  Patient continues to have symptoms of 

difficulty ambulating and generalized weakness as well as contractures noted of 

the hands.  Per neurology given patient's clinical symptoms highly likely for 

ALS and patient is agreeable to LP.  Patient is afebrile denies chest pain 

reports some shortness of breath with deep inspiration although maintained on 

room air.  Patient tolerating diet with no reported nausea or vomiting.  Patient

is not actively withdrawing and will discontinue CIWA protocol.








Review of systems:


Constitutional: No reports of fatigue, fever, or chills


Cardiovascular: No reports of chest pain or palpitations


Respiratory: No reports of shortness of breath or cough


GI: No reports of nausea, vomiting, or diarrhea


: No reports of dysuria or retention


Neurovascular:  reports of generalized weakness and inability to ambulate with 

continued leg weakness and hand contractures that are painful and swollen





All medications have been reviewed








PHYSICAL EXAMINATION: 





GENERAL: The patient is alert and oriented x3, early appearing. Well developed, 

well nourished.  Elderly appearing, unkempt


HEENT: Pupils are round and equally reacting to light. EOMI. No scleral icterus.

No conjunctival pallor. Normocephalic, atraumatic. No pharyngeal erythema. No 

thyromegaly. 


CARDIOVASCULAR: S1 and S2 muffled


PULMONARY: Chest is clear to auscultation, no wheezing or crackles. 


ABDOMEN: Soft, nontender, nondistended, normoactive bowel sounds. No palpable 

organomegaly. 


MUSCULOSKELETAL: No joint swelling or deformity.


EXTREMITIES: No cyanosis, clubbing, or pedal edema. 


NEUROLOGICAL: LE weakess 3/5 strength. Curling of the fingers bilaterally. 4/5 

Strength.  Diffusely weak


SKIN: No rashes. 





Assessment:





-Fall secondary to medical debility and weakness as well as not using walker 

when ambulating.  Possible cervical spondylosis, rule out central cord syndrome 

or amyotrophic lateral sclerosis or neuropathic syndrome, per neurology highly 

suspicious for ALS and will undergo LP on 5/14/2024


-Generalized weakness and debility likely due to deconditioning


-Alcohol neuropathy


-Gait dysfunction possibly secondary to radicular myopathy


-Elevated D-dimer, ruled out PE


-C5-C6 anterolisthesis and severe left neural foraminal stenosis C3-C4, prior 

cervical spine MRI reports as mild neural foraminal stenosis. 


-Severe alcohol use disorder and multiple admissions recently due to alcohol 

withdrawal symptoms. Patient is not currently withdrawing has been educated 

extensively on the need for alcohol cessation and has resources for AA, 

outpatient rehab. 


-Hypomagnesemia due to chronic alcohol use this was supplemented and improving 


-Bilateral hand weakness and contractures with hyperreflexia bilateral hands.  

patient has already been referred to neuromuscular specialist o.p work up and 

has undergone extensive orthopedic and neurological examinations prior admission


-Anxiety/depression


-Hypertension


-Hyperlipidemia


-Osteoarthritis


DVT prophylaxis


GI prophylaxis


Full code





Plan:





Recommend PT/OT therapy daily


Orthopedics evaluated the patient with no plans of surgical intervention has 

started IV dexamethasone with no significant improvement.  Neurology consulted 

and currently pending at this time


Case management/social work following working on discharge planning and multiple

referrals have been made to [facility].  Patient will also require insurance 

authorization


Patient lost IV access and is refusing any further attempts as she is a 

difficult stick.  Will transition to oral unnecessary medications.  


Encouraged oral intake


Patient does have a guardianship hearing on 5/20/2024 to obtain a public 

guardian


Patient has been set up to establish with a new primary care provider if she was

discharged from Dr. Benjamin Larios's office for noncompliance and has not shown up 

for appointments multiple times.


Neurology following undergoing further workup and is scheduled for an LP today. 

Per neurology highly suspicious for ALS given patient's clinical symptoms


Encourage complete alcohol cessation and patient has been counseled extensively 

regarding needing inpatient alcohol rehab


Due to multiple complex medical issues, prognosis is guarded








The impression and plan of care has been dictated by Yamileth Horne, Nurse 

Practitioner as directed.





Dr. Ric MD


I have performed a history and physical examination and medical decision making 

of this patient, discussed the same with the dictator, and agree with the 

dictators assessment and plan as written, documented as a scribe. Based on total

visit time, I have performed more than 50% of this visit.





Objective





- Vital Signs


Vital signs: 


                                   Vital Signs











Temp  98 F   05/14/24 07:13


 


Pulse  77   05/14/24 07:13


 


Resp  16   05/14/24 07:13


 


BP  161/95   05/14/24 07:13


 


Pulse Ox  95   05/14/24 07:13


 


FiO2      








                                 Intake & Output











 05/13/24 05/14/24 05/14/24





 18:59 06:59 18:59


 


Output Total  200 


 


Balance  -200 


 


Output:   


 


  Urine  200 


 


Other:   


 


  # Voids 2  














- Labs


CBC & Chem 7: 


                                 05/13/24 04:17





                                 05/13/24 04:17


Labs: 


                  Abnormal Lab Results - Last 24 Hours (Table)











  05/13/24 Range/Units





  12:52 


 


D-Dimer  0.60 H  (<0.60)  mg/L FEU

## 2024-05-16 NOTE — P.PN
Subjective


Progress Note Date: 05/15/24











This is a 63 year old female who comes back into the hospital after falling at 

home and states she has hit her head. Patient states she was not using her 

walker when she fell as she has boxes piled in her house states she is unable to

use her walker in the pathways. Has been wearing hospital gain and lying on the 

floor since the fall. She called [facility] requesting admission and was advised 

to call EMS and was brought back to the hospital. Patent has had frequent 

readmission secondary to weakness, falls and chronic alcohol use. States she has

not been drinking. Has been in the ER twice for falls since her last admission. 

Patient has been discharged from her prior PCP office and dose have an appt at 

0800 with Dr. Schwarz to establish care and cannot get set up with homecare 

until a new PCP sends the referral. This has been a barrier to discharge 

planning. She has not been able to follow up with neuromuscular specialist as 

recommended prior hospital stay. Head and cervical spine CT reveal moderate 

patchy burden of chronic small vessel ischemic disease. No acute fracture in 

cervical spine. Moderate multilevel spondylytic change with degenerative crit 

one anterolisthesis C5-C6. Incidental scattered ductal disease with dental 

carries and a prominent periapical lucency involving the right maxillary canine.

patient is requesting subacute rehab. Has not been approved for rehab multiple 

prior hospitalizations. Patient is reporting increased pain to the left leg 

after fall and also lower back pain. Patient feels increasingly weak. Reports no

chest pain, no shortness of breath. No nausea vomiting or diarrhea. Potassium 

3.0, magnesium 1.5. 





5/10/2024


Patient seen and evaluated in follow-up this morning awaiting orthopedic 

evaluation as patient reports she has continued weakness and inability to 

ambulate.  Patient reports she has continued weakness with frequent following 

and then is unable to get up off of the floor.  Potassium and magnesium 

improving post supplementation would recommend daily supplements of magnesium as

patient has chronic low magnesium.  Encouraged oral intake.  Will await PT/OT 

therapy evaluation.  Social work following as well as they are currently recomm

ending rehab and patient is agreeable.  Referrals have been placed to ECF.  

Patient is afebrile with no reports of chest pain or shortness of breath.  

Patient is tolerating diet with no reported nausea or vomiting.





5/13/2024


Patient is seen in follow-up today and has been evaluated by orthopedics i

nitially recommending surgery although no plans for surgical intervention 

recommending conservative management and neurology evaluation.  Neurology has 

been consulted and pending.  Patient has been evaluated by neurology previously 

with this ongoing significant lower extremity weakness and difficulty in 

ambulation.  Patient has significant radicular myopathy and has had multiple 

hospitalizations regarding this with difficulty in ambulating and has been 

crawling around the home unable to use her walker.  Patient also noted to have 

significant curling of the fingers bilaterally and has been instructed on 

previous admissions to follow-up with neuro for further nerve studies although 

does not drive and has no ability for transportation to get to appointments.  

Patient has very poor social support and history of alcohol use and has also 

been hospitalized multiple times for EtOH.  Patient is not actively withdrawing 

and will discontinue CIWA protocol.  Continue with pain management as needed and

will await neurology evaluation.  Case management is following working on 

possible ECF.  Recommend PT/OT therapy daily.  Additional testing ordered 

including VDRL, HIV, D-dimer and if positive will order CTA.  CRP and sed rate 

are within normal limits.





5/14/2024


Patient is seen in follow-up today with multiple medical consultations 

following.  Neurology has been consulted and following recommending LP which 

will be performed sometime later today.  Patient continues to have symptoms of 

difficulty ambulating and generalized weakness as well as contractures noted of 

the hands.  Per neurology given patient's clinical symptoms highly likely for 

ALS and patient is agreeable to LP.  Patient is afebrile denies chest pain 

reports some shortness of breath with deep inspiration although maintained on 

room air.  Patient tolerating diet with no reported nausea or vomiting.  Patient

is not actively withdrawing and will discontinue CIWA protocol.





5/15/2024 





patient is seen in follow-up today and is quite tearful on exam.  Patient 

reports she feels increased anxiety about her medical condition and wants to 

seek help.  Patient will need outpatient follow-up with Dr. Cory plata 

for further EMG studies and further possible ALS workup.  Patient underwent LP 

with neurology showing a mildly elevated protein and glucose.  Other testing was

sent and pending at this time and will follow-up outpatient.  Patient is 

afebrile with no reports of chest pain or shortness of breath.  Patient has been

tolerating diet with no reported nausea or vomiting.  Patient continues to 

report significant weakness and difficulty in ambulation.








Review of systems:


Constitutional: No reports of fatigue, fever, or chills, reports increased 

anxiety


Cardiovascular: No reports of chest pain or palpitations


Respiratory: No reports of shortness of breath or cough


GI: No reports of nausea, vomiting, or diarrhea


: No reports of dysuria or retention


Neurovascular:  reports of generalized weakness and inability to ambulate with 

continued leg weakness and hand contractures that are painful and swollen





All medications have been reviewed








PHYSICAL EXAMINATION: 





GENERAL: The patient is alert and oriented x3, elderly appearing.  Anxious and 

tearful on exam today.  Well developed, well nourished.  Elderly appearing, 

unkempt


HEENT: Pupils are round and equally reacting to light. EOMI. No scleral icterus.

No conjunctival pallor. Normocephalic, atraumatic. No pharyngeal erythema. No 

thyromegaly. 


CARDIOVASCULAR: S1 and S2 muffled


PULMONARY: Chest is clear to auscultation, no wheezing or crackles. 


ABDOMEN: Soft, nontender, nondistended, normoactive bowel sounds. No palpable 

organomegaly. 


MUSCULOSKELETAL: No joint swelling or deformity.


EXTREMITIES: No cyanosis, clubbing, or pedal edema. 


NEUROLOGICAL: LE weakess 3/5 strength. Curling of the fingers bilaterally. 4/5 

Strength.  Diffusely weak


SKIN: No rashes. 





Assessment:





-Fall secondary to medical debility and weakness as well as not using walker 

when ambulating.  Possible cervical spondylosis, rule out central cord syndrome 

or amyotrophic lateral sclerosis or neuropathic syndrome, per neurology highly 

suspicious for ALS and underwent LP on 5/14/2024


-Generalized weakness and debility likely due to deconditioning


-Alcohol neuropathy


-Gait dysfunction possibly secondary to radicular myopathy


-Elevated D-dimer, ruled out PE


-C5-C6 anterolisthesis and severe left neural foraminal stenosis C3-C4, prior 

cervical spine MRI reports as mild neural foraminal stenosis. 


-Severe alcohol use disorder and multiple admissions recently due to alcohol 

withdrawal symptoms. Patient is not currently withdrawing has been educated 

extensively on the need for alcohol cessation and has resources for AA, 

outpatient rehab. 


-Hypomagnesemia due to chronic alcohol use this was supplemented and improving 


-Bilateral hand weakness and contractures with hyperreflexia bilateral hands.  

patient has already been referred to neuromuscular specialist o.p work up and 

has undergone extensive orthopedic and neurological examinations prior admission


-Anxiety/depression


-Hypertension


-Hyperlipidemia


-Osteoarthritis


DVT prophylaxis


GI prophylaxis


Full code





Plan:





Recommend PT/OT therapy daily


Orthopedics evaluated the patient with no plans of surgical intervention has 

started IV dexamethasone with no significant improvement.  Will transition to 

Medrol Dosepak.  


Case management/social work following working on discharge planning and multiple

referrals have been made to ECF.  Patient will also require insurance 

authorization


Patient lost IV access and is refusing any further attempts as she is a 

difficult stick.  Will transition to oral  for necessary medications.  


Encouraged oral intake


Patient does have a guardianship hearing on 5/20/2024 to obtain a public 

guardian


Patient has been set up to establish with a new primary care provider if she was

discharged from Dr. Benjamin Larios's office for noncompliance and has not shown up 

for appointments multiple times.


Neurology following undergoing further workup and underwent LP showing some mild

ly elevated glucose and protein.  Further lab studies sent and pending at this 

time.  Per neurology highly suspicious for ALS given patient's clinical 

symptoms.  Patient will need follow-up with neurology in the outpatient setting.


Encourage complete alcohol cessation and patient has been counseled extensively 

regarding needing inpatient alcohol rehab


Due to multiple complex medical issues, prognosis is guarded


Possible discharge planning in the next 24 to 48 hours.  Currently pending 

insurance authorization for ECF.








The impression and plan of care has been dictated by Yamileth Horne, Nurse 

Practitioner as directed.





Dr. Ric MD


I have performed a history and physical examination and medical decision making 

of this patient, discussed the same with the dictator, and agree with the 

dictators assessment and plan as written, documented as a scribe. Based on total

visit time, I have performed more than 50% of this visit.





Objective





- Vital Signs


Vital signs: 


                                   Vital Signs











Temp  98.8 F   05/15/24 07:04


 


Pulse  73   05/15/24 07:04


 


Resp  17   05/15/24 07:04


 


BP  137/85   05/15/24 07:04


 


Pulse Ox  95   05/15/24 07:04


 


FiO2      








                                 Intake & Output











 05/14/24 05/15/24 05/15/24





 18:59 06:59 18:59


 


Other:   


 


  Voiding Method Bedside Commode  


 


  # Voids 2 2 1


 


  # Bowel Movements 2  1














- Labs


CBC & Chem 7: 


                                 05/13/24 04:17





                                 05/13/24 04:17


Labs: 


                  Abnormal Lab Results - Last 24 Hours (Table)











  05/14/24 Range/Units





  16:37 


 


CSF Glucose  103 H  (40-70)  mg/dL


 


CSF Total Protein  75 H  (12-60)  mg/dL








                      Microbiology - Last 24 Hours (Table)











 05/14/24 16:37 CSF Gram Stain - Preliminary





 Cerebral Spinal Fluid

## 2024-05-16 NOTE — P.PN
Subjective


Progress Note Date: 05/15/24





Patient was seen for follow-up.  No significant change in the condition.  

Patient states that she has been seen in the ER about 6 times since January for 

the falls.  She has not seen neurologist outpatient so far.  Has not had any EMG

done.  Patient admits that she has been "procrastinating" therefore not seen a 

neurologist.  Per primary physician, patient has been noncompliant with the 

evaluations and treatments the past.  Patient states she drinks half a gallon of

alcohol twice a month.  She mainly drinks on Friday and Saturday.  Patient has 2

sons, one of them lives in [place], the other 1 in [facility].  They are 

not significant help to the patient, because of their own family situation.





Objective





- Vital Signs


Vital signs: 


                                   Vital Signs











Temp  99.1 F   05/15/24 12:42


 


Pulse  101 H  05/15/24 12:42


 


Resp  16   05/15/24 12:42


 


BP  129/88   05/15/24 12:42


 


Pulse Ox  95   05/15/24 12:42


 


FiO2      








                                 Intake & Output











 05/14/24 05/15/24 05/15/24





 18:59 06:59 18:59


 


Weight   72.575 kg


 


Other:   


 


  Voiding Method Bedside Commode  


 


  # Voids 2 2 1


 


  # Bowel Movements 2  1














- Exam





Mentation normal.  No change in the examination.





- Labs


CBC & Chem 7: 


                                 05/13/24 04:17





                                 05/13/24 04:17


Labs: 


                  Abnormal Lab Results - Last 24 Hours (Table)











  05/14/24 Range/Units





  16:37 


 


CSF Glucose  103 H  (40-70)  mg/dL


 


CSF Total Protein  75 H  (12-60)  mg/dL








                      Microbiology - Last 24 Hours (Table)











 05/14/24 16:37 CSF Gram Stain - Preliminary





 Cerebral Spinal Fluid 














Assessment and Plan


Assessment: 





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 

  diplegia variant of ALS.  Patient has clinical evidence of upper and lower 

  motor neuron dysfunction, with atrophy, fasciculations and hyperreflexia.  

  Patient also has developed some difficulty with breathing consistent with 

  involvement of respiratory muscles.


* Hypertension 


* Hyperlipidemia


* Vitamin D deficiency











Plan: 





* Patient's neurological symptoms started more like peripheral neuropathy with 

  paresthesias of the hands and feet.  However at this time, she has developed 

  signs and symptoms of motor neuron disease.


* B12 442, MMA 0.37, folate 16.40, TSH 0.28, with free T4 normal 1.70, 

  hemoglobin A1c 6.0, CK 33


* CSF showed WBC 1, RBC 0, CSF glucose 103, total protein 75 (12-60).  MS panel,

  angiotensin-converting enzyme level pending.


* Patient needs to undergo EMG and nerve conductions of upper and lower 

  extremities including thoracic paraspinals to confirm the diagnosis.


* Recommend patient follow-up with neuromuscular specialist at tertiary care 

  center.


* Based upon the results of the EMG and nerve conduction, if it confirms ALS, 

  then patient may benefit from Rilutek or other medications for ALS.  If it 

  shows evidence of demyelinating polyneuropathy, then IVIG can be considered.  

  This needs to be done after EMG has been completed.


* Okay to discharge to rehab facility and patient will follow-up with 

  neurologist outpatient.


* Referral to neurologist also provided in the discharge packet.


* Discussed with primary team.

## 2024-05-17 NOTE — P.PN
Subjective


Progress Note Date: 05/16/24











This is a 63 year old female who comes back into the hospital after falling at 

home and states she has hit her head. Patient states she was not using her 

walker when she fell as she has boxes piled in her house states she is unable to

use her walker in the pathways. Has been wearing hospital gain and lying on the 

floor since the fall. She called [facility] requesting admission and was advised 

to call EMS and was brought back to the hospital. Patent has had frequent 

readmission secondary to weakness, falls and chronic alcohol use. States she has

not been drinking. Has been in the ER twice for falls since her last admission. 

Patient has been discharged from her prior PCP office and dose have an appt at 

0800 with Dr. Schwarz to establish care and cannot get set up with homecare 

until a new PCP sends the referral. This has been a barrier to discharge 

planning. She has not been able to follow up with neuromuscular specialist as 

recommended prior hospital stay. Head and cervical spine CT reveal moderate 

patchy burden of chronic small vessel ischemic disease. No acute fracture in 

cervical spine. Moderate multilevel spondylytic change with degenerative crit 

one anterolisthesis C5-C6. Incidental scattered ductal disease with dental 

carries and a prominent periapical lucency involving the right maxillary canine.

patient is requesting subacute rehab. Has not been approved for rehab multiple 

prior hospitalizations. Patient is reporting increased pain to the left leg 

after fall and also lower back pain. Patient feels increasingly weak. Reports no

chest pain, no shortness of breath. No nausea vomiting or diarrhea. Potassium 

3.0, magnesium 1.5. 





5/10/2024


Patient seen and evaluated in follow-up this morning awaiting orthopedic 

evaluation as patient reports she has continued weakness and inability to 

ambulate.  Patient reports she has continued weakness with frequent following 

and then is unable to get up off of the floor.  Potassium and magnesium 

improving post supplementation would recommend daily supplements of magnesium as

patient has chronic low magnesium.  Encouraged oral intake.  Will await PT/OT 

therapy evaluation.  Social work following as well as they are currently recomm

ending rehab and patient is agreeable.  Referrals have been placed to ECF.  

Patient is afebrile with no reports of chest pain or shortness of breath.  

Patient is tolerating diet with no reported nausea or vomiting.





5/13/2024


Patient is seen in follow-up today and has been evaluated by orthopedics i

nitially recommending surgery although no plans for surgical intervention 

recommending conservative management and neurology evaluation.  Neurology has 

been consulted and pending.  Patient has been evaluated by neurology previously 

with this ongoing significant lower extremity weakness and difficulty in 

ambulation.  Patient has significant radicular myopathy and has had multiple 

hospitalizations regarding this with difficulty in ambulating and has been 

crawling around the home unable to use her walker.  Patient also noted to have 

significant curling of the fingers bilaterally and has been instructed on 

previous admissions to follow-up with neuro for further nerve studies although 

does not drive and has no ability for transportation to get to appointments.  

Patient has very poor social support and history of alcohol use and has also 

been hospitalized multiple times for EtOH.  Patient is not actively withdrawing 

and will discontinue CIWA protocol.  Continue with pain management as needed and

will await neurology evaluation.  Case management is following working on 

possible ECF.  Recommend PT/OT therapy daily.  Additional testing ordered 

including VDRL, HIV, D-dimer and if positive will order CTA.  CRP and sed rate 

are within normal limits.





5/14/2024


Patient is seen in follow-up today with multiple medical consultations 

following.  Neurology has been consulted and following recommending LP which 

will be performed sometime later today.  Patient continues to have symptoms of 

difficulty ambulating and generalized weakness as well as contractures noted of 

the hands.  Per neurology given patient's clinical symptoms highly likely for 

ALS and patient is agreeable to LP.  Patient is afebrile denies chest pain 

reports some shortness of breath with deep inspiration although maintained on 

room air.  Patient tolerating diet with no reported nausea or vomiting.  Patient

is not actively withdrawing and will discontinue CIWA protocol.





5/15/2024 





patient is seen in follow-up today and is quite tearful on exam.  Patient 

reports she feels increased anxiety about her medical condition and wants to 

seek help.  Patient will need outpatient follow-up with Dr. Cory plata 

for further EMG studies and further possible ALS workup.  Patient underwent LP 

with neurology showing a mildly elevated protein and glucose.  Other testing was

sent and pending at this time and will follow-up outpatient.  Patient is 

afebrile with no reports of chest pain or shortness of breath.  Patient has been

tolerating diet with no reported nausea or vomiting.  Patient continues to 

report significant weakness and difficulty in ambulation.





5/16/2024


Patient seen in follow-up today currently awaiting insurance authorization for 

ECF which apparently per case management went to peer to peer and initially 

denied and will follow-up with insurance as patient would benefit from ECF for 

continued strength and mobility.  Patient with significant weakness multiple 

falls and generalized decline over the last few months has had frequent 

hospitalizations regarding this.  Patient has poor social support lives alone 

and does not drive and has been noncompliant in the outpatient setting.  Patient

evaluated by neurology recommending outpatient follow-up with neurology for 

further nerve conduction studies as these are not done while inpatient.  Patient

unable to make it to her appointments with no help and support.  Patient is 

afebrile and denies chest pain or shortness of breath.  Patient has been 

tolerating diet and encouraged increase activity as tolerated.  Patient has been

willing to work with physical therapy and does extremely well with guidance.  

Clinically patient would greatly benefit from more aggressive PT/OT therapy 

daily at an [facility] as opposed to having home care.  Patient currently working on 

reestablishing with another primary care provider to establish home care.  

Patient is somewhat of a hoarder and unsafe home environment making it difficult

to use her walker which leads her to crawling around on the floor and attempt to

perform ADLs.  It is felt that if patient went to [facility] and had aggressive 

physical therapy of at least 2 to 3 hours daily that she would show improvements

in overall quality of life and independent ADLs.





Review of systems:


Constitutional: No reports of fatigue, fever, or chills, reports increased 

anxiety


Cardiovascular: No reports of chest pain or palpitations


Respiratory: No reports of shortness of breath or cough


GI: No reports of nausea, vomiting, or diarrhea


: No reports of dysuria or retention


Neurovascular:  reports of generalized weakness and inability to ambulate with 

continued leg weakness and hand contractures that are painful and swollen





All medications have been reviewed








PHYSICAL EXAMINATION: 





GENERAL: The patient is alert and oriented x3, elderly appearing.  Anxious and 

tearful on exam today.  Well developed, well nourished.  Elderly appearing, 

unkempt


HEENT: Pupils are round and equally reacting to light. EOMI. No scleral icterus.

No conjunctival pallor. Normocephalic, atraumatic. No pharyngeal erythema. No 

thyromegaly. 


CARDIOVASCULAR: S1 and S2 muffled


PULMONARY: Chest is clear to auscultation, no wheezing or crackles. 


ABDOMEN: Soft, nontender, nondistended, normoactive bowel sounds. No palpable 

organomegaly. 


MUSCULOSKELETAL: No joint swelling or deformity.


EXTREMITIES: No cyanosis, clubbing, or pedal edema. 


NEUROLOGICAL: LE weakess 3/5 strength. Curling of the fingers bilaterally. 4/5 

Strength.  Diffusely weak


SKIN: No rashes. 





Assessment:





-Fall secondary to medical debility and weakness as well as not using walker 

when ambulating.  Possible cervical spondylosis, rule out central cord syndrome 

or amyotrophic lateral sclerosis or neuropathic syndrome, per neurology highly 

suspicious for ALS and underwent LP on 5/14/2024


-Generalized weakness and debility likely due to deconditioning


-Alcohol neuropathy


-Gait dysfunction possibly secondary to radicular myopathy


-Elevated D-dimer, ruled out PE


-C5-C6 anterolisthesis and severe left neural foraminal stenosis C3-C4, prior 

cervical spine MRI reports as mild neural foraminal stenosis. 


-Severe alcohol use disorder and multiple admissions recently due to alcohol 

withdrawal symptoms. Patient is not currently withdrawing has been educated exte

nsively on the need for alcohol cessation and has resources for AA, outpatient 

rehab. 


-Hypomagnesemia due to chronic alcohol use this was supplemented and improving 


-Bilateral hand weakness and contractures with hyperreflexia bilateral hands.  

patient has already been referred to neuromuscular specialist o.p work up and 

has undergone extensive orthopedic and neurological examinations prior admission


-Anxiety/depression


-Hypertension


-Hyperlipidemia


-Osteoarthritis


DVT prophylaxis


GI prophylaxis


Full code





Plan:





Recommend PT/OT therapy daily


Orthopedics evaluated the patient with no plans of surgical intervention has 

started IV dexamethasone with no significant improvement.  Will transition to 

Medrol Dosepak.  


Case management/social work following working on discharge planning and multiple

referrals have been made to [facility].  Patient will also require insurance 

authorization


Patient lost IV access and is refusing any further attempts as she is a 

difficult stick.  Will transition to oral  for necessary medications.  


Encouraged oral intake


Patient does have a guardianship hearing on 5/20/2024 to obtain a public 

guardian


Patient has been set up to establish with a new primary care provider if she was

discharged from Dr. Benjamin Larios's office for noncompliance and has not shown up 

for appointments multiple times.


Neurology following undergoing further workup and underwent LP showing some 

mildly elevated glucose and protein.  Further lab studies sent and pending at 

this time.  Per neurology highly suspicious for ALS given patient's clinical 

symptoms.  Patient will need follow-up with neurology in the outpatient setting.

 This will be arranged by secretaries here at hospital for patient to have an 

appointment prior to discharge and will work with case management/social work r

egarding transportation.  Patient also instructed to talk with her insurance 

regarding assistance with transportation to and from appointments.  Again as 

mentioned previously patient would greatly benefit from ECF for continued 

strength and mobility.  Poor social support and poor living conditions.  

Insurance requesting peer to peer for ECF authorization.  Will speak with  At

[facility] on 5/17/2024 per [employer] insurance.


Encourage complete alcohol cessation and patient has been counseled extensively 

regarding needing inpatient alcohol rehab





Possible discharge planning in the next 24 to 48 hours.  Currently pending 

insurance authorization for ECF and peer to peer evaluation.








The impression and plan of care has been dictated by Yamileth Horne, Nurse 

Practitioner as directed.





Dr. Latrell MD


I have performed a history and physical examination and medical decision making 

of this patient, discussed the same with the dictator, and agree with the 

dictators assessment and plan as written, documented as a scribe. Based on total

visit time, I have performed more than 50% of this visit.





Objective





- Vital Signs


Vital signs: 


                                   Vital Signs











Temp  97.7 F   05/16/24 07:37


 


Pulse  80   05/16/24 07:37


 


Resp  18   05/16/24 07:37


 


BP  141/92   05/16/24 07:37


 


Pulse Ox  98   05/16/24 07:37


 


FiO2      








                                 Intake & Output











 05/15/24 05/16/24 05/16/24





 18:59 06:59 18:59


 


Weight 72.575 kg  


 


Other:   


 


  Voiding Method  Bedside Commode 


 


  # Voids 1 3 


 


  # Bowel Movements 1  














- Labs


CBC & Chem 7: 


                                 05/13/24 04:17





                                 05/13/24 04:17


Labs: 


                      Microbiology - Last 24 Hours (Table)











 05/14/24 16:37 CSF Gram Stain - Preliminary





 Cerebral Spinal Fluid CSF Culture - Preliminary

## 2024-05-17 NOTE — P.DS
Providers


Date of admission: 


05/08/24 21:26





Expected date of discharge: 05/17/24


Attending physician: 


Boni Larios





Consults: 





                                        





05/11/24 07:04


Consult Physician Routine 


   Consulting Provider: Carlos Stubbs


   Consult Reason/Comments: Difficulty ambulating


   Do you want consulting provider notified?: Yes, Notify in am





05/12/24 11:30


Consult Physician Routine 


   Consulting Provider: Satish Jackson


   Consult Reason/Comments: weakness


   Do you want consulting provider notified?: Yes











Primary care physician: 


Adam Chinchilla





Hospital Course: 








Final diagnosis





-Fall secondary to medical debility and weakness as well as not using walker 

when ambulating.  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

per neurology highly suspicious for ALS and underwent LP on 5/14/2024


-Generalized weakness and debility likely due to deconditioning


-Alcohol neuropathy


-Gait dysfunction possibly secondary to radicular myopathy


-Elevated D-dimer, ruled out PE


-C5-C6 anterolisthesis and severe left neural foraminal stenosis C3-C4, prior 

cervical spine MRI reports as mild neural foraminal stenosis. 


-Severe alcohol use disorder and multiple admissions recently due to alcohol 

withdrawal symptoms. Patient is not currently withdrawing has been educated 

extensively on the need for alcohol cessation and has resources for AA, 

outpatient rehab. 


-Hypomagnesemia due to chronic alcohol use this was supplemented and improving 


-Bilateral hand weakness and contractures with hyperreflexia bilateral hands.  

patient has already been referred to neuromuscular specialist o.p work up and 

has undergone extensive orthopedic and neurological examinations prior admission


-Anxiety/depression


-Hypertension


-Hyperlipidemia


-Osteoarthritis


DVT prophylaxis


GI prophylaxis


Full code





Discharge disposition


Patient is being discharged in a stable condition with guarded prognosis to home

and arranging for home care .  Patient will follow-up with Dr. Adam Chinchilla in

the outpatient setting upon discharge to establish.  Patient is to continue with

current medication regimen and close outpatient follow-up with neurology as 

scheduled.  Patient will need further EMG studies outpatient.  Total time taken 

is greater than 35 minutes.





Hospital course


This is a 63-year-old female who was recently admitted with falls and medical 

debility with generalized weakness and clinical decline.  Patient being closely 

monitored evaluated by orthopedics as well as neurology as patient was noted to 

have C5-C6 anterolisthesis and severe left neuroforaminal stenosis and having 

increasing weakness and gait dysfunction and inability to walk.  Patient to 

follow-up with orthopedics outpatient although highly recommending neurology 

follow-up for further EMG studies.  Patient underwent extensive neurological 

workup here including LP which showed mildly elevated glucose and protein.  Per 

neurology, highly suggestive of ALS and again will need outpatient studies 

including EMG and neurology.  Patient does have significant social issues and 

poor social support is scheduled to undergo guardianship hearing on 5/20/2024.  

Patient was seen and evaluated by physical therapy recommending rehab for 

continued strength and mobility and was accepted at an ECF although insurance 

denied recommending peer to peer evaluation.  Attempted peer to peer review with

approval consideration although was denied recommending the patient to follow-up

in the outpatient setting and also discuss about long-term care possibilities 

once patient has a public guardian appointed.  Patient has been cleared for 

discharge by neurology and will be discharged home today.  Patient is concerned 

with her overall safety and reports she is evicted from her current residence 

and has nowhere to go.  Patient is known to have chronic alcoholism as well as 

hoarding in her apartment is likely unkept and considered unsafe to return to.  

Patient has been having these ongoing issues in the outpatient setting since 

October 2023.  Patient has been medically cleared for discharge and also 

provided resources to establish with a new primary care provider as patient was 

discharged from her previous Dr. Benjamin Larios for noncompliance and failed to fol

low-up.  Please refer to other consultation notes for further HPI. Currently no 

reports of chest pain, shortness of breath, or palpitations.  Patient is 

afebrile.  No reports of nausea or vomiting and patient is tolerating diet.  

Patient will be discharged home today.  High risk for readmissions as patient 

has significant noncompliance and failure to outpatient with multiple 

hospitalizations regarding this issue.





Physical exam:








Gen: This is a 63-year-old female who is awake, alert and oriented x 3, well-

developed, elderly appearing, unkempt


HEENT: Head is atraumatic, normocephalic. Pupils equal, round. Sclerae is 

anicteric. 


NECK: Supple. No JVD. No lymphadenopathy. No thyromegaly. 


LUNGS: Clear to auscultation. No wheezes or rhonchi.  No intercostal 

retractions.


HEART: Regular rate and rhythm. No murmur. 


ABDOMEN: Soft. Bowel sounds are present. No masses.  No tenderness.


EXTREMITIES: No pedal edema.  No calf tenderness.  Generalized lower extremity 

swelling, nonpitting, contractures noted to bilateral hands


NEUROLOGICAL: Patient is awake, alert and oriented x3. Cranial nerves 2 through 

12 are grossly intact.  Diffusely weak





Please refer to medication reconciliation sheet for a list of medications.








Patient Condition at Discharge: Stable





Plan - Discharge Summary


Discharge Rx Participant: Yes


New Discharge Prescriptions: 


New


   methylPREDNISolone Dose Pack [Medrol Dose Pack] 16 mg PO DAILY #21 cap


   DULoxetine HCL [Cymbalta] 30 mg PO BID #60 cap


   Magnesium Oxide [Mag-Ox] 400 mg PO BID #60 tab


   Acetaminophen Tab [Tylenol] 650 mg PO Q6HR PRN  tab


     PRN Reason: Mild Pain Or Fever > 100.5


   Thiamine [Vitamin B-1] 100 mg PO DAILY #30 tab





Continue


   Thiamine [Vitamin B-1] 100 mg PO AS DIRECTED


   Folic Acid 1 mg PO AS DIRECTED





Changed


   Escitalopram [Lexapro] 20 mg PO DAILY #0


   Pantoprazole [Protonix] 40 mg PO DAILY #0





Discontinued


   Cholesterol Med (Unknown) 1 tab PO DAILY


   Bp Med (Unknown) 1 tab PO DAILY


Discharge Medication List





Folic Acid 1 mg PO AS DIRECTED 05/03/24 [History]


Thiamine [Vitamin B-1] 100 mg PO AS DIRECTED 05/03/24 [History]


Acetaminophen Tab [Tylenol] 650 mg PO Q6HR PRN  tab 05/17/24 [Rx]


DULoxetine HCL [Cymbalta] 30 mg PO BID #60 cap 05/17/24 [Rx]


Escitalopram [Lexapro] 20 mg PO DAILY #0 05/17/24 [Rx]


Magnesium Oxide [Mag-Ox] 400 mg PO BID #60 tab 05/17/24 [Rx]


Pantoprazole [Protonix] 40 mg PO DAILY #0 05/17/24 [Rx]


Thiamine [Vitamin B-1] 100 mg PO DAILY #30 tab 05/17/24 [Rx]


methylPREDNISolone Dose Pack [Medrol Dose Pack] 16 mg PO DAILY #21 cap 05/17/24 

[Rx]








Follow up Appointment(s)/Referral(s): 


Adam Chinchilla DO [Primary Care Provider] - 1-2 days


[facility], [NON-STAFF] - As Needed


Madyson Ray MD [Medical Doctor] - 1 Week (Patient's symptoms started with 

symptoms of peripheral neuropathy with burning of the hands and feet.  However 

she has progressed with weakness of the arms and legs.  At present her 

examination is consistent with ALS/motor neuron disease.  CSF was performed in 

which her proteins are slightly elevated 75 (12-60).  Suggest EMG and nerve 

conduction study of upper and lower extremities, to evaluate for demyelinating 

polyneuropathy versus motor neuron disease.  If evidence of demyelinating 

polyneuropathy, may consider IVIG.)


Activity/Diet/Wound Care/Special Instructions: 


[facility]


[address] [place] [phone]


P: [phone]


Discharge/Stand Alone Forms:  [facility]


Discharge Disposition: HOME SELF-CARE

## 2024-05-17 NOTE — P.PN
Subjective


Progress Note Date: 05/16/24





Patient was seen for follow-up.   Patient is laying comfortably in the bed.  

Patient states that she feels slightly headache, achy, sore involving the neck 

back and the leg.  Headache is 9-10/10.  Also slightly dizzy.








Objective





- Vital Signs


Vital signs: 


                                   Vital Signs











Temp  98.6 F   05/16/24 14:00


 


Pulse  89   05/16/24 14:00


 


Resp  18   05/16/24 14:00


 


BP  124/83   05/16/24 14:00


 


Pulse Ox  96   05/16/24 14:00


 


FiO2      








                                 Intake & Output











 05/15/24 05/16/24 05/16/24





 18:59 06:59 18:59


 


Weight 72.575 kg  


 


Other:   


 


  Voiding Method  Bedside Commode Bedside Commode


 


  # Voids 1 3 5


 


  # Bowel Movements 1  1














- Exam





Mentation normal.  No change in the examination.





- Labs


CBC & Chem 7: 


                                 05/13/24 04:17





                                 05/13/24 04:17


Labs: 


                      Microbiology - Last 24 Hours (Table)











 05/14/24 16:37 CSF Gram Stain - Preliminary





 Cerebral Spinal Fluid CSF Culture - Preliminary














Assessment and Plan


Assessment: 





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 

  diplegia variant of ALS.  Patient has clinical evidence of upper and lower 

  motor neuron dysfunction, with atrophy, fasciculations and hyperreflexia.  

  Patient also has developed some difficulty with breathing consistent with 

  involvement of respiratory muscles.


* Headache, rule out post spinal headache.


* Hypertension 


* Hyperlipidemia


* Vitamin D deficiency











Plan: 





* Patient has developed headache, which could be post spinal headache.  Patient 

  was recommended to watch if the headache gets worse on sitting and standing up

  and goes away on laying down.  If that is so, then she should drink a lot of 

  fluids, caffeine, which should help.  If the headache persists, then she may 

  need epidural blood patch.





* Patient's neurological symptoms started more like peripheral neuropathy with 

  paresthesias of the hands and feet.  However at this time, she has developed 

  signs and symptoms of motor neuron disease.


* B12 442, MMA 0.37, folate 16.40, TSH 0.28, with free T4 normal 1.70, 

  hemoglobin A1c 6.0, CK 33, RPR negative.


* CSF showed WBC 1, RBC 0, CSF glucose 103, total protein 75 (12-60).  MS panel,

  angiotensin-converting enzyme level pending.


* Patient needs to undergo EMG and nerve conductions of upper and lower extre

  mities including thoracic paraspinals to confirm the diagnosis.


* Recommend patient follow-up with neuromuscular specialist at tertiary care 

  center.


* Based upon the results of the EMG and nerve conduction, if it confirms ALS, 

  then patient may benefit from Rilutek or other medications for ALS.  If it 

  shows evidence of demyelinating polyneuropathy, then IVIG can be considered.  

  This needs to be done after EMG has been completed.


* Okay to discharge to rehab facility and patient will follow-up with 

  neurologist outpatient.


* Referral to neurologist also provided in the discharge packet.

## 2024-05-18 NOTE — P.PN
Subjective


Progress Note Date: 05/18/24 05/18/2024


Discharge has been held at this time as patient has filed an appeal and there 

has been no update to the appeals process today. Patient continues to request 

rehab on discharge states she is unable to return home. Continues to report 

migraine at this time. 





Final diagnosis





-Fall secondary to medical debility and weakness as well as not using walker 

when ambulating.  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

per neurology highly suspicious for ALS and underwent LP on 5/14/2024


-Generalized weakness and debility likely due to deconditioning


-Alcohol neuropathy


-Gait dysfunction possibly secondary to radicular myopathy


-Elevated D-dimer, ruled out PE


-C5-C6 anterolisthesis and severe left neural foraminal stenosis C3-C4, prior 

cervical spine MRI reports as mild neural foraminal stenosis. 


-Severe alcohol use disorder and multiple admissions recently due to alcohol 

withdrawal symptoms. Patient is not currently withdrawing has been educated 

extensively on the need for alcohol cessation and has resources for AA, 

outpatient rehab. 


-Hypomagnesemia due to chronic alcohol use this was supplemented and improving 


-Bilateral hand weakness and contractures with hyperreflexia bilateral hands.  

patient has already been referred to neuromuscular specialist o.p work up and 

has undergone extensive orthopedic and neurological examinations prior admission


-Anxiety/depression


-Hypertension


-Hyperlipidemia


-Osteoarthritis


DVT prophylaxis


GI prophylaxis


Full code





Discharge disposition


Patient is being discharged in a stable condition with guarded prognosis to home

and arranging for home care .  Patient will follow-up with Dr. Adam Chinchilla in

the outpatient setting upon discharge to establish.  Patient is to continue with

current medication regimen and close outpatient follow-up with neurology as 

scheduled.  Patient will need further EMG studies outpatient.  Total time taken 

is greater than 35 minutes.





Hospital course


This is a 63-year-old female who was recently admitted with falls and medical 

debility with generalized weakness and clinical decline.  Patient being closely 

monitored evaluated by orthopedics as well as neurology as patient was noted to 

have C5-C6 anterolisthesis and severe left neuroforaminal stenosis and having 

increasing weakness and gait dysfunction and inability to walk.  Patient to 

follow-up with orthopedics outpatient although highly recommending neurology 

follow-up for further EMG studies.  Patient underwent extensive neurological 

workup here including LP which showed mildly elevated glucose and protein.  Per 

neurology, highly suggestive of ALS and again will need outpatient studies 

including EMG and neurology.  Patient does have significant social issues and 

poor social support is scheduled to undergo guardianship hearing on 5/20/2024.  

Patient was seen and evaluated by physical therapy recommending rehab for 

continued strength and mobility and was accepted at an [facility] although insurance 

denied recommending peer to peer evaluation.  Attempted peer to peer review with

approval consideration although was denied recommending the patient to follow-up

in the outpatient setting and also discuss about long-term care possibilities 

once patient has a public guardian appointed.  Patient has been cleared for 

discharge by neurology and will be discharged home today.  Patient is concerned 

with her overall safety and reports she is evicted from her current residence 

and has nowhere to go.  Patient is known to have chronic alcoholism as well as 

hoarding in her apartment is likely unkept and considered unsafe to return to.  

Patient has been having these ongoing issues in the outpatient setting since 

October 2023.  Patient has been medically cleared for discharge and also 

provided resources to establish with a new primary care provider as patient was 

discharged from her previous Dr. Benjamin Larios for noncompliance and failed to 

follow-up.  Please refer to other consultation notes for further HPI. Currently 

no reports of chest pain, shortness of breath, or palpitations.  Patient is 

afebrile.  No reports of nausea or vomiting and patient is tolerating diet.  

Patient will be discharged home today.  High risk for readmissions as patient 

has significant noncompliance and failure to outpatient with multiple hospitali

zations regarding this issue.





Physical exam:








Gen: This is a 63-year-old female who is awake, alert and oriented x 3, well-

developed, elderly appearing, unkempt


HEENT: Head is atraumatic, normocephalic. Pupils equal, round. Sclerae is 

anicteric. 


NECK: Supple. No JVD. No lymphadenopathy. No thyromegaly. 


LUNGS: Clear to auscultation. No wheezes or rhonchi.  No intercostal 

retractions.


HEART: Regular rate and rhythm. No murmur. 


ABDOMEN: Soft. Bowel sounds are present. No masses.  No tenderness.


EXTREMITIES: No pedal edema.  No calf tenderness.  Generalized lower extremity 

swelling, nonpitting, contractures noted to bilateral hands


NEUROLOGICAL: Patient is awake, alert and oriented x3. Cranial nerves 2 through 

12 are grossly intact.  Diffusely weak





Please refer to medication reconciliation sheet for a list of medications.











The impression and plan of care has been dictated by Linda Dietz Nurse 

Practitioner as directed.





Dr. Latrell MD


I have performed a history and physical examination and medical decision making 

of this patient, discussed the same with the dictator, and agree with the 

dictators assessment and plan as written, documented as a scribe. Based on total

visit time, I have performed more than 50% of this visit.








Objective





- Vital Signs


Vital signs: 


                                   Vital Signs











Temp  97.6 F   05/18/24 07:31


 


Pulse  78   05/18/24 07:31


 


Resp  16   05/18/24 07:31


 


BP  142/79   05/18/24 07:31


 


Pulse Ox  95   05/18/24 07:31


 


FiO2      








                                 Intake & Output











 05/17/24 05/18/24 05/18/24





 18:59 06:59 18:59


 


Intake Total  600 


 


Balance  600 


 


Intake:   


 


  Oral  600 


 


Other:   


 


  Voiding Method Bedside Commode Bedside Commode 


 


  # Voids 6 2 


 


  # Bowel Movements 2  














- Labs


CBC & Chem 7: 


                                 05/13/24 04:17





                                 05/13/24 04:17


Labs: 


                      Microbiology - Last 24 Hours (Table)











 05/14/24 16:37 CSF Gram Stain - Preliminary





 Cerebral Spinal Fluid CSF Culture - Preliminary














Assessment and Plan


Time with Patient: Less than 30

## 2024-05-19 LAB
ANION GAP SERPL CALC-SCNC: 12.4 MMOL/L (ref 4–12)
BASOPHILS # BLD AUTO: 0.01 X 10*3/UL (ref 0–0.1)
BASOPHILS NFR BLD AUTO: 0.1 %
BUN SERPL-SCNC: 26.1 MG/DL (ref 9–27)
BUN/CREAT SERPL: 52.2 RATIO (ref 12–20)
CALCIUM SPEC-MCNC: 10.1 MG/DL (ref 8.7–10.3)
CHLORIDE SERPL-SCNC: 100 MMOL/L (ref 96–109)
CO2 SERPL-SCNC: 26.6 MMOL/L (ref 21.6–31.8)
EOSINOPHIL # BLD AUTO: 0.16 X 10*3/UL (ref 0.04–0.35)
EOSINOPHIL NFR BLD AUTO: 1.7 %
ERYTHROCYTE [DISTWIDTH] IN BLOOD BY AUTOMATED COUNT: 3.88 X 10*6/UL (ref 4.1–5.2)
ERYTHROCYTE [DISTWIDTH] IN BLOOD: 14.3 % (ref 11.5–14.5)
GLUCOSE SERPL-MCNC: 107 MG/DL (ref 70–110)
HCT VFR BLD AUTO: 39.8 % (ref 37.2–46.3)
HGB BLD-MCNC: 12.8 G/DL (ref 12–15)
IMM GRANULOCYTES BLD QL AUTO: 0.7 %
LYMPHOCYTES # SPEC AUTO: 3.91 X 10*3/UL (ref 0.9–5)
LYMPHOCYTES NFR SPEC AUTO: 41.1 %
MAGNESIUM SPEC-SCNC: 2.1 MG/DL (ref 1.5–2.4)
MCH RBC QN AUTO: 33 PG (ref 27–32)
MCHC RBC AUTO-ENTMCNC: 32.2 G/DL (ref 32–37)
MCV RBC AUTO: 102.6 FL (ref 80–97)
MONOCYTES # BLD AUTO: 0.86 X 10*3/UL (ref 0.2–1)
MONOCYTES NFR BLD AUTO: 9 %
NEUTROPHILS # BLD AUTO: 4.5 X 10*3/UL (ref 1.8–7.7)
NEUTROPHILS NFR BLD AUTO: 47.4 %
NRBC BLD AUTO-RTO: 0 X 10*3/UL (ref 0–0.01)
PLATELET # BLD AUTO: 394 X 10*3/UL (ref 140–440)
POTASSIUM SERPL-SCNC: 4.8 MMOL/L (ref 3.5–5.5)
SODIUM SERPL-SCNC: 139 MMOL/L (ref 135–145)
WBC # BLD AUTO: 9.51 X 10*3/UL (ref 4.5–10)

## 2024-05-19 NOTE — P.PN
Subjective


Progress Note Date: 05/19/24 05/18/2024


Discharge has been held at this time as patient has filed an appeal and there 

has been no update to the appeals process today. Patient continues to request 

rehab on discharge states she is unable to return home. Continues to report 

migraine at this time. 





05/19/2024


Patient is evaluated in follow up on the medical floor. Given IV toradol due to 

complaints of generalized body aches and pains and she states she feels better 

and has been able to ambulate better. Discharge is doing through appeals 

process. Patient has guardianship court hearing tomorrow. 





Final diagnosis





-Fall secondary to medical debility and weakness as well as not using walker 

when ambulating.  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

per neurology highly suspicious for ALS and underwent LP on 5/14/2024


-Generalized weakness and debility likely due to deconditioning


-Alcohol neuropathy


-Gait dysfunction possibly secondary to radicular myopathy


-Elevated D-dimer, ruled out PE


-C5-C6 anterolisthesis and severe left neural foraminal stenosis C3-C4, prior 

cervical spine MRI reports as mild neural foraminal stenosis. 


-Severe alcohol use disorder and multiple admissions recently due to alcohol 

withdrawal symptoms. Patient is not currently withdrawing has been educated 

extensively on the need for alcohol cessation and has resources for AA, 

outpatient rehab. 


-Hypomagnesemia due to chronic alcohol use this was supplemented and improving 


-Bilateral hand weakness and contractures with hyperreflexia bilateral hands.  

patient has already been referred to neuromuscular specialist o.p work up and 

has undergone extensive orthopedic and neurological examinations prior admission


-Anxiety/depression


-Hypertension


-Hyperlipidemia


-Osteoarthritis


DVT prophylaxis


GI prophylaxis


Full code





Discharge disposition


Patient is being discharged in a stable condition with guarded prognosis to home

and arranging for home care .  Patient will follow-up with Dr. Adam Chinchilla in

the outpatient setting upon discharge to establish.  Patient is to continue with

current medication regimen and close outpatient follow-up with neurology as 

scheduled.  Patient will need further EMG studies outpatient.  Total time taken 

is greater than 35 minutes.





Hospital course


This is a 63-year-old female who was recently admitted with falls and medical 

debility with generalized weakness and clinical decline.  Patient being closely 

monitored evaluated by orthopedics as well as neurology as patient was noted to 

have C5-C6 anterolisthesis and severe left neuroforaminal stenosis and having 

increasing weakness and gait dysfunction and inability to walk.  Patient to 

follow-up with orthopedics outpatient although highly recommending neurology 

follow-up for further EMG studies.  Patient underwent extensive neurological 

workup here including LP which showed mildly elevated glucose and protein.  Per 

neurology, highly suggestive of ALS and again will need outpatient studies 

including EMG and neurology.  Patient does have significant social issues and 

poor social support is scheduled to undergo guardianship hearing on 5/20/2024.  

Patient was seen and evaluated by physical therapy recommending rehab for sam

nued strength and mobility and was accepted at an F although insurance denied 

recommending peer to peer evaluation.  Attempted peer to peer review with 

approval consideration although was denied recommending the patient to follow-up

in the outpatient setting and also discuss about long-term care possibilities 

once patient has a public guardian appointed.  Patient has been cleared for 

discharge by neurology and will be discharged home today.  Patient is concerned 

with her overall safety and reports she is evicted from her current residence 

and has nowhere to go.  Patient is known to have chronic alcoholism as well as 

hoarding in her apartment is likely unkept and considered unsafe to return to.  

Patient has been having these ongoing issues in the outpatient setting since 

October 2023.  Patient has been medically cleared for discharge and also 

provided resources to establish with a new primary care provider as patient was 

discharged from her previous Dr. Benjamin Larios for noncompliance and failed to 

follow-up.  Please refer to other consultation notes for further HPI. Currently 

no reports of chest pain, shortness of breath, or palpitations.  Patient is 

afebrile.  No reports of nausea or vomiting and patient is tolerating diet.  

Patient will be discharged home today.  High risk for readmissions as patient 

has significant noncompliance and failure to outpatient with multiple hospit

alizations regarding this issue.





Physical exam:








Gen: This is a 63-year-old female who is awake, alert and oriented x 3, well-

developed, elderly appearing, unkempt


HEENT: Head is atraumatic, normocephalic. Pupils equal, round. Sclerae is 

anicteric. 


NECK: Supple. No JVD. No lymphadenopathy. No thyromegaly. 


LUNGS: Clear to auscultation. No wheezes or rhonchi.  No intercostal 

retractions.


HEART: Regular rate and rhythm. No murmur. 


ABDOMEN: Soft. Bowel sounds are present. No masses.  No tenderness.


EXTREMITIES: No pedal edema.  No calf tenderness.  Generalized lower extremity 

swelling, nonpitting, contractures noted to bilateral hands


NEUROLOGICAL: Patient is awake, alert and oriented x3. Cranial nerves 2 through 

12 are grossly intact.  Diffusely weak





Please refer to medication reconciliation sheet for a list of medications.











The impression and plan of care has been dictated by Linda Dietz, Nurse 

Practitioner as directed.





Dr. Latrell MD


I have performed a history and physical examination and medical decision making 

of this patient, discussed the same with the dictator, and agree with the 

dictators assessment and plan as written, documented as a scribe. Based on total

visit time, I have performed more than 50% of this visit.








Objective





- Vital Signs


Vital signs: 


                                   Vital Signs











Temp  99.5 F   05/19/24 12:59


 


Pulse  94   05/19/24 12:59


 


Resp  17   05/19/24 12:59


 


BP  137/86   05/19/24 12:59


 


Pulse Ox  96   05/19/24 12:59


 


FiO2      








                                 Intake & Output











 05/18/24 05/19/24 05/19/24





 18:59 06:59 18:59


 


Output Total  1 


 


Balance  -1 


 


Output:   


 


  Urine  1 


 


Other:   


 


  Voiding Method  Bedside Commode 


 


  # Voids 5 3 1


 


  # Bowel Movements 3 1 1














- Labs


CBC & Chem 7: 


                                 05/19/24 03:50





                                 05/19/24 03:50


Labs: 


                  Abnormal Lab Results - Last 24 Hours (Table)











  05/19/24 05/19/24 Range/Units





  03:50 03:50 


 


RBC  3.88 L   (4.10-5.20)  X 10*6/uL


 


MCV  102.6 H   (80.0-97.0)  FL


 


MCH  33.0 H   (27.0-32.0)  pg


 


Immature Gran #  0.07 H   (0.00-0.04)  X 10*3/uL


 


Anion Gap   12.40 H  (4.00-12.00)  mmol/L


 


Creatinine   0.5 L  (0.6-1.5)  mg/dL


 


BUN/Creatinine Ratio   52.20 H  (12.00-20.00)  Ratio








                      Microbiology - Last 24 Hours (Table)











 05/14/24 16:37 CSF Gram Stain - Final





 Cerebral Spinal Fluid CSF Culture - Final














Assessment and Plan


Time with Patient: Less than 30

## 2024-05-19 NOTE — P.PN
Subjective


Progress Note Date: 05/19/24





Patient was seen for follow-up.   Patient is sitting comfortably in her bed.  

Patient states that her body is aching today, but the headache is improved.  She

has difficulty with sleeping at night.  Patient is asking for  for 

wheelchair, toilet seat and shower seat.  Recommended we discussed with the 

.





Objective





- Vital Signs


Vital signs: 


                                   Vital Signs











Temp  99.5 F   05/19/24 12:59


 


Pulse  94   05/19/24 12:59


 


Resp  17   05/19/24 12:59


 


BP  137/86   05/19/24 12:59


 


Pulse Ox  96   05/19/24 12:59


 


FiO2      








                                 Intake & Output











 05/18/24 05/19/24 05/19/24





 18:59 06:59 18:59


 


Output Total  1 


 


Balance  -1 


 


Output:   


 


  Urine  1 


 


Other:   


 


  Voiding Method  Bedside Commode 


 


  # Voids 5 3 1


 


  # Bowel Movements 3 1 1














- Exam





Mentation normal.  No change in the examination.





- Labs


CBC & Chem 7: 


                                 05/19/24 03:50





                                 05/19/24 03:50


Labs: 


                  Abnormal Lab Results - Last 24 Hours (Table)











  05/19/24 05/19/24 Range/Units





  03:50 03:50 


 


RBC  3.88 L   (4.10-5.20)  X 10*6/uL


 


MCV  102.6 H   (80.0-97.0)  FL


 


MCH  33.0 H   (27.0-32.0)  pg


 


Immature Gran #  0.07 H   (0.00-0.04)  X 10*3/uL


 


Anion Gap   12.40 H  (4.00-12.00)  mmol/L


 


Creatinine   0.5 L  (0.6-1.5)  mg/dL


 


BUN/Creatinine Ratio   52.20 H  (12.00-20.00)  Ratio








                      Microbiology - Last 24 Hours (Table)











 05/14/24 16:37 CSF Gram Stain - Final





 Cerebral Spinal Fluid CSF Culture - Final














Assessment and Plan


Assessment: 





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 

  diplegia variant of ALS.  Patient has clinical evidence of upper and lower 

  motor neuron dysfunction, with atrophy, fasciculations and hyperreflexia.  

  Patient also has developed some difficulty with breathing consistent with 

  involvement of respiratory muscles.


* Headache, rule out post spinal headache.


* Hypertension 


* Hyperlipidemia


* Vitamin D deficiency











Plan: 





* Patient has developed headache, which could be post spinal headache.  Patient 

  was recommended to watch if the headache gets worse on sitting and standing up

  and goes away on laying down.  If that is so, then she should drink a lot of 

  fluids, caffeine, which should help.  If the headache persists, then she may 

  need epidural blood patch.





* Patient's neurological symptoms started more like peripheral neuropathy with 

  paresthesias of the hands and feet.  However at this time, she has developed 

  signs and symptoms of motor neuron disease.


* B12 442, MMA 0.37, folate 16.40, TSH 0.28, with free T4 normal 1.70, 

  hemoglobin A1c 6.0, CK 33, RPR negative.


* CSF showed WBC 1, RBC 0, CSF glucose 103, total protein 75 (12-60).  An

  giotensin-converting enzyme level < 5.  MS panel pending.


* Patient needs to undergo EMG and nerve conductions of upper and lower 

  extremities including thoracic paraspinals to confirm the diagnosis.


* Recommend patient follow-up with neuromuscular specialist at tertiary care [facility].


* Based upon the results of the EMG and nerve conduction, if it confirms ALS, 

  then patient may benefit from Rilutek or other medications like Radicava for 

  ALS.  If it shows evidence of demyelinating polyneuropathy, then IVIG can be 

  considered.  This needs to be done after EMG has been completed.


* Okay to discharge to rehab facility and patient will follow-up with 

  neurologist outpatient.


* Referral to neurologist also provided in the discharge packet.

## 2024-05-20 NOTE — P.DS
Providers


Date of admission: 


05/08/24 21:26





Expected date of discharge: 05/20/24


Attending physician: 


Boni Larios





Consults: 





                                        





05/11/24 07:04


Consult Physician Routine 


   Consulting Provider: Carlos Stubbs


   Consult Reason/Comments: Difficulty ambulating


   Do you want consulting provider notified?: Yes, Notify in am





05/12/24 11:30


Consult Physician Routine 


   Consulting Provider: Satish Jackson


   Consult Reason/Comments: weakness


   Do you want consulting provider notified?: Yes











Primary care physician: 


Adam Chinchilla





Hospital Course: 








Final diagnosis





-Fall secondary to medical debility and weakness as well as not using walker 

when ambulating.  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

per neurology highly suspicious for ALS and underwent LP on 5/14/2024


-Generalized weakness and debility likely due to deconditioning


-Alcohol neuropathy


-Gait dysfunction possibly secondary to radicular myopathy


-Elevated D-dimer, ruled out PE


-C5-C6 anterolisthesis and severe left neural foraminal stenosis C3-C4, prior 

cervical spine MRI reports as mild neural foraminal stenosis. 


-Severe alcohol use disorder and multiple admissions recently due to alcohol 

withdrawal symptoms. Patient is not currently withdrawing has been educated 

extensively on the need for alcohol cessation and has resources for AA, 

outpatient rehab. 


-Hypomagnesemia due to chronic alcohol use this was supplemented and improving 


-Bilateral hand weakness and contractures with hyperreflexia bilateral hands.  

patient has already been referred to neuromuscular specialist o.p work up and 

has undergone extensive orthopedic and neurological examinations prior admission


-Anxiety/depression


-Hypertension


-Hyperlipidemia


-Osteoarthritis


DVT prophylaxis


GI prophylaxis


Full code





Discharge disposition


Patient is being discharged in a stable condition with guarded prognosis to home

and arranging for home care .  Patient will follow-up with Dr. Adam Chinchilla in

the outpatient setting upon discharge to establish.  Patient is to continue with

current medication regimen and close outpatient follow-up with neurology as 

scheduled.  Patient will need further EMG studies outpatient.  Total time taken 

is greater than 35 minutes.





Hospital course


This is a 63-year-old female who was recently admitted with falls and medical 

debility with generalized weakness and clinical decline.  Patient being closely 

monitored evaluated by orthopedics as well as neurology as patient was noted to 

have C5-C6 anterolisthesis and severe left neuroforaminal stenosis and having 

increasing weakness and gait dysfunction and inability to walk.  Patient to 

follow-up with orthopedics outpatient although highly recommending neurology 

follow-up for further EMG studies.  Patient underwent extensive neurological 

workup here including LP which showed mildly elevated glucose and protein.  Per 

neurology, highly suggestive of ALS and again will need outpatient studies 

including EMG and neurology.  Patient does have significant social issues and 

poor social support is scheduled to undergo guardianship hearing on 5/20/2024.  

Patient was seen and evaluated by physical therapy recommending rehab for 

continued strength and mobility and was accepted at an [facility] although insurance 

denied recommending peer to peer evaluation.  Attempted peer to peer review with

approval consideration although was denied recommending the patient to follow-up

in the outpatient setting and also discuss about long-term care possibilities 

once patient has a public guardian appointed.  Patient has been cleared for 

discharge by neurology and will be discharged home today.  Patient is concerned 

with her overall safety and reports she is evicted from her current residence 

and has nowhere to go.  Patient is known to have chronic alcoholism as well as 

hoarding in her apartment is likely unkept and considered unsafe to return to.  

Patient has been having these ongoing issues in the outpatient setting since 

October 2023.  Patient has been medically cleared for discharge and also 

provided resources to establish with a new primary care provider as patient was 

discharged from her previous Dr. Benjamin Larios for noncompliance and failed to fol

low-up.  Please refer to other consultation notes for further HPI. Currently no 

reports of chest pain, shortness of breath, or palpitations.  Patient is 

afebrile.  No reports of nausea or vomiting and patient is tolerating diet.  

Patient will be discharged home today.  High risk for readmissions as patient 

has significant noncompliance and failure to outpatient with multiple 

hospitalizations regarding this issue.





5/20/2024





Patient remained hospitalized as patient appealed the discharge refusing to be 

discharged from the hospital.  After medical review appeal was overturned and 

denied and patient is stable for discharge.  Patient has a court hearing today 

for public guardianship and will be arranged in the outpatient setting with 

establishing with a new primary care provider as well as establishing home care 

and other social situations supports.  Patient is medically stable and will be 

discharged home today.  An appointment has been made with Dr. Chinchilla for 

5/24/2024 to establish.  Patient has been instructed of this appointment.





Physical exam:








Gen: This is a 63-year-old female who is awake, alert and oriented x 3, well-

developed, elderly appearing, unkempt


HEENT: Head is atraumatic, normocephalic. Pupils equal, round. Sclerae is 

anicteric. 


NECK: Supple. No JVD. No lymphadenopathy. No thyromegaly. 


LUNGS: Clear to auscultation. No wheezes or rhonchi.  No intercostal retr

actions.


HEART: Regular rate and rhythm. No murmur. 


ABDOMEN: Soft. Bowel sounds are present. No masses.  No tenderness.


EXTREMITIES: No pedal edema.  No calf tenderness.  Generalized lower extremity 

swelling, nonpitting, contractures noted to bilateral hands


NEUROLOGICAL: Patient is awake, alert and oriented x3. Cranial nerves 2 through 

12 are grossly intact.  Diffusely weak





Please refer to medication reconciliation sheet for a list of medications.








The impression and plan of care has been dictated by Yamileth Horne, Nurse 

Practitioner as directed.





Dr. Latrell MD


I have performed a history and examination and MDM of this patient, discussed 

the same with the dictator, and  agree with the dictator's assessment and plan 

as written ,documented as a scribe. Based on total visit time,  I have performed

more than 50% of the visit.


Patient Condition at Discharge: Stable





Plan - Discharge Summary


Discharge Rx Participant: Yes


New Discharge Prescriptions: 


New


   methylPREDNISolone Dose Pack [Medrol Dose Pack] 16 mg PO DAILY #21 cap


   Ibuprofen [Motrin] 400 mg PO Q6HR PRN #20 tab


     PRN Reason: Pain


   DULoxetine HCL [Cymbalta] 30 mg PO BID #60 cap


   Magnesium Oxide [Mag-Ox] 400 mg PO BID #60 tab


   Acetaminophen Tab [Tylenol] 650 mg PO Q6HR PRN  tab


     PRN Reason: Mild Pain Or Fever > 100.5


   Thiamine [Vitamin B-1] 100 mg PO DAILY #30 tab





Continue


   Thiamine [Vitamin B-1] 100 mg PO AS DIRECTED


   Folic Acid 1 mg PO AS DIRECTED





Changed


   Escitalopram [Lexapro] 20 mg PO DAILY #0


   Pantoprazole [Protonix] 40 mg PO DAILY #0





Discontinued


   Cholesterol Med (Unknown) 1 tab PO DAILY


   Bp Med (Unknown) 1 tab PO DAILY


Discharge Medication List





Folic Acid 1 mg PO AS DIRECTED 05/03/24 [History]


Thiamine [Vitamin B-1] 100 mg PO AS DIRECTED 05/03/24 [History]


Acetaminophen Tab [Tylenol] 650 mg PO Q6HR PRN  tab 05/17/24 [Rx]


DULoxetine HCL [Cymbalta] 30 mg PO BID #60 cap 05/17/24 [Rx]


Escitalopram [Lexapro] 20 mg PO DAILY #0 05/17/24 [Rx]


Magnesium Oxide [Mag-Ox] 400 mg PO BID #60 tab 05/17/24 [Rx]


Pantoprazole [Protonix] 40 mg PO DAILY #0 05/17/24 [Rx]


Thiamine [Vitamin B-1] 100 mg PO DAILY #30 tab 05/17/24 [Rx]


methylPREDNISolone Dose Pack [Medrol Dose Pack] 16 mg PO DAILY #21 cap 05/17/24 

[Rx]


Ibuprofen [Motrin] 400 mg PO Q6HR PRN #20 tab 05/20/24 [Rx]








Follow up Appointment(s)/Referral(s): 


Adam Chinchilla DO [Primary Care Provider] - 05/24/24 11:00 am


Madyson Ray MD [Medical Doctor] - 1 Week (Patient's symptoms started with 

symptoms of peripheral neuropathy with burning of the hands and feet.  However 

she has progressed with weakness of the arms and legs.  At present her 

examination is consistent with ALS/motor neuron disease.  CSF was performed in 

which her proteins are slightly elevated 75 (12-60).  Suggest EMG and nerve 

conduction study of upper and lower extremities, to evaluate for demyelinating 

polyneuropathy versus motor neuron disease.  If evidence of demyelinating 

polyneuropathy, may consider IVIG.)


[facility] [[facility]STA[facility]] - 1 Week


Activity/Diet/Wound Care/Special Instructions: 


[facility] - [facility]


[address] [place] [id number]


P: [phone]





Follow-up at your scheduled appointment with your primary care provider to 

establish


Continue taking medications as prescribed


Follow-up with neurology outpatient for further EMG studies





Discharge/Stand Alone Forms:  [facility]


Discharge Disposition: HOME WITH HOME HEALTH SERVICES

## 2024-05-21 VITALS — RESPIRATION RATE: 17 BRPM | SYSTOLIC BLOOD PRESSURE: 137 MMHG | HEART RATE: 102 BPM | DIASTOLIC BLOOD PRESSURE: 84 MMHG

## 2024-05-21 VITALS — TEMPERATURE: 98.2 F

## 2024-05-21 LAB
ALBUMIN SERPL-MCNC: (no result) MG/DL (ref 3500–5200)
IGG CSF-MCNC: 2.5 MG/DL (ref 0–3.4)
IGG SERPL-MCNC: 830 MG/DL (ref 700–1600)
IGG/ALB CSF: 0.48 {RATIO} (ref 0–0.77)

## 2024-05-23 ENCOUNTER — HOSPITAL ENCOUNTER (INPATIENT)
Dept: HOSPITAL 47 - EC | Age: 64
LOS: 5 days | Discharge: HOME HEALTH | DRG: 897 | End: 2024-05-28
Attending: INTERNAL MEDICINE | Admitting: INTERNAL MEDICINE
Payer: MEDICARE

## 2024-05-23 DIAGNOSIS — Z59.41: ICD-10-CM

## 2024-05-23 DIAGNOSIS — F41.9: ICD-10-CM

## 2024-05-23 DIAGNOSIS — I10: ICD-10-CM

## 2024-05-23 DIAGNOSIS — Z59.82: ICD-10-CM

## 2024-05-23 DIAGNOSIS — F32.A: ICD-10-CM

## 2024-05-23 DIAGNOSIS — E78.5: ICD-10-CM

## 2024-05-23 DIAGNOSIS — R30.0: ICD-10-CM

## 2024-05-23 DIAGNOSIS — Z79.899: ICD-10-CM

## 2024-05-23 DIAGNOSIS — R29.6: ICD-10-CM

## 2024-05-23 DIAGNOSIS — Z87.891: ICD-10-CM

## 2024-05-23 DIAGNOSIS — Z99.3: ICD-10-CM
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
## 2024-05-23 DIAGNOSIS — Z91.040: ICD-10-CM

## 2024-05-23 DIAGNOSIS — E86.0: ICD-10-CM

## 2024-05-23 DIAGNOSIS — Z59.819: ICD-10-CM

## 2024-05-23 DIAGNOSIS — Z88.5: ICD-10-CM

## 2024-05-23 DIAGNOSIS — R74.8: ICD-10-CM
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
## 2024-05-23 DIAGNOSIS — Z88.4: ICD-10-CM

## 2024-05-23 DIAGNOSIS — F10.229: Primary | ICD-10-CM
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
## 2024-05-23 DIAGNOSIS — M19.90: ICD-10-CM

## 2024-05-23 LAB
ALBUMIN SERPL-MCNC: 4.4 G/DL (ref 3.5–5)
ALP SERPL-CCNC: 69 U/L (ref 38–126)
ALT SERPL-CCNC: 24 U/L (ref 4–34)
ANION GAP SERPL CALC-SCNC: 14 MMOL/L
APTT BLD: 22.3 SEC (ref 22–30)
AST SERPL-CCNC: 28 U/L (ref 14–36)
BASOPHILS # BLD AUTO: 0.1 K/UL (ref 0–0.2)
BASOPHILS NFR BLD AUTO: 0 %
BUN SERPL-SCNC: 18 MG/DL (ref 7–17)
CALCIUM SPEC-MCNC: 9.3 MG/DL (ref 8.4–10.2)
CHLORIDE SERPL-SCNC: 109 MMOL/L (ref 98–107)
CK SERPL-CCNC: 79 U/L (ref 30–135)
CO2 SERPL-SCNC: 18 MMOL/L (ref 22–30)
EOSINOPHIL # BLD AUTO: 0.2 K/UL (ref 0–0.7)
EOSINOPHIL NFR BLD AUTO: 1 %
ERYTHROCYTE [DISTWIDTH] IN BLOOD BY AUTOMATED COUNT: 4.14 M/UL (ref 3.8–5.4)
ERYTHROCYTE [DISTWIDTH] IN BLOOD: 13.4 % (ref 11.5–15.5)
GLUCOSE SERPL-MCNC: 99 MG/DL (ref 74–99)
HCT VFR BLD AUTO: 43 % (ref 34–46)
HGB BLD-MCNC: 13.7 GM/DL (ref 11.4–16)
HYALINE CASTS UR QL AUTO: 11 /LPF (ref 0–2)
INR PPP: 0.9 (ref ?–1.2)
LIPASE SERPL-CCNC: 7560 U/L (ref 23–300)
LYMPHOCYTES # SPEC AUTO: 2.5 K/UL (ref 1–4.8)
LYMPHOCYTES NFR SPEC AUTO: 14 %
MAGNESIUM SPEC-SCNC: 2.1 MG/DL (ref 1.6–2.3)
MCH RBC QN AUTO: 33 PG (ref 25–35)
MCHC RBC AUTO-ENTMCNC: 31.8 G/DL (ref 31–37)
MCV RBC AUTO: 103.9 FL (ref 80–100)
MONOCYTES # BLD AUTO: 0.6 K/UL (ref 0–1)
MONOCYTES NFR BLD AUTO: 3 %
NEUTROPHILS # BLD AUTO: 14.9 K/UL (ref 1.3–7.7)
NEUTROPHILS NFR BLD AUTO: 81 %
PH UR: 5 [PH] (ref 5–8)
PLATELET # BLD AUTO: 368 K/UL (ref 150–450)
POTASSIUM SERPL-SCNC: 4.6 MMOL/L (ref 3.5–5.1)
PROT SERPL-MCNC: 7.1 G/DL (ref 6.3–8.2)
PT BLD: 9.9 SEC (ref 10–12.5)
RBC UR QL: 1 /HPF (ref 0–5)
SODIUM SERPL-SCNC: 141 MMOL/L (ref 137–145)
SP GR UR: 1.01 (ref 1–1.03)
SQUAMOUS UR QL AUTO: 1 /HPF (ref 0–4)
UROBILINOGEN UR QL STRIP: <2 MG/DL (ref ?–2)
WBC # BLD AUTO: 18.3 K/UL (ref 3.8–10.6)
WBC #/AREA URNS HPF: 7 /HPF (ref 0–5)

## 2024-05-23 PROCEDURE — 72125 CT NECK SPINE W/O DYE: CPT

## 2024-05-23 PROCEDURE — 80320 DRUG SCREEN QUANTALCOHOLS: CPT

## 2024-05-23 PROCEDURE — 70450 CT HEAD/BRAIN W/O DYE: CPT

## 2024-05-23 PROCEDURE — 99285 EMERGENCY DEPT VISIT HI MDM: CPT

## 2024-05-23 PROCEDURE — 85610 PROTHROMBIN TIME: CPT

## 2024-05-23 PROCEDURE — 84100 ASSAY OF PHOSPHORUS: CPT

## 2024-05-23 PROCEDURE — 83605 ASSAY OF LACTIC ACID: CPT

## 2024-05-23 PROCEDURE — 36415 COLL VENOUS BLD VENIPUNCTURE: CPT

## 2024-05-23 PROCEDURE — 96372 THER/PROPH/DIAG INJ SC/IM: CPT

## 2024-05-23 PROCEDURE — 71045 X-RAY EXAM CHEST 1 VIEW: CPT

## 2024-05-23 PROCEDURE — 96361 HYDRATE IV INFUSION ADD-ON: CPT

## 2024-05-23 PROCEDURE — 72170 X-RAY EXAM OF PELVIS: CPT

## 2024-05-23 PROCEDURE — 83690 ASSAY OF LIPASE: CPT

## 2024-05-23 PROCEDURE — 83735 ASSAY OF MAGNESIUM: CPT

## 2024-05-23 PROCEDURE — 85025 COMPLETE CBC W/AUTO DIFF WBC: CPT

## 2024-05-23 PROCEDURE — 81001 URINALYSIS AUTO W/SCOPE: CPT

## 2024-05-23 PROCEDURE — 85730 THROMBOPLASTIN TIME PARTIAL: CPT

## 2024-05-23 PROCEDURE — 96375 TX/PRO/DX INJ NEW DRUG ADDON: CPT

## 2024-05-23 PROCEDURE — 96376 TX/PRO/DX INJ SAME DRUG ADON: CPT

## 2024-05-23 PROCEDURE — 96374 THER/PROPH/DIAG INJ IV PUSH: CPT

## 2024-05-23 PROCEDURE — 82550 ASSAY OF CK (CPK): CPT

## 2024-05-23 PROCEDURE — 80053 COMPREHEN METABOLIC PANEL: CPT

## 2024-05-23 RX ADMIN — NICARDIPINE HYDROCHLORIDE STA MLS/HR: 2.5 INJECTION INTRAVENOUS at 21:01

## 2024-05-23 RX ADMIN — CEFAZOLIN STA MLS/HR: 330 INJECTION, POWDER, FOR SOLUTION INTRAMUSCULAR; INTRAVENOUS at 21:02

## 2024-05-23 SDOH — ECONOMIC STABILITY - FOOD INSECURITY: FOOD INSECURITY: Z59.41

## 2024-05-23 SDOH — ECONOMIC STABILITY - TRANSPORTATION SECURITY: TRANSPORTATION INSECURITY: Z59.82

## 2024-05-23 SDOH — ECONOMIC STABILITY - HOUSING INSECURITY: HOUSING INSTABILITY UNSPECIFIED: Z59.819

## 2024-05-23 NOTE — CT
EXAMINATION TYPE: CT brain cspine wo con

CT DLP: 1285.8 mGycm, Automated exposure control for dose reduction was used.

 

DATE OF EXAM: 5/23/2024 8:35 PM

 

COMPARISON: None.

 

CLINICAL INDICATION:Female, 63 years old with history of fall; Fall.

 

TECHNIQUE: 

Brain: Multiple axial CT images of the brain were obtained without IV contrast. 

Cspine: Axial CT images from the skull base to the inferior aspect of T2 we obtained without intraven
ous contrast. Coronal and sagittal reformatted images were also reviewed. 

 

FINDINGS:

 

Brain:

Extra-axial spaces: No abnormal extra-axial fluid collections.

Ventricular system: Within normal limits

Cerebral parenchyma: No acute intraparenchymal hemorrhage or mass effect.  The gray-white junction is
 well differentiated. Scattered hypoattenuating areas are seen within the white matter.  

Cerebellum: Unremarkable.

Mass effect: No evidence of midline shift.

Intracranial vasculature: Atherosclerotic calcifications of the intracranial vessels.

Soft tissues: Normal.

Calvarium/osseous structures: No depressed skull fracture.

Paranasal sinuses and mastoid air cells: Clear.

Visualized orbits: Orbital contents are intact.

 

Cervical spine:

Fracture: None.

Osseous structures: Multilevel degenerative changes of the cervical spine are appreciated. 

Vertebral alignment: Reversal the normal cervical lordotic curve.

Spinal canal/Neural Foramina: No evidence of significant spinal canal narrowing. No evidence for sign
ificant neural foraminal stenosis.

Neck soft tissues: Prevertebral soft tissues are within normal limits.

Other: The airway is patent. The lung apices are clear.

 

IMPRESSION:

CT brain:

1.No acute intracranial process.

 

CT cervical spine:

1.No evidence of cervical spine fracture.

2.Mild multilevel degenerative disc disease.

## 2024-05-23 NOTE — XR
EXAMINATION TYPE: XR pelvis AP view

 

DATE OF EXAM: 5/23/2024 8:39 PM

 

CLINICAL INDICATION:Female, 63 years old with history of fall; [facility]

 

COMPARISON: None

 

TECHNIQUE: The pelvis was examined in a single projection. 

 

FINDINGS: There is no evidence of fracture or dislocation. There is no soft tissue abnormality.  Pelv
ic phleboliths are present.  Multilevel degenerative changes of the lower spine.

 

 

IMPRESSION: 

No acute osseous pathology.

## 2024-05-23 NOTE — XR
EXAMINATION TYPE: XR chest 1V

 

DATE OF EXAM: 5/23/2024 8:39 PM

 

CLINICAL INDICATION:Female, 63 years old with history of fall; [facility]

 

COMPARISON: Chest radiographs from

 

TECHNIQUE: XR chest 1V Frontal view of the chest.

 

FINDINGS: 

Lungs/Pleura: There is no evidence of pleural effusion, focal consolidation, or pneumothorax.  

Pulmonary vascularity: Unremarkable.

Heart/mediastinum: Cardiomediastinal silhouette is unremarkable.

Musculoskeletal: No acute osseous pathology. Remote healing right-sided rib fractures.

 

 

 

IMPRESSION: 

1. No acute cardiopulmonary disease/process.

2. Remote healing right-sided rib fractures.

## 2024-05-23 NOTE — ED
Alcohol HPI





- General


Chief Complaint: Fall


Stated Complaint: Fall, ETOH


Time Seen by Provider: 05/23/24 19:07


Source: patient, EMS, RN notes reviewed, old records reviewed


Mode of arrival: EMS


Limitations: no limitations





- History of Present Illness


Initial Comments: 





This is a 63-year-old female who presents to the ER today.  Patient midstate for

evaluation of altered mental status found down.  Patient was found at her house 

for an unknown amount of time with severe intoxication unsure if patient had 

traumatic fall or injury and patient is a poor historian secondary to clinical 

condition of intoxication presumed


MD Complaint: alcohol intoxication


Last Drink: just PTA


-: minute(s)


Previous Visits for Alcohol Intoxication?: Yes


Recent Trauma: Yes


Associated Symptoms: denies other symptoms


Treatments Prior to Arrival: none


Chronic Alcohol Use: Yes





- Related Data


                                Home Medications











 Medication  Instructions  Recorded  Confirmed


 


Folic Acid 1 mg PO AS DIRECTED 05/03/24 05/23/24


 


Thiamine [Vitamin B-1] 100 mg PO AS DIRECTED 05/03/24 05/23/24


 


DULoxetine HCL [Cymbalta] 30 mg PO AS DIRECTED 05/23/24 05/23/24


 


Escitalopram [Lexapro] 20 mg PO AS DIRECTED 05/23/24 05/23/24


 


Magnesium Oxide [Mag-Ox] 400 mg PO AS DIRECTED 05/23/24 05/23/24


 


methylPREDNISolone Dose Pack See Taper PO AS DIRECTED 05/23/24 05/23/24





[Medrol Dose Pack]   








                                  Previous Rx's











 Medication  Instructions  Recorded


 


Acetaminophen Tab [Tylenol] 650 mg PO Q6HR PRN  tab 05/17/24


 


Pantoprazole [Protonix] 40 mg PO DAILY #0 05/17/24


 


Ibuprofen [Motrin] 400 mg PO Q6HR PRN #20 tab 05/20/24











                                    Allergies











Allergy/AdvReac Type Severity Reaction Status Date / Time


 


latex Allergy Unknown Itching, Verified 05/23/24 21:04





   Blisters  


 


codeine AdvReac Severe HEADACHE Verified 05/23/24 21:04


 


hydrocodone [From Vicodin] AdvReac Severe HEADACHE Verified 05/23/24 21:04


 


Anesthetics - Amide Type - AdvReac  GAS GIVES Verified 05/23/24 21:04





Select A   HER  





   HEADACHE,  





   VOMITING,  





   HEART  





   PALIPITATIONS  


 


Anesthetics - Juliana Type- AdvReac  GAS GIVES Verified 05/23/24 21:04





Parabens   HER  





   HEADACHE,  





   VOMITING,  





   HEART  





   PALIPITATIONS  


 


ANESTHESIA AdvReac Unknown GAS GIVES Uncoded 05/23/24 19:15





   HER  





   HEADACHE,  





   VOMITING,  





   HEART  





   PALIPITATIONS  














Review of Systems


ROS Statement: 


Those systems with pertinent positive or pertinent negative responses have been 

documented in the HPI.





ROS Other: All systems not noted in ROS Statement are negative.





Past Medical History


Past Medical History: Hyperlipidemia, Hypertension, Osteoarthritis (OA)


Additional Past Medical History / Comment(s): Multiple recent falls. hx colon 

polyps, occasional diarrhea, hx of fall Nov 2020 and has been having pain right 

hip since that radiates down right leg, walks with limp., states breast implants

moving up.


History of Any Multi-Drug Resistant Organisms: None Reported


Past Surgical History: Breast Surgery, Orthopedic Surgery, Tonsillectomy


Additional Past Surgical History / Comment(s): right elbow surgery with screws, 

right knee surgery with plate and removal ., lumpectomy left breast, breast 

implants


Past Anesthesia/Blood Transfusion Reactions: Postoperative Nausea & Vomiting 

(PONV)


Past Psychological History: Anxiety, Depression


Smoking Status: Former smoker


Past Alcohol Use History: Abuse, Daily


Past Drug Use History: None Reported





- Past Family History


  ** Father


Family Medical History: Cancer





General Exam


Limitations: altered mental status


General appearance: alert, in no apparent distress, appears intoxicated, anxious


Head exam: Present: atraumatic, normocephalic, normal inspection


Eye exam: Present: normal appearance, PERRL, EOMI.  Absent: scleral icterus, 

conjunctival injection, periorbital swelling


ENT exam: Present: normal exam, mucous membranes moist


Neck exam: Present: normal inspection.  Absent: tenderness, meningismus, 

lymphadenopathy


Respiratory exam: Present: normal lung sounds bilaterally.  Absent: respiratory 

distress, wheezes, rales, rhonchi, stridor


Cardiovascular Exam: Present: regular rate, normal rhythm, normal heart sounds. 

Absent: systolic murmur, diastolic murmur, rubs, gallop, clicks


GI/Abdominal exam: Present: soft, normal bowel sounds.  Absent: distended, 

tenderness, guarding, rebound, rigid


Extremities exam: Present: normal inspection, full ROM, normal capillary refill.

 Absent: tenderness, pedal edema, joint swelling, calf tenderness


Back exam: Present: normal inspection


Neurological exam: Present: alert, oriented X3, CN II-XII intact


Psychiatric exam: Present: normal affect, normal mood


Skin exam: Present: warm, dry, intact, normal color.  Absent: rash





Course


                                   Vital Signs











  05/23/24 05/23/24 05/24/24





  19:12 21:13 01:00


 


Temperature 98.0 F  


 


Pulse Rate 109 H 120 H 60


 


Pulse Rate [   





Pulse Oximetery   





]   


 


Respiratory 16 16 16





Rate   


 


Blood Pressure 117/73 125/71 159/98


 


Blood Pressure   





[Right Arm]   


 


O2 Sat by Pulse 96 97 93 L





Oximetry   














  05/24/24 05/24/24 05/24/24





  03:39 06:38 09:00


 


Temperature   98.5 F


 


Pulse Rate 114 H 112 H 


 


Pulse Rate [   100





Pulse Oximetery   





]   


 


Respiratory 18 18 18





Rate   


 


Blood Pressure 122/60 126/69 


 


Blood Pressure   104/85





[Right Arm]   


 


O2 Sat by Pulse 93 L 98 95





Oximetry   














  05/24/24 05/24/24 05/24/24





  12:14 17:08 21:30


 


Temperature 98.4 F  


 


Pulse Rate   96


 


Pulse Rate [ 103 H 96 





Pulse Oximetery   





]   


 


Respiratory 18 18 17





Rate   


 


Blood Pressure   135/82


 


Blood Pressure 120/82 125/83 





[Right Arm]   


 


O2 Sat by Pulse 95 92 L 97





Oximetry   














- Reevaluation(s)


Reevaluation #1: 





05/23/24 22:52


Medical records reviewed


Reevaluation #2: 





05/23/24 22:52


Patient symptoms unchanged, still somnolent


Reevaluation #3: 





05/23/24 22:52


Patient informed of results questions answered


Reevaluation #4: 





Was pt. sent in by a medical professional or institution (, PA, NP, urgent 

care, hospital, or nursing home...) When possible be specific


@  -no


Did you speak to anyone other than the patient for history (EMS, parent, family,

police, friend...)? What history was obtained from this source 


@  -no


Did you review nursing and triage notes (agree or disagree)?  Why? 


@  -agree


Are old charts reviewed (outside hosp., previous admission, EMS record, old EKG,

old radiological studies, urgent care reports/EKG's, nursing home records)? 

Report findings 


@  -yes


Differential Diagnosis (chest pain, altered mental status, abdominal pain women,

abdominal pain men, vaginal bleeding, weakness, fever, dyspnea, syncope, 

headache, dizziness, GI bleed, back pain, seizure, CVA, palpatations, mental 

health, musculoskeletal)? 


@  -prior


EKG interpreted by me (3pts min.).


@  -yes


X-rays interpreted by me (1pt min.).


@  - yes negative for acute disease


CT interpreted by me (1pt min.).


@  -Yes negative for acute disease


U/S interpreted by me (1pt. min.).


@  -no


What testing was considered but not performed or refused? (CT, X-rays, U/S, 

labs)? Why?


@  -none


What meds were considered but not given or refused? Why?


@  -none


Did you discuss the management of the patient with other professionals 

(professionals i.e. , PA, NP, lab, RT, psych nurse, , , 

teacher, , )? Give summary


@  -no


Was smoking cessation discussed for >3mins.?


@  -no


Was critical care preformed (if so, how long)?


@  -no


Were there social determinants of health that impacted care today? How? 

(Homelessness, low income, unemployed, alcoholism, drug addiction, 

transportation, low edu. Level, literacy, decrease access to med. care, prison, 

rehab)?


@  -none


Was there de-escalation of care discussed even if they declined (Discuss DNR or 

withdrawal of care, Hospice)? DNR status


@  -no


What co-morbidities impacted this encounter? (DM, HTN, Smoking, COPD, CAD, 

Cancer, CVA, ARF, Chemo, Hep., AIDS, mental health diagnosis, sleep apnea, mor

bid obesity)?


@  -none


Was patient admitted / discharged? Hospital course, mention meds given and ro

trinh, prescriptions, significant lab abnormalities, going to OR and other 

pertinent info.


@  - 63 female with recent debility secondary to alcohol intoxication currently.

 Patient is having severe pancreatitis elevated lipase falls weakness with no 

traumatic injury noted.  Patient was found laying on the ground for unknown 

downtime.  Elevated white count we will place on antibiotics


Admitted


Undiagnosed new problem with uncertain prognosis?


@  -no


Drug Therapy requiring intensive monitoring for toxicity (Heparin, Nitro, 

Insulin, Cardizem)?


@  -no


Were any procedures done?


@  -no


Diagnosis/symptom?


@  -Alcohol disease withdrawal pending, acute pancreatitis


Acute, or Chronic, or Acute on Chronic?


@  -Acute


Uncomplicated (without systemic symptoms) or Complicated (systemic symptoms)?


@  -Complicated


Side effects of treatment?


@  -no


Exacerbation, Progression, or Severe Exacerbation?


@  -exacerbation


Poses a threat to life or bodily function? How? (Chest pain, USA, MI, pneumonia,

PE, COPD, DKA, ARF, appy, cholecystitis, CVA, Diverticulitis, Homicidal, 

Suicidal, threat to staff... and all critical care pts)


@  -yes with significant debility pancreatitis and alcohol disease





Reevaluation #5: 





Differential Altered Mental Status:


Hypoglycemia, DKA, hypercapnia, ETOH, overdose, CO poisoning, trauma, myxedema 

coma, HTN encephalopathy, infection, encephalitis, psychosis, intercranial 

hemorrhage, hepatic encephalopathy, meningitis, CVA, this is not meant to be an 

all-inclusive list








- Consultations


Consultation #1: 





Spoke with sound who agrees to admit this patient





Medical Decision Making





- Medical Decision Making





63 female with recent debility secondary to alcohol intoxication currently.  

Patient is having severe pancreatitis elevated lipase falls weakness with no 

traumatic injury noted.  Patient was found laying on the ground for unknown 

downtime.  Elevated white count we will place on antibiotics





- Lab Data


Result diagrams: 


                                 05/24/24 06:05





                                 05/24/24 06:05


                                   Lab Results











  05/23/24 05/23/24 05/23/24 Range/Units





  19:29 19:29 19:29 


 


WBC  18.3 H    (3.8-10.6)  k/uL


 


RBC  4.14    (3.80-5.40)  m/uL


 


Hgb  13.7    (11.4-16.0)  gm/dL


 


Hct  43.0    (34.0-46.0)  %


 


MCV  103.9 H    (80.0-100.0)  fL


 


MCH  33.0    (25.0-35.0)  pg


 


MCHC  31.8    (31.0-37.0)  g/dL


 


RDW  13.4    (11.5-15.5)  %


 


Plt Count  368    (150-450)  k/uL


 


MPV  7.4    


 


Neutrophils %  81    %


 


Lymphocytes %  14    %


 


Monocytes %  3    %


 


Eosinophils %  1    %


 


Basophils %  0    %


 


Neutrophils #  14.9 H    (1.3-7.7)  k/uL


 


Lymphocytes #  2.5    (1.0-4.8)  k/uL


 


Monocytes #  0.6    (0-1.0)  k/uL


 


Eosinophils #  0.2    (0-0.7)  k/uL


 


Basophils #  0.1    (0-0.2)  k/uL


 


Macrocytosis  Slight    


 


PT   9.9 L   (10.0-12.5)  sec


 


INR   0.9   (<1.2)  


 


APTT   22.3   (22.0-30.0)  sec


 


Sodium    141  (137-145)  mmol/L


 


Potassium    4.6  (3.5-5.1)  mmol/L


 


Chloride    109 H  ()  mmol/L


 


Carbon Dioxide    18 L  (22-30)  mmol/L


 


Anion Gap    14  mmol/L


 


BUN    18 H  (7-17)  mg/dL


 


Creatinine    0.43 L  (0.52-1.04)  mg/dL


 


Est GFR (CKD-EPI)AfAm    >90  (>60 ml/min/1.73 sqM)  


 


Est GFR (CKD-EPI)NonAf    >90  (>60 ml/min/1.73 sqM)  


 


Glucose    99  (74-99)  mg/dL


 


Lactic Ac Sepsis Rflx     


 


Plasma Lactic Acid Gabe     (0.7-2.0)  mmol/L


 


Calcium    9.3  (8.4-10.2)  mg/dL


 


Phosphorus    4.1  (2.5-4.5)  mg/dL


 


Magnesium    2.1  (1.6-2.3)  mg/dL


 


Total Bilirubin    0.4  (0.2-1.3)  mg/dL


 


AST    28  (14-36)  U/L


 


ALT    24  (4-34)  U/L


 


Alkaline Phosphatase    69  ()  U/L


 


Creatine Kinase    79  ()  U/L


 


Total Protein    7.1  (6.3-8.2)  g/dL


 


Albumin    4.4  (3.5-5.0)  g/dL


 


Lipase    7560 H  ()  U/L


 


Urine Color     


 


Urine Appearance     (Clear)  


 


Urine pH     (5.0-8.0)  


 


Ur Specific Gravity     (1.001-1.035)  


 


Urine Protein     (Negative)  


 


Urine Glucose (UA)     (Negative)  


 


Urine Ketones     (Negative)  


 


Urine Blood     (Negative)  


 


Urine Nitrite     (Negative)  


 


Urine Bilirubin     (Negative)  


 


Urine Urobilinogen     (<2.0)  mg/dL


 


Ur Leukocyte Esterase     (Negative)  


 


Urine RBC     (0-5)  /hpf


 


Urine WBC     (0-5)  /hpf


 


Ur Squamous Epith Cells     (0-4)  /hpf


 


Urine Bacteria     (None)  /hpf


 


Hyaline Casts     (0-2)  /lpf


 


Urine Mucus     (None)  /hpf


 


Serum Alcohol    253 H*  mg/dL














  05/23/24 05/23/24 05/23/24 Range/Units





  19:29 21:26 22:23 


 


WBC     (3.8-10.6)  k/uL


 


RBC     (3.80-5.40)  m/uL


 


Hgb     (11.4-16.0)  gm/dL


 


Hct     (34.0-46.0)  %


 


MCV     (80.0-100.0)  fL


 


MCH     (25.0-35.0)  pg


 


MCHC     (31.0-37.0)  g/dL


 


RDW     (11.5-15.5)  %


 


Plt Count     (150-450)  k/uL


 


MPV     


 


Neutrophils %     %


 


Lymphocytes %     %


 


Monocytes %     %


 


Eosinophils %     %


 


Basophils %     %


 


Neutrophils #     (1.3-7.7)  k/uL


 


Lymphocytes #     (1.0-4.8)  k/uL


 


Monocytes #     (0-1.0)  k/uL


 


Eosinophils #     (0-0.7)  k/uL


 


Basophils #     (0-0.2)  k/uL


 


Macrocytosis     


 


PT     (10.0-12.5)  sec


 


INR     (<1.2)  


 


APTT     (22.0-30.0)  sec


 


Sodium     (137-145)  mmol/L


 


Potassium     (3.5-5.1)  mmol/L


 


Chloride     ()  mmol/L


 


Carbon Dioxide     (22-30)  mmol/L


 


Anion Gap     mmol/L


 


BUN     (7-17)  mg/dL


 


Creatinine     (0.52-1.04)  mg/dL


 


Est GFR (CKD-EPI)AfAm     (>60 ml/min/1.73 sqM)  


 


Est GFR (CKD-EPI)NonAf     (>60 ml/min/1.73 sqM)  


 


Glucose     (74-99)  mg/dL


 


Lactic Ac Sepsis Rflx   Y   


 


Plasma Lactic Acid Gabe  2.4 H*    (0.7-2.0)  mmol/L


 


Calcium     (8.4-10.2)  mg/dL


 


Phosphorus     (2.5-4.5)  mg/dL


 


Magnesium     (1.6-2.3)  mg/dL


 


Total Bilirubin     (0.2-1.3)  mg/dL


 


AST     (14-36)  U/L


 


ALT     (4-34)  U/L


 


Alkaline Phosphatase     ()  U/L


 


Creatine Kinase     ()  U/L


 


Total Protein     (6.3-8.2)  g/dL


 


Albumin     (3.5-5.0)  g/dL


 


Lipase     ()  U/L


 


Urine Color    Colorless  


 


Urine Appearance    Clear  (Clear)  


 


Urine pH    5.0  (5.0-8.0)  


 


Ur Specific Gravity    1.014  (1.001-1.035)  


 


Urine Protein    Negative  (Negative)  


 


Urine Glucose (UA)    Negative  (Negative)  


 


Urine Ketones    Negative  (Negative)  


 


Urine Blood    Negative  (Negative)  


 


Urine Nitrite    Negative  (Negative)  


 


Urine Bilirubin    Negative  (Negative)  


 


Urine Urobilinogen    <2.0  (<2.0)  mg/dL


 


Ur Leukocyte Esterase    Small H  (Negative)  


 


Urine RBC    1  (0-5)  /hpf


 


Urine WBC    7 H  (0-5)  /hpf


 


Ur Squamous Epith Cells    1  (0-4)  /hpf


 


Urine Bacteria    Rare H  (None)  /hpf


 


Hyaline Casts    11 H  (0-2)  /lpf


 


Urine Mucus    Occasional H  (None)  /hpf


 


Serum Alcohol     mg/dL














- Radiology Data


Radiology results: report reviewed (CT brain C-spine chest and pelvis x-ray 

negative for traumatic injury chest x-ray is negative for infection), image 

reviewed





Disposition


Clinical Impression: 


 Acute pancreatitis, Pancreatitis, Weakness, Debility, Dehydration, MICHAEL (acute 

kidney injury), Altered mental status, Alcoholic intoxication





Disposition: ADMITTED AS IP TO THIS [facility]


Condition: Serious


Is patient prescribed a controlled substance at d/c from ED?: No


Time of Disposition: 22:50

## 2024-05-24 LAB
ALBUMIN SERPL-MCNC: 3.6 G/DL (ref 3.5–5)
ALP SERPL-CCNC: 62 U/L (ref 38–126)
ALT SERPL-CCNC: 22 U/L (ref 4–34)
ANION GAP SERPL CALC-SCNC: 6 MMOL/L
AST SERPL-CCNC: 29 U/L (ref 14–36)
BASOPHILS # BLD AUTO: 0.1 K/UL (ref 0–0.2)
BASOPHILS NFR BLD AUTO: 1 %
BUN SERPL-SCNC: 14 MG/DL (ref 7–17)
CALCIUM SPEC-MCNC: 8.4 MG/DL (ref 8.4–10.2)
CHLORIDE SERPL-SCNC: 112 MMOL/L (ref 98–107)
CO2 SERPL-SCNC: 21 MMOL/L (ref 22–30)
EOSINOPHIL # BLD AUTO: 0.3 K/UL (ref 0–0.7)
EOSINOPHIL NFR BLD AUTO: 2 %
ERYTHROCYTE [DISTWIDTH] IN BLOOD BY AUTOMATED COUNT: 3.84 M/UL (ref 3.8–5.4)
ERYTHROCYTE [DISTWIDTH] IN BLOOD: 13.6 % (ref 11.5–15.5)
GLUCOSE SERPL-MCNC: 73 MG/DL (ref 74–99)
HCT VFR BLD AUTO: 39.9 % (ref 34–46)
HGB BLD-MCNC: 12.6 GM/DL (ref 11.4–16)
LIPASE SERPL-CCNC: 1088 U/L (ref 23–300)
LYMPHOCYTES # SPEC AUTO: 2.7 K/UL (ref 1–4.8)
LYMPHOCYTES NFR SPEC AUTO: 23 %
MAGNESIUM SPEC-SCNC: 1.8 MG/DL (ref 1.6–2.3)
MCH RBC QN AUTO: 32.8 PG (ref 25–35)
MCHC RBC AUTO-ENTMCNC: 31.6 G/DL (ref 31–37)
MCV RBC AUTO: 103.9 FL (ref 80–100)
MONOCYTES # BLD AUTO: 0.6 K/UL (ref 0–1)
MONOCYTES NFR BLD AUTO: 5 %
NEUTROPHILS # BLD AUTO: 7.6 K/UL (ref 1.3–7.7)
NEUTROPHILS NFR BLD AUTO: 67 %
PLATELET # BLD AUTO: 256 K/UL (ref 150–450)
POTASSIUM SERPL-SCNC: 4.6 MMOL/L (ref 3.5–5.1)
PROT SERPL-MCNC: 6.1 G/DL (ref 6.3–8.2)
SODIUM SERPL-SCNC: 139 MMOL/L (ref 137–145)
WBC # BLD AUTO: 11.5 K/UL (ref 3.8–10.6)

## 2024-05-24 RX ADMIN — MORPHINE SULFATE PRN MG: 4 INJECTION, SOLUTION INTRAMUSCULAR; INTRAVENOUS at 03:36

## 2024-05-24 RX ADMIN — ESCITALOPRAM OXALATE SCH MG: 20 TABLET, FILM COATED ORAL at 01:31

## 2024-05-24 RX ADMIN — CEFAZOLIN STA: 330 INJECTION, POWDER, FOR SOLUTION INTRAMUSCULAR; INTRAVENOUS at 01:37

## 2024-05-24 RX ADMIN — PANTOPRAZOLE SODIUM SCH MG: 40 TABLET, DELAYED RELEASE ORAL at 10:17

## 2024-05-24 RX ADMIN — SODIUM CHLORIDE STA MG: 900 INJECTION, SOLUTION INTRAVENOUS at 00:46

## 2024-05-24 RX ADMIN — HEPARIN SODIUM SCH UNIT: 5000 INJECTION, SOLUTION INTRAVENOUS; SUBCUTANEOUS at 10:16

## 2024-05-24 RX ADMIN — Medication SCH MG: at 10:17

## 2024-05-24 RX ADMIN — HYDROMORPHONE HYDROCHLORIDE STA MG: 1 INJECTION, SOLUTION INTRAMUSCULAR; INTRAVENOUS; SUBCUTANEOUS at 00:47

## 2024-05-24 RX ADMIN — PANTOPRAZOLE SODIUM SCH MG: 40 INJECTION, POWDER, FOR SOLUTION INTRAVENOUS at 00:47

## 2024-05-24 NOTE — P.HPIM
History of Present Illness


H&P Date: 05/24/24


Chief Complaint: Alcohol intoxication





63-year-old female with hypertension hyperlipidemia





Patient very poor historian and has poor insight of her medical conditions.  She

is known alcoholic however she denies any alcoholic beverages despite her blood 

alcohol level being 250.  She reports that she has been wheelchair-bound unable 

to go to the bathroom she has some dysuria and foul-smelling urine denies any 

abdominal pain fevers chills nausea vomiting.





Today patient was found down at home EMS notified who found her intoxicated 

confused brought her to the hospital for evaluation patient unknown how long she

has been on the floor.  Or if there was head injury.


CT of the head was unremarkable no evidence of acute intracranial pathology





Patient was just discharged couple days ago from the hospital due to generalized

weakness and fall neurology evaluation was trying to rule out ametropic lateral 

sclerosis recommending further testing as an outpatient





Patient also had elevated D-dimer however PE was ruled out








review of systems


Pertinent positives as noted in HPI. All other systems were reviewed and are 

negative








on exam


Constitutional:          No acute distress





Eyes:      Anicteric sclerae, moist conjunctiva, 


         Pupils equal round reactive to light





ENMT:      NC/AT


         Oropharynx clear, no erythema, or exudates





Neck:      Supple,  no masses, or JVD


         No carotid bruits


         No thyromegaly





Lungs:      Clear to auscultation


         Clear to percussion


         Normal respiratory effort, no accessory muscle use 





Cardiovascular:      Heart regular in rate and rhythm, 


         Systolic murmurs, no gallops, or rubs


         No peripheral edema





Abdominal:       Soft


         Nontender, no guarding, rebound or rigidity


         Abdomen moving with respiration


         Normoactive bowel sounds


         








Extremities:      No digital cyanosis 


         No clubbing


         Pedal pulses intact and symmetrical


         Radial pulses intact and symmetrical 


         No calf tenderness 





Psychiatric:      Alert and oriented to person, place and time


         





      


Neuro      Muscles Strength 2/5 in bilateral lower extremity 4 out of 5 in 

bilateral upper extremity 


         Sensation to light touch grossly present throughout


         Cranial nerves II-XII grossly intact 








Past Medical History


Past Medical History: Hyperlipidemia, Hypertension, Osteoarthritis (OA)


Additional Past Medical History / Comment(s): Multiple recent falls. hx colon 

polyps, occasional diarrhea, hx of fall Nov 2020 and has been having pain right 

hip since that radiates down right leg, walks with limp., states breast implants

moving up.


History of Any Multi-Drug Resistant Organisms: None Reported


Past Surgical History: Breast Surgery, Orthopedic Surgery, Tonsillectomy


Additional Past Surgical History / Comment(s): right elbow surgery with screws, 

right knee surgery with plate and removal ., lumpectomy left breast, breast 

implants


Past Anesthesia/Blood Transfusion Reactions: Postoperative Nausea & Vomiting 

(PONV)


Past Psychological History: Anxiety, Depression


Smoking Status: Former smoker


Past Alcohol Use History: Abuse, Daily


Past Drug Use History: None Reported





- Past Family History


  ** Father


Family Medical History: Cancer





Medications and Allergies


                                Home Medications











 Medication  Instructions  Recorded  Confirmed  Type


 


Folic Acid 1 mg PO AS DIRECTED 05/03/24 05/23/24 History


 


Thiamine [Vitamin B-1] 100 mg PO AS DIRECTED 05/03/24 05/23/24 History


 


Acetaminophen Tab [Tylenol] 650 mg PO Q6HR PRN  tab 05/17/24 05/23/24 Rx


 


Pantoprazole [Protonix] 40 mg PO DAILY #0 05/17/24 05/23/24 Rx


 


Ibuprofen [Motrin] 400 mg PO Q6HR PRN #20 tab 05/20/24 05/23/24 Rx


 


DULoxetine HCL [Cymbalta] 30 mg PO AS DIRECTED 05/23/24 05/23/24 History


 


Escitalopram [Lexapro] 20 mg PO AS DIRECTED 05/23/24 05/23/24 History


 


Magnesium Oxide [Mag-Ox] 400 mg PO AS DIRECTED 05/23/24 05/23/24 History


 


methylPREDNISolone Dose Pack See Taper PO AS DIRECTED 05/23/24 05/23/24 History





[Medrol Dose Pack]    








                                    Allergies











Allergy/AdvReac Type Severity Reaction Status Date / Time


 


latex Allergy Unknown Itching, Verified 05/23/24 21:04





   Blisters  


 


codeine AdvReac Severe HEADACHE Verified 05/23/24 21:04


 


hydrocodone [From Vicodin] AdvReac Severe HEADACHE Verified 05/23/24 21:04


 


Anesthetics - Amide Type - AdvReac  GAS GIVES Verified 05/23/24 21:04





Select A   HER  





   HEADACHE,  





   VOMITING,  





   HEART  





   PALIPITATIONS  


 


Anesthetics - Juliana Type- AdvReac  GAS GIVES Verified 05/23/24 21:04





Parabens   HER  





   HEADACHE,  





   VOMITING,  





   HEART  





   PALIPITATIONS  


 


ANESTHESIA AdvReac Unknown GAS GIVES Uncoded 05/23/24 19:15





   HER  





   HEADACHE,  





   VOMITING,  





   HEART  





   PALIPITATIONS  














Physical Exam


Vitals: 


                                   Vital Signs











  Temp Pulse Resp BP Pulse Ox


 


 05/23/24 21:13   120 H  16  125/71  97


 


 05/23/24 19:12  98.0 F  109 H  16  117/73  96








                                Intake and Output











 05/23/24 05/23/24 05/24/24





 14:59 22:59 06:59


 


Other:   


 


  Weight  72.575 kg 














Results


CBC & Chem 7: 


                                 05/23/24 19:29





                                 05/23/24 19:29


Labs: 


                  Abnormal Lab Results - Last 24 Hours (Table)











  05/23/24 05/23/24 05/23/24 Range/Units





  19:29 19:29 19:29 


 


WBC  18.3 H    (3.8-10.6)  k/uL


 


MCV  103.9 H    (80.0-100.0)  fL


 


Neutrophils #  14.9 H    (1.3-7.7)  k/uL


 


PT   9.9 L   (10.0-12.5)  sec


 


Chloride    109 H  ()  mmol/L


 


Carbon Dioxide    18 L  (22-30)  mmol/L


 


BUN    18 H  (7-17)  mg/dL


 


Creatinine    0.43 L  (0.52-1.04)  mg/dL


 


Plasma Lactic Acid Gabe     (0.7-2.0)  mmol/L


 


Lipase    7560 H  ()  U/L


 


Ur Leukocyte Esterase     (Negative)  


 


Urine WBC     (0-5)  /hpf


 


Urine Bacteria     (None)  /hpf


 


Hyaline Casts     (0-2)  /lpf


 


Urine Mucus     (None)  /hpf


 


Serum Alcohol    253 H*  mg/dL














  05/23/24 05/23/24 Range/Units





  19:29 22:23 


 


WBC    (3.8-10.6)  k/uL


 


MCV    (80.0-100.0)  fL


 


Neutrophils #    (1.3-7.7)  k/uL


 


PT    (10.0-12.5)  sec


 


Chloride    ()  mmol/L


 


Carbon Dioxide    (22-30)  mmol/L


 


BUN    (7-17)  mg/dL


 


Creatinine    (0.52-1.04)  mg/dL


 


Plasma Lactic Acid Gabe  2.4 H*   (0.7-2.0)  mmol/L


 


Lipase    ()  U/L


 


Ur Leukocyte Esterase   Small H  (Negative)  


 


Urine WBC   7 H  (0-5)  /hpf


 


Urine Bacteria   Rare H  (None)  /hpf


 


Hyaline Casts   11 H  (0-2)  /lpf


 


Urine Mucus   Occasional H  (None)  /hpf


 


Serum Alcohol    mg/dL














Assessment and Plan


Assessment: 





63-year-old female hypertension hyperlipidemia debility wheelchair-bound alcohol

dependence coming in after being found down at home intoxicated I discussed the 

case with ED doctor and accepted the admission severe alcohol intoxication with 

possible pancreatitis with anticipated length of stay more than 2 midnights





Alcohol dependence with severe alcohol intoxication


Monitor for alcohol withdrawal syndrome


IV fluid hydration normal saline 100 cc/h


Fall precautions


Seizure precautions


Benzos per CIWA scale


Thiamine daily





Blood alcohol level 250


Lactic acid 2.4





Rule out acute pancreatitis


Elevated lipase


Patient denies any abdominal pain


Continue to monitor for any evidence of GI bleeding follow-up





White count 18 hemoglobin 13.7, afebrile


Renal function unremarkable BUN 18 creatinine 0.43


Urine analysis unremarkable small leukocyte esterase





Full code


DVT prophylaxis heparin subcu 3 times daily

## 2024-05-25 RX ADMIN — ACETAMINOPHEN PRN MG: 325 TABLET, FILM COATED ORAL at 15:22

## 2024-05-25 NOTE — P.PN
Subjective


Progress Note Date: 05/25/24





Pt is back to baseline.  Pending SW issues prior to d/c: guardianship, 

placement.








Gen: In NAD, non-toxic


HEENT: normocephalic, atraumatic, hearing acuity is intant, mucous membranes 

moist


CVS: perfusing all extremities well, no pitting edema, 


Respiratory: symmetric chest expansion, no accessory muscle use, 


GI: soft, NTTP, ND, 


: no suprapubic tenderness, no CVA tenderness


MSK/Derm: no rashes, cyanosis


Neuro: CN II-XII intact, no motor weakness, 


Psych: cooperative, euthymic mood, judgment and insight is impaired








Hospital course:





63-year-old female hypertension hyperlipidemia debility wheelchair-bound alcohol

dependence coming in after being found down at home intoxicated. 


Blood alcohol level 250


Lactic acid 2.4


White count 18 hemoglobin 13.7, afebrile


Renal function unremarkable BUN 18 creatinine 0.43


Urine analysis unremarkable small leukocyte esterase








Assessment/plan:











Alcohol dependence with severe alcohol intoxication


Monitor for alcohol withdrawal syndrome


IV fluid hydration normal saline 100 cc/h


Fall precautions


Seizure precautions


Benzos per CIWA scale


Thiamine daily











Rule out acute pancreatitis


Elevated lipase


Patient denies any abdominal pain


Continue to monitor for any evidence of GI bleeding follow-up











Full code


DVT prophylaxis heparin subcu 3 times daily








Objective





- Vital Signs


Vital signs: 


                                   Vital Signs











Temp  98.5 F   05/25/24 07:22


 


Pulse  82   05/25/24 07:22


 


Resp  16   05/25/24 07:22


 


BP  135/83   05/25/24 07:22


 


Pulse Ox  97   05/25/24 07:22


 


FiO2      








                                 Intake & Output











 05/24/24 05/25/24 05/25/24





 18:59 06:59 18:59


 


Intake Total 368 250 


 


Balance 368 250 


 


Weight  72.575 kg 


 


Intake:   


 


  IV 10 10 


 


    Invasive Line 1 10 10 


 


  Oral 358 240 


 


Other:   


 


  Voiding Method Bedside Commode Bedside Commode Bedside Commode


 


  # Voids 1 1 2


 


  # Bowel Movements 1  1














- Labs


CBC & Chem 7: 


                                 05/24/24 06:05





                                 05/24/24 06:05

## 2024-05-26 NOTE — P.PN
Subjective


Progress Note Date: 05/26/24





Pt is back to baseline.  Pending SW issues prior to d/c: guardianship, 

placement.








Gen: In NAD, non-toxic


HEENT: normocephalic, atraumatic, hearing acuity is intant, mucous membranes 

moist


CVS: perfusing all extremities well, no pitting edema, 


Respiratory: symmetric chest expansion, no accessory muscle use, 


GI: soft, NTTP, ND, 


: no suprapubic tenderness, no CVA tenderness


MSK/Derm: no rashes, cyanosis


Neuro: CN II-XII intact, no motor weakness, 


Psych: cooperative, euthymic mood, judgment and insight is impaired








Hospital course:





63-year-old female hypertension hyperlipidemia debility wheelchair-bound alcohol

dependence coming in after being found down at home intoxicated. 


Blood alcohol level 250


Lactic acid 2.4


White count 18 hemoglobin 13.7, afebrile


Renal function unremarkable BUN 18 creatinine 0.43


Urine analysis unremarkable small leukocyte esterase








Assessment/plan:











Alcohol dependence with severe alcohol intoxication


Monitor for alcohol withdrawal syndrome


IV fluid hydration normal saline 100 cc/h


Fall precautions


Seizure precautions


Benzos per CIWA scale


Thiamine daily














Elevated lipase


Patient denies any abdominal pain


Continue to monitor for any evidence of GI bleeding follow-up











Full code


DVT prophylaxis heparin subcu 3 times daily








Objective





- Vital Signs


Vital signs: 


                                   Vital Signs











Temp  98.3 F   05/26/24 07:15


 


Pulse  71   05/26/24 07:15


 


Resp  17   05/26/24 07:15


 


BP  135/88   05/26/24 07:15


 


Pulse Ox  96   05/26/24 07:15


 


FiO2      








                                 Intake & Output











 05/25/24 05/26/24 05/26/24





 18:59 06:59 18:59


 


Other:   


 


  Voiding Method Bedside Commode  Bedside Commode


 


  # Voids 1 3 1


 


  # Bowel Movements 2 1 1














- Labs


CBC & Chem 7: 


                                 05/24/24 06:05





                                 05/24/24 06:05

## 2024-05-27 NOTE — P.PN
Subjective


Progress Note Date: 05/27/24





Pt is back to baseline.  Pending SW issues prior to d/c: guardianship, 

placement.








Gen: In NAD, non-toxic


HEENT: normocephalic, atraumatic, hearing acuity is intant, mucous membranes 

moist


CVS: perfusing all extremities well, no pitting edema, 


Respiratory: symmetric chest expansion, no accessory muscle use, 


GI: soft, NTTP, ND, 


: no suprapubic tenderness, no CVA tenderness


MSK/Derm: no rashes, cyanosis


Neuro: CN II-XII intact, no motor weakness, 


Psych: cooperative, euthymic mood, judgment and insight is impaired








Hospital course:





63-year-old female hypertension hyperlipidemia debility wheelchair-bound alcohol

dependence coming in after being found down at home intoxicated. 


Blood alcohol level 250


Lactic acid 2.4


White count 18 hemoglobin 13.7, afebrile


Renal function unremarkable BUN 18 creatinine 0.43


Urine analysis unremarkable small leukocyte esterase








Assessment/plan:











Alcohol dependence with severe alcohol intoxication


Monitor for alcohol withdrawal syndrome


IV fluid hydration normal saline 100 cc/h


Fall precautions


Seizure precautions


Benzos per CIWA scale


Thiamine daily














Elevated lipase


Patient denies any abdominal pain


Continue to monitor for any evidence of GI bleeding follow-up











Full code


DVT prophylaxis heparin subcu 3 times daily








Objective





- Vital Signs


Vital signs: 


                                   Vital Signs











Temp  98.4 F   05/27/24 12:49


 


Pulse  97   05/27/24 12:49


 


Resp  20   05/27/24 12:49


 


BP  141/88   05/27/24 12:49


 


Pulse Ox  94 L  05/27/24 12:49


 


FiO2      








                                 Intake & Output











 05/26/24 05/27/24 05/27/24





 18:59 06:59 18:59


 


Other:   


 


  Voiding Method Bedside Commode Bedside Commode 


 


  # Voids 1 1 1


 


  # Bowel Movements 1  1














- Labs


CBC & Chem 7: 


                                 05/24/24 06:05





                                 05/24/24 06:05

## 2024-05-28 VITALS — SYSTOLIC BLOOD PRESSURE: 138 MMHG | DIASTOLIC BLOOD PRESSURE: 91 MMHG | HEART RATE: 97 BPM | RESPIRATION RATE: 20 BRPM

## 2024-05-28 VITALS — TEMPERATURE: 98.6 F

## 2024-05-28 NOTE — P.DS
Providers


Date of admission: 


05/23/24 22:50





Attending physician: 


Rachel Knox MD





[facility] 


[facility]





[facility] [place] Course: 





Discharge diagnosis


Alcohol dependence with severe alcohol intoxication


Elevated lipase





Hospital course


63-year-old female hypertension hyperlipidemia debility wheelchair-bound alcohol

dependence coming in after being found down at home intoxicated. 


Blood alcohol level 250


Lactic acid 2.4


White count 18 hemoglobin 13.7, afebrile


Renal function unremarkable BUN 18 creatinine 0.43


Urine analysis unremarkable small leukocyte esterase


Patient was found to have elevated lipase however she denied any abdominal pain.


Patient was monitored in the hospital for alcoholic intoxication.  She was 

treated with fluids.


Patient was then deemed stable for discharge.


She will return to [facility] with [facility] care.





Physical exam


General examination - Alert  and Oriented 3 in NAD


Heart - + S1S2  no murmurs


Lungs - Clear to auscultation


Abdomen soft NT ND +ve BS


Extremities - No edema


CNS - Moving all 4 extremities spontaneously


Psych - Calm and cooperative


Patient Condition at Discharge: Fair





Plan - Discharge Summary


New Discharge Prescriptions: 


Continue


   Pantoprazole [Protonix] 40 mg PO DAILY #0


   Ibuprofen [Motrin] 400 mg PO Q6HR PRN #20 tab


     PRN Reason: Pain


   Escitalopram [Lexapro] 20 mg PO AS DIRECTED


   Thiamine [Vitamin B-1] 100 mg PO AS DIRECTED


   Folic Acid 1 mg PO AS DIRECTED


   Acetaminophen Tab [Tylenol] 650 mg PO Q6HR PRN  tab


     PRN Reason: Mild Pain Or Fever > 100.5


   DULoxetine HCL [Cymbalta] 30 mg PO AS DIRECTED


   Magnesium Oxide [Mag-Ox] 400 mg PO AS DIRECTED





Discontinued


   methylPREDNISolone Dose Pack [Medrol Dose Pack] See Taper PO AS DIRECTED


Discharge Medication List





Folic Acid 1 mg PO AS DIRECTED 05/03/24 [History]


Thiamine [Vitamin B-1] 100 mg PO AS DIRECTED 05/03/24 [History]


Acetaminophen Tab [Tylenol] 650 mg PO Q6HR PRN  tab 05/17/24 [Rx]


Pantoprazole [Protonix] 40 mg PO DAILY #0 05/17/24 [Rx]


Ibuprofen [Motrin] 400 mg PO Q6HR PRN #20 tab 05/20/24 [Rx]


DULoxetine HCL [Cymbalta] 30 mg PO AS DIRECTED 05/23/24 [History]


Escitalopram [Lexapro] 20 mg PO AS DIRECTED 05/23/24 [History]


Magnesium Oxide [Mag-Ox] 400 mg PO AS DIRECTED 05/23/24 [History]








Follow up Appointment(s)/Referral(s): 


Adam Chinchilla,  [Primary Care Provider] - 1-2 days


[facility], [NON-STAFF] - As Needed


Discharge/Stand Alone Forms:  [employer], Who Do I Call?, Community 

Resources, Outpatient Counseling, In Substance Abuse Facilities

## 2024-05-30 ENCOUNTER — HOSPITAL ENCOUNTER (EMERGENCY)
Dept: HOSPITAL 47 - EC | Age: 64
Discharge: HOME | End: 2024-05-30
Payer: MEDICARE

## 2024-05-30 VITALS — RESPIRATION RATE: 18 BRPM

## 2024-05-30 VITALS — TEMPERATURE: 98.8 F | HEART RATE: 110 BPM | SYSTOLIC BLOOD PRESSURE: 132 MMHG | DIASTOLIC BLOOD PRESSURE: 95 MMHG

## 2024-05-30 DIAGNOSIS — R00.0: ICD-10-CM

## 2024-05-30 DIAGNOSIS — Z88.5: ICD-10-CM

## 2024-05-30 DIAGNOSIS — Z88.4: ICD-10-CM

## 2024-05-30 DIAGNOSIS — R53.81: Primary | ICD-10-CM

## 2024-05-30 LAB
ALBUMIN SERPL-MCNC: 4.6 G/DL (ref 3.5–5)
ALP SERPL-CCNC: 67 U/L (ref 38–126)
ALT SERPL-CCNC: 18 U/L (ref 4–34)
ANION GAP SERPL CALC-SCNC: 8 MMOL/L
APTT BLD: 20.2 SEC (ref 22–30)
AST SERPL-CCNC: 22 U/L (ref 14–36)
BASOPHILS # BLD AUTO: 0.1 K/UL (ref 0–0.2)
BASOPHILS NFR BLD AUTO: 1 %
BUN SERPL-SCNC: 17 MG/DL (ref 7–17)
CALCIUM SPEC-MCNC: 10.1 MG/DL (ref 8.4–10.2)
CHLORIDE SERPL-SCNC: 105 MMOL/L (ref 98–107)
CK SERPL-CCNC: 157 U/L (ref 30–135)
CO2 SERPL-SCNC: 25 MMOL/L (ref 22–30)
EOSINOPHIL # BLD AUTO: 0.2 K/UL (ref 0–0.7)
EOSINOPHIL NFR BLD AUTO: 2 %
ERYTHROCYTE [DISTWIDTH] IN BLOOD BY AUTOMATED COUNT: 4.14 M/UL (ref 3.8–5.4)
ERYTHROCYTE [DISTWIDTH] IN BLOOD: 13.8 % (ref 11.5–15.5)
GLUCOSE SERPL-MCNC: 106 MG/DL (ref 74–99)
HCT VFR BLD AUTO: 42.1 % (ref 34–46)
HGB BLD-MCNC: 13.5 GM/DL (ref 11.4–16)
INR PPP: 0.9 (ref ?–1.2)
LYMPHOCYTES # SPEC AUTO: 2.2 K/UL (ref 1–4.8)
LYMPHOCYTES NFR SPEC AUTO: 20 %
MAGNESIUM SPEC-SCNC: 1.7 MG/DL (ref 1.6–2.3)
MCH RBC QN AUTO: 32.7 PG (ref 25–35)
MCHC RBC AUTO-ENTMCNC: 32.2 G/DL (ref 31–37)
MCV RBC AUTO: 101.7 FL (ref 80–100)
MONOCYTES # BLD AUTO: 0.6 K/UL (ref 0–1)
MONOCYTES NFR BLD AUTO: 6 %
NEUTROPHILS # BLD AUTO: 7.5 K/UL (ref 1.3–7.7)
NEUTROPHILS NFR BLD AUTO: 70 %
PLATELET # BLD AUTO: 241 K/UL (ref 150–450)
POTASSIUM SERPL-SCNC: 3.8 MMOL/L (ref 3.5–5.1)
PROT SERPL-MCNC: 7.4 G/DL (ref 6.3–8.2)
PT BLD: 10.1 SEC (ref 10–12.5)
SODIUM SERPL-SCNC: 138 MMOL/L (ref 137–145)
WBC # BLD AUTO: 10.8 K/UL (ref 3.8–10.6)

## 2024-05-30 PROCEDURE — 36415 COLL VENOUS BLD VENIPUNCTURE: CPT

## 2024-05-30 PROCEDURE — 80053 COMPREHEN METABOLIC PANEL: CPT

## 2024-05-30 PROCEDURE — 73600 X-RAY EXAM OF ANKLE: CPT

## 2024-05-30 PROCEDURE — 80320 DRUG SCREEN QUANTALCOHOLS: CPT

## 2024-05-30 PROCEDURE — 71045 X-RAY EXAM CHEST 1 VIEW: CPT

## 2024-05-30 PROCEDURE — 99285 EMERGENCY DEPT VISIT HI MDM: CPT

## 2024-05-30 PROCEDURE — 70450 CT HEAD/BRAIN W/O DYE: CPT

## 2024-05-30 PROCEDURE — 82550 ASSAY OF CK (CPK): CPT

## 2024-05-30 PROCEDURE — 73560 X-RAY EXAM OF KNEE 1 OR 2: CPT

## 2024-05-30 PROCEDURE — 93005 ELECTROCARDIOGRAM TRACING: CPT

## 2024-05-30 PROCEDURE — 72125 CT NECK SPINE W/O DYE: CPT

## 2024-05-30 PROCEDURE — 85730 THROMBOPLASTIN TIME PARTIAL: CPT

## 2024-05-30 PROCEDURE — 83735 ASSAY OF MAGNESIUM: CPT

## 2024-05-30 PROCEDURE — 83605 ASSAY OF LACTIC ACID: CPT

## 2024-05-30 PROCEDURE — 73521 X-RAY EXAM HIPS BI 2 VIEWS: CPT

## 2024-05-30 PROCEDURE — 85025 COMPLETE CBC W/AUTO DIFF WBC: CPT

## 2024-05-30 PROCEDURE — 85610 PROTHROMBIN TIME: CPT

## 2024-05-30 RX ADMIN — ACETAMINOPHEN STA MG: 500 TABLET ORAL at 09:10

## 2024-05-30 NOTE — ED
General Adult HPI





- General


Chief complaint: Fall


Stated complaint: Fall


Time Seen by Provider: 05/30/24 07:04


Source: EMS


Mode of arrival: EMS





- History of Present Illness


Initial comments: 


Dictation was produced using dragon dictation software. please excuse any 

grammatical, word or spelling errors. 











Chief Complaint: 63-year-old female presents to the emergency department after 

being found down





History of Present Illness: Patient 63-year-old alcoholic female presents to the

emergency department.  Apparently patient was found down.  History present 

illness obtained for the patient states that she slid out of her wheelchair and 

was on the ground for approximately 2 days.  She currently lives at assisted 

living facility.  Patient frequents the emergency department multiple occasions 

for alcohol intoxication versus debility.  Most recently she was admitted for 

several days and discharged back to assisted living facility.  Patient has 

insurance issues that do not allow her to be disposition to a facility with 

higher level of care.  Patient complaining of bilateral lower extremities.








The ROS documented in this emergency department record has been reviewed and con

firmed by me.  Those systems with pertinent positive or negative responses have 

been documented in the HPI.  All other systems are other negative and/or 

noncontributory.




















- Related Data


                                Home Medications











 Medication  Instructions  Recorded  Confirmed


 


Folic Acid 1 mg PO AS DIRECTED 05/03/24 05/23/24


 


Thiamine [Vitamin B-1] 100 mg PO AS DIRECTED 05/03/24 05/23/24


 


DULoxetine HCL [Cymbalta] 30 mg PO AS DIRECTED 05/23/24 05/23/24


 


Escitalopram [Lexapro] 20 mg PO AS DIRECTED 05/23/24 05/23/24


 


Magnesium Oxide [Mag-Ox] 400 mg PO AS DIRECTED 05/23/24 05/23/24








                                  Previous Rx's











 Medication  Instructions  Recorded


 


Acetaminophen Tab [Tylenol] 650 mg PO Q6HR PRN  tab 05/17/24


 


Pantoprazole [Protonix] 40 mg PO DAILY #0 05/17/24


 


Ibuprofen [Motrin] 400 mg PO Q6HR PRN #20 tab 05/20/24











                                    Allergies











Allergy/AdvReac Type Severity Reaction Status Date / Time


 


latex Allergy Unknown Itching, Verified 05/30/24 07:05





   Blisters  


 


codeine AdvReac Severe HEADACHE Verified 05/30/24 07:05


 


hydrocodone [From Vicodin] AdvReac Severe HEADACHE Verified 05/30/24 07:05


 


Anesthetics - Amide Type - AdvReac  GAS GIVES Verified 05/30/24 07:05





Select A   HER  





   HEADACHE,  





   VOMITING,  





   HEART  





   PALIPITATIONS  


 


Anesthetics - Juliana Type- AdvReac  GAS GIVES Verified 05/30/24 07:05





Parabens   HER  





   HEADACHE,  





   VOMITING,  





   HEART  





   PALIPITATIONS  


 


ANESTHESIA AdvReac Unknown GAS GIVES Uncoded 05/30/24 07:05





   HER  





   HEADACHE,  





   VOMITING,  





   HEART  





   PALIPITATIONS  














Review of Systems


ROS Statement: 


Those systems with pertinent positive or pertinent negative responses have been 

documented in the HPI.





ROS Other: All systems not noted in ROS Statement are negative.





Past Medical History


Past Medical History: Hyperlipidemia, Hypertension, Osteoarthritis (OA)


Additional Past Medical History / Comment(s): Multiple recent falls. hx colon 

polyps, occasional diarrhea, hx of fall Nov 2020 and has been having pain right 

hip since that radiates down right leg, walks with limp., states breast implants

moving up.


History of Any Multi-Drug Resistant Organisms: None Reported


Past Surgical History: Breast Surgery, Orthopedic Surgery, Tonsillectomy


Additional Past Surgical History / Comment(s): right elbow surgery with screws, 

right knee surgery with plate and removal ., lumpectomy left breast, breast 

implants


Past Anesthesia/Blood Transfusion Reactions: Postoperative Nausea & Vomiting 

(PONV)


Past Psychological History: Anxiety, Depression


Smoking Status: Former smoker


Past Alcohol Use History: Abuse, Daily


Past Drug Use History: None Reported





- Past Family History


  ** Father


Family Medical History: Cancer





General Exam





- General Exam Comments


Initial Comments: 











PHYSICAL EXAM:


General Impression: Alert and oriented x3, not in acute distress, smells of 

urine


HEENT: Normocephalic atraumatic, extra-ocular movements intact, pupils equal and

reactive to light bilaterally, mucous membranes moist, poor dentition


Cardiovascular: Heart regular rate and rhythm


Chest: Able to complete full sentences, no retractions, no tachypnea


Abdomen: abdomen soft, non-tender, non-distended, no organomegaly


Musculoskeletal: Pulses present and equal in all extremities, no peripheral 

edema


Motor:  no focal deficits noted


Neurological: CN II-XII grossly intact, contraction to the left upper extremity


Skin: Intact with no visualized rashes


Psych: Normal affect and mood














Course


                                   Vital Signs











  05/30/24 05/30/24





  07:01 07:50


 


Temperature 98.4 F 


 


Pulse Rate 106 H 105 H


 


Respiratory 18 16





Rate  


 


Blood Pressure 145/100 153/93


 


O2 Sat by Pulse 96 95





Oximetry  














- Reevaluation(s)


Reevaluation #1: 





05/30/24 10:15





Patient evaluated by her .  Patient is well-known to our case 

manager and patient has a guardian.  She does not meet insurance authorization 

for hospital admission.  She apparently has all outpatient resources made 

available to her including home health care nurse.








EKG Findings





- EKG Comments:


EKG Findings:: My EKG interpretation: Ventricular rate 113, sinus tachycardia,. 

120, cures 96, QTc 388. No MI prolongation, no QTC prolongation, no ST or T-wave

changes noted.  EKG compared to May 8, 2024 showing no changes.  Overall, this 

EKG is unremarkable





Medical Decision Making





- Medical Decision Making


Was pt. sent in by a medical professional or institution (KRISTAN Wasserman, NP, urgent 

care, hospital, or nursing home...) When possible be specific


@  -No


Did you speak to anyone other than the patient for history (EMS, parent, family,

police, friend...)? What history was obtained from this source 


@  -No


Did you review nursing and triage notes (agree or disagree)?  Why? 


@  -I reviewed and agree with nursing and triage notes


Were old charts reviewed (outside hosp., previous admission, EMS record, old 

EKG, old radiological studies, urgent care reports/EKG's, nursing home records)?

Report findings 


@  -Previous discharge summary was reviewed showing patient was just discharged 

from the hospital


Differential Diagnosis (chest pain, altered mental status, abdominal pain women,

abdominal pain men, vaginal bleeding, musculoskeletal, weakness, fever, dyspnea,

syncope, headache, dizziness, GI bleed, back pain, seizure, CVA, palpatations, 

mental health)? 


@  -Differential Weakness:


Hypoglycemia, shock, sepsis, hyponatremia, anemia, infection, MI, ETOH, adverse 

medicine reaction, overdose, stroke, this is not meant to be an all-inclusive 

list.





EKG interpreted by me (3pts min.).


@  -See above


X-rays interpreted by me (1pt min.).


@  -Right lower extremity x-rays are unremarkable, left hip x-ray is 

unremarkable.  Chest x-ray is nonacute.


CT interpreted by me (1pt min.).


@  -CT scan of the head and C-spine shows no acute processes.


U/S interpreted by me (1pt. min.).


@  -None done


What testing was considered but not performed or refused? (CT, X-rays, U/S, 

labs)? Why?


@  -None


What meds were considered but not given or refused? Why?


@  -None


Did you discuss the management of the patient with other professionals 

(professionals i.e. , PA, NP, lab, RT, psych nurse, , , 

teacher, , )? Give summary


@  -See above per discussion with 


Was smoking cessation discussed for >3mins.?


@  -No


Was critical care preformed (if so, how long)?


@  -No


Were there social determinants of health that impacted care today? How? 

(Homelessness, low income, unemployed, alcoholism, drug addiction, 

transportation, low edu. Level, literacy, decrease access to med. care, [facility], 

rehab)?


@  -No


Was there de-escalation of care discussed even if they declined (Discuss DNR or 

withdrawal of care, Hospice)? DNR status


@  -No


What co-morbidities impacted this encounter? (DM, HTN, Smoking, COPD, CAD, Canc

er, CVA, ARF, Chemo, Hep., AIDS, mental health diagnosis, sleep apnea, morbid 

obesity)?


@  -None


Was patient admitted / discharged? Hospital course, mention meds given and 

route, prescriptions, significant lab abnormalities, going to OR and other 

pertinent info.


@  -63-year-old female well-known to emergency department for multiple 

visitations for debility.  She is well-known to our  who has worked 

with patient's case on multiple occasions.  Vital signs stable.  Patient 

well-appearing at the bedside.  Imaging studies are negative for acute 

processes.  Labs are unremarkable.  Patient told to utilize her resources made 

available to her on outpatient basis.  Patient discharged.


Undiagnosed new problem with uncertain prognosis?


@  -No


Drug Therapy requiring intensive monitoring for toxicity (Heparin, Nitro, 

Insulin, Cardizem)?


@  -No


Were any procedures done?


@  -No


Diagnosis/symptom?  Acute, or Chronic, or Acute on Chronic?  Uncomplicated 

(without systemic symptoms) or Complicated (systemic symptoms)?


@  -Debility


Side effects of treatment?


@  -No


Exacerbation, Progression, or Severe Exacerbation?


@  -No


Poses a threat to life or bodily function? How? (Chest pain, USA, MI, pneumonia,

PE, COPD, DKA, ARF, appy, cholecystitis, CVA, Diverticulitis, Homicidal, 

Suicidal, threat to staff... and all critical care pts)


@  -No











- Lab Data


Result diagrams: 


                                 05/30/24 07:22





                                 05/30/24 09:50


                                   Lab Results











  05/30/24 05/30/24 05/30/24 Range/Units





  07:22 07:22 07:22 


 


WBC  10.8 H    (3.8-10.6)  k/uL


 


RBC  4.14    (3.80-5.40)  m/uL


 


Hgb  13.5    (11.4-16.0)  gm/dL


 


Hct  42.1    (34.0-46.0)  %


 


MCV  101.7 H    (80.0-100.0)  fL


 


MCH  32.7    (25.0-35.0)  pg


 


MCHC  32.2    (31.0-37.0)  g/dL


 


RDW  13.8    (11.5-15.5)  %


 


Plt Count  241    (150-450)  k/uL


 


MPV  9.0    


 


Neutrophils %  70    %


 


Lymphocytes %  20    %


 


Monocytes %  6    %


 


Eosinophils %  2    %


 


Basophils %  1    %


 


Neutrophils #  7.5    (1.3-7.7)  k/uL


 


Lymphocytes #  2.2    (1.0-4.8)  k/uL


 


Monocytes #  0.6    (0-1.0)  k/uL


 


Eosinophils #  0.2    (0-0.7)  k/uL


 


Basophils #  0.1    (0-0.2)  k/uL


 


Macrocytosis  Slight    


 


PT   10.1   (10.0-12.5)  sec


 


INR   0.9   (<1.2)  


 


APTT   20.2 L   (22.0-30.0)  sec


 


Sodium     (137-145)  mmol/L


 


Potassium     (3.5-5.1)  mmol/L


 


Chloride     ()  mmol/L


 


Carbon Dioxide     (22-30)  mmol/L


 


Anion Gap     mmol/L


 


BUN     (7-17)  mg/dL


 


Creatinine     (0.52-1.04)  mg/dL


 


Est GFR (CKD-EPI)AfAm     (>60 ml/min/1.73 sqM)  


 


Est GFR (CKD-EPI)NonAf     (>60 ml/min/1.73 sqM)  


 


Glucose     (74-99)  mg/dL


 


Plasma Lactic Acid Gabe    1.9  (0.7-2.0)  mmol/L


 


Calcium     (8.4-10.2)  mg/dL


 


Magnesium     (1.6-2.3)  mg/dL


 


Total Bilirubin     (0.2-1.3)  mg/dL


 


AST     (14-36)  U/L


 


ALT     (4-34)  U/L


 


Alkaline Phosphatase     ()  U/L


 


Creatine Kinase     ()  U/L


 


Total Protein     (6.3-8.2)  g/dL


 


Albumin     (3.5-5.0)  g/dL


 


Serum Alcohol     mg/dL














  05/30/24 Range/Units





  09:50 


 


WBC   (3.8-10.6)  k/uL


 


RBC   (3.80-5.40)  m/uL


 


Hgb   (11.4-16.0)  gm/dL


 


Hct   (34.0-46.0)  %


 


MCV   (80.0-100.0)  fL


 


MCH   (25.0-35.0)  pg


 


MCHC   (31.0-37.0)  g/dL


 


RDW   (11.5-15.5)  %


 


Plt Count   (150-450)  k/uL


 


MPV   


 


Neutrophils %   %


 


Lymphocytes %   %


 


Monocytes %   %


 


Eosinophils %   %


 


Basophils %   %


 


Neutrophils #   (1.3-7.7)  k/uL


 


Lymphocytes #   (1.0-4.8)  k/uL


 


Monocytes #   (0-1.0)  k/uL


 


Eosinophils #   (0-0.7)  k/uL


 


Basophils #   (0-0.2)  k/uL


 


Macrocytosis   


 


PT   (10.0-12.5)  sec


 


INR   (<1.2)  


 


APTT   (22.0-30.0)  sec


 


Sodium  138  (137-145)  mmol/L


 


Potassium  3.8  (3.5-5.1)  mmol/L


 


Chloride  105  ()  mmol/L


 


Carbon Dioxide  25  (22-30)  mmol/L


 


Anion Gap  8  mmol/L


 


BUN  17  (7-17)  mg/dL


 


Creatinine  0.39 L  (0.52-1.04)  mg/dL


 


Est GFR (CKD-EPI)AfAm  >90  (>60 ml/min/1.73 sqM)  


 


Est GFR (CKD-EPI)NonAf  >90  (>60 ml/min/1.73 sqM)  


 


Glucose  106 H  (74-99)  mg/dL


 


Plasma Lactic Acid Gabe   (0.7-2.0)  mmol/L


 


Calcium  10.1  (8.4-10.2)  mg/dL


 


Magnesium  1.7  (1.6-2.3)  mg/dL


 


Total Bilirubin  1.1  (0.2-1.3)  mg/dL


 


AST  22  (14-36)  U/L


 


ALT  18  (4-34)  U/L


 


Alkaline Phosphatase  67  ()  U/L


 


Creatine Kinase  157 H  ()  U/L


 


Total Protein  7.4  (6.3-8.2)  g/dL


 


Albumin  4.6  (3.5-5.0)  g/dL


 


Serum Alcohol  <10  mg/dL














Disposition


Clinical Impression: 


 Debility





Disposition: HOME SELF-CARE


Condition: Good


Instructions (If sedation given, give patient instructions):  Fall Prevention 

for Older Adults (ED)


Is patient prescribed a controlled substance at d/c from ED?: No


Referrals: 


Adam Chinchilla DO [Primary Care Provider] - 1-2 days


Time of Disposition: 10:18

## 2024-05-30 NOTE — CT
EXAMINATION TYPE: CT brain cspine wo con

 

DATE OF EXAM: 5/30/2024

 

COMPARISON: 5/23/2020

 

HISTORY: fell out of wheel chair yesterday

 

CT DLP: 1382.4 mGycm

Automated exposure control for dose reduction was used.

 

TECHNIQUE: CT scan of the head and cervical spine are performed without contrast.

 

FINDINGS:   There is no acute intracranial hemorrhage, mass effect, or midline shift identified. Mild
 generalized degenerative change. Hypoattenuation in the white matter is nonspecific ischemia. Area o
f low attenuation involving the basal ganglia.

 

Intracranial atherosclerotic changes are seen. Orbits are symmetric. There is calvarium grossly. Sinu
ses are clear. Mastoid air cells are clear cranial cervical junction is again. There is nodular promi
nence of the odontoid similar to prior exam. Aneurysm not excluded.

 

Suggestion of the spinal canal limited due to artifact and resolution. There is multilevel mild-to-mo
derate degenerative disc disease with neuropathy. A foraminal encroachment at C3-C4 on the left. Mult
ilevel facet arthropathy. Chronic deformity of the spinous process of T3. Slight grade 1 anterolisthe
sis C4-C5.

 

Biapical pleural thickening. 3 mm subpleural right lung nodule too small to characterize. Soft tissue
 attenuation along the posterior wall the trachea is nonspecific possibly related to retained secreti
on. Artifact limits assessment of the thyroid. Subcentimeter shotty lymph nodes are seen.

 

IMPRESSION:

1. There is no acute fracture of the cervical spine.

2. No acute intracranial hemorrhage, mass effect, or midline shift is seen.

## 2024-05-30 NOTE — XR
EXAMINATION TYPE: 

 

XR AP pelvis and 2 views Hip Bilateral Complete, 

XR knee 3 views RT, 

XR ankle 3 views RT

 

DATE OF EXAM: 5/30/2024

 

COMPARISON: NONE

 

HISTORY: 63-year-old female complaining of pain after fall yesterday

 

FINDINGS: Pelvis and hips:

Osteopenia. Hip joint space is maintained. SI joints appear symmetric and intact as does the pubic sy
mphysis. There seems to be some type of catheter device projecting over the perineum. Clinically marlen
elate. Mild degenerative change pubic symphysis. No displaced fracture.

 

Right knee:

Lucent areas especially on the lateral view within the proximal tibia indicating prior hardware remov
al. Likely fixating a lateral tibial plateau fracture. The fracture shows prominent irregularity of t
he subchondral bone and articular surface of the lateral compartment. No acute fracture line is seen.
 Trace suprapatellar joint fluid within physiologic range. No lipohemarthrosis. Extensor mechanism is
 intact.

 

Right ankle:

Ankle mortise is congruent with preservation of the distal tibiofibular overlap. Talar dome is intact
. Osteopenia. No ankle joint effusion. Extensor mechanism is intact. Tiny posterior and plantar heel 
spurs. Subtalar joint is aligned. Subtle lucency at the inferior tip of the medial malleolus may rela
te to osteopenia. Otherwise, no acute fracture, subluxation, dislocation. 

 

IMPRESSION: 

 

1. Pelvis and both hips: Osteopenia without displaced fracture. If patient nonweightbearing, MRI can 
provide more sensitive evaluation.

2. Right knee: Findings suggest prior hardware removal and posttraumatic OA within the lateral compar
tment. No acute osseous abnormality seen.

3. Right ankle: Subtle lucency inferior tip of the medial malleolus. Correlate for any focal pain in 
this location. Findings may relate to osteopenia rather than a subtle nondisplaced fracture. Consider
 follow-up in 10-14 days to reassess.

## 2024-05-30 NOTE — XR
EXAMINATION TYPE: XR chest 1V portable

 

DATE OF EXAM: 5/30/2024

 

Comparison: 5/23/2024

 

Clinical History: 63-year-old female pain after fall

 

Findings:

Heart normal size. Aorta and pulmonary vasculature within normal limits. Old right-sided rib fracture
 deformities. Chronic bony deformity, likely posttraumatic of the proximal left humerus. Interstitial
 prominence is unchanged. No consolidation, pneumothorax, or pleural effusion is seen.

 

 

Impression:

Chronic changes. No acute process seen.

## 2024-05-31 NOTE — CDI
Documentation Clarification Form



Date: 05/31/2024 11:11:23 AM

From: Sole Osullivan RN, CCDS

Phone: [phone]

MRN: [id number]

Admit Date: 05/23/2024 10:50:00 PM

Patient Name: Pura Eid

Visit Number: [id number]

Discharge Date:  05/28/2024 06:59:00 PM





ATTENTION: The Clinical Documentation Specialists (CDI) and [facility] Coding Staff 
appreciate your assistance in clarifying documentation. Please respond to the 
clarification below the line at the bottom and electronically sign. The CDI & 
[facility] Coding staff will review the response and follow-up if needed. Please note: 
Queries are made part of the Legal Health Record. If you have any questions, 
please contact the author of this message via ITS.



Dr. Ngoc Sheffield



Severe Pancreatitis is documented in the ED note.  Additional clarification is 
requested.



History/risk factors: HTN, HLD, W/C bound, alcohol dependence. Presented after 
being found down at home and intoxicated. 



Clinical Indicators: 

5/23 ED: "Patient is having severe pancreatitis, elevated lipase, falls and 
weakness with no traumatic injury noted."

5/24 H&P: "accepted the admission for severe alcohol intoxication with possible 
pancreatitis."

5/25 IM: "Rule out acute pancreatitis. Elevated lipase."

5/23 Lipase: 7560; serum alcohol: 253

5/24 Lipase: 1088



Treatment: 1L 0.9 NS IV bolus x1 on 5/23 

Medication: IV Dilaudid 1mg x1 on 5/24; IV Morphine 4mg x3 doses on 5/24; IV 
Protonix 40mg x1 on 5/24; Acetaminophen 650mg Q4H prn 5/25-5/28



Please clarify the diagnosis:



[  ] Acute alcohol induced pancreatitis

[  ] Pancreatitis ruled out

[  ] Other, please specify ____

[  ] Unable to determine

MTDD

## 2024-07-25 NOTE — P.PN
Left message that copy of immunizations is ready for  at the [facility] office .     Subjective


Progress Note Date: 04/16/24








Patient is a 63-year-old female with a past medical history of severe alcohol 

abuse and prior to admission with alcohol withdrawal symptoms, hypertension, 

hyperemia, osteoarthritis and history of anxiety/depression and heavy alcohol 

abuse and prior history of smoking presents to ER status post fall.  Patient 

states that she fell out of bed.  She felt so weak that she has been crawling 

around her apartment.  Screamed for help and her neighbor found her on the 

ground.  Patient states that she was drinking alcohol this morning.  Patient 

does complain of headache.  No neck..  No other injuries..


Denies any fever or chills.  No nausea vomiting abdominal pain or diarrhea.


CT head and cervical spine was done in the ER showed mild stable chronic 

appearing periventricular white matter ischemic changes types.  Follow-up MRI 

can be performed as clinically indicated.  CT cervical spine showed no acute os

seous abnormality of the cervical spine.


EKG showed ectopic atrial tachycardia.


Laboratory data showed WBC 12.4 hemoglobin 14.1 and platelets 349


Sodium 140 potassium 4.9 chloride 102 bicarbonate 17 BUN 28 and creatinine 0.55 

and blood sugar 157 lactic acid 5.6 calcium 10.4 and magnesium 1.4 AST 65 ALT 47

alk phos 81 manage CK level 1359 lipase 222


Urinalysis showed 2+ ketones small blood nitrite negative and small leukocyte 

esterase and WBCs 9.  Serum alcohol abuse 37





4/10/2024





Patient is seen and evaluated in follow-up with no acute noted.  Patient 

continues to report weakness and gait dysfunction and awaiting PT/OT therapy 

evaluation from follow-up yesterday.  Patient minimally improved although 

continues to recommend subacute rehab to increase strength and mobility.  

Patient is afebrile with no reported chest pain or shortness of breath.  Patient

tolerating diet with no reported nausea or vomiting.  Case management is 

following arranging for outpatient follow-up and a court hearing for nonemergent

public legal guardian appointment.  Case management/social work also following 

currently working on possible ECF for strength and mobility.  Labs reviewed and 

within normal limits.  Patient continues on CIWA protocol and has not required 

IV Ativan all day.  Librium taper started and will continue and taper 

accordingly.





4/11/2024


Patient is seen in follow-up this morning agreeable to working with physical 

therapy and has been getting stronger although continues with significant 

weakness and gait dysfunction.  Social work following working on ECF and 

multiple referrals have been placed and all declined thus far.  Looking into 

[facility] as well for further possible ECF.  Patient is afebrile with 

no reported chest pain or shortness of breath.  Patient is tolerating diet with 

no reported nausea or vomiting.  Patient does have CIWA protocol although 

actively withdrawing.  Will add as needed oral Ativan as patient is quite 

anxious.  Continue other home medications.  A.m. labs pending and we will 

follow-up and replace electrolytes per protocol.





4/12/2024


Patient is seen in follow-up today with no acute overnight issues noted.  

Patient has as needed Ativan added for anxiety as patient is not actively 

withdrawing and will discontinue CIWA scale.  Patient will continue on a Librium

taper and will titrate to twice daily.  Patient is afebrile with no reported c

hest pain or shortness of breath.  Magnesium found to be slightly low and will 

replace per protocol and follow-up on repeat labs.  Encouraged oral intake and 

increased activity as tolerated.  Recommend physical therapy daily.  Social work

is following working on discharge planning and looking for an accepting ECF for 

continued strength and mobility.





4/13/2024


Patient is resting in the bed.  Awake alert and slightly confused still.  

Complains of anxiety.  Continued on CIWA protocol.  No complaints of nausea or 

vomiting.  Tolerating oral diet.





4/14/2024 patient is resting in the bed comfortably.  Awake alert and oriented. 

Mentation remains the same.  .  No fever no chills.  No other acute overnight 

issues.  Patient is pending placement to subacute rehab.








4/15/2024





Patient is seen in follow-up today continues to await insurance authorization 

for rehab for continued strength and mobility.  Patient continues to report 

significant weakness and difficulty with ambulation.  Case management/social 

work following working on insurance authorization which remains pending.  

Patient is afebrile denies chest pain or shortness of breath.  Librium taper has

been titrated to daily and will discontinue after tomorrow's dose.  Patient is 

not requiring any CIWA and not actively withdrawing.  Patient reports to 

tolerating oral intake and needs encouragement with meals.  Recommend physical 

therapy daily





4/16/2024





Patient is seen in follow-up this morning currently sitting up in the chair re

porting she feels abdominal pain with nausea and vomiting and has just received 

Zofran.  Patient reports she is voiding and having bowel movements with no 

difficulties.  Will obtain an abdominal x-ray, initiate some gentle hydration 

and continue with Zofran, recommend clear liquid diet slowly advance once 

feeling better.  Case management/social work following awaiting authorization 

from insurance regarding ECF for discharge planning.  There is also a court 

hearing in May regarding obtaining a public guardian.  Patient was notified of 

the court date.  Patient is afebrile with no reports of chest pain or shortness 

of breath.





Review of systems:


Constitutional:  reports of fatigue and generalized weakness, no fever, or 

chills


Cardiovascular: No reports of chest pain or palpitations


Respiratory: No reports of shortness of breath or cough


GI:  reports of nausea, 1 episode of vomiting, no diarrhea, reports belly pain 

on palpation


: No reports of dysuria or retention


Neurovascular: reports of generalized weakness and difficulty in ambulating due 

to lower extremity weakness





All medications have been reviewed





PHYSICAL EXAMINATION: 





Patient is sitting up in the chair,  alert and oriented.  weak.  Well-developed,

elderly appearing, ill-appearing


HEENT: Normocephalic. Neck is supple. Pupils reactive. Nostrils clear. Oral 

cavity is moist. 


Neck reveals no JVD, carotid bruits, or thyromegaly. 


CHEST EXAMINATION: Trachea is central. Symmetrical expansion. Lung fields clear 

to auscultation and percussion. 


CARDIAC: Normal S1, S2 with no gallops. No murmurs 


ABDOMEN: Soft. Bowel sounds normal. No organomegaly. No abdominal bruits.  

Discomfort noted on palpation, diffuse


Extremities: reveal no edema.  No clubbing or cyanosis


Neurologically awake, alert, oriented x 2-3 with well-coordinated movements.  No

focal deficits noted, diffusely weak


Skin: No rash or skin lesions. 


Psychiatric: Cooperative.  Non-suicidal


Musculoskeletal: No joint swelling or deformity.  Normal range of motion.





Assessment:





Status post mechanical fall


Acute alcohol intoxication with level 37 with concerns of acute delirium tremens


Acute rhabdomyolysis due to fall and was found on the floor.  Improving


Lactic acidosis due to dehydration and volume depletion/tissue hypoperfusion, 

improved


Hypomagnesemia.  Improving


Leukocytosis likely reactive.  Patient has no active signs of infection


Severe alcohol abuse 


gait dysfunction with generalized weakness


Prior history of smoking


Anxiety/depression


Hypertension


Hyperlipidemia


Osteoarthritis


GI and DVT prophylaxis


Full code





Plan:





Patient has completed Librium taper and is not actively withdrawing.  Patient 

does have Ativan as needed for anxiety as patient is anxious about being 

discharged.


Patient reporting some diffuse abdominal pain associated with nausea and 1 

episode of vomiting.  Vomiting was unwitnessed and asked patient along with 

nursing staff to show the nurses when she vomited to evaluate.  Will continue 

Zofran and give some gentle hydration.  Abdominal x-ray was done which was 

normal with no acute findings


Recommend physical therapy daily as patient continues with significant weakness 

and gait dysfunction recommending rehab and patient is agreeable.  Social work 

is following working on accepting facilities.  There is an ECF in [facility] 

and patient is agreeable currently awaiting insurance authorization.  

Authorization has been submitted and remains pending at this time.


Patient will follow-up in the outpatient setting with a non-emergent court 

hearing to obtain a public legal guardian.  Hearing is set for May 2024


Encouraged increase activity as tolerated


Will initiate clear liquids as patient is reporting some nausea with vomiting 

and slowly advance as tolerated. 


Replace electrolytes per protocol


Due to multiple complex medical issues, prognosis is guarded


Possible discharge planning in the next 24 hours





The impression and plan of care has been dictated by Yamileth Horne, Nurse 

Practitioner as directed.





Dr Ric MD


I have performed a history and examination and MDM of this patient, discussed 

the same with the dictator, and  agree with the dictator's assessment and plan 

as written ,documented as a scribe. Based on total visit time,  I have performed

more than 50% of the visit.





Objective





- Vital Signs


Vital signs: 


                                   Vital Signs











Temp  99.2 F   04/16/24 12:45


 


Pulse  98   04/16/24 12:45


 


Resp  16   04/16/24 12:45


 


BP  121/76   04/16/24 12:45


 


Pulse Ox  93 L  04/16/24 12:45


 


FiO2      








                                 Intake & Output











 04/15/24 04/16/24 04/16/24





 18:59 06:59 18:59


 


Weight 72.575 kg  


 


Other:   


 


  Voiding Method Toilet Toilet Toilet


 


  # Voids 1 1 1


 


  # Bowel Movements   1














- Labs


CBC & Chem 7: 


                                 04/15/24 06:16





                                 04/15/24 06:16

## 2024-09-06 ENCOUNTER — HOSPITAL ENCOUNTER (OUTPATIENT)
Dept: HOSPITAL 47 - RADMRIMAIN | Age: 64
Discharge: HOME | End: 2024-09-06
Attending: INTERNAL MEDICINE
Payer: MEDICARE

## 2024-09-06 PROCEDURE — 77047 MRI BREAST C- BILATERAL: CPT

## 2024-09-12 NOTE — BMR
LOCATION: [facility]



EXAM DATE: 9/6/2024



EXAM DESCRIPTION: MRI-Breast Bilat (W/O Contrast)



INDICATION: Evaluate implants.



COMPARISON: COMPARISON TO PRIOR MRIs: None available.

Correlation to mammograms: 6/14/2023 and priors.

Correlation to ultrasound: Left breast ultrasound 6/14/2023



CONTRAST: None



TECHNIQUE: Multiplanar multisequence MRI of both breasts was performed with a 
dedicated breast coil. Images

were obtained using the standard non-IV contrast breast implant evaluation 
protocol. Note that this protocol is not

intended to evaluate the breasts for malignancy.

The exam was performed at [facility] institution and submitted to review
by [facility]

radiologist.



FINDINGS: Breast composition: Scattered fibroglandular densities.



RIGHT BREAST:

The T2 weighted series shows no areas of abnormal signal intensity.

There is a prepectoral silicone implant. The implant appears intact.



LEFT BREAST:

The T2 weighted series shows no areas of abnormal signal intensity.

There is a prepectoral silicone implant. There are findings suggestive of 
intracapsular implant rupture, to include

linguine sign (series 601, image 12), Noose sign (series 301, image 15) and 
keyhole sign (series 501, image 30). There

are no MR findings to suggest extracapsular rupture.



IMPRESSION: 

1. Right breast prepectoral silicone implant with no MR evidence of intra- or 
extracapsular rupture.

2. Left breast prepectoral silicone implant with MR findings suggestive of 
intracapsular rupture. No MR evidence of

extracapsular implant rupture.

[facility]

## 2024-10-17 ENCOUNTER — HOSPITAL ENCOUNTER (INPATIENT)
Dept: HOSPITAL 47 - EC | Age: 64
LOS: 8 days | Discharge: NURSING, REHAB OR LONG-TERM CARE | DRG: 280 | End: 2024-10-25
Attending: FAMILY MEDICINE | Admitting: FAMILY MEDICINE
Payer: MEDICARE

## 2024-10-17 VITALS — BODY MASS INDEX: 16.3 KG/M2

## 2024-10-17 DIAGNOSIS — M24.542: ICD-10-CM

## 2024-10-17 DIAGNOSIS — I25.2: ICD-10-CM

## 2024-10-17 DIAGNOSIS — Z90.710: ICD-10-CM

## 2024-10-17 DIAGNOSIS — Z86.711: ICD-10-CM

## 2024-10-17 DIAGNOSIS — Z91.040: ICD-10-CM

## 2024-10-17 DIAGNOSIS — Z79.899: ICD-10-CM

## 2024-10-17 DIAGNOSIS — Z98.82: ICD-10-CM

## 2024-10-17 DIAGNOSIS — Z74.01: ICD-10-CM

## 2024-10-17 DIAGNOSIS — I50.33: ICD-10-CM

## 2024-10-17 DIAGNOSIS — G92.8: ICD-10-CM

## 2024-10-17 DIAGNOSIS — Z86.0100: ICD-10-CM

## 2024-10-17 DIAGNOSIS — M19.90: ICD-10-CM

## 2024-10-17 DIAGNOSIS — M24.541: ICD-10-CM

## 2024-10-17 DIAGNOSIS — Z79.01: ICD-10-CM

## 2024-10-17 DIAGNOSIS — F32.A: ICD-10-CM

## 2024-10-17 DIAGNOSIS — I08.1: ICD-10-CM

## 2024-10-17 DIAGNOSIS — I21.A1: ICD-10-CM

## 2024-10-17 DIAGNOSIS — J18.9: ICD-10-CM

## 2024-10-17 DIAGNOSIS — E78.5: ICD-10-CM

## 2024-10-17 DIAGNOSIS — F41.9: ICD-10-CM

## 2024-10-17 DIAGNOSIS — J96.01: ICD-10-CM

## 2024-10-17 DIAGNOSIS — Z88.5: ICD-10-CM

## 2024-10-17 DIAGNOSIS — F17.210: ICD-10-CM

## 2024-10-17 DIAGNOSIS — Z88.4: ICD-10-CM

## 2024-10-17 DIAGNOSIS — K59.00: ICD-10-CM

## 2024-10-17 DIAGNOSIS — I11.0: Primary | ICD-10-CM

## 2024-10-17 LAB
ALBUMIN SERPL-MCNC: 4 G/DL (ref 3.5–5)
ALP SERPL-CCNC: 63 U/L (ref 38–126)
ALT SERPL-CCNC: 15 U/L (ref 4–34)
ANION GAP SERPL CALC-SCNC: 6 MMOL/L
APTT BLD: 26.5 SEC (ref 22–30)
AST SERPL-CCNC: 25 U/L (ref 14–36)
BASOPHILS # BLD AUTO: 0 K/UL (ref 0–0.2)
BASOPHILS NFR BLD AUTO: 1 %
BUN SERPL-SCNC: 11 MG/DL (ref 7–17)
CALCIUM SPEC-MCNC: 9.3 MG/DL (ref 8.4–10.2)
CHLORIDE SERPL-SCNC: 95 MMOL/L (ref 98–107)
CO2 SERPL-SCNC: 38 MMOL/L (ref 22–30)
EOSINOPHIL # BLD AUTO: 0.2 K/UL (ref 0–0.7)
EOSINOPHIL NFR BLD AUTO: 2 %
ERYTHROCYTE [DISTWIDTH] IN BLOOD BY AUTOMATED COUNT: 4.9 M/UL (ref 3.8–5.4)
ERYTHROCYTE [DISTWIDTH] IN BLOOD: 12.4 % (ref 11.5–15.5)
GLUCOSE SERPL-MCNC: 109 MG/DL (ref 74–99)
HCT VFR BLD AUTO: 50.6 % (ref 34–46)
HGB BLD-MCNC: 15.7 GM/DL (ref 11.4–16)
INR PPP: 1.1 (ref ?–1.2)
LYMPHOCYTES # SPEC AUTO: 1.8 K/UL (ref 1–4.8)
LYMPHOCYTES NFR SPEC AUTO: 28 %
MAGNESIUM SPEC-SCNC: 1.6 MG/DL (ref 1.6–2.3)
MCH RBC QN AUTO: 32.1 PG (ref 25–35)
MCHC RBC AUTO-ENTMCNC: 31.1 G/DL (ref 31–37)
MCV RBC AUTO: 103.3 FL (ref 80–100)
MONOCYTES # BLD AUTO: 0.5 K/UL (ref 0–1)
MONOCYTES NFR BLD AUTO: 9 %
NEUTROPHILS # BLD AUTO: 3.6 K/UL (ref 1.3–7.7)
NEUTROPHILS NFR BLD AUTO: 57 %
NT-PROBNP SERPL-MCNC: 2210 PG/ML
PLATELET # BLD AUTO: 281 K/UL (ref 150–450)
POTASSIUM SERPL-SCNC: 4.2 MMOL/L (ref 3.5–5.1)
PROT SERPL-MCNC: 6.8 G/DL (ref 6.3–8.2)
PT BLD: 11.6 SEC (ref 10–12.5)
SODIUM SERPL-SCNC: 139 MMOL/L (ref 137–145)
WBC # BLD AUTO: 6.3 K/UL (ref 3.8–10.6)

## 2024-10-17 PROCEDURE — 36415 COLL VENOUS BLD VENIPUNCTURE: CPT

## 2024-10-17 PROCEDURE — 84484 ASSAY OF TROPONIN QUANT: CPT

## 2024-10-17 PROCEDURE — 71250 CT THORAX DX C-: CPT

## 2024-10-17 PROCEDURE — 85610 PROTHROMBIN TIME: CPT

## 2024-10-17 PROCEDURE — 93306 TTE W/DOPPLER COMPLETE: CPT

## 2024-10-17 PROCEDURE — 85730 THROMBOPLASTIN TIME PARTIAL: CPT

## 2024-10-17 PROCEDURE — 85025 COMPLETE CBC W/AUTO DIFF WBC: CPT

## 2024-10-17 PROCEDURE — 87086 URINE CULTURE/COLONY COUNT: CPT

## 2024-10-17 PROCEDURE — 80048 BASIC METABOLIC PNL TOTAL CA: CPT

## 2024-10-17 PROCEDURE — 80061 LIPID PANEL: CPT

## 2024-10-17 PROCEDURE — 94640 AIRWAY INHALATION TREATMENT: CPT

## 2024-10-17 PROCEDURE — 74176 CT ABD & PELVIS W/O CONTRAST: CPT

## 2024-10-17 PROCEDURE — 96374 THER/PROPH/DIAG INJ IV PUSH: CPT

## 2024-10-17 PROCEDURE — 83605 ASSAY OF LACTIC ACID: CPT

## 2024-10-17 PROCEDURE — 87324 CLOSTRIDIUM AG IA: CPT

## 2024-10-17 PROCEDURE — 93005 ELECTROCARDIOGRAM TRACING: CPT

## 2024-10-17 PROCEDURE — 99285 EMERGENCY DEPT VISIT HI MDM: CPT

## 2024-10-17 PROCEDURE — 84132 ASSAY OF SERUM POTASSIUM: CPT

## 2024-10-17 PROCEDURE — 80053 COMPREHEN METABOLIC PANEL: CPT

## 2024-10-17 PROCEDURE — 96375 TX/PRO/DX INJ NEW DRUG ADDON: CPT

## 2024-10-17 PROCEDURE — 83036 HEMOGLOBIN GLYCOSYLATED A1C: CPT

## 2024-10-17 PROCEDURE — 71046 X-RAY EXAM CHEST 2 VIEWS: CPT

## 2024-10-17 PROCEDURE — 83735 ASSAY OF MAGNESIUM: CPT

## 2024-10-17 PROCEDURE — 83880 ASSAY OF NATRIURETIC PEPTIDE: CPT

## 2024-10-17 PROCEDURE — 81001 URINALYSIS AUTO W/SCOPE: CPT

## 2024-10-17 RX ADMIN — FUROSEMIDE STA MG: 10 INJECTION, SOLUTION INTRAMUSCULAR; INTRAVENOUS at 22:36

## 2024-10-17 RX ADMIN — ASPIRIN 81 MG CHEWABLE TABLET STA MG: 81 TABLET CHEWABLE at 22:35

## 2024-10-17 NOTE — ED
General Adult HPI





- General


Chief complaint: Weakness


Stated complaint: AMS


Time Seen by Provider: 10/17/24 19:33


Source: EMS


Mode of arrival: EMS





- History of Present Illness


Initial comments: 


Dictation was produced using dragon dictation software. please excuse any 

grammatical, word or spelling errors. 











Chief Complaint: 64-year-old female with multiple comorbidities presents 

emergency department with lethargy hypoxia





History of Present Illness: Patient 64-year-old female she is a poor historian. 

History of present illness obtained from EMS.  According to EMS patient was sent

there to the emergency department by [facility].  She was found 

to be lethargic hypoxic and hypotensive.  There was concern that patient was 70%

on room air with a blood pressure of 90 systolic over 50s diastolic.  Patient 

does not usually wear nasal cannula oxygen.  She was given nasal cannula oxygen 

since yesterday.  Patient denies any significant issues at the bedside.  There 

is no concern about patient's mentation from EMS.








Unable to obtain ROS secondary to mental status

















- Related Data


                                Home Medications











 Medication  Instructions  Recorded  Confirmed


 


DULoxetine HCL [Cymbalta] 30 mg PO HS 05/23/24 10/17/24


 


Acetaminophen Tab [Tylenol Tab] 1,000 mg PO Q6HR PRN 10/17/24 10/17/24


 


Amoxicillin 875 mg PO TID@07,13,19 10/17/24 10/17/24


 


Apixaban [Eliquis] 5 mg PO BID@0700,1600 10/17/24 10/17/24


 


Atorvastatin [Lipitor] 10 mg PO HS 10/17/24 10/17/24


 


DULoxetine HCL [Cymbalta] 60 mg PO DAILY 10/17/24 10/17/24


 


Divalproex ER [Depakote ER] 250 mg PO BID 10/17/24 10/17/24


 


Docusate [Colace] 100 mg PO DAILY@0700 10/17/24 10/17/24


 


Gabapentin [Neurontin] 200 mg PO TID@07,13,19 10/17/24 10/17/24


 


Healthshake 1 dose PO TID-W/MEALS@07,12,17 10/17/24 10/17/24


 


Ipratropium-Albuterol Nebulize 3 ml INHALATION RT-Q4H 10/17/24 10/17/24





[Duoneb 0.5 mg-3 mg/3 ml Soln]   


 


Lasix Injection Solution 10mg/Ml 40 mg IM ONCE 10/17/24 10/17/24


 


Melatonin 5 mg PO HS@2000 10/17/24 10/17/24


 


Montelukast [Singulair] 10 mg PO HS 10/17/24 10/17/24


 


Ondansetron [Zofran] 4 mg PO Q6H PRN 10/17/24 10/17/24


 


Sertraline [Zoloft] 50 mg PO DAILY 10/17/24 10/17/24


 


clonazePAM [KlonoPIN] 0.5 mg PO BID@0700,1600 10/17/24 10/17/24


 


predniSONE See Taper PO AS DIRECTED 10/17/24 10/17/24


 


traMADol HCL 75 mg PO Q6H PRN 10/17/24 10/17/24








                                  Previous Rx's











 Medication  Instructions  Recorded


 


Pantoprazole [Protonix] 40 mg PO DAILY #0 05/17/24











                                    Allergies











Allergy/AdvReac Type Severity Reaction Status Date / Time


 


latex Allergy Unknown Itching, Verified 10/17/24 20:11





   Blisters  


 


morphine Allergy  Rash/Hives Verified 10/17/24 20:11


 


codeine AdvReac Severe HEADACHE Verified 10/17/24 20:11


 


hydrocodone [From Vicodin] AdvReac Severe HEADACHE Verified 10/17/24 20:11


 


Anesthetics - Amide Type - AdvReac  GAS GIVES Verified 10/17/24 20:11





Select A   HER  





   HEADACHE,  





   VOMITING,  





   HEART  





   PALIPITATIONS  


 


Anesthetics - Juliana Type- AdvReac  GAS GIVES Verified 10/17/24 20:11





Parabens   HER  





   HEADACHE,  





   VOMITING,  





   HEART  





   PALIPITATIONS  


 


ANESTHESIA AdvReac Unknown GAS GIVES Uncoded 05/30/24 07:05





   HER  





   HEADACHE,  





   VOMITING,  





   HEART  





   PALIPITATIONS  














Review of Systems


ROS Statement: 


Those systems with pertinent positive or pertinent negative responses have been 

documented in the HPI.





ROS Other: All systems not noted in ROS Statement are negative.





Past Medical History


Past Medical History: Hyperlipidemia, Hypertension, Osteoarthritis (OA)


Additional Past Medical History / Comment(s): Multiple recent falls. hx colon 

polyps, occasional diarrhea, hx of fall Nov 2020 and has been having pain right 

hip since that radiates down right leg, walks with limp., states breast implants

moving up.


History of Any Multi-Drug Resistant Organisms: None Reported


Past Surgical History: Breast Surgery, Orthopedic Surgery, Tonsillectomy


Additional Past Surgical History / Comment(s): right elbow surgery with screws, 

right knee surgery with plate and removal ., lumpectomy left breast, breast 

implants


Past Anesthesia/Blood Transfusion Reactions: Postoperative Nausea & Vomiting 

(PONV)


Past Psychological History: Anxiety, Depression


Smoking Status: Former smoker


Past Alcohol Use History: Abuse, Daily


Past Drug Use History: None Reported





- Past Family History


  ** Father


Family Medical History: Cancer





General Exam





- General Exam Comments


Initial Comments: 











PHYSICAL EXAM:


General Impression: Alert and oriented x2/4, not in acute distress


HEENT: Normocephalic atraumatic, extra-ocular movements intact, pupils equal and

reactive to light bilaterally, mucous membranes moist.


Cardiovascular: Heart regular rate and rhythm


Chest: Able to complete full sentences, no retractions, no tachypnea


Abdomen: abdomen soft, non-tender, non-distended, no organomegaly


Musculoskeletal: Pulses present and equal in all extremities, no peripheral 

edema


Motor:  no focal deficits noted


Neurological: CN II-XII grossly intact, no focal motor or sensory deficits noted


Skin: Intact with no visualized rashes


Psych: Normal affect and mood














Course


                                   Vital Signs











  10/17/24 10/17/24





  19:26 21:19


 


Temperature 97.4 F L 


 


Pulse Rate 95 101 H


 


Respiratory 17 20





Rate  


 


Blood Pressure 142/92 126/106


 


O2 Sat by Pulse 98 99





Oximetry  














EKG Findings





- EKG Comments:


EKG Findings:: My EKG interpretation: Ventricular rate 94, sinus rhythm,.  160, 

, QTc 413. No MD prolongation, no QTC prolongation, no ST or T-wave 

changes noted.  Overall, this EKG is unremarkable





Medical Decision Making





- Medical Decision Making


Was pt. sent in by a medical professional or institution (, PA, NP, urgent 

care, hospital, or nursing home...) When possible be specific


@  -No


Did you speak to anyone other than the patient for history (EMS, parent, family,

police, friend...)? What history was obtained from this source 


@  -History obtained from EMS as described above


Did you review nursing and triage notes (agree or disagree)?  Why? 


@  -I reviewed and agree with nursing and triage notes


Were old charts reviewed (outside hosp., previous admission, EMS record, old 

EKG, old radiological studies, urgent care reports/EKG's, nursing home records)?

Report findings 


@  -No old charts were reviewed


Differential Diagnosis (chest pain, altered mental status, abdominal pain women,

abdominal pain men, vaginal bleeding, musculoskeletal, weakness, fever, dyspnea,

syncope, headache, dizziness, GI bleed, back pain, seizure, CVA, palpatations, 

mental health)? 


@  -Differential Altered Mental Status:


Hypoglycemia, DKA, hypercapnia, ETOH, overdose, CO poisoning, trauma, myxedema 

coma, HTN encephalopathy, infection, encephalitis, psychosis, intercranial 

hemorrhage, hepatic encephalopathy, meningitis, CVA, this is not meant to be an 

all-inclusive list





EKG interpreted by me (3pts min.).


@  -See above


X-rays interpreted by me (1pt min.).


@  -Chest x-ray shows heart failure


CT interpreted by me (1pt min.).


@  -None done


U/S interpreted by me (1pt. min.).


@  -None done


What testing was considered but not performed or refused? (CT, X-rays, U/S, 

labs)? Why?


@  -None


What meds were considered but not given or refused? Why?


@  -None


Was smoking cessation discussed for >3mins.?


@  -No


Were there social determinants of health that impacted care today? How? 

(Homelessness, low income, unemployed, alcoholism, drug addiction, 

transportation, low edu. Level, literacy, decrease access to med. care, penitentiary, 

rehab)?


@  -Nursing home patient


Was there de-escalation of care discussed even if they declined (Discuss DNR or 

withdrawal of care, Hospice)? DNR status


@  -No


What co-morbidities impacted this encounter? (DM, HTN, Smoking, COPD, CAD, 

Cancer, CVA, ARF, Chemo, Hep., AIDS, mental health diagnosis, sleep apnea, 

morbid obesity)?


@  -Debility, history of alcohol abuse


Was patient admitted / discharged? Hospital course, mention meds given and 

route, prescriptions, significant lab abnormalities, going to OR and other pe

rtinent info.


@  -64-year-old female presents to the emergency department for concerns of 

lethargy hypoxia and hypotension.  Allegedly she was 70% room air at the nursing

home he was put on oxygen.  Vital signs upon arrival are stable on 2 L nasal 

cannula.  Blood pressure is within acceptable limits.  Patient in no acute 

distress at the bedside.  Laboratory evaluation obtained.  CBC, coag panel me

tabolic panel is within acceptable limits troponin elevated 0.059.  Patient does

not have history of troponin being elevated this highly.  BNP of 2000.  Clinical

concern for heart failure with troponin leak.  Most recent echocardiogram from 

September 2022 showing no heart failure at that time.  There is concern for new 

onset cardiomyopathy.  Patient admitted given Lasix serial troponins ordered.  

Case discussed with Dr. Nagy for admission


Did you discuss the management of the patient with other professionals 

(professionals i.e. , PA, NP, lab, RT, psych nurse, , , 

teacher, , )? Give summary


@  -See above


Was critical care preformed (if so, how long)?


@  -No


Undiagnosed new problem with uncertain prognosis?


@  -No


Drug Therapy requiring intensive monitoring for toxicity (Heparin, Nitro, 

Insulin, Cardizem)?


@  -No


Were any procedures done?


@  -No


Diagnosis/symptom?  Acute, or Chronic, or Acute on Chronic?  Uncomplicated 

(without systemic symptoms) or Complicated (systemic symptoms)?


@  -Heart failure


Side effects of treatment?


@  -No


Exacerbation, Progression, or Severe Exacerbation?


@  -No


Poses a threat to life or bodily function? How? (Chest pain, USA, MI, pneumonia,

PE, COPD, DKA, ARF, appy, cholecystitis, CVA, Diverticulitis, Homicidal, 

Suicidal, threat to staff... and all critical care pts)


@  -yes











- Lab Data


Result diagrams: 


                                 10/17/24 19:30





                                 10/17/24 19:30


                                   Lab Results











  10/17/24 10/17/24 10/17/24 Range/Units





  19:30 19:30 19:30 


 


WBC  6.3    (3.8-10.6)  k/uL


 


RBC  4.90    (3.80-5.40)  m/uL


 


Hgb  15.7    (11.4-16.0)  gm/dL


 


Hct  50.6 H    (34.0-46.0)  %


 


MCV  103.3 H    (80.0-100.0)  fL


 


MCH  32.1    (25.0-35.0)  pg


 


MCHC  31.1    (31.0-37.0)  g/dL


 


RDW  12.4    (11.5-15.5)  %


 


Plt Count  281    (150-450)  k/uL


 


MPV  7.9    


 


Neutrophils %  57    %


 


Lymphocytes %  28    %


 


Monocytes %  9    %


 


Eosinophils %  2    %


 


Basophils %  1    %


 


Neutrophils #  3.6    (1.3-7.7)  k/uL


 


Lymphocytes #  1.8    (1.0-4.8)  k/uL


 


Monocytes #  0.5    (0-1.0)  k/uL


 


Eosinophils #  0.2    (0-0.7)  k/uL


 


Basophils #  0.0    (0-0.2)  k/uL


 


Hypochromasia  Slight    


 


Macrocytosis  Slight    


 


PT   11.6   (10.0-12.5)  sec


 


INR   1.1   (<1.2)  


 


APTT   26.5   (22.0-30.0)  sec


 


Sodium    139  (137-145)  mmol/L


 


Potassium    4.2  (3.5-5.1)  mmol/L


 


Chloride    95 L  ()  mmol/L


 


Carbon Dioxide    38 H  (22-30)  mmol/L


 


Anion Gap    6  mmol/L


 


BUN    11  (7-17)  mg/dL


 


Creatinine    0.46 L  (0.52-1.04)  mg/dL


 


Est GFR (CKD-EPI)AfAm    >90  (>60 ml/min/1.73 sqM)  


 


Est GFR (CKD-EPI)NonAf    >90  (>60 ml/min/1.73 sqM)  


 


Glucose    109 H  (74-99)  mg/dL


 


Plasma Lactic Acid Gabe     (0.7-2.0)  mmol/L


 


Calcium    9.3  (8.4-10.2)  mg/dL


 


Magnesium    1.6  (1.6-2.3)  mg/dL


 


Total Bilirubin    0.6  (0.2-1.3)  mg/dL


 


AST    25  (14-36)  U/L


 


ALT    15  (4-34)  U/L


 


Alkaline Phosphatase    63  ()  U/L


 


Troponin I     (0.000-0.034)  ng/mL


 


NT-Pro-B Natriuret Pep    2210  pg/mL


 


Total Protein    6.8  (6.3-8.2)  g/dL


 


Albumin    4.0  (3.5-5.0)  g/dL














  10/17/24 10/17/24 Range/Units





  19:30 19:30 


 


WBC    (3.8-10.6)  k/uL


 


RBC    (3.80-5.40)  m/uL


 


Hgb    (11.4-16.0)  gm/dL


 


Hct    (34.0-46.0)  %


 


MCV    (80.0-100.0)  fL


 


MCH    (25.0-35.0)  pg


 


MCHC    (31.0-37.0)  g/dL


 


RDW    (11.5-15.5)  %


 


Plt Count    (150-450)  k/uL


 


MPV    


 


Neutrophils %    %


 


Lymphocytes %    %


 


Monocytes %    %


 


Eosinophils %    %


 


Basophils %    %


 


Neutrophils #    (1.3-7.7)  k/uL


 


Lymphocytes #    (1.0-4.8)  k/uL


 


Monocytes #    (0-1.0)  k/uL


 


Eosinophils #    (0-0.7)  k/uL


 


Basophils #    (0-0.2)  k/uL


 


Hypochromasia    


 


Macrocytosis    


 


PT    (10.0-12.5)  sec


 


INR    (<1.2)  


 


APTT    (22.0-30.0)  sec


 


Sodium    (137-145)  mmol/L


 


Potassium    (3.5-5.1)  mmol/L


 


Chloride    ()  mmol/L


 


Carbon Dioxide    (22-30)  mmol/L


 


Anion Gap    mmol/L


 


BUN    (7-17)  mg/dL


 


Creatinine    (0.52-1.04)  mg/dL


 


Est GFR (CKD-EPI)AfAm    (>60 ml/min/1.73 sqM)  


 


Est GFR (CKD-EPI)NonAf    (>60 ml/min/1.73 sqM)  


 


Glucose    (74-99)  mg/dL


 


Plasma Lactic Acid Gaeb  1.1   (0.7-2.0)  mmol/L


 


Calcium    (8.4-10.2)  mg/dL


 


Magnesium    (1.6-2.3)  mg/dL


 


Total Bilirubin    (0.2-1.3)  mg/dL


 


AST    (14-36)  U/L


 


ALT    (4-34)  U/L


 


Alkaline Phosphatase    ()  U/L


 


Troponin I   0.059 H*  (0.000-0.034)  ng/mL


 


NT-Pro-B Natriuret Pep    pg/mL


 


Total Protein    (6.3-8.2)  g/dL


 


Albumin    (3.5-5.0)  g/dL














Disposition


Clinical Impression: 


 Heart failure





Disposition: ADMITTED AS IP TO THIS [facility]


Condition: Fair


Referrals: 


None,Stated [Primary Care Provider] - 1-2 days


Decision Time: 22:21

## 2024-10-17 NOTE — XR
EXAMINATION TYPE: XR chest 2V

 

DATE OF EXAM: 10/17/2024 8:53 PM

 

CLINICAL INDICATION: Female, 64 years old with history of hypoxia; PHH

 

COMPARISON: Chest radiographs from5/30/2024

 

TECHNIQUE: XR chest 2V Frontal view of the chest.

 

FINDINGS: 

Lungs/Pleura: Low lung volumes are present. There is no evidence of pleural effusion, focal consolida
tion, or pneumothorax.  

Pulmonary vascularity: Unremarkable.

Heart/mediastinum: Cardiomediastinal silhouette is unremarkable.

Musculoskeletal: No acute osseous pathology.

 

Other findings: None

 

IMPRESSION: 

Low lung volumes with a generalized hazy appearance which could represent atelectasis versus pulmonar
y edema correlate with serum BNP. 

 

[facility], Workstation: [id number], 10/17/2024 9:27 PM

## 2024-10-18 LAB
CHOLEST SERPL-MCNC: 176 MG/DL (ref 0–200)
HDLC SERPL-MCNC: 40.7 MG/DL (ref 40–60)
LDLC SERPL CALC-MCNC: 106.3 MG/DL (ref 0–131)
PH UR: 6 [PH] (ref 5–8)
RBC UR QL: <1 /HPF (ref 0–5)
SP GR UR: 1.01 (ref 1–1.03)
SQUAMOUS UR QL AUTO: <1 /HPF (ref 0–4)
TRIGL SERPL-MCNC: 145 MG/DL (ref 0–149)
UROBILINOGEN UR QL STRIP: <2 MG/DL (ref ?–2)
VLDLC SERPL CALC-MCNC: 29 MG/DL (ref 5–40)
WBC # UR AUTO: 2 /HPF (ref 0–5)

## 2024-10-18 RX ADMIN — MONTELUKAST SODIUM SCH MG: 10 TABLET, FILM COATED ORAL at 21:52

## 2024-10-18 RX ADMIN — ATORVASTATIN CALCIUM SCH MG: 20 TABLET, FILM COATED ORAL at 21:52

## 2024-10-18 RX ADMIN — FUROSEMIDE SCH MG: 10 INJECTION, SOLUTION INTRAMUSCULAR; INTRAVENOUS at 08:59

## 2024-10-18 RX ADMIN — PANTOPRAZOLE SODIUM SCH MG: 40 TABLET, DELAYED RELEASE ORAL at 08:59

## 2024-10-18 RX ADMIN — SERTRALINE HYDROCHLORIDE SCH MG: 50 TABLET, FILM COATED ORAL at 08:59

## 2024-10-18 RX ADMIN — APIXABAN SCH MG: 5 TABLET, FILM COATED ORAL at 21:52

## 2024-10-18 RX ADMIN — METOPROLOL TARTRATE SCH MG: 25 TABLET, FILM COATED ORAL at 18:01

## 2024-10-18 RX ADMIN — DIVALPROEX SODIUM SCH MG: 250 TABLET, FILM COATED, EXTENDED RELEASE ORAL at 09:00

## 2024-10-18 RX ADMIN — IPRATROPIUM BROMIDE AND ALBUTEROL SULFATE SCH ML: .5; 3 SOLUTION RESPIRATORY (INHALATION) at 05:49

## 2024-10-18 RX ADMIN — DOCUSATE SODIUM SCH MG: 100 CAPSULE, LIQUID FILLED ORAL at 08:59

## 2024-10-18 RX ADMIN — Medication SCH MG: at 21:52

## 2024-10-18 NOTE — P.HPIM
History of Present Illness





Patient is a 63-year-old female with a past medical history of hypertension, 

hyperlipidemia, osteoarthritis, severe alcohol abuse and multiple admissions due

to alcohol withdrawal symptoms recently, anxiety/depression, prior history of 

smoking and other multiple medical problems.  Also she was recently evaluated by

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  And she is a 

resident of [facility] currently.


Patient was sent from her nursing home for feeling lethargic, hypoxic, 

hypertensive.


On admission and when I saw the patient in the ER she is awake alert, she has 

some articulation problem, probably because of her neurological disease and bad 

dentures, probably mild degree of dysarthria related to her nerve degeneration.


She looks anxious as well.  However she is not in respiratory distress.


She complains from left side chest pain but states it is mild.  Her abdomen 

looks soft, breathing looks okay.  She has contractures on both hands and she 

cannot move both legs.  She has external Keen catheter.


Vitals are stable and patient is afebrile currently but on admission she had 

mild tachycardia of 107.


Rest of labs reviewed were unremarkable CBC, BMP, liver enzymes, INR.  Carbon 

dioxide is elevated slightly at 38


Troponin mildly elevated 0.5 x 2


proBNP is slightly elevated to 210


Chest x-ray is negative for acute process when I reviewed the chest x-ray but 

per urologist it could be atelectasis versus CHF correlate with proBNP


EKG showing sinus rhythm at 94 with no significant ST-T changes


On admission she was started on aspirin 325 mg and home dose of Eliquis 5 mg

















Review of Systems





Review of systems


CONSTITUTIONAL: No fever, no malaise, no fatigue. 


HEENT: No recent visual problems or hearing problems. Denied any sore throat. 


CARDIOVASCULAR: No  orthopnea, PND, no palpitations, no syncope. 


PULMONARY: No shortness of breath, no cough, no hemoptysis. 


GASTROINTESTINAL: No diarrhea, no nausea, no vomiting, no abdominal pain. 

Normoactive bowel sounds. 


NEUROLOGICAL: No headaches, no weakness, no numbness. 


HEMATOLOGICAL: Denies any bleeding or petechiae. 


GENITOURINARY: Denies any burning micturition, frequency, or urgency. 


MUSCULOSKELETAL/RHEUMATOLOGICAL: Denies any joint pain, swelling, or any muscle 

pain. 


ENDOCRINE: Denies any polyuria or polydipsia.








Past Medical History


Past Medical History: Hyperlipidemia, Hypertension, Osteoarthritis (OA)


Additional Past Medical History / Comment(s): Multiple recent falls. hx colon 

polyps, occasional diarrhea, hx of fall Nov 2020 and has been having pain right 

hip since that radiates down right leg, walks with limp., states breast implants

moving up.


History of Any Multi-Drug Resistant Organisms: None Reported


Past Surgical History: Breast Surgery, Orthopedic Surgery, Tonsillectomy


Additional Past Surgical History / Comment(s): right elbow surgery with screws, 

right knee surgery with plate and removal ., lumpectomy left breast, breast 

implants


Past Anesthesia/Blood Transfusion Reactions: Postoperative Nausea & Vomiting 

(PONV)


Past Psychological History: Anxiety, Depression


Smoking Status: Former smoker


Past Alcohol Use History: Abuse, Daily


Past Drug Use History: None Reported





- Past Family History


  ** Father


Family Medical History: Cancer





Medications and Allergies


                                Home Medications











 Medication  Instructions  Recorded  Confirmed  Type


 


Pantoprazole [Protonix] 40 mg PO DAILY #0 05/17/24 10/17/24 Rx


 


DULoxetine HCL [Cymbalta] 30 mg PO HS 05/23/24 10/17/24 History


 


Acetaminophen Tab [Tylenol Tab] 1,000 mg PO Q6HR PRN 10/17/24 10/17/24 History


 


Amoxicillin 875 mg PO TID@07,13,19 10/17/24 10/17/24 History


 


Apixaban [Eliquis] 5 mg PO BID@0700,1600 10/17/24 10/17/24 History


 


Atorvastatin [Lipitor] 10 mg PO HS 10/17/24 10/17/24 History


 


DULoxetine HCL [Cymbalta] 60 mg PO DAILY 10/17/24 10/17/24 History


 


Divalproex ER [Depakote ER] 250 mg PO BID 10/17/24 10/17/24 History


 


Docusate [Colace] 100 mg PO DAILY@0700 10/17/24 10/17/24 History


 


Gabapentin [Neurontin] 200 mg PO TID@07,13,19 10/17/24 10/17/24 History


 


Healthshake 1 dose PO TID-W/MEALS@07,12,17 10/17/24 10/17/24 History


 


Ipratropium-Albuterol Nebulize 3 ml INHALATION RT-Q4H 10/17/24 10/17/24 History





[Duoneb 0.5 mg-3 mg/3 ml Soln]    


 


Lasix Injection Solution 10mg/Ml 40 mg IM ONCE 10/17/24 10/17/24 History


 


Melatonin 5 mg PO HS@2000 10/17/24 10/17/24 History


 


Montelukast [Singulair] 10 mg PO HS 10/17/24 10/17/24 History


 


Ondansetron [Zofran] 4 mg PO Q6H PRN 10/17/24 10/17/24 History


 


Sertraline [Zoloft] 50 mg PO DAILY 10/17/24 10/17/24 History


 


clonazePAM [KlonoPIN] 0.5 mg PO BID@0700,1600 10/17/24 10/17/24 History


 


predniSONE See Taper PO AS DIRECTED 10/17/24 10/17/24 History


 


traMADol HCL 75 mg PO Q6H PRN 10/17/24 10/17/24 History








                                    Allergies











Allergy/AdvReac Type Severity Reaction Status Date / Time


 


latex Allergy Unknown Itching, Verified 10/17/24 20:11





   Blisters  


 


morphine Allergy  Rash/Hives Verified 10/17/24 20:11


 


codeine AdvReac Severe HEADACHE Verified 10/17/24 20:11


 


hydrocodone [From Vicodin] AdvReac Severe HEADACHE Verified 10/17/24 20:11


 


Anesthetics - Amide Type - AdvReac  GAS GIVES Verified 10/17/24 20:11





Select A   HER  





   HEADACHE,  





   VOMITING,  





   HEART  





   PALIPITATIONS  


 


Anesthetics - Juliana Type- AdvReac  GAS GIVES Verified 10/17/24 20:11





Parabens   HER  





   HEADACHE,  





   VOMITING,  





   HEART  





   PALIPITATIONS  


 


ANESTHESIA AdvReac Unknown GAS GIVES Uncoded 05/30/24 07:05





   HER  





   HEADACHE,  





   VOMITING,  





   HEART  





   PALIPITATIONS  














Physical Exam


Vitals: 


                                   Vital Signs











  Temp Pulse Resp BP Pulse Ox


 


 10/18/24 05:57   100   


 


 10/18/24 05:51   98   


 


 10/18/24 05:00   110 H  18  156/81  99


 


 10/18/24 02:00   107 H  18  137/96  98


 


 10/18/24 01:00   107 H  18  137/96  98


 


 10/17/24 21:19   101 H  20  126/106  99


 


 10/17/24 19:26  97.4 F L  95  17  142/92  98








                                Intake and Output











 10/17/24 10/18/24 10/18/24





 22:59 06:59 14:59


 


Other:   


 


  Weight 72.575 kg  














-GENERAL: The patient is awake but confused to time place and person, not in any

acute distress. Well developed, well nourished. 


HEENT: Pupils are round and equally reacting to light. EOMI. No scleral icterus.

No conjunctival pallor. Normocephalic, atraumatic. No pharyngeal erythema. No 

thyromegaly. 


CARDIOVASCULAR: S1 and S2 present. No murmurs, rubs, or gallops. 


PULMONARY: Chest is clear to auscultation, no wheezing , no crackles. 


-ABDOMEN: Soft, no mild suprapubic tenderness, but improved with distraction, 

nondistended, normoactive bowel sounds. No palpable organomegaly. 


MUSCULOSKELETAL: No joint swelling or deformity.  Patient has contractures of 

both hands but she is able to move the upper extremities symmetrically and equal


EXTREMITIES: No cyanosis, clubbing, or pedal edema. 


-NEUROLOGICAL: Patient awake alert but confused, she follows command, she has no

insight.  She has mild degree of dysarthria, she has paraplegia, contractures of

the upper extremities, she move upper extremities symmetrical and equal.


SKIN: No rashes. no petechiae.








Results


CBC & Chem 7: 


                                 10/17/24 19:30





                                 10/17/24 19:30


Labs: 


                  Abnormal Lab Results - Last 24 Hours (Table)











  10/17/24 10/17/24 10/17/24 Range/Units





  19:30 19:30 19:30 


 


Hct  50.6 H    (34.0-46.0)  %


 


MCV  103.3 H    (80.0-100.0)  fL


 


Chloride   95 L   ()  mmol/L


 


Carbon Dioxide   38 H   (22-30)  mmol/L


 


Creatinine   0.46 L   (0.52-1.04)  mg/dL


 


Glucose   109 H   (74-99)  mg/dL


 


Troponin I    0.059 H*  (0.000-0.034)  ng/mL














  10/17/24 10/18/24 Range/Units





  23:59 04:39 


 


Hct    (34.0-46.0)  %


 


MCV    (80.0-100.0)  fL


 


Chloride    ()  mmol/L


 


Carbon Dioxide    (22-30)  mmol/L


 


Creatinine    (0.52-1.04)  mg/dL


 


Glucose    (74-99)  mg/dL


 


Troponin I  0.056 H*  0.092 H*  (0.000-0.034)  ng/mL














Assessment and Plan


Assessment: 





Elevated troponin, rule out  coronary artery disease.


Possible mild acute CHF exacerbation, unknown E F


Mild acute hypoxic respiratory failure, currently stable


Recent history of alcohol use disorder


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 

including upper motor neuron, lower motor neuron with atrophy and fasciculation


Altered mental status most likely metabolic/toxic encephalopathy, with possible 

some elements of dementia or NC failure related to her history of alcohol use 

disorder and degenerative nerves








Plan: 





Continue with Eliquis


Continue with aspirin


Cardiologist consulted


We will check urine analysis and bladder scan given her suprapubic tenderness 

and confusion


Advance diet and monitor for any difficulty eating may consider further imaging 

of the abdomen


Continue with DVT prophylaxis, currently on Eliquis


GI prophylaxis on Protonix


Further recommendation based on the clinical course


Prognosis is guarded

## 2024-10-18 NOTE — HP
HISTORY AND PHYSICAL



HISTORY OF PRESENT ILLNESS:

A 64-year-old white female with lethargy and hypoxia, sent to the [facility]. Lethargic, hypoxic, hypotensive. She had blood pressure in the 90s over 50s,

possible septic.  She was given oxygen, such as nasal cannula.



MEDICATIONS:

1. Tramadol 75 q.6.

2. Zofran 4 q.6.

3. Zoloft 50 mg daily.

4. Klonopin 0.5 b.i.d.

5. Gabapentin 200 t.i.d.

6. DuoNeb q.i.d.

7. Depakote  q.i.d.

8. Eliquis 5 mg b.i.d.

9. Cymbalta 30 daily.



ALLERGIES:

Negative.



REVIEW OF SYSTEMS:

A 14-point review of systems otherwise negative.



PAST MEDICAL HISTORY:

Dyslipidemia, hypertension, osteoarthritis, colon polyps.



PAST SURGICAL HISTORY:

Breast surgery, orthopedic surgery, tonsillectomy, right elbow surgery with screws,

right knee surgery, plate removal, anxiety, depression, former smoker, alcohol abuse.



PHYSICAL EXAMINATION:

VITAL SIGNS: Temperature 97.4,  pulse 95 to 101, respiratory rate 17 to 20, blood

pressure 126 to 142 over 92 to 106, O2 of 98% to 99%.

CARDIOVASCULAR: S1, S2.

LUNGS: Transmitted upper sounds.

GI: Soft, nontender.

HEMATOLOGY: Negative Homans.

PSYCH: Fair mood and affect.

NEUROLOGIC: Cranial nerves intact



EKG sinus rhythm, 94.



Labs were reviewed.



Acute on chronic heart failure, COPD, hypertension, generalized debility, rule out

sepsis.  Prognosis guarded.





MMODL / IJN: [id number] / Job#: [id number]

## 2024-10-18 NOTE — P.CRDCN
History of Present Illness


History of present illness: 





HISTORY OF PRESENT ILLNESS:  





This is a 64-year-old female with a past medical history significant for 

pulmonary embolism, hyperlipidemia, anxiety, depression, alcohol abuse, and 

former nicotine dependence. Patient does not follow with a cardiologist. We have

been asked to see the patient in consultation for CHF and elevated troponin. 

Patient examined at the bedside in the emergency room.  The patient was brought 

to the emergency room from her [facility] where she resides secondary to hypoxia and 

shortness of breath.  Patient reports continued shortness of breath this morning

although improving from yesterday.  She denies any chest pain or pressure.  

Patient was found to be in congestive heart failure and was started on IV 

diuretics.  The patient denies any previous history of CHF.  She denies any 

history of myocardial infarction or previous stenting.  Denies history of atrial

fibrillation.  She states she is on Eliquis outpatient due to history of 

pulmonary embolism.





DIAGNOSTICS:


- EKG reveals sinus mechanism with no signs of acute ischemia.


- Chest xray low lung volumes with generalized hazy appearance which could 

represent atelectasis versus pulmonary edema.


- Laboratory data: WBC 6.3.  Hemoglobin 15.7.  Platelet count 281.  Sodium 139. 

Potassium 4.2.  BUN 11.  Creatinine 0.46.  Troponin 0.059.  0.056.  0.092.  

proBNP 2210.


- Current home cardiac medications include Lipitor 10 mg at night, Eliquis 5 mg 

twice a day


- Most recent echocardiogram obtained in September 2022 revealed ejection 

fraction of 55 to 60% with mild MR and mild TR


- Cardiac catheterization history: Patient denies





REVIEW OF SYSTEMS: 


At the time of my exam:


CONSTITUTIONAL: Denies fever or chills.


HEENT: Denies blurred vision, vision changes, or eye pain. Denies hemoptysis 


CARDIOVASCULAR: Denies chest pain. Denies orthopnea. Denies PND. Denies 

palpitations


RESPIRATORY: + shortness of breath. 


GASTROINTESTINAL: Denies abdominal pain. Denies nausea or vomiting. 


HEMATOLOGIC: Denies bleeding disorders.


GENITOURINARY:  Denies any blood in urine.


SKIN: Denies pruitis. Denies rash.





PHYSICAL EXAM: 


VITAL SIGNS: Reviewed.


GENERAL: Well-developed in no acute distress. 


HEENT: Head is normocephalic. Pupils are equal, round. Sclerae anicteric. Mucous

membranes of the mouth are moist. Neck supple. No JVD or thyromegaly


LUNGS: Respirations even and unlabored. Lungs essentially clear to auscultation 

bilaterally.


HEART: Regular rate and rhythm.  S1 and S2 heard.  Systolic murmur noted


ABDOMEN: Soft. Nondistended. Nontender.


EXTREMITIES: Limited range of motion.  No clubbing or cyanosis.  Peripheral 

pulses intact.  Trace bilateral lower extremity edema


NEUROLOGIC: Awake and alert. 





ASSESSMENT: 


Shortness of breath


Acute hypoxic respiratory failure requiring supplemental oxygen


Acute congestive heart failure with preserved EF, 55 to 60% in 2022, repeat echo

pending


Elevated troponins, type II MI secondary to oxygen supply/demand mismatch


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


History of pulmonary embolism, on [facility] outpatient


Hyperlipidemia


Anxiety


Depression


History of alcohol abuse


Former nicotine dependence





PLAN: 


An acute coronary event has been ruled out


Patient with prominent systolic murmur.  Obtain 2D echo to assess cardiac 

structure and function.  Previous echocardiogram in 2022 did not reveal any 

significant valvular abnormalities


Continue IV Lasix 40 mg every 12 hours


Daily weights, accurate intake and output, monitoring of kidney function


Not a candidate for Jardiance/Farxiga due to limited range of motion and 

inability to independently perform chary-care


Resume additional cardiac medications


Further recommendations pending patient course


Patient does not currently follow with a cardiologist.  Postdischarge, patient 

is to follow-up in the office with Dr. Youngblood








Nurse practitioner note has been reviewed by physician. Signing provider agrees 

with the documented findings, assessment, and plan of care documented by NP as a

scribe.








Past Medical History


Past Medical History: Hyperlipidemia, Hypertension, Osteoarthritis (OA)


Additional Past Medical History / Comment(s): Multiple recent falls. hx colon 

polyps, occasional diarrhea, hx of fall Nov 2020 and has been having pain right 

hip since that radiates down right leg, walks with limp., states breast implants

moving up.


History of Any Multi-Drug Resistant Organisms: None Reported


Past Surgical History: Breast Surgery, Orthopedic Surgery, Tonsillectomy


Additional Past Surgical History / Comment(s): right elbow surgery with screws, 

right knee surgery with plate and removal ., lumpectomy left breast, breast 

implants


Past Anesthesia/Blood Transfusion Reactions: Postoperative Nausea & Vomiting 

(PONV)


Past Psychological History: Anxiety, Depression


Smoking Status: Former smoker


Past Alcohol Use History: Abuse, Daily


Past Drug Use History: None Reported





- Past Family History


  ** Father


Family Medical History: Cancer





Medications and Allergies


                                Home Medications











 Medication  Instructions  Recorded  Confirmed  Type


 


Pantoprazole [Protonix] 40 mg PO DAILY #0 05/17/24 10/17/24 Rx


 


DULoxetine HCL [Cymbalta] 30 mg PO HS 05/23/24 10/17/24 History


 


Acetaminophen Tab [Tylenol Tab] 1,000 mg PO Q6HR PRN 10/17/24 10/17/24 History


 


Amoxicillin 875 mg PO TID@07,13,19 10/17/24 10/17/24 History


 


Apixaban [Eliquis] 5 mg PO BID@0700,1600 10/17/24 10/17/24 History


 


Atorvastatin [Lipitor] 10 mg PO HS 10/17/24 10/17/24 History


 


DULoxetine HCL [Cymbalta] 60 mg PO DAILY 10/17/24 10/17/24 History


 


Divalproex ER [Depakote ER] 250 mg PO BID 10/17/24 10/17/24 History


 


Docusate [Colace] 100 mg PO DAILY@0700 10/17/24 10/17/24 History


 


Gabapentin [Neurontin] 200 mg PO TID@07,13,19 10/17/24 10/17/24 History


 


Healthshake 1 dose PO TID-W/MEALS@07,12,17 10/17/24 10/17/24 History


 


Ipratropium-Albuterol Nebulize 3 ml INHALATION RT-Q4H 10/17/24 10/17/24 History





[Duoneb 0.5 mg-3 mg/3 ml Soln]    


 


Lasix Injection Solution 10mg/Ml 40 mg IM ONCE 10/17/24 10/17/24 History


 


Melatonin 5 mg PO HS@2000 10/17/24 10/17/24 History


 


Montelukast [Singulair] 10 mg PO HS 10/17/24 10/17/24 History


 


Ondansetron [Zofran] 4 mg PO Q6H PRN 10/17/24 10/17/24 History


 


Sertraline [Zoloft] 50 mg PO DAILY 10/17/24 10/17/24 History


 


clonazePAM [KlonoPIN] 0.5 mg PO BID@0700,1600 10/17/24 10/17/24 History


 


predniSONE See Taper PO AS DIRECTED 10/17/24 10/17/24 History


 


traMADol HCL 75 mg PO Q6H PRN 10/17/24 10/17/24 History








                                    Allergies











Allergy/AdvReac Type Severity Reaction Status Date / Time


 


latex Allergy Unknown Itching, Verified 10/17/24 20:11





   Blisters  


 


morphine Allergy  Rash/Hives Verified 10/17/24 20:11


 


codeine AdvReac Severe HEADACHE Verified 10/17/24 20:11


 


hydrocodone [From Vicodin] AdvReac Severe HEADACHE Verified 10/17/24 20:11


 


Anesthetics - Amide Type - AdvReac  GAS GIVES Verified 10/17/24 20:11





Select A   HER  





   HEADACHE,  





   VOMITING,  





   HEART  





   PALIPITATIONS  


 


Anesthetics - Juliana Type- AdvReac  GAS GIVES Verified 10/17/24 20:11





Parabens   HER  





   HEADACHE,  





   VOMITING,  





   HEART  





   PALIPITATIONS  


 


ANESTHESIA AdvReac Unknown GAS GIVES Uncoded 05/30/24 07:05





   HER  





   HEADACHE,  





   VOMITING,  





   HEART  





   PALIPITATIONS  














Physical Exam


Vitals: 


                                   Vital Signs











  Temp Pulse Resp BP Pulse Ox


 


 10/18/24 11:06   114 H  18  131/67  94 L


 


 10/18/24 10:30   114 H   


 


 10/18/24 10:17   112 H   


 


 10/18/24 08:55  98.9 F  105 H  18  139/84  95


 


 10/18/24 05:57   100   


 


 10/18/24 05:51   98   


 


 10/18/24 05:00   110 H  18  156/81  99


 


 10/18/24 02:00   107 H  18  137/96  98


 


 10/18/24 01:00   107 H  18  137/96  98


 


 10/17/24 21:19   101 H  20  126/106  99


 


 10/17/24 19:26  97.4 F L  95  17  142/92  98








                                Intake and Output











 10/17/24 10/18/24 10/18/24





 22:59 06:59 14:59


 


Other:   


 


  Weight 72.575 kg  














Results





                                 10/17/24 19:30





                                 10/17/24 19:30


                                 Cardiac Enzymes











  10/17/24 10/17/24 10/17/24 Range/Units





  19:30 19:30 23:59 


 


AST  25    (14-36)  U/L


 


Troponin I   0.059 H*  0.056 H*  (0.000-0.034)  ng/mL














  10/18/24 Range/Units





  04:39 


 


AST   (14-36)  U/L


 


Troponin I  0.092 H*  (0.000-0.034)  ng/mL








                                   Coagulation











  10/17/24 Range/Units





  19:30 


 


PT  11.6  (10.0-12.5)  sec


 


APTT  26.5  (22.0-30.0)  sec








                                     Lipids











  10/17/24 Range/Units





  19:30 


 


Triglycerides  145.00  (0..00)  mg/dL


 


Cholesterol  176.00  (0..00)  mg/dL


 


HDL Cholesterol  40.70  (40.00-60.00)  mg/dL


 


Cholesterol/HDL Ratio  4.32  Ratio








                                       CBC











  10/17/24 Range/Units





  19:30 


 


WBC  6.3  (3.8-10.6)  k/uL


 


RBC  4.90  (3.80-5.40)  m/uL


 


Hgb  15.7  (11.4-16.0)  gm/dL


 


Hct  50.6 H  (34.0-46.0)  %


 


Plt Count  281  (150-450)  k/uL








                          Comprehensive Metabolic Panel











  10/17/24 Range/Units





  19:30 


 


Sodium  139  (137-145)  mmol/L


 


Potassium  4.2  (3.5-5.1)  mmol/L


 


Chloride  95 L  ()  mmol/L


 


Carbon Dioxide  38 H  (22-30)  mmol/L


 


BUN  11  (7-17)  mg/dL


 


Creatinine  0.46 L  (0.52-1.04)  mg/dL


 


Glucose  109 H  (74-99)  mg/dL


 


Calcium  9.3  (8.4-10.2)  mg/dL


 


AST  25  (14-36)  U/L


 


ALT  15  (4-34)  U/L


 


Alkaline Phosphatase  63  ()  U/L


 


Total Protein  6.8  (6.3-8.2)  g/dL


 


Albumin  4.0  (3.5-5.0)  g/dL








                               Current Medications











Generic Name Dose Route Start Last Admin





  Trade Name Freq  PRN Reason Stop Dose Admin


 


Acetaminophen  1,000 mg  10/18/24 00:36 





  Acetaminophen Tab 500 Mg Tab  PO  





  Q6HR PRN  





  Moderate to Severe Pain (4-10)  


 


Albuterol/Ipratropium  3 ml  10/18/24 04:00  10/18/24 10:16





  Ipratropium-Albuterol 3 Ml Neb  INHALATION   3 ml





  RT-Q4H SAI   Administration


 


Apixaban  5 mg  10/18/24 21:00 





  Apixaban 5 Mg Tab  PO  





  BID [facility]  





  Protocol  


 


Atorvastatin Calcium  10 mg  10/18/24 21:00 





  Atorvastatin 10 Mg Tab  PO  





  HS [facility]  


 


Clonazepam  0.5 mg  10/18/24 07:00  10/18/24 08:59





  Clonazepam 0.5 Mg Tab  PO   0.5 mg





  BID@0700,1600 SAI   Administration


 


Divalproex Sodium  250 mg  10/18/24 09:00  10/18/24 09:00





  Divalproex Er 250 Mg Tab.Er.24h  PO   250 mg





  BID SAI   Administration


 


Docusate Sodium  100 mg  10/18/24 09:00  10/18/24 08:59





  Docusate 100 Mg Cap  PO   100 mg





  DAILY SAI   Administration


 


Furosemide  40 mg  10/18/24 09:00  10/18/24 08:59





  Furosemide 10 Mg/Ml 4 Ml Vial  IV   40 mg





  Q12HR SAI   Administration


 


Melatonin  5 mg  10/18/24 20:00 





  Melatonin 5 Mg Tablet  PO  





  HS@2000 [facility]  


 


Montelukast Sodium  10 mg  10/18/24 21:00 





  Montelukast 10 Mg Tab  PO  





  HS [facility]  


 


Nitroglycerin  0.4 mg  10/17/24 22:16 





  Nitroglycerin Sl Tabs 0.4 Mg Tab  SUBLINGUAL  





  Q5M PRN  





  Chest Pain  


 


Ondansetron HCl  4 mg  10/18/24 00:36 





  Ondansetron 4 Mg Tab  PO  





  Q6H PRN  





  Nausea And Vomiting  


 


Pantoprazole Sodium  40 mg  10/18/24 07:30  10/18/24 08:59





  Pantoprazole 40 Mg Tablet  PO   40 mg





  DAILY@0730 SAI   Administration


 


Sertraline HCl  50 mg  10/18/24 09:00  10/18/24 08:59





  Sertraline 50 Mg Tab  PO   50 mg





  DAILY SAI   Administration


 


Tramadol HCl  75 mg  10/18/24 00:36 





  Tramadol 50 Mg Tab  PO  





  Q6H PRN  





  Moderate to Severe Pain (4-10)  








                                Intake and Output











 10/17/24 10/18/24 10/18/24





 22:59 06:59 14:59


 


Other:   


 


  Weight 72.575 kg  








                                        





                                 10/17/24 19:30 





                                 10/17/24 19:30

## 2024-10-19 LAB
ALBUMIN SERPL-MCNC: 4.4 G/DL (ref 3.5–5)
ALP SERPL-CCNC: 74 U/L (ref 38–126)
ALT SERPL-CCNC: 15 U/L (ref 4–34)
ANION GAP SERPL CALC-SCNC: 9 MMOL/L
AST SERPL-CCNC: 32 U/L (ref 14–36)
BASOPHILS # BLD AUTO: 0 K/UL (ref 0–0.2)
BASOPHILS NFR BLD AUTO: 0 %
BUN SERPL-SCNC: 13 MG/DL (ref 7–17)
CALCIUM SPEC-MCNC: 10.2 MG/DL (ref 8.4–10.2)
CHLORIDE SERPL-SCNC: 90 MMOL/L (ref 98–107)
CO2 SERPL-SCNC: 40 MMOL/L (ref 22–30)
EOSINOPHIL # BLD AUTO: 0 K/UL (ref 0–0.7)
EOSINOPHIL NFR BLD AUTO: 0 %
ERYTHROCYTE [DISTWIDTH] IN BLOOD BY AUTOMATED COUNT: 5.4 M/UL (ref 3.8–5.4)
ERYTHROCYTE [DISTWIDTH] IN BLOOD: 13 % (ref 11.5–15.5)
GLUCOSE SERPL-MCNC: 131 MG/DL (ref 74–99)
HCT VFR BLD AUTO: 52.4 % (ref 34–46)
HGB BLD-MCNC: 18.2 GM/DL (ref 11.4–16)
LYMPHOCYTES # SPEC AUTO: 2.3 K/UL (ref 1–4.8)
LYMPHOCYTES NFR SPEC AUTO: 21 %
MCH RBC QN AUTO: 33.7 PG (ref 25–35)
MCHC RBC AUTO-ENTMCNC: 34.8 G/DL (ref 31–37)
MCV RBC AUTO: 97.1 FL (ref 80–100)
MONOCYTES # BLD AUTO: 1.2 K/UL (ref 0–1)
MONOCYTES NFR BLD AUTO: 11 %
NEUTROPHILS # BLD AUTO: 6.9 K/UL (ref 1.3–7.7)
NEUTROPHILS NFR BLD AUTO: 65 %
PLATELET # BLD AUTO: 303 K/UL (ref 150–450)
POTASSIUM SERPL-SCNC: 3.4 MMOL/L (ref 3.5–5.1)
PROT SERPL-MCNC: 7.4 G/DL (ref 6.3–8.2)
SODIUM SERPL-SCNC: 139 MMOL/L (ref 137–145)
WBC # BLD AUTO: 10.6 K/UL (ref 3.8–10.6)

## 2024-10-19 RX ADMIN — POTASSIUM CHLORIDE SCH MEQ: 20 TABLET, EXTENDED RELEASE ORAL at 17:52

## 2024-10-19 NOTE — P.PN
Subjective





Patient is a 63-year-old female with a past medical history of hypertension, 

hyperlipidemia, osteoarthritis, severe alcohol abuse and multiple admissions due

to alcohol withdrawal symptoms recently, anxiety/depression, prior history of 

smoking and other multiple medical problems.  Also she was recently evaluated by

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  And she is a 

resident of [facility] currently.


Patient was sent from her nursing home for feeling lethargic, hypoxic, 

hypertensive.


On admission and when I saw the patient in the ER she is awake alert, she has 

some articulation problem, probably because of her neurological disease and bad 

dentures, probably mild degree of dysarthria related to her nerve degeneration.


She looks anxious as well.  However she is not in respiratory distress.


She complains from left side chest pain but states it is mild.  Her abdomen 

looks soft, breathing looks okay.  She has contractures on both hands and she 

cannot move both legs.  She has external Keen catheter.


Vitals are stable and patient is afebrile currently but on admission she had 

mild tachycardia of 107.


Rest of labs reviewed were unremarkable CBC, BMP, liver enzymes, INR.  Carbon 

dioxide is elevated slightly at 38


Troponin mildly elevated 0.5 x 2


proBNP is slightly elevated to 210


Chest x-ray is negative for acute process when I reviewed the chest x-ray but 

per urologist it could be atelectasis versus CHF correlate with proBNP


EKG showing sinus rhythm at 94 with no significant ST-T changes


On admission she was started on aspirin 325 mg and home dose of Eliquis 5 mg








10/19


Patient presents with evidence of CHF with pulmonary congestion and left pleural

effusion and elevated proBNP


Patient was started on IV Lasix 40 mg twice daily.  Today breathing is quiet.


Patient complains from periumbilical upset but her abdomen is soft.  She denies 

abdominal pain


Patient could not eat well today.


Other than that she is lying comfortable in bed with no other specific 

complaints





Objective





- Vital Signs


Vital signs: 


                                   Vital Signs











Temp  98.4 F   10/19/24 07:50


 


Pulse  98   10/19/24 12:05


 


Resp  16   10/19/24 12:05


 


BP  117/73   10/19/24 12:05


 


Pulse Ox  94 L  10/19/24 12:05


 


FiO2      








                                 Intake & Output











 10/18/24 10/19/24 10/19/24





 18:59 06:59 18:59


 


Intake Total   240


 


Output Total 1100 300 


 


Balance -1100 -300 240


 


Weight  49.5 kg 49.5 kg


 


Intake:   


 


  Oral   240


 


Output:   


 


  Urine 1100 300 


 


Other:   


 


  Voiding Method  Incontinent Incontinent





  External Catheter External Catheter














- Exam





-GENERAL: The patient is awake but confused to time place and person, not in any

acute distress. Well developed, well nourished. 


HEENT: Pupils are round and equally reacting to light. EOMI. No scleral icterus.

No conjunctival pallor. Normocephalic, atraumatic. No pharyngeal erythema. No 

thyromegaly. 


CARDIOVASCULAR: S1 and S2 present. No murmurs, rubs, or gallops. 


PULMONARY: Chest is clear to auscultation, no wheezing , no crackles. 


-ABDOMEN: Soft, no mild suprapubic tenderness, but improved with distraction, 

nondistended, normoactive bowel sounds. No palpable organomegaly. 


MUSCULOSKELETAL: No joint swelling or deformity.  Patient has contractures of 

both hands but she is able to move the upper extremities symmetrically and equal


EXTREMITIES: No cyanosis, clubbing, or pedal edema. 


-NEUROLOGICAL: Patient awake alert but confused, she follows command, she has no

insight.  She has mild degree of dysarthria, she has paraplegia, contractures of

the upper extremities, she move upper extremities symmetrical and equal.


SKIN: No rashes. no petechiae.








- Labs


CBC & Chem 7: 


                                 10/19/24 05:51





                                 10/19/24 05:51


Labs: 


                  Abnormal Lab Results - Last 24 Hours (Table)











  10/19/24 10/19/24 Range/Units





  05:51 05:51 


 


Hgb  18.2 H   (11.4-16.0)  gm/dL


 


Hct  52.4 H   (34.0-46.0)  %


 


Monocytes #  1.2 H   (0-1.0)  k/uL


 


Potassium   3.4 L  (3.5-5.1)  mmol/L


 


Chloride   90 L  ()  mmol/L


 


Carbon Dioxide   40 H  (22-30)  mmol/L


 


Creatinine   0.44 L  (0.52-1.04)  mg/dL


 


Glucose   131 H  (74-99)  mg/dL








                      Microbiology - Last 24 Hours (Table)











 10/18/24 02:13 Urine Culture - Final





 Urine,Voided 














Assessment and Plan


Assessment: 





Elevated troponin, rule out  coronary artery disease.


Possible mild acute CHF exacerbation, unknown E F


Mild acute hypoxic respiratory failure, currently stable


Recent history of alcohol use disorder


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 

including upper motor neuron, lower motor neuron with atrophy and fasciculation


Altered mental status most likely metabolic/toxic encephalopathy, with possible 

some elements of dementia or NC failure related to her history of alcohol use 

disorder and degenerative nerves








Plan: 





Continue with Eliquis


Continue with aspirin


Cardiologist consulted


We will check urine analysis and bladder scan given her suprapubic tenderness 

and confusion


Advance diet and monitor for any difficulty eating may consider further imaging 

of the abdomen


Continue with DVT prophylaxis, currently on Eliquis


GI prophylaxis on Protonix


Further recommendation based on the clinical course


Prognosis is guarded

## 2024-10-20 LAB
ANION GAP SERPL CALC-SCNC: 9 MMOL/L
BUN SERPL-SCNC: 35 MG/DL (ref 7–17)
CALCIUM SPEC-MCNC: 10.2 MG/DL (ref 8.4–10.2)
CHLORIDE SERPL-SCNC: 91 MMOL/L (ref 98–107)
CO2 SERPL-SCNC: 39 MMOL/L (ref 22–30)
GLUCOSE SERPL-MCNC: 132 MG/DL (ref 74–99)
POTASSIUM SERPL-SCNC: 3.8 MMOL/L (ref 3.5–5.1)
SODIUM SERPL-SCNC: 139 MMOL/L (ref 137–145)

## 2024-10-20 RX ADMIN — BUMETANIDE SCH MG: 1 TABLET ORAL at 08:06

## 2024-10-20 RX ADMIN — ONDANSETRON HYDROCHLORIDE PRN MG: 4 TABLET, FILM COATED ORAL at 15:22

## 2024-10-20 NOTE — P.PN
Subjective





Patient is a 63-year-old female with a past medical history of hypertension, 

hyperlipidemia, osteoarthritis, severe alcohol abuse and multiple admissions due

to alcohol withdrawal symptoms recently, anxiety/depression, prior history of 

smoking and other multiple medical problems.  Also she was recently evaluated by

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  And she is a 

resident of [facility] currently.


Patient was sent from her nursing home for feeling lethargic, hypoxic, 

hypertensive.


On admission and when I saw the patient in the ER she is awake alert, she has 

some articulation problem, probably because of her neurological disease and bad 

dentures, probably mild degree of dysarthria related to her nerve degeneration.


She looks anxious as well.  However she is not in respiratory distress.


She complains from left side chest pain but states it is mild.  Her abdomen 

looks soft, breathing looks okay.  She has contractures on both hands and she 

cannot move both legs.  She has external Keen catheter.


Vitals are stable and patient is afebrile currently but on admission she had 

mild tachycardia of 107.


Rest of labs reviewed were unremarkable CBC, BMP, liver enzymes, INR.  Carbon 

dioxide is elevated slightly at 38


Troponin mildly elevated 0.5 x 2


proBNP is slightly elevated to 210


Chest x-ray is negative for acute process when I reviewed the chest x-ray but 

per urologist it could be atelectasis versus CHF correlate with proBNP


EKG showing sinus rhythm at 94 with no significant ST-T changes


On admission she was started on aspirin 325 mg and home dose of Eliquis 5 mg








10/19


Patient presents with evidence of CHF with pulmonary congestion and left pleural

effusion and elevated proBNP


Patient was started on IV Lasix 40 mg twice daily.  Today breathing is quiet.


Patient complains from periumbilical upset but her abdomen is soft.  She denies 

abdominal pain


Patient could not eat well today.


Other than that she is lying comfortable in bed with no other specific 

complaints





10/20


Patient does not feel well today she feels more tired but main concern is the 

diarrhea frequent bowel movement


Also she has periumbilical tenderness but more on the left side


We going to order CT of the abdomen pelvis with oral contrast.  Check C. 

difficile.  Repeat labs today and tomorrow


Patient started on Bumex, she feels little short of breath.  


Echocardiogram is reviewed by me showing preserved ejection fraction at 65%.  

But she has moderate to severe mitral regurgitation.








Objective





- Vital Signs


Vital signs: 


                                   Vital Signs











Temp  98.2 F   10/20/24 08:01


 


Pulse  75   10/20/24 11:45


 


Resp  16   10/20/24 11:45


 


BP  149/85   10/20/24 11:45


 


Pulse Ox  99   10/20/24 11:45


 


FiO2      








                                 Intake & Output











 10/19/24 10/20/24 10/20/24





 18:59 06:59 18:59


 


Intake Total 480  


 


Output Total 32  


 


Balance 448  


 


Weight 49.5 kg  


 


Intake:   


 


  Oral 480  


 


Output:   


 


  Post Void Residual 32  


 


Other:   


 


  Voiding Method Incontinent Incontinent Incontinent





 External Catheter External Catheter External Catheter


 


  # Voids 1  


 


  # Bowel Movements 1 1 1














- Exam





-GENERAL: The patient is awake but confused to time place and person, not in any

acute distress. Well developed, well nourished. 


HEENT: Pupils are round and equally reacting to light. EOMI. No scleral icterus.

No conjunctival pallor. Normocephalic, atraumatic. No pharyngeal erythema. No 

thyromegaly. 


CARDIOVASCULAR: S1 and S2 present. No murmurs, rubs, or gallops. 


PULMONARY: Chest is clear to auscultation, no wheezing , no crackles. 


--ABDOMEN: Soft, left sided abdominal tenderness with no guarding or rebound 

tenderness, nondistended, normoactive bowel sounds. No palpable organomegaly. 


MUSCULOSKELETAL: No joint swelling or deformity.  Patient has contractures of 

both hands but she is able to move the upper extremities symmetrically and equal


EXTREMITIES: No cyanosis, clubbing, or pedal edema. 


-NEUROLOGICAL: Patient awake alert but confused, she follows command, she has no

insight.  She has mild degree of dysarthria, she has paraplegia, contractures of

the upper extremities, she move upper extremities symmetrical and equal.


SKIN: No rashes. no petechiae.








- Labs


CBC & Chem 7: 


                                 10/19/24 05:51





                                 10/19/24 05:51





Assessment and Plan


Assessment: 





Elevated troponin, rule out  coronary artery disease.


Possibleacute CHF exacerbation, unknown E F


Moderate to severe mitral regurgitation


Mild acute hypoxic respiratory failure, currently stable


Abdominal pain tenderness with diarrhea.  Could be gastroenteritis.  Rule out 

other pathologies


Recent history of alcohol use disorder


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 

including upper motor neuron, lower motor neuron with atrophy and fasciculation


Altered mental status most likely metabolic/toxic encephalopathy, with possible 

some elements of dementia or NC failure related to her history of alcohol use 

disorder and degenerative nerves








Plan: 





Continue with Eliquis


Continue with aspirin


Cardiologist consulted 


Check CAT scan abdomen and pelvis


Monitor labs


Advance diet and monitor for any difficulty eating may consider further imaging 

of the abdomen


Continue with DVT prophylaxis, currently on Eliquis


GI prophylaxis on Protonix


Further recommendation based on the clinical course


Prognosis is guarded

## 2024-10-20 NOTE — P.PN
Subjective


Progress Note Date: 10/19/24





HISTORY OF PRESENT ILLNESS:  





This is a 64-year-old female with a past medical history significant for pu

lmonary embolism, hyperlipidemia, anxiety, depression, alcohol abuse, and former

nicotine dependence. Patient does not follow with a cardiologist. We have been 

asked to see the patient in consultation for CHF and elevated troponin. Patient 

examined at the bedside in the emergency room.  The patient was brought to the 

emergency room from her [facility] where she resides secondary to hypoxia and shortness

of breath.  Patient reports continued shortness of breath this morning although 

improving from yesterday.  She denies any chest pain or pressure.  Patient was 

found to be in congestive heart failure and was started on IV diuretics.  The 

patient denies any previous history of CHF.  She denies any history of 

myocardial infarction or previous stenting.  Denies history of atrial 

fibrillation.  She states she is on Eliquis outpatient due to history of 

pulmonary embolism.





Progress note


10/19/2024


Echo shows hyperdynamic LV with EF more than 65%, moderate concentric LVH, 

moderate to severe mitral regurgitation.








DIAGNOSTICS:


- EKG reveals sinus mechanism with no signs of acute ischemia.


- Chest xray low lung volumes with generalized hazy appearance which could 

represent atelectasis versus pulmonary edema.


- Laboratory data: WBC 6.3.  Hemoglobin 15.7.  Platelet count 281.  Sodium 139. 

Potassium 4.2.  BUN 11.  Creatinine 0.46.  Troponin 0.059.  0.056.  0.092.  

proBNP 2210.


- Current home cardiac medications include Lipitor 10 mg at night, Eliquis 5 mg 

twice a day


- Most recent echocardiogram obtained in September 2022 revealed ejection 

fraction of 55 to 60% with mild MR and mild TR


- Cardiac catheterization history: Patient denies





REVIEW OF SYSTEMS: 


At the time of my exam:


CONSTITUTIONAL: Denies fever or chills.


HEENT: Denies blurred vision, vision changes, or eye pain. Denies hemoptysis 


CARDIOVASCULAR: Denies chest pain. Denies orthopnea. Denies PND. Denies 

palpitations


RESPIRATORY: + shortness of breath. 


GASTROINTESTINAL: Denies abdominal pain. Denies nausea or vomiting. 


HEMATOLOGIC: Denies bleeding disorders.


GENITOURINARY:  Denies any blood in urine.


SKIN: Denies pruitis. Denies rash.





PHYSICAL EXAM: 


VITAL SIGNS: Reviewed.


GENERAL: Well-developed in no acute distress. 


HEENT: Head is normocephalic. Pupils are equal, round. Sclerae anicteric. Mucous

membranes of the mouth are moist. Neck supple. No JVD or thyromegaly


LUNGS: Respirations even and unlabored. Lungs essentially clear to auscultation 

bilaterally.


HEART: Regular rate and rhythm.  S1 and S2 heard.  Systolic murmur noted


ABDOMEN: Soft. Nondistended. Nontender.


EXTREMITIES: Limited range of motion.  No clubbing or cyanosis.  Peripheral 

pulses intact.  Trace bilateral lower extremity edema


NEUROLOGIC: Awake and alert. 





ASSESSMENT: 


Shortness of breath


Acute hypoxic respiratory failure requiring supplemental oxygen


Acute HFpEF exacerbation





Elevated troponins, type II MI secondary to oxygen supply/demand mismatch


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


History of pulmonary embolism, on [facility] outpatient


Hyperlipidemia


Anxiety


Depression


History of alcohol abuse


Former nicotine dependence





PLAN: 


Echo shows hyperdynamic LV cavity.  Moderate to severe mitral regurgitation.


Continue IV Lasix today.  Transition to p.o. tomorrow


Metoprolol 25 mg twice daily





Not a candidate for Jardiance/Farxiga due to limited range of motion and 

inability to independently perform chary-care





Patient does not currently follow with a cardiologist.  Postdischarge, patient 

is to follow-up in the office with Dr. Youngblood





Objective





- Vital Signs


Vital signs: 


                                   Vital Signs











Temp  98.2 F   10/20/24 19:45


 


Pulse  107 H  10/20/24 19:45


 


Resp  19   10/20/24 19:45


 


BP  138/88   10/20/24 19:45


 


Pulse Ox  97   10/20/24 19:45


 


FiO2      








                                 Intake & Output











 10/20/24 10/20/24 10/21/24





 06:59 18:59 06:59


 


Intake Total   10


 


Balance   10


 


Intake:   


 


  IV   10


 


    Invasive Line 1   10


 


Other:   


 


  Voiding Method Incontinent Incontinent Incontinent





 External Catheter External Catheter External Catheter


 


  # Bowel Movements 1 1 














- Labs


CBC & Chem 7: 


                                 10/19/24 05:51





                                 10/20/24 15:06


Labs: 


                  Abnormal Lab Results - Last 24 Hours (Table)











  10/20/24 Range/Units





  15:06 


 


Chloride  91 L  ()  mmol/L


 


Carbon Dioxide  39 H  (22-30)  mmol/L


 


BUN  35 H  (7-17)  mg/dL


 


Creatinine  0.51 L  (0.52-1.04)  mg/dL


 


Glucose  132 H  (74-99)  mg/dL

## 2024-10-20 NOTE — CA
Transthoracic Echo Report 

 Name: Pura Eid 

 MRN:    [id number] 

 Age:    64     Gender:     F 

 

 :    1960 

 Exam Date:     10/19/2024 08:43 

 Exam Location: [facility] 

 Ht (in):     65     Wt (lb):     160 

 Ordering Physician:        Halie Trujillo 

 Attending/Referring Phys:         [id number], Ronnie 

 Technician         Nati Yang, AARON 

 Procedure CPT: 

 Indications:       LV function, CHF 

 

 Cardiac Hx: 

 Technical Quality:      Fair 

 Contrast 1:                                Total Dose (mL): 

 Contrast 2:                                Total Dose (mL): 

 

 MEASUREMENTS  (Male / Female) Normal Values 

 2D ECHO 

 LV Diastolic Diameter PLAX        2.2 cm                4.2 - 5.9 / 3.9 - 5.3 cm 

 LV Systolic Diameter PLAX         1.0 cm                 

 IVS Diastolic Thickness           1.6 cm                0.6 - 1.0 / 0.6 - 0.9 cm 

 LVPW Diastolic Thickness          1.5 cm                0.6 - 1.0 / 0.6 - 0.9 cm 

 LV Relative Wall Thickness        1.4                    

 RV Internal Dim ED PLAX           2.0 cm                 

 LA Systolic Diameter LX           1.9 cm                3.0 - 4.0 / 2.7 - 3.8 cm 

 LV Diastolic Volume MOD BP        24.7 cm???              67 - 155 / 56 - 104 cm??? 

 LV Systolic Volume MOD BP         11.7 cm???               -  / 19 - 49 cm??? 

 LV Ejection Fraction MOD BP       52.5 %                >= 55  % 

 LV Cardiac Index MOD BP           753.5 cm???/min???m???       

 LV Diastolic Volume MOD 4C        23.9 cm???               

 LV Systolic Volume MOD 4C         7.8 cm???                

 LV Ejection Fraction MOD 4C       67.3 %                 

 LV Cardiac Index MOD 4C           936.7 cm???/min???m???       

 LV Diastolic Length 4C            6.0 cm                 

 LV Systolic Length 4C             2.8 cm                 

 LV Diastolic Volume MOD 2C        23.5 cm???               

 LV Systolic Volume MOD 2C         11.0 cm???               

 LV Ejection Fraction MOD 2C       53.0 %                 

 LV Cardiac Index MOD 2C           723.2 cm???/min???m???       

 LV Diastolic Length 2C            5.4 cm                 

 LV Systolic Length 2C             4.8 cm                 

 LA Volume                         34.2 cm???              18 - 58 / 22 - 52 cm??? 

 LA Volume Index                   18.6 cm???/m???           16 - 28 cm???/m??? 

 

 M-MODE 

 Aortic Root Diameter MM           3.3 cm                 

 LA Systolic Diameter MM           2.5 cm                 

 LA Ao Ratio MM                    0.8                    

 AV Cusp Separation MM             1.3 cm                 

 

 DOPPLER 

 MV Area PHT                       4.7 cm???                

 Mitral E Point Velocity           56.5 cm/s              

 Mitral A Point Velocity           89.2 cm/s              

 Mitral E to A Ratio               0.6                    

 MV Deceleration Time              161.3 ms               

 TR Peak Velocity                  268.7 cm/s             

 TR Peak Gradient                  28.9 mmHg              

 Right Ventricular Systolic Press  32.3 mmHg              

 

 

 FINDINGS 

 Left Ventricle 

 Left ventricular ejection fraction is estimated at 69-70%. Moderately increased  

 septal wall thickness. Moderately increased posterior wall thickness. No  

 obvious regional wall motion abnormalities. Left ventricular cavity size  

 normal. Hyperdynamic left ventricular systolic function. HOCM LVOT PG 70 mmHG. 

 

 Right Ventricle 

 Normal right ventricular size and function. Right ventricular systolic pressure  

 within normal limits. 

 

 Right Atrium 

 Normal right atrial size. 

 

 Left Atrium 

 Normal left atrial size. 

 

 Mitral Valve 

 Myxomatous (redundant) mitral valve. Moderate mitral regurgitation. Systolic  

 anterior motion of the mitral valve. No mitral stenosis. 

 

 Aortic Valve 

 Trileaflet aortic valve. No aortic valve stenosis or regurgitation. 

 

 Tricuspid Valve 

 Structurally normal tricuspid valve. Mild tricuspid regurgitation. 

 

 Pulmonic Valve 

 Structurally normal pulmonic valve. Trace pulmonic regurgitation. No pulmonic  

 stenosis. 

 

 Pericardium 

 No pericardial or pleural effusion. 

 

 Aorta 

 Normal size aortic root and proximal ascending aorta. 

 

 CONCLUSIONS 

 Hyperdynamic LV.  LVEF > 65% 

 Moderate concentric LVH 

 No obvious regional wall motion abnormality 

 Moderate to severe mitral regurgitation. 

 Normal RV size and systolic function 

 Previewed by:  

 Dr Jenaro Bailey 

 (Electronically Signed) 

 Final Date:      2024 14:36

## 2024-10-20 NOTE — P.PN
Subjective


Progress Note Date: 10/20/24





HISTORY OF PRESENT ILLNESS:  





This is a 64-year-old female with a past medical history significant for pu

lmonary embolism, hyperlipidemia, anxiety, depression, alcohol abuse, and former

nicotine dependence. Patient does not follow with a cardiologist. We have been 

asked to see the patient in consultation for CHF and elevated troponin. Patient 

examined at the bedside in the emergency room.  The patient was brought to the 

emergency room from her [facility] where she resides secondary to hypoxia and shortness

of breath.  Patient reports continued shortness of breath this morning although 

improving from yesterday.  She denies any chest pain or pressure.  Patient was 

found to be in congestive heart failure and was started on IV diuretics.  The 

patient denies any previous history of CHF.  She denies any history of 

myocardial infarction or previous stenting.  Denies history of atrial 

fibrillation.  She states she is on Eliquis outpatient due to history of 

pulmonary embolism.





Progress note


10/19/2024


Echo shows hyperdynamic LV with EF more than 65%, moderate concentric LVH, 

moderate to severe mitral regurgitation.





10/20/2024


Sinus tachycardic with heart rate 110, appears somewhat euvolemic.  Will di

scontinue IV diuretics and start p.o.





DIAGNOSTICS:


- EKG reveals sinus mechanism with no signs of acute ischemia.


- Chest xray low lung volumes with generalized hazy appearance which could 

represent atelectasis versus pulmonary edema.


- Laboratory data: WBC 6.3.  Hemoglobin 15.7.  Platelet count 281.  Sodium 139. 

Potassium 4.2.  BUN 11.  Creatinine 0.46.  Troponin 0.059.  0.056.  0.092.  

proBNP 2210.


- Current home cardiac medications include Lipitor 10 mg at night, Eliquis 5 mg 

twice a day


- Most recent echocardiogram obtained in September 2022 revealed ejection 

fraction of 55 to 60% with mild MR and mild TR


- Cardiac catheterization history: Patient denies





REVIEW OF SYSTEMS: 


At the time of my exam:


CONSTITUTIONAL: Denies fever or chills.


HEENT: Denies blurred vision, vision changes, or eye pain. Denies hemoptysis 


CARDIOVASCULAR: Denies chest pain. Denies orthopnea. Denies PND. Denies 

palpitations


RESPIRATORY: + shortness of breath. 


GASTROINTESTINAL: Denies abdominal pain. Denies nausea or vomiting. 


HEMATOLOGIC: Denies bleeding disorders.


GENITOURINARY:  Denies any blood in urine.


SKIN: Denies pruitis. Denies rash.





PHYSICAL EXAM: 


VITAL SIGNS: Reviewed.


GENERAL: Well-developed in no acute distress. 


HEENT: Head is normocephalic. Pupils are equal, round. Sclerae anicteric. Mucous

membranes of the mouth are moist. Neck supple. No JVD or thyromegaly


LUNGS: Respirations even and unlabored. Lungs essentially clear to auscultation 

bilaterally.


HEART: Regular rate and rhythm.  S1 and S2 heard.  Systolic murmur noted


ABDOMEN: Soft. Nondistended. Nontender.


EXTREMITIES: Limited range of motion.  No clubbing or cyanosis.  Peripheral 

pulses intact.  Trace bilateral lower extremity edema


NEUROLOGIC: Awake and alert. 





ASSESSMENT: 


Shortness of breath


Acute hypoxic respiratory failure requiring supplemental oxygen


Acute HFpEF exacerbation





Elevated troponins, type II MI secondary to oxygen supply/demand mismatch


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


History of pulmonary embolism, on [facility] outpatient


Hyperlipidemia


Anxiety


Depression


History of alcohol abuse


Former nicotine dependence





PLAN: 


Echo shows hyperdynamic LV cavity.  Moderate to severe mitral regurgitation.


Discontinue Lasix, start p.o. Bumex 1 mg p.o. daily.


Metoprolol 25 mg twice daily





Not a candidate for Jardiance/Farxiga due to limited range of motion and 

inability to independently perform chary-care


Patient does not currently follow with a cardiologist.  Postdischarge, patient 

is to follow-up in the office with Dr. Youngblood





Objective





- Vital Signs


Vital signs: 


                                   Vital Signs











Temp  98.2 F   10/20/24 19:45


 


Pulse  107 H  10/20/24 19:45


 


Resp  19   10/20/24 19:45


 


BP  138/88   10/20/24 19:45


 


Pulse Ox  97   10/20/24 19:45


 


FiO2      








                                 Intake & Output











 10/20/24 10/20/24 10/21/24





 06:59 18:59 06:59


 


Intake Total   10


 


Balance   10


 


Intake:   


 


  IV   10


 


    Invasive Line 1   10


 


Other:   


 


  Voiding Method Incontinent Incontinent Incontinent





 External Catheter External Catheter External Catheter


 


  # Bowel Movements 1 1 














- Labs


CBC & Chem 7: 


                                 10/19/24 05:51





                                 10/20/24 15:06


Labs: 


                  Abnormal Lab Results - Last 24 Hours (Table)











  10/20/24 Range/Units





  15:06 


 


Chloride  91 L  ()  mmol/L


 


Carbon Dioxide  39 H  (22-30)  mmol/L


 


BUN  35 H  (7-17)  mg/dL


 


Creatinine  0.51 L  (0.52-1.04)  mg/dL


 


Glucose  132 H  (74-99)  mg/dL

## 2024-10-21 LAB
ANION GAP SERPL CALC-SCNC: 8 MMOL/L
BASOPHILS # BLD AUTO: 0.1 K/UL (ref 0–0.2)
BASOPHILS NFR BLD AUTO: 1 %
BUN SERPL-SCNC: 47 MG/DL (ref 7–17)
CALCIUM SPEC-MCNC: 9.9 MG/DL (ref 8.4–10.2)
CHLORIDE SERPL-SCNC: 91 MMOL/L (ref 98–107)
CO2 SERPL-SCNC: 40 MMOL/L (ref 22–30)
EOSINOPHIL # BLD AUTO: 0.1 K/UL (ref 0–0.7)
EOSINOPHIL NFR BLD AUTO: 1 %
ERYTHROCYTE [DISTWIDTH] IN BLOOD BY AUTOMATED COUNT: 5.1 M/UL (ref 3.8–5.4)
ERYTHROCYTE [DISTWIDTH] IN BLOOD: 13.1 % (ref 11.5–15.5)
GLUCOSE SERPL-MCNC: 118 MG/DL (ref 74–99)
HCT VFR BLD AUTO: 51.1 % (ref 34–46)
HGB BLD-MCNC: 16.9 GM/DL (ref 11.4–16)
LYMPHOCYTES # SPEC AUTO: 2.9 K/UL (ref 1–4.8)
LYMPHOCYTES NFR SPEC AUTO: 24 %
MCH RBC QN AUTO: 33.1 PG (ref 25–35)
MCHC RBC AUTO-ENTMCNC: 33 G/DL (ref 31–37)
MCV RBC AUTO: 100.3 FL (ref 80–100)
MONOCYTES # BLD AUTO: 1.1 K/UL (ref 0–1)
MONOCYTES NFR BLD AUTO: 9 %
NEUTROPHILS # BLD AUTO: 7.6 K/UL (ref 1.3–7.7)
NEUTROPHILS NFR BLD AUTO: 63 %
PLATELET # BLD AUTO: 244 K/UL (ref 150–450)
POTASSIUM SERPL-SCNC: 3.6 MMOL/L (ref 3.5–5.1)
SODIUM SERPL-SCNC: 139 MMOL/L (ref 137–145)
WBC # BLD AUTO: 12 K/UL (ref 3.8–10.6)

## 2024-10-21 RX ADMIN — IOPAMIDOL PRN ML: 612 INJECTION, SOLUTION INTRAVENOUS at 15:45

## 2024-10-21 RX ADMIN — CEFAZOLIN ONE MLS/HR: 330 INJECTION, POWDER, FOR SOLUTION INTRAMUSCULAR; INTRAVENOUS at 08:28

## 2024-10-21 RX ADMIN — CEFAZOLIN SCH MLS/HR: 330 INJECTION, POWDER, FOR SOLUTION INTRAMUSCULAR; INTRAVENOUS at 08:36

## 2024-10-21 NOTE — CT
EXAMINATION TYPE: CT abdomen pelvis wo con

 

DATE OF EXAM: 10/21/2024

 

COMPARISON: None

 

HISTORY: 64-year-old female abdominal tenderness, no output in 10+ hours

 

CT DLP: 454 mGycm. Automated exposure control for dose reduction was used.

 

TECHNIQUE: Contiguous axial scanning of the abdomen and pelvis without IV contrast. Coronal and sagit
lazaro reconstructions performed. 

 

FINDINGS: 

Partially visualized bilateral breast implants. Heart borderline in size. There are trace bilateral p
leural effusions and prominent bibasilar opacities with air bronchograms. Ectatic lower descending th
oracic aorta 2.8 cm.

 

Noncontrast appearance of the liver, gallbladder, adrenal glands, kidneys, spleen, and pancreas withi
n normal limits.

 

No dilated small bowel, free fluid, or free air. No mesenteric or retroperitoneal lymphadenopathy.

 

No significant stool burden. Oral contrast progressed to the ascending colon. No pericolonic inflamma
tory change.

 

Moderate circumferential bladder wall thickening. Small pelvic phleboliths. Uterus surgically absent.
 Neither ovary is identified. No abnormal fluid collection in the pelvis or pelvic lymphadenopathy.

 

There appears to be a rectal tube which is out with posterior soiling.

 

Facet arthropathy mid to lower lumbar spine. Mild-to-moderate degenerative disc disease L2-L3.

 

 

IMPRESSION: 

1.  No significant stool burden. The rectal tube is out with posterior soiling.

2.  Trace bilateral pleural effusions with prominent bibasilar atelectasis versus consolidation/infil
trate. Correlate with symptoms to exclude the possibility of pneumonia.

 

 

 

[facility], Workstation: [id number], 10/21/2024 6:04 PM

## 2024-10-21 NOTE — P.GSCN
History of Present Illness


Consult date: 10/21/24


History of present illness: 





CHIEF COMPLAINT: Abdominal pain





HISTORY OF PRESENT ILLNESS: This is a 64-year-old female who presented with 

mental status changes and hypotension.  She is followed by cardiology service 

and being treated for an acute CHF exacerbation.  Patient has a recent diagnosis

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  She is bedbound.  Surgical

service has been consulted in regards to her abdominal pain.  Patient reports 

she has had intermittent left lower quadrant abdominal pain for 2 years.  She 

reports pain is worse after eating.  However, she was able to eat breakfast 

today with no pain.  Patient currently has no abdominal pain.  She also reports 

having intermittent nausea.  She has been having bowel movements.  She does go 

back and forth between diarrhea and constipation but reports that is normal for 

her.  She denies any blood in the stools.  She reports her last colonoscopy was 

about 10 years ago and had noncancerous polyps removed on prior colonoscopies.  

Past surgical history includes a hysterectomy.  CT scan abdomen pelvis is 

pending.  She does report a prior history of colitis.





PAST MEDICAL HISTORY: 


CHF, PE on Eliquis, hyperlipidemia, Hypertension, Osteoarthritis (OA)Multiple 

recent falls. hx colon polyps, occasional diarrhea, hx of fall Nov 2020 and has 

been having pain right hip since that radiates down right leg, 





PAST SURGICAL HISTORY: 


 Breast Surgery, Orthopedic Surgery, Tonsillectomy, umpectomy left breast, 

breast implants





MEDICATIONS: 


See below





ALLERGIES: 


See below





SOCIAL HISTORY: No illicit drug use.  History of EtOH abuse.





REVIEW OF SYSTEMS: 


CONSTITUTIONAL: Denies fever or chills.


HEENT: Denies blurred vision, vision changes, or eye pain. Denies hemoptysis 


CARDIOVASCULAR: Denies chest pain or pressure.


RESPIRATORY: No shortness of breath. 


GASTROINTESTINAL: See HPI for pertinent findings


HEMATOLOGIC: Denies bleeding disorders.


GENITOURINARY:  Denies any blood in urine or increased urinary frequency.  


SKIN: Denies pruitis. Denies rash.





PHYSICAL EXAM: 


VITAL SIGNS: Reviewed


GENERAL: Well-developed in no acute distress. 


ABDOMEN:  Soft. Nondistended.  Nontender


NEUROLOGIC:  Alert and oriented.  Cranial nerves II through XII grossly intact.





LABORATORY DATA:


WBC did go up from 10.6-12 Hgb 16.9 platelets 244


Sodium 139 potassium 3.6 creatinine 0.90


Stool for C. difficile negative





IMAGING:


CT scan abdomen pelvis pending





ASSESSMENT: 


1.  Intermittent left lower quadrant abdominal pain.  Currently no pain and 

nontender on exam.


2.  Leukocytosis





PLAN: 


-Follow-up on CT scan abdomen pelvis with oral contrast that was ordered by 

medicine service


-Further recommendations forthcoming per surgeon 











Physician Assistant note has been reviewed by physician. Signing provider agrees

with the documented findings, assessment, and plan of care.





Attestation


Patient seen and examined at bedside.  Presented with chief complaint of 

abdominal pain, mental status changes and hypotension.  Consult was placed 

secondary to the abdominal pain.  During exam, patient denies abdominal pain.  

Mild leukocytosis is noted.  Patient has pending CT of the abdomen and pelvis 

with oral contrast.  Will await the CT for further evaluation as patient's 

abdominal pain has resolved.





Ana Cohen, 





Past Medical History


Past Medical History: Hyperlipidemia, Hypertension, Osteoarthritis (OA)


Additional Past Medical History / Comment(s): Multiple recent falls. hx colon 

polyps, occasional diarrhea, hx of fall Nov 2020 and has been having pain right 

hip since that radiates down right leg, walks with limp., states breast implants

moving up.


History of Any Multi-Drug Resistant Organisms: None Reported


Past Surgical History: Breast Surgery, Orthopedic Surgery, Tonsillectomy


Additional Past Surgical History / Comment(s): right elbow surgery with screws, 

right knee surgery with plate and removal ., lumpectomy left breast, breast 

implants


Past Anesthesia/Blood Transfusion Reactions: Postoperative Nausea & Vomiting 

(PONV)


Past Psychological History: Anxiety, Depression


Smoking Status: Unknown if ever smoked


Past Alcohol Use History: Abuse, Daily


Additional Past Alcohol Use History / Comment(s): quit smoking 35 yrs ago 

(1986), hx of 2ppd, started age 15.


Past Drug Use History: None Reported


Additional Drug Use History / Comment(s): Patient states she opens a new bottle 

twice a month, gallon of vodka-drinks until its gone. Last drink in 3/21/2024





- Past Family History


  ** Father


Family Medical History: Cancer





Medications and Allergies


                                Home Medications











 Medication  Instructions  Recorded  Confirmed  Type


 


Pantoprazole [Protonix] 40 mg PO DAILY #0 05/17/24 10/17/24 Rx


 


DULoxetine HCL [Cymbalta] 30 mg PO HS 05/23/24 10/17/24 History


 


Acetaminophen Tab [Tylenol Tab] 1,000 mg PO Q6HR PRN 10/17/24 10/17/24 History


 


Amoxicillin 875 mg PO TID@07,13,19 10/17/24 10/17/24 History


 


Apixaban [Eliquis] 5 mg PO BID@0700,1600 10/17/24 10/17/24 History


 


Atorvastatin [Lipitor] 10 mg PO HS 10/17/24 10/17/24 History


 


DULoxetine HCL [Cymbalta] 60 mg PO DAILY 10/17/24 10/17/24 History


 


Divalproex ER [Depakote ER] 250 mg PO BID 10/17/24 10/17/24 History


 


Docusate [Colace] 100 mg PO DAILY@0700 10/17/24 10/17/24 History


 


Gabapentin [Neurontin] 200 mg PO TID@07,13,19 10/17/24 10/17/24 History


 


Healthshake 1 dose PO TID-W/MEALS@07,12,17 10/17/24 10/17/24 History


 


Ipratropium-Albuterol Nebulize 3 ml INHALATION RT-Q4H 10/17/24 10/17/24 History





[Duoneb 0.5 mg-3 mg/3 ml Soln]    


 


Lasix Injection Solution 10mg/Ml 40 mg IM ONCE 10/17/24 10/17/24 History


 


Melatonin 5 mg PO HS@2000 10/17/24 10/17/24 History


 


Montelukast [Singulair] 10 mg PO HS 10/17/24 10/17/24 History


 


Ondansetron [Zofran] 4 mg PO Q6H PRN 10/17/24 10/17/24 History


 


Sertraline [Zoloft] 50 mg PO DAILY 10/17/24 10/17/24 History


 


clonazePAM [KlonoPIN] 0.5 mg PO BID@0700,1600 10/17/24 10/17/24 History


 


predniSONE See Taper PO AS DIRECTED 10/17/24 10/17/24 History


 


traMADol HCL 75 mg PO Q6H PRN 10/17/24 10/17/24 History








                                    Allergies











Allergy/AdvReac Type Severity Reaction Status Date / Time


 


latex Allergy Unknown Itching, Verified 10/17/24 20:11





   Blisters  


 


morphine Allergy  Rash/Hives Verified 10/17/24 20:11


 


codeine AdvReac Severe HEADACHE Verified 10/17/24 20:11


 


hydrocodone [From Vicodin] AdvReac Severe HEADACHE Verified 10/17/24 20:11


 


Anesthetics - Amide Type - AdvReac  GAS GIVES Verified 10/17/24 20:11





Select A   HER  





   HEADACHE,  





   VOMITING,  





   HEART  





   PALIPITATIONS  


 


Anesthetics - Juliana Type- AdvReac  GAS GIVES Verified 10/17/24 20:11





Parabens   HER  





   HEADACHE,  





   VOMITING,  





   HEART  





   PALIPITATIONS  


 


ANESTHESIA AdvReac Unknown GAS GIVES Uncoded 05/30/24 07:05





   HER  





   HEADACHE,  





   VOMITING,  





   HEART  





   PALIPITATIONS  














Surgical - Exam


Osteopathic Statement: *.  No significant issues noted on an osteopathic struc

tural exam other than those noted in the History and Physical/Consult.


                                   Vital Signs











Temp Pulse Resp BP Pulse Ox


 


 97.4 F L  95   17   142/92   98 


 


 10/17/24 19:26  10/17/24 19:26  10/17/24 19:26  10/17/24 19:26  10/17/24 19:26














Results





- Labs





                                 10/21/24 05:16





                                 10/21/24 05:16


                  Abnormal Lab Results - Last 24 Hours (Table)











  10/20/24 10/21/24 10/21/24 Range/Units





  15:06 05:16 05:16 


 


WBC   12.0 H   (3.8-10.6)  k/uL


 


Hgb   16.9 H   (11.4-16.0)  gm/dL


 


Hct   51.1 H   (34.0-46.0)  %


 


MCV   100.3 H   (80.0-100.0)  fL


 


Monocytes #   1.1 H   (0-1.0)  k/uL


 


Chloride  91 L   91 L  ()  mmol/L


 


Carbon Dioxide  39 H   40 H  (22-30)  mmol/L


 


BUN  35 H   47 H  (7-17)  mg/dL


 


Creatinine  0.51 L    (0.52-1.04)  mg/dL


 


Glucose  132 H   118 H  (74-99)  mg/dL








                                 Diabetes panel











  10/20/24 10/21/24 Range/Units





  15:06 05:16 


 


Sodium  139  139  (137-145)  mmol/L


 


Potassium  3.8  3.6  (3.5-5.1)  mmol/L


 


Chloride  91 L  91 L  ()  mmol/L


 


Carbon Dioxide  39 H  40 H  (22-30)  mmol/L


 


BUN  35 H  47 H  (7-17)  mg/dL


 


Creatinine  0.51 L  0.90  (0.52-1.04)  mg/dL


 


Glucose  132 H  118 H  (74-99)  mg/dL


 


Calcium  10.2  9.9  (8.4-10.2)  mg/dL








                                  Calcium panel











  10/20/24 10/21/24 Range/Units





  15:06 05:16 


 


Calcium  10.2  9.9  (8.4-10.2)  mg/dL








                                 Pituitary panel











  10/20/24 10/21/24 Range/Units





  15:06 05:16 


 


Sodium  139  139  (137-145)  mmol/L


 


Potassium  3.8  3.6  (3.5-5.1)  mmol/L


 


Chloride  91 L  91 L  ()  mmol/L


 


Carbon Dioxide  39 H  40 H  (22-30)  mmol/L


 


BUN  35 H  47 H  (7-17)  mg/dL


 


Creatinine  0.51 L  0.90  (0.52-1.04)  mg/dL


 


Glucose  132 H  118 H  (74-99)  mg/dL


 


Calcium  10.2  9.9  (8.4-10.2)  mg/dL








                                  Adrenal panel











  10/20/24 10/21/24 Range/Units





  15:06 05:16 


 


Sodium  139  139  (137-145)  mmol/L


 


Potassium  3.8  3.6  (3.5-5.1)  mmol/L


 


Chloride  91 L  91 L  ()  mmol/L


 


Carbon Dioxide  39 H  40 H  (22-30)  mmol/L


 


BUN  35 H  47 H  (7-17)  mg/dL


 


Creatinine  0.51 L  0.90  (0.52-1.04)  mg/dL


 


Glucose  132 H  118 H  (74-99)  mg/dL


 


Calcium  10.2  9.9  (8.4-10.2)  mg/dL

## 2024-10-22 LAB
ALBUMIN SERPL-MCNC: 3.5 G/DL (ref 3.8–4.9)
ALBUMIN/GLOB SERPL: 1.59 RATIO (ref 1.6–3.17)
ALP SERPL-CCNC: 48 U/L (ref 41–126)
ALT SERPL-CCNC: 12 U/L (ref 8–44)
ANION GAP SERPL CALC-SCNC: 12 MMOL/L (ref 4–12)
AST SERPL-CCNC: 27 U/L (ref 13–35)
BASOPHILS # BLD AUTO: 0.05 X 10*3/UL (ref 0–0.1)
BASOPHILS NFR BLD AUTO: 0.5 %
BUN SERPL-SCNC: 25.3 MG/DL (ref 9–27)
BUN/CREAT SERPL: 50.6 RATIO (ref 12–20)
CALCIUM SPEC-MCNC: 9.1 MG/DL (ref 8.7–10.3)
CHLORIDE SERPL-SCNC: 96 MMOL/L (ref 96–109)
CO2 SERPL-SCNC: 35 MMOL/L (ref 21.6–31.8)
EOSINOPHIL # BLD AUTO: 0.11 X 10*3/UL (ref 0.04–0.35)
EOSINOPHIL NFR BLD AUTO: 1 %
ERYTHROCYTE [DISTWIDTH] IN BLOOD BY AUTOMATED COUNT: 4.55 X 10*6/UL (ref 4.1–5.2)
ERYTHROCYTE [DISTWIDTH] IN BLOOD: 12.8 % (ref 11.5–14.5)
GLOBULIN SER CALC-MCNC: 2.2 G/DL (ref 1.6–3.3)
GLUCOSE SERPL-MCNC: 99 MG/DL (ref 70–110)
HCT VFR BLD AUTO: 46.8 % (ref 37.2–46.3)
HGB BLD-MCNC: 14.8 G/DL (ref 12–15)
IMM GRANULOCYTES BLD QL AUTO: 0.3 %
LYMPHOCYTES # SPEC AUTO: 3.61 X 10*3/UL (ref 0.9–5)
LYMPHOCYTES NFR SPEC AUTO: 32.6 %
MCH RBC QN AUTO: 32.5 PG (ref 27–32)
MCHC RBC AUTO-ENTMCNC: 31.6 G/DL (ref 32–37)
MCV RBC AUTO: 102.9 FL (ref 80–97)
MONOCYTES # BLD AUTO: 1.1 X 10*3/UL (ref 0.2–1)
MONOCYTES NFR BLD AUTO: 9.9 %
NEUTROPHILS # BLD AUTO: 6.17 X 10*3/UL (ref 1.8–7.7)
NEUTROPHILS NFR BLD AUTO: 55.7 %
NRBC BLD AUTO-RTO: 0 X 10*3/UL (ref 0–0.01)
PH UR: 6 [PH] (ref 5–8)
PLATELET # BLD AUTO: 185 X 10*3/UL (ref 140–440)
POTASSIUM SERPL-SCNC: 3.4 MMOL/L (ref 3.5–5.5)
PROT SERPL-MCNC: 5.7 G/DL (ref 6.2–8.2)
RBC UR QL: 1 /HPF (ref 0–5)
SODIUM SERPL-SCNC: 143 MMOL/L (ref 135–145)
SP GR UR: 1.02 (ref 1–1.03)
SQUAMOUS UR QL AUTO: <1 /HPF (ref 0–4)
UROBILINOGEN UR QL STRIP: <2 MG/DL (ref ?–2)
WBC # BLD AUTO: 11.07 X 10*3/UL (ref 4.5–10)
WBC # UR AUTO: 8 /HPF (ref 0–5)

## 2024-10-22 RX ADMIN — AZITHROMYCIN MONOHYDRATE SCH MLS/HR: 500 INJECTION, POWDER, LYOPHILIZED, FOR SOLUTION INTRAVENOUS at 06:18

## 2024-10-22 RX ADMIN — POTASSIUM CHLORIDE SCH MEQ: 20 TABLET, EXTENDED RELEASE ORAL at 13:26

## 2024-10-22 RX ADMIN — POTASSIUM CHLORIDE SCH MEQ: 20 TABLET, EXTENDED RELEASE ORAL at 20:42

## 2024-10-22 NOTE — P.PN
Subjective


Progress Note Date: 10/22/24





CHIEF COMPLAINT: Abdominal pain





HISTORY OF PRESENT ILLNESS: Patient mid to the hospital with mental status 

changes, hypotension and abdominal pain.  She does have evidence of pneumonia 

and is on antibiotics.  Patient does report some left lower quadrant abdominal 

pain today.  Denies any nausea or vomiting.  Appetite was decreased this 

morning.  She has been having bowel movements.  CT scan abdomen pelvis reported 

no significant stool burden.  The rectal tube is out with posterior soiling.  

Afebrile.  WBC is down from 12-11 Hgb 14.8





PHYSICAL EXAM: 


VITAL SIGNS: Reviewed.


GENERAL: Well-developed in no acute distress. 


ABDOMEN:  Soft.  Nondistended.  Mild tenderness left lower quadrant.  No guardin

g


NEUROLOGIC: confused





ASSESSMENT: 


1.  Intermittent left lower quadrant abdominal pain.   


2.  Leukocytosis possibly related to patient's pneumonia.  Proving with 

antibiotics








PLAN: 


-Continue regular diet


-Continue supportive care


-No surgical intervention planned





Physician Assistant note has been reviewed by physician. Signing provider agrees

with the documented findings, assessment, and plan of care.





Objective





- Vital Signs


Vital signs: 


                                   Vital Signs











Temp  98.0 F   10/22/24 07:20


 


Pulse  103 H  10/22/24 07:20


 


Resp  16   10/22/24 07:20


 


BP  90/60   10/22/24 07:20


 


Pulse Ox  92 L  10/22/24 07:20


 


FiO2      








                                 Intake & Output











 10/21/24 10/22/24 10/22/24





 18:59 06:59 18:59


 


Intake Total  590 


 


Output Total  400 


 


Balance  190 


 


Intake:   


 


  Oral  590 


 


Output:   


 


  Urine  400 


 


Other:   


 


  Voiding Method Diaper Diaper Diaper





 External Catheter External Catheter External Catheter


 


  # Voids 1  


 


  # Bowel Movements  1 














- Labs


CBC & Chem 7: 


                                 10/22/24 04:47





                                 10/22/24 04:47


Labs: 


                  Abnormal Lab Results - Last 24 Hours (Table)











  10/22/24 10/22/24 10/22/24 Range/Units





  04:47 04:47 05:15 


 


WBC  11.07 H    (4.50-10.00)  X 10*3/uL


 


Hct  46.8 H    (37.2-46.3)  %


 


MCV  102.9 H    (80.0-97.0)  FL


 


MCH  32.5 H    (27.0-32.0)  pg


 


MCHC  31.6 L    (32.0-37.0)  g/dL


 


MPV  12.6 H    (9.5-12.2)  FL


 


Monocytes #  1.10 H    (0.20-1.00)  X 10*3/uL


 


Potassium   3.4 L   (3.5-5.5)  mmol/L


 


Carbon Dioxide   35.0 H   (21.6-31.8)  mmol/L


 


Creatinine   0.5 L   (0.6-1.5)  mg/dL


 


BUN/Creatinine Ratio   50.60 H   (12.00-20.00)  Ratio


 


Total Protein   5.7 L   (6.2-8.2)  g/dL


 


Albumin   3.5 L   (3.8-4.9)  g/dL


 


Albumin/Globulin Ratio   1.59 L   (1.60-3.17)  Ratio


 


Urine Blood    Small H  (Negative)  


 


Ur Leukocyte Esterase    Small H  (Negative)  


 


Urine WBC    8 H  (0-5)  /hpf


 


Urine Bacteria    Occasional H  (None)  /hpf














Assessment and Plan


Assessment: 





63 yo female w/ non specific abdominal pain


-ctap is negative for acute process


-no surgical intevention at this time


Time with Patient: Less than 30

## 2024-10-22 NOTE — CT
EXAMINATION TYPE: CT chest wo con

 

DATE OF EXAM: 10/22/2024

 

COMPARISON: NONE

 

HISTORY: copd

 

CT DLP: 256.3 mGycm.  Automated Exposure Control for Dose Reduction was Utilized.

 

 

TECHNIQUE:  CT scan of the thorax is performed without IV contrast.

 

FINDINGS:

 

LUNGS: Trace bilateral pleural effusions. There is and additional right greater than left bibasilar c
onsolidation and/or atelectasis. Upper lungs are clear bilaterally.

 

MEDIASTINUM: Lack of IV contrast is noted to limit evaluation for mediastinal and especially hilar ad
enopathy. There are no definitive greater than 1 cm mediastinal lymph nodes.   No cardiomegaly or per
icardial effusion is seen. Some coronary artery calcification is present.

 

OTHER: Bilateral breast implants are noted. Some oral contrast is seen within the visualized bowel lo
ops.

 

IMPRESSION: No significant emphysematous change. Trace bilateral pleural effusions with bibasilar con
solidation and/or atelectasis noted.

 

[facility], Workstation: [id number], 10/22/2024 12:55 AM

## 2024-10-22 NOTE — PN
PROGRESS NOTE



A 64-year-old white female, pelvis CT shows pneumonia, trace bilateral pleural

effusions, prominent bibasilar opacities with air bronchograms.  Suspect pneumonia.

Started on Rocephin, azithromycin.  Get pulmonary consult.  _____ trace pleural

effusions, possible pneumonia, _____ pneumonia. Prognosis guarded.





MMODL / IJN: [id number] / Job#: [id number]

## 2024-10-23 NOTE — PN
PROGRESS NOTE



64-year-old white female who has pneumonia, started on Rocephin, azithromycin,

bibasilar consolidation, treated her with appropriate medications, antibiotics, updraft

steroids.  Echocardiogram, ejection fraction 69-70%, severe mitral regurgitation.

Prognosis is guarded.  Continue current treatment.  Ambulate as tolerated.  Please see

further orders.  Keep her getting stronger and ambulate her around and treat her

pneumonia and she will slowly get better.  Continue current treatment.





MMODL / IJN: [id number] / Job#: [id number]

## 2024-10-24 RX ADMIN — SODIUM CHLORIDE, PRESERVATIVE FREE SCH ML: 5 INJECTION INTRAVENOUS at 21:07

## 2024-10-24 NOTE — PN
PROGRESS NOTE



SUBJECTIVE:

She has pneumonia bilaterally.  She is given IV antibiotics, updraft treatments.

Continues to breathe better.  She is on azithromycin, Rocephin, Depakote, DuoNeb,

Eliquis. She is feeling better and better as the case go on.



OBJECTIVE:

VITAL SIGNS: O2 of 94% on 2 L.  Blood pressure 103 to 116 over 60s to 70s, temp 97 to

98, pulse 80 to 90, respiratory rate 16 to 18.

CARDIOVASCULAR: S1, S2.

LUNGS: Transmitted breath sounds.

GI: Soft.

HEMATOLOGY: Negative Homans.

PSYCH: Fair mood and affect.



Cultures negative.



PLAN:

The patient is being treated with pneumonia and respiratory failure, possibly discharge

home.  She will go to rehab center in the next 2 or 3 days if she improves as she has

no place to go to live at this time.  She wants to get a job and keep working, but she

has nowhere to go currently.  People see her for her left lower quadrant abdominal pain

related to pneumonia and possible constipation. She is on regular diet.  Continue with

antibiotics.





MMODL / IJN: [id number] / Job#: [id number]

## 2024-10-25 VITALS
SYSTOLIC BLOOD PRESSURE: 130 MMHG | RESPIRATION RATE: 17 BRPM | HEART RATE: 109 BPM | TEMPERATURE: 96.4 F | DIASTOLIC BLOOD PRESSURE: 89 MMHG

## 2024-10-25 RX ADMIN — IPRATROPIUM BROMIDE AND ALBUTEROL SULFATE SCH: .5; 3 SOLUTION RESPIRATORY (INHALATION) at 09:42

## 2024-10-25 NOTE — DS
DISCHARGE SUMMARY



A 64-year-old white female came in with acute on chronic diastolic heart failure, acute

on chronic pancreatitis, acute kidney injury with dehydration, possible alcohol

withdrawal symptom, COPD, depression, weakness, fatigue, rehydrated, seen by

Cardiology.  Kidney function improved.  Cardiology cleared her for discharge.  She was

found to have aspiration pneumonia.  Pneumonia was treated with antibiotics for a full

week at least.  She will be able to go to the rehab center as she has generalized

weakness in her legs.  She remains on her breathing treatments 3 to 4 times a day, do

not keep this for long-term, do not stop that, and continue with physical therapy for

leg weakness and arm weakness.  Possibly, some nutrition shakes at home.



HOME MEDICINES:

Include:

1. DuoNeb q.i.d.

2. Bumex 1 mg daily.

3. Metoprolol 25 b.i.d.

4. Nitroglycerin sublingual p.r.n.

5. Protonix 40 daily.

6. Klonopin 0.5 b.i.d.

7. Singulair 10 daily, now 25 daily.

8. Colace 100 mg daily.

9. Cymbalta 60 mg daily.

10.Tramadol 75 mg every 6 hours p.r.n. for pain.

11.Cymbalta again 30 mg at night.

12.Depakote  b.i.d.

13.Colace for constipation, 100 mg daily.

14.Sertraline 50 mg q.a.m.

15.Tylenol p.r.n.

16.Zofran 4 mg p.o. q.6 p.r.n. for nausea.

17.Eliquis 5 mg b.i.d.

18.Lipitor 10 mg q.h.s.



CONDITION:

Stable.



PROGNOSIS:

Guarded.



Ambulate as tolerated.  She will follow up when she gets out of the nursing home.  She

is to go there for generalized weakness in her legs.  Continue breathing treatments

q.i.d.  Prognosis guarded.  Diet as tolerated.  Please see further orders.





MMODL / IJN: [id number] / Job#: [id number]